# Patient Record
Sex: MALE | Race: WHITE | NOT HISPANIC OR LATINO | Employment: FULL TIME | URBAN - METROPOLITAN AREA
[De-identification: names, ages, dates, MRNs, and addresses within clinical notes are randomized per-mention and may not be internally consistent; named-entity substitution may affect disease eponyms.]

---

## 2024-09-14 ENCOUNTER — HOSPITAL ENCOUNTER (OUTPATIENT)
Dept: LAB | Facility: MEDICAL CENTER | Age: 59
End: 2024-09-14
Attending: FAMILY MEDICINE
Payer: COMMERCIAL

## 2024-09-14 ENCOUNTER — HOSPITAL ENCOUNTER (OUTPATIENT)
Dept: RADIOLOGY | Facility: MEDICAL CENTER | Age: 59
End: 2024-09-14
Attending: FAMILY MEDICINE
Payer: COMMERCIAL

## 2024-09-14 DIAGNOSIS — R10.84 ABDOMINAL PAIN, GENERALIZED: ICD-10-CM

## 2024-09-14 LAB
ALBUMIN SERPL BCP-MCNC: 4.6 G/DL (ref 3.2–4.9)
ALBUMIN/GLOB SERPL: 1.8 G/DL
ALP SERPL-CCNC: 100 U/L (ref 30–99)
ALT SERPL-CCNC: 39 U/L (ref 2–50)
ANION GAP SERPL CALC-SCNC: 12 MMOL/L (ref 7–16)
AST SERPL-CCNC: 32 U/L (ref 12–45)
BASOPHILS # BLD AUTO: 0.5 % (ref 0–1.8)
BASOPHILS # BLD: 0.03 K/UL (ref 0–0.12)
BILIRUB SERPL-MCNC: 0.5 MG/DL (ref 0.1–1.5)
BUN SERPL-MCNC: 14 MG/DL (ref 8–22)
CALCIUM ALBUM COR SERPL-MCNC: 9.6 MG/DL (ref 8.5–10.5)
CALCIUM SERPL-MCNC: 10.1 MG/DL (ref 8.4–10.2)
CHLORIDE SERPL-SCNC: 103 MMOL/L (ref 96–112)
CHOLEST SERPL-MCNC: 154 MG/DL (ref 100–199)
CO2 SERPL-SCNC: 26 MMOL/L (ref 20–33)
CREAT SERPL-MCNC: 0.88 MG/DL (ref 0.5–1.4)
EOSINOPHIL # BLD AUTO: 0.17 K/UL (ref 0–0.51)
EOSINOPHIL NFR BLD: 2.8 % (ref 0–6.9)
ERYTHROCYTE [DISTWIDTH] IN BLOOD BY AUTOMATED COUNT: 39.3 FL (ref 35.9–50)
EST. AVERAGE GLUCOSE BLD GHB EST-MCNC: 117 MG/DL
FASTING STATUS PATIENT QL REPORTED: NORMAL
GFR SERPLBLD CREATININE-BSD FMLA CKD-EPI: 99 ML/MIN/1.73 M 2
GLOBULIN SER CALC-MCNC: 2.6 G/DL (ref 1.9–3.5)
GLUCOSE SERPL-MCNC: 104 MG/DL (ref 65–99)
HBA1C MFR BLD: 5.7 % (ref 4–5.6)
HCT VFR BLD AUTO: 42.3 % (ref 42–52)
HDLC SERPL-MCNC: 48 MG/DL
HGB BLD-MCNC: 14.8 G/DL (ref 14–18)
IMM GRANULOCYTES # BLD AUTO: 0.02 K/UL (ref 0–0.11)
IMM GRANULOCYTES NFR BLD AUTO: 0.3 % (ref 0–0.9)
LDLC SERPL CALC-MCNC: 92 MG/DL
LIPASE SERPL-CCNC: 18 U/L (ref 11–82)
LYMPHOCYTES # BLD AUTO: 1.68 K/UL (ref 1–4.8)
LYMPHOCYTES NFR BLD: 27.2 % (ref 22–41)
MCH RBC QN AUTO: 30.7 PG (ref 27–33)
MCHC RBC AUTO-ENTMCNC: 35 G/DL (ref 32.3–36.5)
MCV RBC AUTO: 87.8 FL (ref 81.4–97.8)
MONOCYTES # BLD AUTO: 0.52 K/UL (ref 0–0.85)
MONOCYTES NFR BLD AUTO: 8.4 % (ref 0–13.4)
NEUTROPHILS # BLD AUTO: 3.76 K/UL (ref 1.82–7.42)
NEUTROPHILS NFR BLD: 60.8 % (ref 44–72)
NRBC # BLD AUTO: 0 K/UL
NRBC BLD-RTO: 0 /100 WBC (ref 0–0.2)
PLATELET # BLD AUTO: 228 K/UL (ref 164–446)
PMV BLD AUTO: 10 FL (ref 9–12.9)
POTASSIUM SERPL-SCNC: 4.7 MMOL/L (ref 3.6–5.5)
PROT SERPL-MCNC: 7.2 G/DL (ref 6–8.2)
PSA SERPL-MCNC: 2.76 NG/ML (ref 0–4)
RBC # BLD AUTO: 4.82 M/UL (ref 4.7–6.1)
SODIUM SERPL-SCNC: 141 MMOL/L (ref 135–145)
TRIGL SERPL-MCNC: 70 MG/DL (ref 0–149)
WBC # BLD AUTO: 6.2 K/UL (ref 4.8–10.8)

## 2024-09-14 PROCEDURE — 85025 COMPLETE CBC W/AUTO DIFF WBC: CPT

## 2024-09-14 PROCEDURE — 80053 COMPREHEN METABOLIC PANEL: CPT

## 2024-09-14 PROCEDURE — 36415 COLL VENOUS BLD VENIPUNCTURE: CPT

## 2024-09-14 PROCEDURE — 83036 HEMOGLOBIN GLYCOSYLATED A1C: CPT

## 2024-09-14 PROCEDURE — 80061 LIPID PANEL: CPT

## 2024-09-14 PROCEDURE — 84153 ASSAY OF PSA TOTAL: CPT

## 2024-09-14 PROCEDURE — 83690 ASSAY OF LIPASE: CPT

## 2024-09-14 PROCEDURE — 74018 RADEX ABDOMEN 1 VIEW: CPT

## 2024-09-19 ENCOUNTER — HOSPITAL ENCOUNTER (EMERGENCY)
Facility: MEDICAL CENTER | Age: 59
End: 2024-09-19
Attending: EMERGENCY MEDICINE
Payer: COMMERCIAL

## 2024-09-19 ENCOUNTER — APPOINTMENT (OUTPATIENT)
Dept: RADIOLOGY | Facility: MEDICAL CENTER | Age: 59
End: 2024-09-19
Attending: EMERGENCY MEDICINE
Payer: COMMERCIAL

## 2024-09-19 VITALS
DIASTOLIC BLOOD PRESSURE: 78 MMHG | BODY MASS INDEX: 28.33 KG/M2 | RESPIRATION RATE: 18 BRPM | TEMPERATURE: 97.7 F | OXYGEN SATURATION: 98 % | HEART RATE: 88 BPM | HEIGHT: 68 IN | SYSTOLIC BLOOD PRESSURE: 147 MMHG | WEIGHT: 186.95 LBS

## 2024-09-19 DIAGNOSIS — R93.3 ABNORMAL CT SCAN, COLON: ICD-10-CM

## 2024-09-19 DIAGNOSIS — N20.1 URETERAL STONE: Primary | ICD-10-CM

## 2024-09-19 LAB
ALBUMIN SERPL BCP-MCNC: 4.6 G/DL (ref 3.2–4.9)
ALBUMIN/GLOB SERPL: 1.9 G/DL
ALP SERPL-CCNC: 104 U/L (ref 30–99)
ALT SERPL-CCNC: 37 U/L (ref 2–50)
ANION GAP SERPL CALC-SCNC: 12 MMOL/L (ref 7–16)
APPEARANCE UR: CLEAR
AST SERPL-CCNC: 32 U/L (ref 12–45)
BACTERIA #/AREA URNS HPF: NEGATIVE /HPF
BASOPHILS # BLD AUTO: 0.5 % (ref 0–1.8)
BASOPHILS # BLD: 0.04 K/UL (ref 0–0.12)
BILIRUB SERPL-MCNC: 0.3 MG/DL (ref 0.1–1.5)
BILIRUB UR QL STRIP.AUTO: NEGATIVE
BUN SERPL-MCNC: 19 MG/DL (ref 8–22)
CALCIUM ALBUM COR SERPL-MCNC: 9.5 MG/DL (ref 8.5–10.5)
CALCIUM SERPL-MCNC: 10 MG/DL (ref 8.4–10.2)
CHLORIDE SERPL-SCNC: 102 MMOL/L (ref 96–112)
CO2 SERPL-SCNC: 21 MMOL/L (ref 20–33)
COLOR UR: YELLOW
CREAT SERPL-MCNC: 0.89 MG/DL (ref 0.5–1.4)
EOSINOPHIL # BLD AUTO: 0.19 K/UL (ref 0–0.51)
EOSINOPHIL NFR BLD: 2.3 % (ref 0–6.9)
EPI CELLS #/AREA URNS HPF: NEGATIVE /HPF
ERYTHROCYTE [DISTWIDTH] IN BLOOD BY AUTOMATED COUNT: 41 FL (ref 35.9–50)
GFR SERPLBLD CREATININE-BSD FMLA CKD-EPI: 99 ML/MIN/1.73 M 2
GLOBULIN SER CALC-MCNC: 2.4 G/DL (ref 1.9–3.5)
GLUCOSE SERPL-MCNC: 134 MG/DL (ref 65–99)
GLUCOSE UR STRIP.AUTO-MCNC: NEGATIVE MG/DL
HCT VFR BLD AUTO: 44.5 % (ref 42–52)
HGB BLD-MCNC: 15.4 G/DL (ref 14–18)
IMM GRANULOCYTES # BLD AUTO: 0.02 K/UL (ref 0–0.11)
IMM GRANULOCYTES NFR BLD AUTO: 0.2 % (ref 0–0.9)
KETONES UR STRIP.AUTO-MCNC: NEGATIVE MG/DL
LEUKOCYTE ESTERASE UR QL STRIP.AUTO: NEGATIVE
LIPASE SERPL-CCNC: 25 U/L (ref 11–82)
LYMPHOCYTES # BLD AUTO: 2.06 K/UL (ref 1–4.8)
LYMPHOCYTES NFR BLD: 25.3 % (ref 22–41)
MCH RBC QN AUTO: 30.9 PG (ref 27–33)
MCHC RBC AUTO-ENTMCNC: 34.6 G/DL (ref 32.3–36.5)
MCV RBC AUTO: 89.2 FL (ref 81.4–97.8)
MICRO URNS: ABNORMAL
MONOCYTES # BLD AUTO: 0.75 K/UL (ref 0–0.85)
MONOCYTES NFR BLD AUTO: 9.2 % (ref 0–13.4)
NEUTROPHILS # BLD AUTO: 5.08 K/UL (ref 1.82–7.42)
NEUTROPHILS NFR BLD: 62.5 % (ref 44–72)
NITRITE UR QL STRIP.AUTO: NEGATIVE
NRBC # BLD AUTO: 0 K/UL
NRBC BLD-RTO: 0 /100 WBC (ref 0–0.2)
PH UR STRIP.AUTO: 6 [PH] (ref 5–8)
PLATELET # BLD AUTO: 227 K/UL (ref 164–446)
PMV BLD AUTO: 10.2 FL (ref 9–12.9)
POTASSIUM SERPL-SCNC: 4.8 MMOL/L (ref 3.6–5.5)
PROT SERPL-MCNC: 7 G/DL (ref 6–8.2)
PROT UR QL STRIP: NEGATIVE MG/DL
RBC # BLD AUTO: 4.99 M/UL (ref 4.7–6.1)
RBC # URNS HPF: ABNORMAL /HPF
RBC UR QL AUTO: ABNORMAL
SODIUM SERPL-SCNC: 135 MMOL/L (ref 135–145)
SP GR UR STRIP.AUTO: <=1.005
WBC # BLD AUTO: 8.1 K/UL (ref 4.8–10.8)
WBC #/AREA URNS HPF: ABNORMAL /HPF

## 2024-09-19 PROCEDURE — 80053 COMPREHEN METABOLIC PANEL: CPT

## 2024-09-19 PROCEDURE — 96375 TX/PRO/DX INJ NEW DRUG ADDON: CPT

## 2024-09-19 PROCEDURE — A9270 NON-COVERED ITEM OR SERVICE: HCPCS | Performed by: EMERGENCY MEDICINE

## 2024-09-19 PROCEDURE — 85025 COMPLETE CBC W/AUTO DIFF WBC: CPT

## 2024-09-19 PROCEDURE — 36415 COLL VENOUS BLD VENIPUNCTURE: CPT

## 2024-09-19 PROCEDURE — 700102 HCHG RX REV CODE 250 W/ 637 OVERRIDE(OP): Performed by: EMERGENCY MEDICINE

## 2024-09-19 PROCEDURE — 700117 HCHG RX CONTRAST REV CODE 255: Performed by: EMERGENCY MEDICINE

## 2024-09-19 PROCEDURE — 700111 HCHG RX REV CODE 636 W/ 250 OVERRIDE (IP): Mod: JZ | Performed by: EMERGENCY MEDICINE

## 2024-09-19 PROCEDURE — 74177 CT ABD & PELVIS W/CONTRAST: CPT

## 2024-09-19 PROCEDURE — 81001 URINALYSIS AUTO W/SCOPE: CPT

## 2024-09-19 PROCEDURE — 96374 THER/PROPH/DIAG INJ IV PUSH: CPT

## 2024-09-19 PROCEDURE — 99285 EMERGENCY DEPT VISIT HI MDM: CPT

## 2024-09-19 PROCEDURE — 83690 ASSAY OF LIPASE: CPT

## 2024-09-19 RX ORDER — TAMSULOSIN HYDROCHLORIDE 0.4 MG/1
0.4 CAPSULE ORAL DAILY
Qty: 30 CAPSULE | Refills: 0 | Status: SHIPPED | OUTPATIENT
Start: 2024-09-19

## 2024-09-19 RX ORDER — ONDANSETRON 2 MG/ML
4 INJECTION INTRAMUSCULAR; INTRAVENOUS ONCE
Status: COMPLETED | OUTPATIENT
Start: 2024-09-19 | End: 2024-09-19

## 2024-09-19 RX ORDER — MORPHINE SULFATE 15 MG/1
15 TABLET ORAL EVERY 6 HOURS PRN
Qty: 15 TABLET | Refills: 0 | Status: SHIPPED | OUTPATIENT
Start: 2024-09-19 | End: 2024-09-19

## 2024-09-19 RX ORDER — TAMSULOSIN HYDROCHLORIDE 0.4 MG/1
0.4 CAPSULE ORAL ONCE
Status: COMPLETED | OUTPATIENT
Start: 2024-09-19 | End: 2024-09-19

## 2024-09-19 RX ORDER — OXYCODONE AND ACETAMINOPHEN 5; 325 MG/1; MG/1
1 TABLET ORAL EVERY 4 HOURS PRN
Qty: 10 TABLET | Refills: 0 | Status: SHIPPED | OUTPATIENT
Start: 2024-09-19 | End: 2024-09-22

## 2024-09-19 RX ORDER — HYDROMORPHONE HYDROCHLORIDE 1 MG/ML
1 INJECTION, SOLUTION INTRAMUSCULAR; INTRAVENOUS; SUBCUTANEOUS ONCE
Status: COMPLETED | OUTPATIENT
Start: 2024-09-19 | End: 2024-09-19

## 2024-09-19 RX ADMIN — HYDROMORPHONE HYDROCHLORIDE 1 MG: 1 INJECTION, SOLUTION INTRAMUSCULAR; INTRAVENOUS; SUBCUTANEOUS at 03:15

## 2024-09-19 RX ADMIN — TAMSULOSIN HYDROCHLORIDE 0.4 MG: 0.4 CAPSULE ORAL at 04:27

## 2024-09-19 RX ADMIN — IOHEXOL 100 ML: 350 INJECTION, SOLUTION INTRAVENOUS at 03:36

## 2024-09-19 RX ADMIN — ONDANSETRON 4 MG: 2 INJECTION INTRAMUSCULAR; INTRAVENOUS at 03:15

## 2024-09-19 ASSESSMENT — PAIN DESCRIPTION - PAIN TYPE: TYPE: ACUTE PAIN

## 2024-09-19 ASSESSMENT — FIBROSIS 4 INDEX: FIB4 SCORE: 1.33

## 2024-09-19 NOTE — ED PROVIDER NOTES
"ER Provider Note    Scribed for Jim Williamson Ii, M.d. by Ruy Peck. 9/19/2024  2:50 AM    Primary Care Provider: Rickie Naranjo M.D.    CHIEF COMPLAINT   Chief Complaint   Patient presents with    Low Back Pain     Right sided back pain worse tonight possible kidney stone hx of this 4months ago    Abdominal Pain     Lower abd pain   Reports it started in July & has been having issues since then with his pcp   No GI referral pending scheduling a colonoscopy    Nausea     -Vomit     EXTERNAL RECORDS REVIEWED  No recent records    HPI/ROS  LIMITATION TO HISTORY   Select: : None  OUTSIDE HISTORIAN(S):  None    Booker Saucedo is a 59 y.o. male who presents to the ED for evaluation of right sided lower back  pain onset 10 PM tonight. He reports a history of kidney stones which he states resemble his current symptoms. He reports his last kidney stone was around 4 months ago which he passed at home. He reports associated nausea, but denies fever. The patient had a prescription for tramadol which ran out. He has been taking antibiotics. The patient has been seen by his primary care provider for diarrhea and constipation with associated abdominal pain stating he has not had a normal bowel movement since July.  He has a history of myocardial infarction.     PAST MEDICAL HISTORY  Reports history of kidney stones, myocardial infarction.     SURGICAL HISTORY  No past surgical history noted.    FAMILY HISTORY  No family history noted.    SOCIAL HISTORY  None noted.    CURRENT MEDICATIONS  Discharge Medication List as of 9/19/2024  4:29 AM        ALLERGIES  Patient has no allergy information on record.    PHYSICAL EXAM  BP (!) 175/90   Pulse 83   Temp 36.5 °C (97.7 °F) (Temporal)   Resp 18   Ht 1.727 m (5' 8\")   Wt 84.8 kg (186 lb 15.2 oz)   SpO2 98%   BMI 28.43 kg/m²   Physical Exam  Vitals and nursing note reviewed.   Constitutional:       Comments: 59 year old man appears to be in pain   HENT:      " Head: Normocephalic and atraumatic.      Nose: Nose normal.      Mouth/Throat:      Mouth: Mucous membranes are moist.   Eyes:      Extraocular Movements: Extraocular movements intact.      Pupils: Pupils are equal, round, and reactive to light.   Cardiovascular:      Rate and Rhythm: Normal rate and regular rhythm.   Pulmonary:      Effort: Pulmonary effort is normal.      Breath sounds: Normal breath sounds.   Abdominal:      Comments: Mild distension, tenderness at right lower quadrant   Musculoskeletal:         General: No swelling or tenderness. Normal range of motion.   Skin:     General: Skin is warm.   Neurological:      General: No focal deficit present.      Mental Status: He is alert.   Psychiatric:         Mood and Affect: Mood normal.     DIAGNOSTIC STUDIES    EKG/LABS  Results for orders placed or performed during the hospital encounter of 09/19/24   CBC WITH DIFFERENTIAL   Result Value Ref Range    WBC 8.1 4.8 - 10.8 K/uL    RBC 4.99 4.70 - 6.10 M/uL    Hemoglobin 15.4 14.0 - 18.0 g/dL    Hematocrit 44.5 42.0 - 52.0 %    MCV 89.2 81.4 - 97.8 fL    MCH 30.9 27.0 - 33.0 pg    MCHC 34.6 32.3 - 36.5 g/dL    RDW 41.0 35.9 - 50.0 fL    Platelet Count 227 164 - 446 K/uL    MPV 10.2 9.0 - 12.9 fL    Neutrophils-Polys 62.50 44.00 - 72.00 %    Lymphocytes 25.30 22.00 - 41.00 %    Monocytes 9.20 0.00 - 13.40 %    Eosinophils 2.30 0.00 - 6.90 %    Basophils 0.50 0.00 - 1.80 %    Immature Granulocytes 0.20 0.00 - 0.90 %    Nucleated RBC 0.00 0.00 - 0.20 /100 WBC    Neutrophils (Absolute) 5.08 1.82 - 7.42 K/uL    Lymphs (Absolute) 2.06 1.00 - 4.80 K/uL    Monos (Absolute) 0.75 0.00 - 0.85 K/uL    Eos (Absolute) 0.19 0.00 - 0.51 K/uL    Baso (Absolute) 0.04 0.00 - 0.12 K/uL    Immature Granulocytes (abs) 0.02 0.00 - 0.11 K/uL    NRBC (Absolute) 0.00 K/uL   COMP METABOLIC PANEL   Result Value Ref Range    Sodium 135 135 - 145 mmol/L    Potassium 4.8 3.6 - 5.5 mmol/L    Chloride 102 96 - 112 mmol/L    Co2 21 20 - 33  mmol/L    Anion Gap 12.0 7.0 - 16.0    Glucose 134 (H) 65 - 99 mg/dL    Bun 19 8 - 22 mg/dL    Creatinine 0.89 0.50 - 1.40 mg/dL    Calcium 10.0 8.4 - 10.2 mg/dL    Correct Calcium 9.5 8.5 - 10.5 mg/dL    AST(SGOT) 32 12 - 45 U/L    ALT(SGPT) 37 2 - 50 U/L    Alkaline Phosphatase 104 (H) 30 - 99 U/L    Total Bilirubin 0.3 0.1 - 1.5 mg/dL    Albumin 4.6 3.2 - 4.9 g/dL    Total Protein 7.0 6.0 - 8.2 g/dL    Globulin 2.4 1.9 - 3.5 g/dL    A-G Ratio 1.9 g/dL   URINALYSIS (UA)    Specimen: Urine   Result Value Ref Range    Color Yellow     Character Clear     Specific Gravity <=1.005 <1.035    Ph 6.0 5.0 - 8.0    Glucose Negative Negative mg/dL    Ketones Negative Negative mg/dL    Protein Negative Negative mg/dL    Bilirubin Negative Negative    Nitrite Negative Negative    Leukocyte Esterase Negative Negative    Occult Blood Trace (A) Negative    Micro Urine Req Microscopic    LIPASE   Result Value Ref Range    Lipase 25 11 - 82 U/L   URINE MICROSCOPIC (W/UA)   Result Value Ref Range    WBC 0-2 (A) /hpf    RBC 0-2 (A) /hpf    Bacteria Negative None /hpf    Epithelial Cells Negative Few /hpf   ESTIMATED GFR   Result Value Ref Range    GFR (CKD-EPI) 99 >60 mL/min/1.73 m 2      I have independently interpreted this EKG    RADIOLOGY/PROCEDURES   The attending emergency physician has independently interpreted the diagnostic imaging associated with this visit and am waiting the final reading from the radiologist.   My preliminary interpretation is a follows: Right hydronephrosis    Radiologist interpretation:  CT-ABDOMEN-PELVIS WITH   Final Result      1.  Mild right hydronephrosis and hydroureter appear to be caused by obstructing 1 mm calculus at the right UVJ.      2.  No evidence of left-sided hydronephrosis.      3.  Rectal wall thickening appears to be present. Differential diagnosis includes proctitis colitis and carcinoma. Mildly enlarged perirectal lymph node is also noted.        COURSE & MEDICAL DECISION MAKING      ASSESSMENT, COURSE AND PLAN  Care Narrative:     2:54 AM - This is an evaluation of a 59 y.o. man who presents to the ED with lower abdominal pain radiating to the lower back onset tonight. He notes a history of kidney stones which his current symptoms resemble. He has also had fluctuating diarrhea, constipation and abdominal pain since July that has been unexplained. Plan for labs cbc, cmp, lipase, UA and CT abdomen pelvis. Pain could be renal stone, constipation, bowel obstruction, muscle strain.  He also has concerns about possible malignancy given his difficulties with stools and rectal pain.  He just finished a course of ciprofloxacin and Flagyl treating for possible colitis.  He has not had any recent imaging and his primary is trying to arrange for him to get a colonoscopy.    4:09 AM - Patient was reevaluated at bedside. Discussed lab and radiology results with the patient and informed them of findings significant for 1 mm right ureteral stone. There is also thickening of the rectum that could be colitis or carcinoma. I informed the patient of my plan for discharge including referrals to GI.  For his problems with fluctuating diarrhea and constipation I ecommended increased fiber intake.  He does need to have a colonoscopy to further investigate the abnormality seen on CT.  I did make an urgent referral to GI and gave him our on-call GI specialist contact information Dr. More.  He will also be prescribed Flomax for ureteral stone and medication for pain.  His pain has improved and I think it is likely that the stone will be passed soon.  I was agreeable to writing a prescription for pain medication that is for the kidney stone in the rectal pain he has been experiencing.  I did caution him that this could cause worsening constipation and to only take the medication as needed.  The patient was given strict return precautions for any new or worsening symptoms. Patient verbalizes understanding and  agreement to this plan of care.       PROBLEM LIST  #Right ureteral stone with hydronephrosis    #CT shows abnormality at rectum   -needs close follow up, colonoscopy with GI    DISPOSITION AND DISCUSSIONS  I have discussed management of the patient with the following physicians and MARTHA's:  None    Discussion of management with other HP or appropriate source(s): None         Barriers to care at this time, including but not limited to:  None known at this time .     Decision tools and prescription drugs considered including, but not limited to: Pain Medications Morphine 15 mg PO .    The patient will return for new or worsening symptoms and is stable at the time of discharge.    I reviewed prescription monitoring program for patient's narcotic use before prescribing a scheduled drug.The patient will not drink alcohol nor drive with prescribed medications. The patient will return for new or worsening symptoms and is stable at the time of discharge.    The patient is referred to a primary physician for blood pressure management, diabetic screening, and for all other preventative health concerns.    In prescribing controlled substances to this patient, I certify that I have obtained and reviewed the medical history of Booker Saucedo. I have also made a good abraham effort to obtain applicable records from other providers who have treated the patient and records did not demonstrate any increased risk of substance abuse that would prevent me from prescribing controlled substances.     I have conducted a physical exam and documented it. I have reviewed Mr. Saucedo’s prescription history as maintained by the [place] Prescription Monitoring Program.     I have assessed the patient’s risk for abuse, dependency, and addiction using the validated Opioid Risk Tool available at https://www.mdcalc.com/qhusas-foqm-mvta-ort-narcotic-abuse.     Given the above, I believe the benefits of controlled substance therapy outweigh the  risks. The reasons for prescribing controlled substances include non-narcotic, oral analgesic alternatives have been inadequate for pain control. Accordingly, I have discussed the risk and benefits, treatment plan, and alternative therapies with the patient.     DISPOSITION:  Patient will be discharged home in stable condition.    FOLLOW UP:  Pedrito More M.D.  [address]  [address]  [place] [id number] [phone]    Schedule an appointment as soon as possible for a visit   Call in 1 day to make an appointment for abnormal findings on CT    [facility] [place] [id number]  [place] [address]  [phone]      for kidney stones    [facility], Emergency Dept  [address]  [place] [address] [phone]    fever, frequent bloody stools    OUTPATIENT MEDICATIONS:  Discharge Medication List as of 9/19/2024  4:29 AM        START taking these medications    Details   morphine (MS IR) 15 MG tablet Take 1 Tablet by mouth every 6 hours as needed for Severe Pain for up to 10 days., Disp-15 Tablet, R-0, Normal      tamsulosin (FLOMAX) 0.4 MG capsule Take 1 Capsule by mouth every day., Disp-30 Capsule, R-0, Normal             FINAL DIAGNOSIS  1. Ureteral stone    2. Abnormal CT scan, colon      Ruy MCDONALD (Scribfarzaneh), am scribing for, and in the presence of, LUANN Eid II.    Electronically signed by: Ruy Peck (Scribe), 9/19/2024    Jim MCDONALD II, M* personally performed the services described in this documentation, as scribed by Ruy Peck in my presence, and it is both accurate and complete.     Dictation #1  MRN:[id number]  CSN:[id number] The note accurately reflects work and decisions made by me.  Jmi Williamson II, M.D.  9/19/2024  5:24 AM

## 2024-09-19 NOTE — PROGRESS NOTES
Md at bedside discussing findings and followup instuctions, pt medicated prior to discharge, pt understands discharge instructions and followup. Ambulated to exit without assist

## 2024-09-19 NOTE — PROGRESS NOTES
Progress Note    Date of Service: 09/19/24    Received a phone call from [facility] pharmacy.  They do not have morphine in stock.  They carry Norco and Percocet.  The maximum number of Percocet that they will give at 1 time is 10 tablets.  This does not seem like an unreasonable amount for pain associated with kidney stone in a patient who is not narcotic naïve.  Database was reviewed.  Patient was already consented.  I have written a new prescription and canceled morphine prescription.    Electronically signed by: Nikolas Lyon M.D., 9/19/2024 9:10 AM

## 2024-09-19 NOTE — ED NOTES
PT states he is experiencing abdominal and lower back pain all lower quadrants. Has been experiencing this since July last night is when it peaked in pain. PT states he has constipation and diarrhea and has a history of kidney stones.

## 2024-09-19 NOTE — DISCHARGE INSTRUCTIONS
CT-ABDOMEN-PELVIS WITH (Final result)  Result time 09/19/24 03:45:32  Final result                Impression:      1.  Mild right hydronephrosis and hydroureter appear to be caused by obstructing 1 mm calculus at the right UVJ.    2.  No evidence of left-sided hydronephrosis.    3.  Rectal wall thickening appears to be present. Differential diagnosis includes proctitis colitis and carcinoma. Mildly enlarged perirectal lymph node is also noted.            Narrative:      9/19/2024 3:07 AM    HISTORY/REASON FOR EXAM:  Right lower quadrant pain and right flank pain.      TECHNIQUE/EXAM DESCRIPTION:   CT scan of the abdomen and pelvis with contrast.    Contrast-enhanced helical scanning was obtained from the diaphragmatic domes through the pubic symphysis following the bolus administration of nonionic contrast without complication.    100 mL of Omnipaque 350 nonionic contrast was administered without complication.    Low dose optimization technique was utilized for this CT exam including automated exposure control and adjustment of the mA and/or kV according to patient size.    COMPARISON: No prior studies available.    FINDINGS:  Lower Chest: Unremarkable.    Liver: Normal.    Spleen: Unremarkable.    Pancreas: Unremarkable.    Gallbladder: No calcified stones.    Biliary: Nondilated.    Adrenal glands: Normal.    Kidneys: There is mild right hydronephrosis and hydroureter. Obstructing 1 mm calculus is noted at the right UVJ. Right-sided perirenal and periureteral stranding is identified..    Bowel: Rectal wall thickening appears to be present. Mild stranding is noted perirectal fat..    Lymph nodes: 7 mm perirectal lymph node.    Vasculature: Unremarkable.    Peritoneum: Unremarkable without ascites.    Musculoskeletal: No acute or destructive process.    Pelvis: No adenopathy or free fluid.

## 2024-09-19 NOTE — ED TRIAGE NOTES
Chief Complaint   Patient presents with    Low Back Pain     Right sided back pain worse tonight possible kidney stone hx of this 4months ago    Abdominal Pain     Lower abd pain   Reports it started in July & has been having issues since then with his pcp   No GI referral pending scheduling a colonoscopy    Nausea     -Vomit

## 2024-09-25 ENCOUNTER — APPOINTMENT (OUTPATIENT)
Dept: RADIOLOGY | Facility: MEDICAL CENTER | Age: 59
End: 2024-09-25
Attending: EMERGENCY MEDICINE
Payer: COMMERCIAL

## 2024-09-25 ENCOUNTER — HOSPITAL ENCOUNTER (EMERGENCY)
Facility: MEDICAL CENTER | Age: 59
End: 2024-09-26
Attending: EMERGENCY MEDICINE
Payer: COMMERCIAL

## 2024-09-25 DIAGNOSIS — N23 RENAL COLIC: ICD-10-CM

## 2024-09-25 DIAGNOSIS — K59.00 CONSTIPATION, UNSPECIFIED CONSTIPATION TYPE: ICD-10-CM

## 2024-09-25 LAB
ALBUMIN SERPL BCP-MCNC: 4.6 G/DL (ref 3.2–4.9)
ALBUMIN/GLOB SERPL: 1.7 G/DL
ALP SERPL-CCNC: 90 U/L (ref 30–99)
ALT SERPL-CCNC: 44 U/L (ref 2–50)
ANION GAP SERPL CALC-SCNC: 16 MMOL/L (ref 7–16)
APPEARANCE UR: CLEAR
APTT PPP: 29.6 SEC (ref 24.7–36)
AST SERPL-CCNC: 34 U/L (ref 12–45)
BACTERIA #/AREA URNS HPF: NEGATIVE /HPF
BASOPHILS # BLD AUTO: 0.3 % (ref 0–1.8)
BASOPHILS # BLD: 0.02 K/UL (ref 0–0.12)
BILIRUB SERPL-MCNC: 0.6 MG/DL (ref 0.1–1.5)
BILIRUB UR QL STRIP.AUTO: NEGATIVE
BUN SERPL-MCNC: 12 MG/DL (ref 8–22)
CALCIUM ALBUM COR SERPL-MCNC: 9.7 MG/DL (ref 8.5–10.5)
CALCIUM SERPL-MCNC: 10.2 MG/DL (ref 8.4–10.2)
CHLORIDE SERPL-SCNC: 96 MMOL/L (ref 96–112)
CO2 SERPL-SCNC: 23 MMOL/L (ref 20–33)
COLOR UR: YELLOW
CREAT SERPL-MCNC: 0.83 MG/DL (ref 0.5–1.4)
EOSINOPHIL # BLD AUTO: 0.09 K/UL (ref 0–0.51)
EOSINOPHIL NFR BLD: 1.3 % (ref 0–6.9)
EPI CELLS #/AREA URNS HPF: NEGATIVE /HPF
ERYTHROCYTE [DISTWIDTH] IN BLOOD BY AUTOMATED COUNT: 38.2 FL (ref 35.9–50)
GFR SERPLBLD CREATININE-BSD FMLA CKD-EPI: 101 ML/MIN/1.73 M 2
GLOBULIN SER CALC-MCNC: 2.7 G/DL (ref 1.9–3.5)
GLUCOSE SERPL-MCNC: 87 MG/DL (ref 65–99)
GLUCOSE UR STRIP.AUTO-MCNC: NEGATIVE MG/DL
HCT VFR BLD AUTO: 43.2 % (ref 42–52)
HGB BLD-MCNC: 15.1 G/DL (ref 14–18)
HYALINE CASTS #/AREA URNS LPF: ABNORMAL /LPF
IMM GRANULOCYTES # BLD AUTO: 0.01 K/UL (ref 0–0.11)
IMM GRANULOCYTES NFR BLD AUTO: 0.1 % (ref 0–0.9)
INR PPP: 1.04 (ref 0.87–1.13)
KETONES UR STRIP.AUTO-MCNC: 40 MG/DL
LEUKOCYTE ESTERASE UR QL STRIP.AUTO: NEGATIVE
LIPASE SERPL-CCNC: 9 U/L (ref 11–82)
LYMPHOCYTES # BLD AUTO: 1.56 K/UL (ref 1–4.8)
LYMPHOCYTES NFR BLD: 21.9 % (ref 22–41)
MCH RBC QN AUTO: 30.8 PG (ref 27–33)
MCHC RBC AUTO-ENTMCNC: 35 G/DL (ref 32.3–36.5)
MCV RBC AUTO: 88.2 FL (ref 81.4–97.8)
MICRO URNS: ABNORMAL
MONOCYTES # BLD AUTO: 0.61 K/UL (ref 0–0.85)
MONOCYTES NFR BLD AUTO: 8.6 % (ref 0–13.4)
NEUTROPHILS # BLD AUTO: 4.82 K/UL (ref 1.82–7.42)
NEUTROPHILS NFR BLD: 67.8 % (ref 44–72)
NITRITE UR QL STRIP.AUTO: NEGATIVE
NRBC # BLD AUTO: 0 K/UL
NRBC BLD-RTO: 0 /100 WBC (ref 0–0.2)
PH UR STRIP.AUTO: 6 [PH] (ref 5–8)
PLATELET # BLD AUTO: 247 K/UL (ref 164–446)
PMV BLD AUTO: 9.3 FL (ref 9–12.9)
POTASSIUM SERPL-SCNC: 4.3 MMOL/L (ref 3.6–5.5)
PROT SERPL-MCNC: 7.3 G/DL (ref 6–8.2)
PROT UR QL STRIP: NEGATIVE MG/DL
PROTHROMBIN TIME: 13.9 SEC (ref 12–14.6)
RBC # BLD AUTO: 4.9 M/UL (ref 4.7–6.1)
RBC # URNS HPF: ABNORMAL /HPF
RBC UR QL AUTO: ABNORMAL
SODIUM SERPL-SCNC: 135 MMOL/L (ref 135–145)
SP GR UR STRIP.AUTO: 1.01
WBC # BLD AUTO: 7.1 K/UL (ref 4.8–10.8)
WBC #/AREA URNS HPF: ABNORMAL /HPF

## 2024-09-25 PROCEDURE — 36415 COLL VENOUS BLD VENIPUNCTURE: CPT

## 2024-09-25 PROCEDURE — 96375 TX/PRO/DX INJ NEW DRUG ADDON: CPT

## 2024-09-25 PROCEDURE — 700111 HCHG RX REV CODE 636 W/ 250 OVERRIDE (IP): Mod: JZ | Performed by: EMERGENCY MEDICINE

## 2024-09-25 PROCEDURE — 81001 URINALYSIS AUTO W/SCOPE: CPT

## 2024-09-25 PROCEDURE — 85730 THROMBOPLASTIN TIME PARTIAL: CPT

## 2024-09-25 PROCEDURE — 99285 EMERGENCY DEPT VISIT HI MDM: CPT

## 2024-09-25 PROCEDURE — 83690 ASSAY OF LIPASE: CPT

## 2024-09-25 PROCEDURE — 96374 THER/PROPH/DIAG INJ IV PUSH: CPT

## 2024-09-25 PROCEDURE — 85025 COMPLETE CBC W/AUTO DIFF WBC: CPT

## 2024-09-25 PROCEDURE — 80053 COMPREHEN METABOLIC PANEL: CPT

## 2024-09-25 PROCEDURE — 85610 PROTHROMBIN TIME: CPT

## 2024-09-25 RX ORDER — ONDANSETRON 2 MG/ML
4 INJECTION INTRAMUSCULAR; INTRAVENOUS ONCE
Status: COMPLETED | OUTPATIENT
Start: 2024-09-26 | End: 2024-09-25

## 2024-09-25 RX ORDER — MORPHINE SULFATE 4 MG/ML
4 INJECTION INTRAVENOUS ONCE
Status: COMPLETED | OUTPATIENT
Start: 2024-09-26 | End: 2024-09-25

## 2024-09-25 RX ORDER — SODIUM PHOSPHATE,MONO-DIBASIC 19G-7G/118
1 ENEMA (ML) RECTAL ONCE
Status: COMPLETED | OUTPATIENT
Start: 2024-09-26 | End: 2024-09-26

## 2024-09-25 RX ADMIN — ONDANSETRON 4 MG: 2 INJECTION INTRAMUSCULAR; INTRAVENOUS at 23:59

## 2024-09-25 RX ADMIN — MORPHINE SULFATE 4 MG: 4 INJECTION, SOLUTION INTRAMUSCULAR; INTRAVENOUS at 23:59

## 2024-09-25 ASSESSMENT — FIBROSIS 4 INDEX: FIB4 SCORE: 1.37

## 2024-09-26 VITALS
TEMPERATURE: 98.6 F | SYSTOLIC BLOOD PRESSURE: 149 MMHG | DIASTOLIC BLOOD PRESSURE: 94 MMHG | OXYGEN SATURATION: 98 % | HEIGHT: 68 IN | BODY MASS INDEX: 27.57 KG/M2 | WEIGHT: 181.88 LBS | RESPIRATION RATE: 22 BRPM | HEART RATE: 73 BPM

## 2024-09-26 PROCEDURE — 700101 HCHG RX REV CODE 250: Performed by: EMERGENCY MEDICINE

## 2024-09-26 PROCEDURE — 74177 CT ABD & PELVIS W/CONTRAST: CPT

## 2024-09-26 PROCEDURE — 700117 HCHG RX CONTRAST REV CODE 255: Performed by: EMERGENCY MEDICINE

## 2024-09-26 RX ORDER — SODIUM PHOSPHATE,MONO-DIBASIC 19G-7G/118
1 ENEMA (ML) RECTAL ONCE
Status: DISCONTINUED | OUTPATIENT
Start: 2024-09-26 | End: 2024-09-26

## 2024-09-26 RX ADMIN — IOHEXOL 100 ML: 350 INJECTION, SOLUTION INTRAVENOUS at 00:19

## 2024-09-26 RX ADMIN — SODIUM PHOSPHATE 1 ENEMA: 7; 19 ENEMA RECTAL at 00:01

## 2024-09-26 NOTE — ED NOTES
Pt. Verbalizes understanding of discharge instructions.  Pt. Alert/awake in NAD.  All questions answered and understood. Advised to call GI about visit in ER.

## 2024-09-26 NOTE — ED PROVIDER NOTES
"ER Provider Note    Scribed for Dr. Gigi Lopes M.D. by Poonam Traore. 9/25/2024  11:27 PM    Primary Care Provider: Rickie Naranjo M.D.    CHIEF COMPLAINT  Chief Complaint   Patient presents with    Abdominal Pain     Pt has having abdominal pain like 8/10 cramping possible bowel obstruction. Pt has a scheduled  for colonoscopy tomorrow and drinking the premedication before procedure but no bowel movement with little amount of stool coming out. Pt hx hypertension,  kidney stones and CAD with stent on aspirin.       EXTERNAL RECORDS REVIEWED  Outpatient Notes Patient was seen on 9/19 and found to have thickening of the rectum and a ureteral stone. He is prepping for a colonoscopy tonight.    HPI/ROS    Booker Saucedo is a 59 y.o. male who presents to the ED for abdominal pain onset one week ago. The patient describes his pain as pressure, cramping, and localized to his lower pelvis. He notes he is scheduled for a colonoscopy tomorrow morning and has been drinking the premedication, however he has only been able to pass very small, \"mushy\" bowel movements. The patient has a history of kidney stones and recent ureteral stone.    PAST MEDICAL HISTORY  History reviewed. No pertinent past medical history.    SURGICAL HISTORY  History reviewed. No pertinent surgical history.    FAMILY HISTORY  History reviewed. No pertinent family history.    SOCIAL HISTORY   reports that he has never smoked. He has never used smokeless tobacco. He reports that he does not drink alcohol and does not use drugs.    CURRENT MEDICATIONS  Previous Medications    TAMSULOSIN (FLOMAX) 0.4 MG CAPSULE    Take 1 Capsule by mouth every day.       ALLERGIES  Patient has no known allergies.    PHYSICAL EXAM  BP (!) 144/83   Pulse 79   Temp 37 °C (98.6 °F) (Tympanic)   Resp (!) 22   Ht 1.727 m (5' 8\")   Wt 82.5 kg (181 lb 14.1 oz)   BMI 27.65 kg/m²   Constitutional: Alert in no apparent distress.  HENT: No signs of trauma, " Bilateral external ears normal, Nose normal.   Eyes: Pupils are equal and reactive, Conjunctiva normal, Non-icteric.   Neck: Normal range of motion, No tenderness, Supple,   Cardiovascular: Regular rate and rhythm, no murmurs.   Thorax & Lungs: Normal breath sounds, No respiratory distress, No wheezing, No chest tenderness.   Abdomen: Bowel sounds diminished but present, Soft, Tenderness throughout.  Skin: Warm, Dry, No erythema, No rash.   Back: No bony tenderness, No CVA tenderness.   Extremities: No edema, No tenderness, No cyanosis.  Neurologic: Awake and alert.  Psychiatric: Affect normal     DIAGNOSTIC STUDIES & PROCEDURES    Labs:   Labs Reviewed   CBC WITH DIFFERENTIAL - Abnormal; Notable for the following components:       Result Value    Lymphocytes 21.90 (*)     All other components within normal limits   LIPASE - Abnormal; Notable for the following components:    Lipase 9 (*)     All other components within normal limits   URINALYSIS - Abnormal; Notable for the following components:    Ketones 40 (*)     Occult Blood Trace (*)     All other components within normal limits   URINE MICROSCOPIC (W/UA) - Abnormal; Notable for the following components:    WBC 0-2 (*)     RBC 0-2 (*)     All other components within normal limits   COMP METABOLIC PANEL   PROTHROMBIN TIME   APTT   ESTIMATED GFR      All labs reviewed by me.    Radiology:   The attending Emergency Physician has independently interpreted the diagnostic imaging associated with this visit and is awaiting the final reading from the radiologist, which will be displayed below.    Preliminary interpretation is a follows: Stool noted in the patient's distal colon  Radiologist interpretation:     CT-ABDOMEN-PELVIS WITH   Final Result         1.  1.0 mm right ureterovesicular junction stone resulting in right urinary outflow tract changes   2.  Mild fluid-filled prominence of small bowel and colon, consider component of enterocolitis.   3.  Atherosclerosis  and atherosclerotic coronary artery disease           COURSE & MEDICAL DECISION MAKING    ED Observation Status? No; Patient does not meet criteria for ED Observation.     INITIAL ASSESSMENT AND PLAN  Care Narrative:       11:27 PM - Patient seen and evaluated at bedside. He presents with lower pelvic pain and difficulty passing stools. Discussed plan of care, including labs and imaging to evaluate. Patient agrees to plan of care. Ordered CT-Abdomen-Pelvis w/, UA w/ microscopic, Lipase, CMP, CBC w/ diff, PT/INR, and PTT to evaluate.        ADDITIONAL PROBLEM LIST AND DISPOSITION  Patient with some symptoms of constipation.  Ultimately if he was given in the patient feeling much improved.  CT scan does show a 1 mm right UVJ stone.  This was known to the patient.  Lipase is low.  No concerning infection in the urine.  Patient feeling improved.  Will follow-up colonoscopy tomorrow.  Will give strict return precautions and follow-up.               DISPOSITION AND DISCUSSIONS  I have discussed management of the patient with the following physicians and MARTHA's: none    Discussion of management with other QHP or appropriate source(s): None     Escalation of care considered, and ultimately not performed: acute inpatient care management, however at this time, the patient is most appropriate for outpatient management.    Barriers to care at this time, including but not limited to:  None known at this time .     Decision tools and prescription drugs considered including, but not limited to: Pain Medications as needed over-the-counter .        FINAL IMPRESSION   1. Renal colic    2. Constipation, unspecified constipation type         I, Poonam Traore (Tyra), am scribing for, and in the presence of, Gigi Lopes M.D..    Electronically signed by: Poonam Graves), 9/25/2024    IGigi M.D. personally performed the services described in this documentation, as scribed by Poonam Traore in my presence,  and it is both accurate and complete.    The note accurately reflects work and decisions made by me.  Gigi Lopes M.D.  9/26/2024  12:51 AM

## 2024-09-27 ENCOUNTER — HOSPITAL ENCOUNTER (OUTPATIENT)
Dept: RADIOLOGY | Facility: MEDICAL CENTER | Age: 59
End: 2024-09-27
Attending: SURGERY
Payer: COMMERCIAL

## 2024-09-27 DIAGNOSIS — R19.00 INTRA-ABDOMINAL AND PELVIC SWELLING, MASS AND LUMP, UNSPECIFIED SITE: ICD-10-CM

## 2024-09-27 PROCEDURE — 700117 HCHG RX CONTRAST REV CODE 255: Performed by: SURGERY

## 2024-09-27 PROCEDURE — 71260 CT THORAX DX C+: CPT

## 2024-09-27 RX ADMIN — IOHEXOL 100 ML: 350 INJECTION, SOLUTION INTRAVENOUS at 18:05

## 2024-09-27 RX ADMIN — IOHEXOL 25 ML: 240 INJECTION, SOLUTION INTRATHECAL; INTRAVASCULAR; INTRAVENOUS; ORAL at 18:17

## 2024-10-02 ENCOUNTER — HOSPITAL ENCOUNTER (OUTPATIENT)
Dept: RADIATION ONCOLOGY | Facility: MEDICAL CENTER | Age: 59
End: 2024-10-02
Attending: RADIOLOGY
Payer: COMMERCIAL

## 2024-10-02 ENCOUNTER — PATIENT MESSAGE (OUTPATIENT)
Dept: ONCOLOGY | Facility: MEDICAL CENTER | Age: 59
End: 2024-10-02

## 2024-10-02 ENCOUNTER — HOSPITAL ENCOUNTER (OUTPATIENT)
Dept: RADIOLOGY | Facility: MEDICAL CENTER | Age: 59
End: 2024-10-02
Attending: SURGERY
Payer: COMMERCIAL

## 2024-10-02 VITALS
DIASTOLIC BLOOD PRESSURE: 83 MMHG | SYSTOLIC BLOOD PRESSURE: 135 MMHG | RESPIRATION RATE: 17 BRPM | WEIGHT: 177.69 LBS | HEART RATE: 76 BPM | BODY MASS INDEX: 26.93 KG/M2 | TEMPERATURE: 97.8 F | OXYGEN SATURATION: 99 % | HEIGHT: 68 IN

## 2024-10-02 DIAGNOSIS — R19.00 INTRA-ABDOMINAL AND PELVIC SWELLING, MASS AND LUMP, UNSPECIFIED SITE: ICD-10-CM

## 2024-10-02 DIAGNOSIS — C20 RECTAL CANCER (HCC): ICD-10-CM

## 2024-10-02 PROCEDURE — 99205 OFFICE O/P NEW HI 60 MIN: CPT | Performed by: RADIOLOGY

## 2024-10-02 PROCEDURE — A9579 GAD-BASE MR CONTRAST NOS,1ML: HCPCS | Mod: JZ | Performed by: SURGERY

## 2024-10-02 PROCEDURE — 700117 HCHG RX CONTRAST REV CODE 255: Mod: JZ | Performed by: SURGERY

## 2024-10-02 PROCEDURE — 700111 HCHG RX REV CODE 636 W/ 250 OVERRIDE (IP): Mod: JZ | Performed by: RADIOLOGY

## 2024-10-02 PROCEDURE — 72197 MRI PELVIS W/O & W/DYE: CPT

## 2024-10-02 PROCEDURE — 99214 OFFICE O/P EST MOD 30 MIN: CPT | Performed by: RADIOLOGY

## 2024-10-02 RX ORDER — TRAMADOL HYDROCHLORIDE 100 MG/1
100 TABLET, COATED ORAL 2 TIMES DAILY PRN
COMMUNITY
Start: 2024-09-18 | End: 2024-10-23

## 2024-10-02 RX ADMIN — GADOTERIDOL 20 ML: 279.3 INJECTION, SOLUTION INTRAVENOUS at 15:33

## 2024-10-02 RX ADMIN — GLUCAGON 1 MG: 1 INJECTION, POWDER, LYOPHILIZED, FOR SOLUTION INTRAMUSCULAR; INTRAVENOUS at 14:50

## 2024-10-02 ASSESSMENT — LIFESTYLE VARIABLES
SMOKING_STATUS: NO
TOBACCO_USE: NO

## 2024-10-02 ASSESSMENT — PAIN SCALES - GENERAL: PAINLEVEL: 4=SLIGHT-MODERATE PAIN

## 2024-10-02 ASSESSMENT — FIBROSIS 4 INDEX: FIB4 SCORE: 1.22

## 2024-10-03 ENCOUNTER — PATIENT OUTREACH (OUTPATIENT)
Dept: ONCOLOGY | Facility: MEDICAL CENTER | Age: 59
End: 2024-10-03
Payer: COMMERCIAL

## 2024-10-04 ENCOUNTER — HOSPITAL ENCOUNTER (OUTPATIENT)
Dept: LAB | Facility: MEDICAL CENTER | Age: 59
End: 2024-10-04
Attending: SURGERY
Payer: COMMERCIAL

## 2024-10-04 LAB — CEA SERPL-MCNC: 2.5 NG/ML (ref 0–3)

## 2024-10-04 PROCEDURE — 82378 CARCINOEMBRYONIC ANTIGEN: CPT

## 2024-10-04 PROCEDURE — 36415 COLL VENOUS BLD VENIPUNCTURE: CPT

## 2024-10-09 ENCOUNTER — HOSPITAL ENCOUNTER (OUTPATIENT)
Dept: HEMATOLOGY ONCOLOGY | Facility: MEDICAL CENTER | Age: 59
End: 2024-10-09
Attending: STUDENT IN AN ORGANIZED HEALTH CARE EDUCATION/TRAINING PROGRAM
Payer: COMMERCIAL

## 2024-10-09 ENCOUNTER — APPOINTMENT (OUTPATIENT)
Dept: RADIOLOGY | Facility: MEDICAL CENTER | Age: 59
End: 2024-10-09
Attending: RADIOLOGY
Payer: COMMERCIAL

## 2024-10-09 DIAGNOSIS — Z79.899 ENCOUNTER FOR LONG-TERM (CURRENT) USE OF HIGH-RISK MEDICATION: ICD-10-CM

## 2024-10-09 DIAGNOSIS — C20 RECTAL CANCER (HCC): ICD-10-CM

## 2024-10-09 LAB — GLUCOSE BLD-MCNC: 84 MG/DL (ref 65–99)

## 2024-10-09 PROCEDURE — A9579 GAD-BASE MR CONTRAST NOS,1ML: HCPCS | Mod: JZ | Performed by: RADIOLOGY

## 2024-10-09 PROCEDURE — A9552 F18 FDG: HCPCS

## 2024-10-09 PROCEDURE — 74183 MRI ABD W/O CNTR FLWD CNTR: CPT

## 2024-10-09 PROCEDURE — 99214 OFFICE O/P EST MOD 30 MIN: CPT

## 2024-10-09 PROCEDURE — 700117 HCHG RX CONTRAST REV CODE 255: Mod: JZ | Performed by: RADIOLOGY

## 2024-10-09 RX ORDER — LOPERAMIDE HYDROCHLORIDE 2 MG/1
2 TABLET ORAL SEE ADMIN INSTRUCTIONS
Qty: 30 TABLET | Refills: 6 | Status: SHIPPED | OUTPATIENT
Start: 2024-10-12

## 2024-10-09 RX ORDER — PROCHLORPERAZINE MALEATE 10 MG
10 TABLET ORAL EVERY 6 HOURS PRN
Qty: 30 TABLET | Refills: 6 | Status: SHIPPED | OUTPATIENT
Start: 2024-10-12

## 2024-10-09 RX ORDER — OLANZAPINE 5 MG/1
5 TABLET ORAL NIGHTLY
Qty: 30 TABLET | Refills: 6 | Status: SHIPPED | OUTPATIENT
Start: 2024-10-12

## 2024-10-09 RX ORDER — ONDANSETRON 4 MG/1
4 TABLET, FILM COATED ORAL EVERY 4 HOURS PRN
Qty: 30 TABLET | Refills: 6 | Status: SHIPPED | OUTPATIENT
Start: 2024-10-12

## 2024-10-09 RX ADMIN — GADOTERIDOL 15 ML: 279.3 INJECTION, SOLUTION INTRAVENOUS at 13:27

## 2024-10-11 ENCOUNTER — APPOINTMENT (OUTPATIENT)
Dept: ADMISSIONS | Facility: MEDICAL CENTER | Age: 59
End: 2024-10-11
Attending: SURGERY
Payer: COMMERCIAL

## 2024-10-14 ENCOUNTER — PRE-ADMISSION TESTING (OUTPATIENT)
Dept: ADMISSIONS | Facility: MEDICAL CENTER | Age: 59
End: 2024-10-14
Attending: SURGERY
Payer: COMMERCIAL

## 2024-10-14 RX ORDER — HYDROXYZINE HYDROCHLORIDE 25 MG/1
25 TABLET, FILM COATED ORAL EVERY 8 HOURS PRN
COMMUNITY
Start: 2024-10-04

## 2024-10-14 RX ORDER — ASPIRIN 81 MG/1
81 TABLET ORAL DAILY
COMMUNITY

## 2024-10-14 RX ORDER — CARVEDILOL 12.5 MG/1
12.5 TABLET ORAL 2 TIMES DAILY
COMMUNITY

## 2024-10-14 RX ORDER — ATORVASTATIN CALCIUM 40 MG/1
40 TABLET, FILM COATED ORAL DAILY
COMMUNITY

## 2024-10-14 RX ORDER — CITALOPRAM HYDROBROMIDE 20 MG/1
20 TABLET ORAL DAILY
COMMUNITY

## 2024-10-15 ENCOUNTER — APPOINTMENT (OUTPATIENT)
Dept: RADIOLOGY | Facility: MEDICAL CENTER | Age: 59
End: 2024-10-15
Attending: SURGERY
Payer: COMMERCIAL

## 2024-10-15 ENCOUNTER — ANESTHESIA (OUTPATIENT)
Dept: SURGERY | Facility: MEDICAL CENTER | Age: 59
End: 2024-10-15
Payer: COMMERCIAL

## 2024-10-15 ENCOUNTER — TELEPHONE (OUTPATIENT)
Dept: RADIATION ONCOLOGY | Facility: MEDICAL CENTER | Age: 59
End: 2024-10-15

## 2024-10-15 ENCOUNTER — ANESTHESIA EVENT (OUTPATIENT)
Dept: SURGERY | Facility: MEDICAL CENTER | Age: 59
End: 2024-10-15
Payer: COMMERCIAL

## 2024-10-15 ENCOUNTER — HOSPITAL ENCOUNTER (OUTPATIENT)
Facility: MEDICAL CENTER | Age: 59
End: 2024-10-15
Attending: SURGERY | Admitting: SURGERY
Payer: COMMERCIAL

## 2024-10-15 VITALS
HEIGHT: 68 IN | SYSTOLIC BLOOD PRESSURE: 145 MMHG | DIASTOLIC BLOOD PRESSURE: 85 MMHG | HEART RATE: 78 BPM | TEMPERATURE: 98 F | RESPIRATION RATE: 15 BRPM | WEIGHT: 182.98 LBS | OXYGEN SATURATION: 92 % | BODY MASS INDEX: 27.73 KG/M2

## 2024-10-15 DIAGNOSIS — C20 RECTAL CANCER (HCC): ICD-10-CM

## 2024-10-15 LAB — EKG IMPRESSION: NORMAL

## 2024-10-15 PROCEDURE — 160046 HCHG PACU - 1ST 60 MINS PHASE II: Performed by: SURGERY

## 2024-10-15 PROCEDURE — 700111 HCHG RX REV CODE 636 W/ 250 OVERRIDE (IP): Performed by: SURGERY

## 2024-10-15 PROCEDURE — 93005 ELECTROCARDIOGRAM TRACING: CPT | Performed by: SURGERY

## 2024-10-15 PROCEDURE — C1788 PORT, INDWELLING, IMP: HCPCS | Performed by: SURGERY

## 2024-10-15 PROCEDURE — 160048 HCHG OR STATISTICAL LEVEL 1-5: Performed by: SURGERY

## 2024-10-15 PROCEDURE — 160002 HCHG RECOVERY MINUTES (STAT): Performed by: SURGERY

## 2024-10-15 PROCEDURE — 700111 HCHG RX REV CODE 636 W/ 250 OVERRIDE (IP): Performed by: STUDENT IN AN ORGANIZED HEALTH CARE EDUCATION/TRAINING PROGRAM

## 2024-10-15 PROCEDURE — 160039 HCHG SURGERY MINUTES - EA ADDL 1 MIN LEVEL 3: Performed by: SURGERY

## 2024-10-15 PROCEDURE — 700111 HCHG RX REV CODE 636 W/ 250 OVERRIDE (IP): Mod: JZ | Performed by: STUDENT IN AN ORGANIZED HEALTH CARE EDUCATION/TRAINING PROGRAM

## 2024-10-15 PROCEDURE — 71045 X-RAY EXAM CHEST 1 VIEW: CPT

## 2024-10-15 PROCEDURE — 160025 RECOVERY II MINUTES (STATS): Performed by: SURGERY

## 2024-10-15 PROCEDURE — 160028 HCHG SURGERY MINUTES - 1ST 30 MINS LEVEL 3: Performed by: SURGERY

## 2024-10-15 PROCEDURE — 160035 HCHG PACU - 1ST 60 MINS PHASE I: Performed by: SURGERY

## 2024-10-15 PROCEDURE — 160009 HCHG ANES TIME/MIN: Performed by: SURGERY

## 2024-10-15 PROCEDURE — 160036 HCHG PACU - EA ADDL 30 MINS PHASE I: Performed by: SURGERY

## 2024-10-15 PROCEDURE — 700101 HCHG RX REV CODE 250: Performed by: STUDENT IN AN ORGANIZED HEALTH CARE EDUCATION/TRAINING PROGRAM

## 2024-10-15 PROCEDURE — A9270 NON-COVERED ITEM OR SERVICE: HCPCS | Performed by: ANESTHESIOLOGY

## 2024-10-15 PROCEDURE — 700105 HCHG RX REV CODE 258: Performed by: SURGERY

## 2024-10-15 PROCEDURE — A9270 NON-COVERED ITEM OR SERVICE: HCPCS | Performed by: STUDENT IN AN ORGANIZED HEALTH CARE EDUCATION/TRAINING PROGRAM

## 2024-10-15 PROCEDURE — 700102 HCHG RX REV CODE 250 W/ 637 OVERRIDE(OP): Performed by: ANESTHESIOLOGY

## 2024-10-15 PROCEDURE — 93010 ELECTROCARDIOGRAM REPORT: CPT | Performed by: INTERNAL MEDICINE

## 2024-10-15 PROCEDURE — 700102 HCHG RX REV CODE 250 W/ 637 OVERRIDE(OP): Performed by: STUDENT IN AN ORGANIZED HEALTH CARE EDUCATION/TRAINING PROGRAM

## 2024-10-15 DEVICE — POWER PORTMRI COMPATABLE: Type: IMPLANTABLE DEVICE | Site: CHEST | Status: FUNCTIONAL

## 2024-10-15 RX ORDER — EPHEDRINE SULFATE 50 MG/ML
INJECTION, SOLUTION INTRAVENOUS PRN
Status: DISCONTINUED | OUTPATIENT
Start: 2024-10-15 | End: 2024-10-15 | Stop reason: SURG

## 2024-10-15 RX ORDER — HALOPERIDOL 5 MG/ML
1 INJECTION INTRAMUSCULAR
Status: DISCONTINUED | OUTPATIENT
Start: 2024-10-15 | End: 2024-10-15 | Stop reason: HOSPADM

## 2024-10-15 RX ORDER — SODIUM CHLORIDE, SODIUM LACTATE, POTASSIUM CHLORIDE, CALCIUM CHLORIDE 600; 310; 30; 20 MG/100ML; MG/100ML; MG/100ML; MG/100ML
INJECTION, SOLUTION INTRAVENOUS CONTINUOUS
Status: ACTIVE | OUTPATIENT
Start: 2024-10-15 | End: 2024-10-15

## 2024-10-15 RX ORDER — DIPHENHYDRAMINE HYDROCHLORIDE 50 MG/ML
12.5 INJECTION INTRAMUSCULAR; INTRAVENOUS
Status: DISCONTINUED | OUTPATIENT
Start: 2024-10-15 | End: 2024-10-15 | Stop reason: HOSPADM

## 2024-10-15 RX ORDER — BUPIVACAINE HYDROCHLORIDE 5 MG/ML
INJECTION, SOLUTION EPIDURAL; INTRACAUDAL
Status: DISCONTINUED | OUTPATIENT
Start: 2024-10-15 | End: 2024-10-15 | Stop reason: HOSPADM

## 2024-10-15 RX ORDER — OXYCODONE HCL 5 MG/5 ML
5 SOLUTION, ORAL ORAL
Status: COMPLETED | OUTPATIENT
Start: 2024-10-15 | End: 2024-10-15

## 2024-10-15 RX ORDER — LANOLIN ALCOHOL/MO/W.PET/CERES
3 CREAM (GRAM) TOPICAL
COMMUNITY

## 2024-10-15 RX ORDER — NAPROXEN SODIUM 220 MG/1
220 TABLET, FILM COATED ORAL 2 TIMES DAILY PRN
COMMUNITY

## 2024-10-15 RX ORDER — CEFAZOLIN SODIUM 1 G/3ML
INJECTION, POWDER, FOR SOLUTION INTRAMUSCULAR; INTRAVENOUS PRN
Status: DISCONTINUED | OUTPATIENT
Start: 2024-10-15 | End: 2024-10-15 | Stop reason: SURG

## 2024-10-15 RX ORDER — HYDROCODONE BITARTRATE AND ACETAMINOPHEN 7.5; 325 MG/1; MG/1
1 TABLET ORAL EVERY 8 HOURS PRN
COMMUNITY

## 2024-10-15 RX ORDER — ALBUTEROL SULFATE 5 MG/ML
2.5 SOLUTION RESPIRATORY (INHALATION)
Status: DISCONTINUED | OUTPATIENT
Start: 2024-10-15 | End: 2024-10-15 | Stop reason: HOSPADM

## 2024-10-15 RX ORDER — POLYETHYLENE GLYCOL-3350 AND ELECTROLYTES 236; 6.74; 5.86; 2.97; 22.74 G/274.31G; G/274.31G; G/274.31G; G/274.31G; G/274.31G
4 POWDER, FOR SOLUTION ORAL ONCE
COMMUNITY
Start: 2024-09-23 | End: 2024-10-23

## 2024-10-15 RX ORDER — MIDAZOLAM HYDROCHLORIDE 1 MG/ML
INJECTION INTRAMUSCULAR; INTRAVENOUS PRN
Status: DISCONTINUED | OUTPATIENT
Start: 2024-10-15 | End: 2024-10-15 | Stop reason: SURG

## 2024-10-15 RX ORDER — HYDROMORPHONE HYDROCHLORIDE 1 MG/ML
0.1 INJECTION, SOLUTION INTRAMUSCULAR; INTRAVENOUS; SUBCUTANEOUS
Status: DISCONTINUED | OUTPATIENT
Start: 2024-10-15 | End: 2024-10-15 | Stop reason: HOSPADM

## 2024-10-15 RX ORDER — ACETAMINOPHEN 500 MG
1000 TABLET ORAL ONCE
Status: COMPLETED | OUTPATIENT
Start: 2024-10-15 | End: 2024-10-15

## 2024-10-15 RX ORDER — ONDANSETRON 2 MG/ML
INJECTION INTRAMUSCULAR; INTRAVENOUS PRN
Status: DISCONTINUED | OUTPATIENT
Start: 2024-10-15 | End: 2024-10-15 | Stop reason: SURG

## 2024-10-15 RX ORDER — OXYCODONE AND ACETAMINOPHEN 5; 325 MG/1; MG/1
1 TABLET ORAL EVERY 4 HOURS PRN
COMMUNITY
End: 2024-10-16

## 2024-10-15 RX ORDER — HYDROMORPHONE HYDROCHLORIDE 1 MG/ML
0.4 INJECTION, SOLUTION INTRAMUSCULAR; INTRAVENOUS; SUBCUTANEOUS
Status: DISCONTINUED | OUTPATIENT
Start: 2024-10-15 | End: 2024-10-15 | Stop reason: HOSPADM

## 2024-10-15 RX ORDER — HYDROMORPHONE HYDROCHLORIDE 1 MG/ML
0.2 INJECTION, SOLUTION INTRAMUSCULAR; INTRAVENOUS; SUBCUTANEOUS
Status: DISCONTINUED | OUTPATIENT
Start: 2024-10-15 | End: 2024-10-15 | Stop reason: HOSPADM

## 2024-10-15 RX ORDER — OXYCODONE HCL 5 MG/5 ML
10 SOLUTION, ORAL ORAL
Status: COMPLETED | OUTPATIENT
Start: 2024-10-15 | End: 2024-10-15

## 2024-10-15 RX ORDER — ONDANSETRON 2 MG/ML
4 INJECTION INTRAMUSCULAR; INTRAVENOUS
Status: DISCONTINUED | OUTPATIENT
Start: 2024-10-15 | End: 2024-10-15 | Stop reason: HOSPADM

## 2024-10-15 RX ADMIN — SODIUM CHLORIDE, POTASSIUM CHLORIDE, SODIUM LACTATE AND CALCIUM CHLORIDE: 600; 310; 30; 20 INJECTION, SOLUTION INTRAVENOUS at 12:58

## 2024-10-15 RX ADMIN — GLYCOPYRROLATE 0.3 MG: 0.2 INJECTION INTRAMUSCULAR; INTRAVENOUS at 18:01

## 2024-10-15 RX ADMIN — EPHEDRINE SULFATE 15 MG: 50 INJECTION, SOLUTION INTRAVENOUS at 18:05

## 2024-10-15 RX ADMIN — FENTANYL CITRATE 25 MCG: 50 INJECTION, SOLUTION INTRAMUSCULAR; INTRAVENOUS at 19:45

## 2024-10-15 RX ADMIN — FENTANYL CITRATE 100 MCG: 50 INJECTION, SOLUTION INTRAMUSCULAR; INTRAVENOUS at 17:40

## 2024-10-15 RX ADMIN — EPHEDRINE SULFATE 15 MG: 50 INJECTION, SOLUTION INTRAVENOUS at 17:47

## 2024-10-15 RX ADMIN — ACETAMINOPHEN 1000 MG: 500 TABLET ORAL at 12:51

## 2024-10-15 RX ADMIN — MIDAZOLAM HYDROCHLORIDE 2 MG: 2 INJECTION, SOLUTION INTRAMUSCULAR; INTRAVENOUS at 17:40

## 2024-10-15 RX ADMIN — CEFAZOLIN 2 G: 1 INJECTION, POWDER, FOR SOLUTION INTRAMUSCULAR; INTRAVENOUS at 17:53

## 2024-10-15 RX ADMIN — OXYCODONE HYDROCHLORIDE 10 MG: 5 SOLUTION ORAL at 19:21

## 2024-10-15 RX ADMIN — ONDANSETRON 4 MG: 2 INJECTION INTRAMUSCULAR; INTRAVENOUS at 18:10

## 2024-10-15 RX ADMIN — PROPOFOL 200 MG: 10 INJECTION, EMULSION INTRAVENOUS at 17:41

## 2024-10-15 ASSESSMENT — PAIN DESCRIPTION - PAIN TYPE
TYPE: SURGICAL PAIN
TYPE: ACUTE PAIN;SURGICAL PAIN
TYPE: SURGICAL PAIN
TYPE: SURGICAL PAIN

## 2024-10-15 ASSESSMENT — FIBROSIS 4 INDEX: FIB4 SCORE: 1.22

## 2024-10-16 ENCOUNTER — PHARMACY VISIT (OUTPATIENT)
Dept: PHARMACY | Facility: MEDICAL CENTER | Age: 59
End: 2024-10-16
Payer: MEDICARE

## 2024-10-16 ENCOUNTER — HOSPITAL ENCOUNTER (OUTPATIENT)
Dept: RADIATION ONCOLOGY | Facility: MEDICAL CENTER | Age: 59
End: 2024-10-16
Attending: RADIOLOGY
Payer: COMMERCIAL

## 2024-10-16 ENCOUNTER — HOSPITAL ENCOUNTER (OUTPATIENT)
Dept: HEMATOLOGY ONCOLOGY | Facility: MEDICAL CENTER | Age: 59
End: 2024-10-16
Attending: STUDENT IN AN ORGANIZED HEALTH CARE EDUCATION/TRAINING PROGRAM
Payer: COMMERCIAL

## 2024-10-16 VITALS
WEIGHT: 186 LBS | BODY MASS INDEX: 28.19 KG/M2 | SYSTOLIC BLOOD PRESSURE: 113 MMHG | DIASTOLIC BLOOD PRESSURE: 59 MMHG | HEIGHT: 68 IN

## 2024-10-16 VITALS
RESPIRATION RATE: 18 BRPM | WEIGHT: 186.95 LBS | HEART RATE: 63 BPM | SYSTOLIC BLOOD PRESSURE: 125 MMHG | BODY MASS INDEX: 28.43 KG/M2 | OXYGEN SATURATION: 96 % | DIASTOLIC BLOOD PRESSURE: 76 MMHG | TEMPERATURE: 97.7 F

## 2024-10-16 DIAGNOSIS — G89.3 CANCER RELATED PAIN: ICD-10-CM

## 2024-10-16 DIAGNOSIS — C20 RECTAL CANCER (HCC): ICD-10-CM

## 2024-10-16 PROCEDURE — 99205 OFFICE O/P NEW HI 60 MIN: CPT | Performed by: STUDENT IN AN ORGANIZED HEALTH CARE EDUCATION/TRAINING PROGRAM

## 2024-10-16 PROCEDURE — 99212 OFFICE O/P EST SF 10 MIN: CPT | Performed by: STUDENT IN AN ORGANIZED HEALTH CARE EDUCATION/TRAINING PROGRAM

## 2024-10-16 PROCEDURE — RXMED WILLOW AMBULATORY MEDICATION CHARGE: Performed by: STUDENT IN AN ORGANIZED HEALTH CARE EDUCATION/TRAINING PROGRAM

## 2024-10-16 RX ORDER — ONDANSETRON 2 MG/ML
4 INJECTION INTRAMUSCULAR; INTRAVENOUS PRN
OUTPATIENT
Start: 2024-10-16

## 2024-10-16 RX ORDER — DEXTROSE MONOHYDRATE 50 MG/ML
INJECTION, SOLUTION INTRAVENOUS CONTINUOUS
OUTPATIENT
Start: 2024-10-16

## 2024-10-16 RX ORDER — ONDANSETRON 8 MG/1
8 TABLET, ORALLY DISINTEGRATING ORAL PRN
OUTPATIENT
Start: 2024-10-16

## 2024-10-16 RX ORDER — 0.9 % SODIUM CHLORIDE 0.9 %
3 VIAL (ML) INJECTION PRN
OUTPATIENT
Start: 2024-10-16

## 2024-10-16 RX ORDER — 0.9 % SODIUM CHLORIDE 0.9 %
10 VIAL (ML) INJECTION PRN
OUTPATIENT
Start: 2024-10-16

## 2024-10-16 RX ORDER — 0.9 % SODIUM CHLORIDE 0.9 %
VIAL (ML) INJECTION PRN
OUTPATIENT
Start: 2024-10-16

## 2024-10-16 RX ORDER — OXYCODONE AND ACETAMINOPHEN 5; 325 MG/1; MG/1
1 TABLET ORAL EVERY 8 HOURS PRN
Qty: 30 TABLET | Refills: 0 | Status: SHIPPED | OUTPATIENT
Start: 2024-10-16 | End: 2024-10-26

## 2024-10-16 RX ORDER — PROCHLORPERAZINE MALEATE 10 MG
10 TABLET ORAL EVERY 6 HOURS PRN
OUTPATIENT
Start: 2024-10-16

## 2024-10-16 RX ORDER — METHYLPREDNISOLONE SODIUM SUCCINATE 125 MG/2ML
125 INJECTION, POWDER, LYOPHILIZED, FOR SOLUTION INTRAMUSCULAR; INTRAVENOUS PRN
OUTPATIENT
Start: 2024-10-16

## 2024-10-16 RX ORDER — EPINEPHRINE 1 MG/ML(1)
0.5 AMPUL (ML) INJECTION PRN
OUTPATIENT
Start: 2024-10-16

## 2024-10-16 RX ORDER — LOPERAMIDE HYDROCHLORIDE 2 MG/1
4 CAPSULE ORAL PRN
OUTPATIENT
Start: 2024-10-16

## 2024-10-16 RX ORDER — 0.9 % SODIUM CHLORIDE 0.9 %
20 VIAL (ML) INJECTION PRN
OUTPATIENT
Start: 2024-10-16

## 2024-10-16 RX ORDER — ATROPINE SULFATE 1 MG/ML
0.5 INJECTION, SOLUTION INTRAVENOUS PRN
OUTPATIENT
Start: 2024-10-16

## 2024-10-16 RX ORDER — DIPHENHYDRAMINE HYDROCHLORIDE 50 MG/ML
50 INJECTION INTRAMUSCULAR; INTRAVENOUS PRN
OUTPATIENT
Start: 2024-10-16

## 2024-10-16 RX ORDER — LOPERAMIDE HYDROCHLORIDE 2 MG/1
2 CAPSULE ORAL
OUTPATIENT
Start: 2024-10-16

## 2024-10-16 ASSESSMENT — FIBROSIS 4 INDEX
FIB4 SCORE: 1.22
FIB4 SCORE: 1.22

## 2024-10-16 ASSESSMENT — PAIN SCALES - GENERAL: PAINLEVEL: 2=MINIMAL-SLIGHT

## 2024-10-17 ENCOUNTER — APPOINTMENT (OUTPATIENT)
Dept: ADMISSIONS | Facility: MEDICAL CENTER | Age: 59
End: 2024-10-17
Attending: RADIOLOGY
Payer: COMMERCIAL

## 2024-10-17 ASSESSMENT — ENCOUNTER SYMPTOMS
DOUBLE VISION: 0
WHEEZING: 0
BLOOD IN STOOL: 0
DIZZINESS: 0
VOMITING: 0
SORE THROAT: 0
SHORTNESS OF BREATH: 0
TINGLING: 0
CONSTIPATION: 0
NERVOUS/ANXIOUS: 0
WEIGHT LOSS: 0
DIAPHORESIS: 0
NAUSEA: 0
COUGH: 0
MYALGIAS: 0
SPUTUM PRODUCTION: 0
SINUS PAIN: 0
PALPITATIONS: 0
HEADACHES: 0
ABDOMINAL PAIN: 1
DIARRHEA: 0
BACK PAIN: 0
ROS GI COMMENTS: RECTAL PAIN
CHILLS: 0
BRUISES/BLEEDS EASILY: 0
ORTHOPNEA: 0
FEVER: 0
HEARTBURN: 0
BLURRED VISION: 0
INSOMNIA: 0
WEAKNESS: 0

## 2024-10-21 ENCOUNTER — TELEPHONE (OUTPATIENT)
Dept: HEMATOLOGY ONCOLOGY | Facility: MEDICAL CENTER | Age: 59
End: 2024-10-21
Payer: COMMERCIAL

## 2024-10-23 ENCOUNTER — PRE-ADMISSION TESTING (OUTPATIENT)
Dept: ADMISSIONS | Facility: MEDICAL CENTER | Age: 59
End: 2024-10-23
Attending: RADIOLOGY
Payer: COMMERCIAL

## 2024-10-23 ENCOUNTER — TELEPHONE (OUTPATIENT)
Dept: SURGICAL ONCOLOGY | Facility: MEDICAL CENTER | Age: 59
End: 2024-10-23
Payer: COMMERCIAL

## 2024-10-23 RX ORDER — POLYETHYLENE GLYCOL 3350 17 G/17G
17 POWDER, FOR SOLUTION ORAL
COMMUNITY

## 2024-10-24 ENCOUNTER — PATIENT OUTREACH (OUTPATIENT)
Dept: ONCOLOGY | Facility: MEDICAL CENTER | Age: 59
End: 2024-10-24
Payer: COMMERCIAL

## 2024-10-29 DIAGNOSIS — G89.3 CANCER RELATED PAIN: ICD-10-CM

## 2024-10-29 PROCEDURE — RXMED WILLOW AMBULATORY MEDICATION CHARGE: Performed by: STUDENT IN AN ORGANIZED HEALTH CARE EDUCATION/TRAINING PROGRAM

## 2024-10-29 RX ORDER — OXYCODONE AND ACETAMINOPHEN 5; 325 MG/1; MG/1
1 TABLET ORAL EVERY 8 HOURS PRN
Qty: 90 TABLET | Refills: 0 | Status: SHIPPED | OUTPATIENT
Start: 2024-10-29 | End: 2024-11-28

## 2024-10-31 NOTE — PROGRESS NOTES
"Pharmacy Chemotherapy Verification Note:    Patient Name: Booker Saucedo      Dx: Rectal Cancer      Protocol: mFOLFIRINOX (OXALIplatin + Leucovorin + Irinotecan + Fluorouracil)       *Dosing Reference*  OXALIplatin 85 mg/m² IV over 2 hours on Day 1   Leucovorin 400 mg/m² IV over 2 hours on Day 1   Irinotecan 150 mg/m² IV over 30 - 90 minutes on Day 1   Fluorouracil 1,200 mg/m² IV continuous infusion daily on Days 1 - 2 (2,400 mg/m2 IV over 46 hours)     14-day cycle for 6 - 8 cycles (Total Neoadjuvant Therapy) or until disease progression or unacceptable toxicity (advanced or metastatic)    1. NCCNGuidelines® for Rectal Cancer V.1.2024.   2. Jim J , et al. Clin Colorectal Cancer. 2019;18(1):e69-e73.c   3. Awa WEST et al. Beena Surg Oncol. 2013;20(13):4289-97.d    Allergies:  Patient has no known allergies.     BP (!) 145/85   Pulse 90   Temp 36.1 °C (97 °F) (Temporal)   Resp 18   Ht 1.727 m (5' 8\")   Wt 82.6 kg (182 lb 1.6 oz)   SpO2 97%   BMI 27.69 kg/m²  Body surface area is 1.99 meters squared.    Labs 11/7/24:  ANC~ 3770 Plt = 238k   Hgb = 13.8     SCr = 0.83 mg/dL CrCl ~ 111.5 mL/min   AST/ALT/AP = 24/16/103 TBili = 0.3          Drug Order   (Drug name, dose, route, IV Fluid & volume, frequency, number of doses) Cycle: 1      Previous treatment: n/a     Medication = OXALIplatin  Base Dose = 85 mg/m²  Calc Dose: Base Dose x 1.99 m² = 169.15 mg  Final Dose = 165 mg  Route = IV  Fluid & Volume = D5W 250 mL  Admin Duration = Over 2 hours          <10% difference, rounded to vial size (with 10%) per dose rounding protocol, OK to treat with final dose     Medication = Leucovorin  Base Dose = 400 mg/m²  Calc Dose: Base Dose x 1.99 m² = 796 mg  Final Dose = 790 mg  Route = IV  Fluid & Volume = D5W 250 mL  Admin Duration = Over 2 hours          <10% difference, rounded to vial size (with 10%) per dose rounding protocol, OK to treat with final dose     Medication = Irinotecan  Base Dose = 150 " mg/m²  Calc Dose:Base Dose x 1.99 m² = 298.5 mg  Final Dose = 295.6 mg  Route = IV  Fluid & Volume = D5W 500 mL  Admin Duration = Over 90 minutes          <10% difference, rounded to vial size (with 10%) per dose rounding protocol, OK to treat with final dose     Medication = Fluorouracil  Base Dose = 2400 mg/m²  Calc Dose: Base Dose x 1.99 m² = 4776 mg  Final Dose = 4730 mg  Route = continuous IV infusion  Conc 50 mg/ml Volume = 94.6mL (+ 3 mL overfill)  Admin Duration = Over 48 hours at a rate of 2 ml/hr          <10% difference, rounded to vial size (with 10%) per dose rounding protocol, OK to treat with final dose       By my signature below, I confirm this process was performed independently with the BSA and all final chemotherapy dosing calculations congruent. I have reviewed the above chemotherapy order and that my calculation of the final dose and BSA (when applicable) corroborate those calculations of the  pharmacist.     Aliyah Xiao, PharmD

## 2024-11-03 ENCOUNTER — PHARMACY VISIT (OUTPATIENT)
Dept: PHARMACY | Facility: MEDICAL CENTER | Age: 59
End: 2024-11-03
Payer: MEDICARE

## 2024-11-06 ENCOUNTER — PATIENT OUTREACH (OUTPATIENT)
Dept: ONCOLOGY | Facility: MEDICAL CENTER | Age: 59
End: 2024-11-06
Payer: COMMERCIAL

## 2024-11-06 NOTE — PROGRESS NOTES
On November 6, 2024, Oncology Social Worker Kathy Tse attempted telephone contact with pt. regarding lodging reservations. OSW Dedrick left voicemail message for pt. requesting pt. call back at earliest convenience.  OSW Dedrick left contact information in voicemail message.

## 2024-11-07 ENCOUNTER — HOSPITAL ENCOUNTER (OUTPATIENT)
Dept: LAB | Facility: MEDICAL CENTER | Age: 59
End: 2024-11-07
Attending: STUDENT IN AN ORGANIZED HEALTH CARE EDUCATION/TRAINING PROGRAM
Payer: COMMERCIAL

## 2024-11-07 DIAGNOSIS — C20 RECTAL CANCER (HCC): ICD-10-CM

## 2024-11-07 DIAGNOSIS — Z79.899 ENCOUNTER FOR LONG-TERM (CURRENT) USE OF HIGH-RISK MEDICATION: ICD-10-CM

## 2024-11-07 LAB
ALBUMIN SERPL BCP-MCNC: 4.5 G/DL (ref 3.2–4.9)
ALBUMIN/GLOB SERPL: 1.6 G/DL
ALP SERPL-CCNC: 103 U/L (ref 30–99)
ALT SERPL-CCNC: 16 U/L (ref 2–50)
ANION GAP SERPL CALC-SCNC: 13 MMOL/L (ref 7–16)
AST SERPL-CCNC: 24 U/L (ref 12–45)
BASOPHILS # BLD AUTO: 0.5 % (ref 0–1.8)
BASOPHILS # BLD: 0.03 K/UL (ref 0–0.12)
BILIRUB SERPL-MCNC: 0.3 MG/DL (ref 0.1–1.5)
BUN SERPL-MCNC: 16 MG/DL (ref 8–22)
CALCIUM ALBUM COR SERPL-MCNC: 10.1 MG/DL (ref 8.5–10.5)
CALCIUM SERPL-MCNC: 10.5 MG/DL (ref 8.5–10.5)
CHLORIDE SERPL-SCNC: 103 MMOL/L (ref 96–112)
CO2 SERPL-SCNC: 25 MMOL/L (ref 20–33)
CREAT SERPL-MCNC: 0.83 MG/DL (ref 0.5–1.4)
EOSINOPHIL # BLD AUTO: 0.17 K/UL (ref 0–0.51)
EOSINOPHIL NFR BLD: 2.6 % (ref 0–6.9)
ERYTHROCYTE [DISTWIDTH] IN BLOOD BY AUTOMATED COUNT: 41.2 FL (ref 35.9–50)
GFR SERPLBLD CREATININE-BSD FMLA CKD-EPI: 101 ML/MIN/1.73 M 2
GLOBULIN SER CALC-MCNC: 2.9 G/DL (ref 1.9–3.5)
GLUCOSE SERPL-MCNC: 86 MG/DL (ref 65–99)
HCT VFR BLD AUTO: 40.2 % (ref 42–52)
HGB BLD-MCNC: 13.8 G/DL (ref 14–18)
IMM GRANULOCYTES # BLD AUTO: 0.02 K/UL (ref 0–0.11)
IMM GRANULOCYTES NFR BLD AUTO: 0.3 % (ref 0–0.9)
LYMPHOCYTES # BLD AUTO: 1.84 K/UL (ref 1–4.8)
LYMPHOCYTES NFR BLD: 28.5 % (ref 22–41)
MCH RBC QN AUTO: 30.7 PG (ref 27–33)
MCHC RBC AUTO-ENTMCNC: 34.3 G/DL (ref 32.3–36.5)
MCV RBC AUTO: 89.5 FL (ref 81.4–97.8)
MONOCYTES # BLD AUTO: 0.63 K/UL (ref 0–0.85)
MONOCYTES NFR BLD AUTO: 9.8 % (ref 0–13.4)
NEUTROPHILS # BLD AUTO: 3.77 K/UL (ref 1.82–7.42)
NEUTROPHILS NFR BLD: 58.3 % (ref 44–72)
NRBC # BLD AUTO: 0 K/UL
NRBC BLD-RTO: 0 /100 WBC (ref 0–0.2)
PLATELET # BLD AUTO: 238 K/UL (ref 164–446)
PMV BLD AUTO: 10 FL (ref 9–12.9)
POTASSIUM SERPL-SCNC: 4.2 MMOL/L (ref 3.6–5.5)
PROT SERPL-MCNC: 7.4 G/DL (ref 6–8.2)
RBC # BLD AUTO: 4.49 M/UL (ref 4.7–6.1)
SODIUM SERPL-SCNC: 141 MMOL/L (ref 135–145)
WBC # BLD AUTO: 6.5 K/UL (ref 4.8–10.8)

## 2024-11-07 PROCEDURE — 85025 COMPLETE CBC W/AUTO DIFF WBC: CPT

## 2024-11-07 PROCEDURE — 80053 COMPREHEN METABOLIC PANEL: CPT

## 2024-11-07 PROCEDURE — 36415 COLL VENOUS BLD VENIPUNCTURE: CPT

## 2024-11-07 NOTE — PROGRESS NOTES
On November 5th, 2024, Oncology Social Worker Kathy Tse received notification from JUANA Alvarado with the following request:  Booker Saucedo will need his lodging secured today for check in tomorrow 11/7, check out 11/8. C1 chemo @ 0700 11/8.      Chart note from JUANA Alvarado stated the following on 10/24/2024:  Booker has previously completed and returned his application for [employer] lodging assistance.     SHELBI Tse had not received [facility] Patient Lodging Assistance Hitesh application.  SHELBI Tse asked JUANA Alvarado regarding application.  JUANA Alvarado provided SHELBI Tse with printed pt.'s application.  SHELBI Tse was informed application had been placed in patient's media within [facility].  SHELBI Tse was not made aware of this.      SHELBI Tse attempted telephone contact with the [facility] multiple times and was unsuccessful at reaching staff to make pt.'s lodging accommodations.

## 2024-11-07 NOTE — PROGRESS NOTES
On November 6, 2024, Oncology Social Worker Kathy Tse received telephone call back from pt.  OSW Dedrick informed pt. room had been reserved for pt. to check in on November 7th, 2024 and check out on November 8th, 2024.  OSW Dedrick provided pt. with instructions on where the [facility]  is located, reservation details and must have debit/credit card or cash in the amount of $200 security deposit to check in.

## 2024-11-08 ENCOUNTER — PATIENT OUTREACH (OUTPATIENT)
Dept: ONCOLOGY | Facility: MEDICAL CENTER | Age: 59
End: 2024-11-08

## 2024-11-08 ENCOUNTER — OUTPATIENT INFUSION SERVICES (OUTPATIENT)
Dept: ONCOLOGY | Facility: MEDICAL CENTER | Age: 59
End: 2024-11-08
Attending: STUDENT IN AN ORGANIZED HEALTH CARE EDUCATION/TRAINING PROGRAM
Payer: COMMERCIAL

## 2024-11-08 VITALS
DIASTOLIC BLOOD PRESSURE: 85 MMHG | RESPIRATION RATE: 18 BRPM | WEIGHT: 182.1 LBS | OXYGEN SATURATION: 97 % | HEART RATE: 90 BPM | BODY MASS INDEX: 27.6 KG/M2 | SYSTOLIC BLOOD PRESSURE: 145 MMHG | HEIGHT: 68 IN | TEMPERATURE: 97 F

## 2024-11-08 DIAGNOSIS — C20 RECTAL CANCER (HCC): ICD-10-CM

## 2024-11-08 LAB — CEA SERPL-MCNC: 2.6 NG/ML (ref 0–3)

## 2024-11-08 PROCEDURE — 700105 HCHG RX REV CODE 258: Performed by: STUDENT IN AN ORGANIZED HEALTH CARE EDUCATION/TRAINING PROGRAM

## 2024-11-08 PROCEDURE — 96375 TX/PRO/DX INJ NEW DRUG ADDON: CPT

## 2024-11-08 PROCEDURE — 96417 CHEMO IV INFUS EACH ADDL SEQ: CPT

## 2024-11-08 PROCEDURE — 700101 HCHG RX REV CODE 250: Performed by: STUDENT IN AN ORGANIZED HEALTH CARE EDUCATION/TRAINING PROGRAM

## 2024-11-08 PROCEDURE — 82378 CARCINOEMBRYONIC ANTIGEN: CPT

## 2024-11-08 PROCEDURE — 96367 TX/PROPH/DG ADDL SEQ IV INF: CPT

## 2024-11-08 PROCEDURE — 700111 HCHG RX REV CODE 636 W/ 250 OVERRIDE (IP): Mod: JZ | Performed by: STUDENT IN AN ORGANIZED HEALTH CARE EDUCATION/TRAINING PROGRAM

## 2024-11-08 PROCEDURE — 96413 CHEMO IV INFUSION 1 HR: CPT

## 2024-11-08 PROCEDURE — A4212 NON CORING NEEDLE OR STYLET: HCPCS

## 2024-11-08 PROCEDURE — 96415 CHEMO IV INFUSION ADDL HR: CPT

## 2024-11-08 PROCEDURE — G0498 CHEMO EXTEND IV INFUS W/PUMP: HCPCS

## 2024-11-08 RX ORDER — ATROPINE SULFATE 1 MG/ML
0.5 INJECTION, SOLUTION INTRAVENOUS PRN
Status: DISCONTINUED | OUTPATIENT
Start: 2024-11-08 | End: 2024-11-08 | Stop reason: HOSPADM

## 2024-11-08 RX ORDER — DEXAMETHASONE SODIUM PHOSPHATE 4 MG/ML
12 INJECTION, SOLUTION INTRA-ARTICULAR; INTRALESIONAL; INTRAMUSCULAR; INTRAVENOUS; SOFT TISSUE ONCE
Status: COMPLETED | OUTPATIENT
Start: 2024-11-08 | End: 2024-11-08

## 2024-11-08 RX ORDER — ONDANSETRON 2 MG/ML
16 INJECTION INTRAMUSCULAR; INTRAVENOUS ONCE
Status: COMPLETED | OUTPATIENT
Start: 2024-11-08 | End: 2024-11-08

## 2024-11-08 RX ADMIN — LIDOCAINE HYDROCHLORIDE 0.5 ML: 10 INJECTION, SOLUTION EPIDURAL; INFILTRATION; INTRACAUDAL at 07:20

## 2024-11-08 RX ADMIN — ATROPINE SULFATE 0.5 MG: 1 INJECTION, SOLUTION INTRAVENOUS at 08:18

## 2024-11-08 RX ADMIN — FLUOROURACIL 4730 MG: 50 INJECTION, SOLUTION INTRAVENOUS at 12:48

## 2024-11-08 RX ADMIN — OXALIPLATIN 165 MG: 5 INJECTION, SOLUTION INTRAVENOUS at 10:19

## 2024-11-08 RX ADMIN — FOSAPREPITANT 150 MG: 150 INJECTION, POWDER, LYOPHILIZED, FOR SOLUTION INTRAVENOUS at 07:42

## 2024-11-08 RX ADMIN — DEXTROSE MONOHYDRATE 295.6 MG: 50 INJECTION, SOLUTION INTRAVENOUS at 08:19

## 2024-11-08 RX ADMIN — DEXAMETHASONE SODIUM PHOSPHATE 12 MG: 4 INJECTION INTRA-ARTICULAR; INTRALESIONAL; INTRAMUSCULAR; INTRAVENOUS; SOFT TISSUE at 07:35

## 2024-11-08 RX ADMIN — ONDANSETRON 16 MG: 2 INJECTION INTRAMUSCULAR; INTRAVENOUS at 07:31

## 2024-11-08 RX ADMIN — LEUCOVORIN CALCIUM 790 MG: 500 INJECTION, POWDER, LYOPHILIZED, FOR SOLUTION INTRAMUSCULAR; INTRAVENOUS at 10:16

## 2024-11-08 ASSESSMENT — FIBROSIS 4 INDEX: FIB4 SCORE: 1.49

## 2024-11-08 NOTE — PROGRESS NOTES
"Pharmacy Chemotherapy calculation:    DX: Rectal CA    Cycle 1  Previous treatment = n/a    Regimen: mFOLFIRINOX    *Dosing Reference*  Oxaliplatin 85 mg/m2 IV over 2 hours on Day 1  Leucovorin 400 mg/m2 IV over 2 hours on Day 1  Irinotecan 150 mg/m2 IV over 30-90 minutes on Day 1  Fluorouracil 1200 mg/m2 IV continuous infusion daily on Days 1-2 (2400 mg/m2 IV over 46 hours)  14-day cycle for 6-8 cycles (total neoadjuvant therapy) or until disease progression or unacceptable toxicity (advanced or metastatic)  NCCN Guidelines for Rectal Cancer V.1.2024.  Jim REGAN, et al. Clin Colorectal Cancer. 2019;18(1):e69-e73.  Awa M, et al. Beena Surg Oncol. 2013;20(13):4289-97.    Allergies: Patient has no known allergies.   BP (!) 145/85   Pulse 90   Temp 36.1 °C (97 °F) (Temporal)   Resp 18   Ht 1.727 m (5' 8\")   Wt 82.6 kg (182 lb 1.6 oz)   SpO2 97%   BMI 27.69 kg/m²   Body surface area is 1.99 meters squared.    Labs 11/7/24  ANC~ 3770 Plt = 238k   Hgb = 13.8     SCr = 0.83 mg/dL CrCl ~ 111.5 mL/min   LFT's = 24/16/103 TBili = 0.3     Irinotecan 150 mg/m2 x 1.99 m2 = 298.5 mg   <10% difference, okay to treat with final dose = 295.6 mg IV    Oxaliplatin 85 mg/m2 x 1.99 m2 = 169.15 mg   <10% difference, okay to treat with final dose = 165 mg IV    Leucovorin 400 mg/m2 x 1.99 m2 = 796 mg   <10% difference, okay to treat with final dose = 790 mg IV    Fluorouracil 2400 mg/m2 x 1.99 m2 = 4776 mg   <10% difference, okay to treat with final dose = 4730 mg IV over 48 hours at 2 mL/hour    Tadeo Lake, PharmD    "

## 2024-11-08 NOTE — PROGRESS NOTES
ONN able to meet Booker in [facility] on D1C1 mFOLFIRINOX for rectal ca. Booker was sitting comfortably in his infusion bay getting pre-medications. Booker expresses gratitude to formerly [facility] and all his care team members. He was able to stay at the [facility] last night and states that went very well for him. JUANA will continue to follow and provide ongoing support to Booker.

## 2024-11-08 NOTE — PROGRESS NOTES
Pt presented to Infusion for D1C1 OP Colon mFOLFIRINIOX. POC discussed and pt verbalized understanding. Pt was oriented to OPIC and all questions were answered. Pt has never had port accessed and he did not have EMLA cream on; lidocaine bleb used prior to accessing. Port accessed per [facility] policy, brisk blood return noted, line flushes with ease, and labs drawn. Pt tolerated well. Labs reviewed. Pre-medications and chemotherapy administered per MAR. Pt tolerated well.   Consent signed for infusion pump and patient viewed instructional video. He was given a chemo spill kit. All questions were answered and pt verbalized understanding.  Port flushed per [facility] policy, brisk blood return noted and 5FU CADD pump connected with 2 RN verification of settings. Pump in RUN mode, connections secured, clamps open and pump placed in carrying bag. Educated pt on when to contact provider including fever, s/s of infection, worrisome symptoms, pump troubleshooting and/or defer judgment to ED for provider evaluation. Pt verbalized understanding. Pt confirmed pump deaccess appt and discharged to self care. Pt in NAD at time of discharge.

## 2024-11-08 NOTE — PROGRESS NOTES
Chemotherapy Verification - PRIMARY RN      Height = 1.727 m  Weight = 82.6 kg  BSA = 1.99 m^2       Medication: irinotecan (Camptosar)  Dose: 150 mg/m^2  Calculated Dose: 298.5 mg                             (In mg/m2, AUC, mg/kg)     Medication: oxaliplatin (Eloxatin)  Dose: 85 mg/m^2  Calculated Dose: 169.15 mg                             (In mg/m2, AUC, mg/kg)    Medication: leucovorin  Dose: 400 mg/m^2  Calculated Dose: 796 mg                             (In mg/m2, AUC, mg/kg)    Medication: fluorouracil (Adrucil)  Dose: 2400 mg/m^2  Calculated Dose: 4776 mg                             (In mg/m2, AUC, mg/kg)      I confirm this process was performed independently with the BSA and all final chemotherapy dosing calculations congruent.  Any discrepancies of 10% or greater have been addressed with the chemotherapy pharmacist. The resolution of the discrepancy has been documented in the EPIC progress notes.

## 2024-11-08 NOTE — PROGRESS NOTES
Chemotherapy Verification - SECONDARY RN       Height = 1.727m  Weight = 82.6kg  BSA = 1.99m2       Medication: irinotecan (Camptosar)  Dose: 150mg/m2  Calculated Dose: 298.5mg                             (In mg/m2, AUC, mg/kg)     Medication: oxaliplatin (Eloxatin)  Dose: 85mg/m2  Calculated Dose: 169.15mg                             (In mg/m2, AUC, mg/kg)    Medication: fluorouracil (Adrucil)  Dose: 2400mg/m2  Calculated Dose: 4776mg                             (In mg/m2, AUC, mg/kg)      I confirm that this process was performed independently.

## 2024-11-10 ENCOUNTER — OUTPATIENT INFUSION SERVICES (OUTPATIENT)
Dept: ONCOLOGY | Facility: MEDICAL CENTER | Age: 59
End: 2024-11-10
Attending: STUDENT IN AN ORGANIZED HEALTH CARE EDUCATION/TRAINING PROGRAM
Payer: COMMERCIAL

## 2024-11-10 VITALS
RESPIRATION RATE: 18 BRPM | OXYGEN SATURATION: 99 % | TEMPERATURE: 97.2 F | DIASTOLIC BLOOD PRESSURE: 83 MMHG | SYSTOLIC BLOOD PRESSURE: 147 MMHG | HEART RATE: 62 BPM

## 2024-11-10 DIAGNOSIS — C20 RECTAL CANCER (HCC): ICD-10-CM

## 2024-11-10 PROCEDURE — 96523 IRRIG DRUG DELIVERY DEVICE: CPT

## 2024-11-10 PROCEDURE — 700111 HCHG RX REV CODE 636 W/ 250 OVERRIDE (IP): Performed by: STUDENT IN AN ORGANIZED HEALTH CARE EDUCATION/TRAINING PROGRAM

## 2024-11-10 RX ADMIN — HEPARIN 500 UNITS: 100 SYRINGE at 14:12

## 2024-11-10 NOTE — PROGRESS NOTES
Here for 5 FU pump disconnect after 48 hrs of continuous infusion. See chemotherapy flow sheet for volume / dose administration. Port flushed per protocol, site deaccessed, needle intact compression dressing applied. Patient discharge home with care giver via wheelchair in [facility]. Next appointment confirmed.

## 2024-11-12 ENCOUNTER — TELEPHONE (OUTPATIENT)
Dept: ONCOLOGY | Facility: MEDICAL CENTER | Age: 59
End: 2024-11-12
Payer: COMMERCIAL

## 2024-11-12 NOTE — TELEPHONE ENCOUNTER
"Nutrition Services: RD Consultation/ Telephone Encounter    Booker Saucedo is a 59 y.o. male with diagnosis of rectal cancer        Nutrition Assessment:      RD able to call to introduce self and nutrition services.  Pt answers, does express interest in speaking with RD as is having challenges with colorectal pain. Pt describes the sensation of \"tearing\" when food passes through parts of his colorectum due to the tumor narrowing the passage. Pt interested in exploring which foods may be helpful in reducing or managing this pain. Mentions does take miralax daily which results in stool output of creamy consistency. Pt states would rather have this output than hard stool and constipation. Mentions does experience some nausea, has taken zofran, compazine, and percocet, which seem to help but does still generally feel poor overall. Pt did not express any further nutrition-related questions or concerns at this time.     Food/ Nutrition-related History:  No specific food allergies on file, does mention experiencing some constipation with dairy products. Does include plant-based protein powder into diet, Liquid IV    Past Medical History:   Diagnosis Date    Bowel habit changes     Cancer (HCC) 09/2024    colorectal cancer    High cholesterol     Hypertension     Kidney stone     4-5x    MI (myocardial infarction) (HCC) 2015       Past Surgical History:   Procedure Laterality Date    CATH PLACEMENT Right 10/15/2024    Procedure: CENTRAL VENOUS CATHETER PLACEMENT WITH SUBCUTANEOUS PORT;  Surgeon: Hector Tse MD, PhD;  Location: SURGERY [facility];  Service: Gastroenterology    STENT PLACEMENT  2015    BARE METAL STENT- CARDIAC    APPENDECTOMY      KNEE ARTHROSCOPY      MENISCECTOMY    TONSILLECTOMY         Biochemical/ Anthropometrics  Treatment Regimen: OP Colon mFOLFIRINOX (OXALIplatin + Leucovorin + Irinotecan + Fluorouracil)   Pertinent Labs: reviewed   Pertinent Meds: percocet, zofran, compazine, " "olanzapine, imodium, hydroxyzine  Wt Readings from Last 1 Encounters:   24 82.6 kg (182 lb 1.6 oz)      Ht Readings from Last 1 Encounters:   24 1.727 m (5' 8\")      BMI Readings from Last 1 Encounters:   24 27.69 kg/m²   BMI Classification: Overweight   Nutrition-Focused Physical Exam: Unable to assess given telephone encounter     Estimated Nutrition Needs:   Calories 27kcal/k kcal per day  Total grams of protein: 115 grams/day (1.4g/kg)  Total fluids per Jackelyn Segar: 2400mL/day     Weight History:  Wt Readings from Last 6 Encounters:   24 82.6 kg (182 lb 1.6 oz)   10/16/24 84.8 kg (186 lb 15.2 oz)   10/16/24 84.4 kg (186 lb)   10/02/24 80.6 kg (177 lb 11.1 oz)   10/15/24 83 kg (182 lb 15.7 oz)   24 82.5 kg (181 lb 14.1 oz)     Weight Change/Malnutrition Risk: wt fluctuates, though reflective of weight from 24     Nutrition Diagnosis:      Altered GI function related to rectal cancer as evidenced by narrowing of rectum, pain associated with passage of bolus of food in rectum, constipation.          Nutrition Interventions:      Introduced self and discussed role of dietitian throughout treatment process   RD discussed potential benefit of low fiber diet to prevent blockages and pain associated with fecal weight passing through narrowed rectum. RD to send MNT handout for consideration. Encouraged pt to track foods that are easier to tolerate and ones that contribute to discomfort. Will send via [employer] message.   Encouraged continuation of supplements to augment nutrients in the setting of reduced appetite 2/2 treatment side effects. RD to recommend different low-volume supplements or additions to consider and will provide samples accordingly pending pt interest.   RD provided contact information. Encouraged pt to reach out as questions/concerns arise.     Nutrition Monitoring and Evaluation:     Dietary pattern as prescribed by RD  Weight stabilization throughout " treatment  Appropriate management of side effects through medication management and nutrition modification as warranted    Pt reports understanding and was receptive to information provided.   RD available PRN   [phone]

## 2024-11-13 ENCOUNTER — APPOINTMENT (OUTPATIENT)
Dept: RADIOLOGY | Facility: MEDICAL CENTER | Age: 59
End: 2024-11-13
Attending: RADIOLOGY
Payer: COMMERCIAL

## 2024-11-13 ENCOUNTER — HOSPITAL ENCOUNTER (OUTPATIENT)
Facility: MEDICAL CENTER | Age: 59
End: 2024-11-13
Attending: RADIOLOGY | Admitting: RADIOLOGY
Payer: COMMERCIAL

## 2024-11-13 VITALS
HEART RATE: 71 BPM | HEIGHT: 68 IN | DIASTOLIC BLOOD PRESSURE: 81 MMHG | WEIGHT: 176.59 LBS | RESPIRATION RATE: 16 BRPM | SYSTOLIC BLOOD PRESSURE: 120 MMHG | TEMPERATURE: 98.7 F | BODY MASS INDEX: 26.76 KG/M2 | OXYGEN SATURATION: 99 %

## 2024-11-13 DIAGNOSIS — C20 RECTAL CANCER (HCC): ICD-10-CM

## 2024-11-13 LAB
INR PPP: 0.96 (ref 0.87–1.13)
PATHOLOGY CONSULT NOTE: NORMAL
PROTHROMBIN TIME: 12.8 SEC (ref 12–14.6)

## 2024-11-13 PROCEDURE — 700111 HCHG RX REV CODE 636 W/ 250 OVERRIDE (IP): Mod: JZ

## 2024-11-13 PROCEDURE — 71045 X-RAY EXAM CHEST 1 VIEW: CPT

## 2024-11-13 PROCEDURE — 700111 HCHG RX REV CODE 636 W/ 250 OVERRIDE (IP): Mod: JZ | Performed by: RADIOLOGY

## 2024-11-13 PROCEDURE — 700105 HCHG RX REV CODE 258: Performed by: RADIOLOGY

## 2024-11-13 PROCEDURE — 85610 PROTHROMBIN TIME: CPT

## 2024-11-13 PROCEDURE — 47000 NEEDLE BIOPSY OF LIVER PERQ: CPT

## 2024-11-13 PROCEDURE — 160002 HCHG RECOVERY MINUTES (STAT)

## 2024-11-13 PROCEDURE — 160035 HCHG PACU - 1ST 60 MINS PHASE I

## 2024-11-13 PROCEDURE — 160036 HCHG PACU - EA ADDL 30 MINS PHASE I

## 2024-11-13 PROCEDURE — 160046 HCHG PACU - 1ST 60 MINS PHASE II

## 2024-11-13 RX ORDER — OXYCODONE HYDROCHLORIDE 10 MG/1
10 TABLET ORAL
Status: DISCONTINUED | OUTPATIENT
Start: 2024-11-13 | End: 2024-11-13 | Stop reason: HOSPADM

## 2024-11-13 RX ORDER — MIDAZOLAM HYDROCHLORIDE 1 MG/ML
.5-2 INJECTION INTRAMUSCULAR; INTRAVENOUS PRN
Status: DISCONTINUED | OUTPATIENT
Start: 2024-11-13 | End: 2024-11-13 | Stop reason: HOSPADM

## 2024-11-13 RX ORDER — ONDANSETRON 2 MG/ML
4 INJECTION INTRAMUSCULAR; INTRAVENOUS PRN
Status: DISCONTINUED | OUTPATIENT
Start: 2024-11-13 | End: 2024-11-13 | Stop reason: HOSPADM

## 2024-11-13 RX ORDER — MIDAZOLAM HYDROCHLORIDE 1 MG/ML
INJECTION INTRAMUSCULAR; INTRAVENOUS
Status: COMPLETED
Start: 2024-11-13 | End: 2024-11-13

## 2024-11-13 RX ORDER — ONDANSETRON 2 MG/ML
4 INJECTION INTRAMUSCULAR; INTRAVENOUS ONCE
Status: DISCONTINUED | OUTPATIENT
Start: 2024-11-13 | End: 2024-11-13 | Stop reason: HOSPADM

## 2024-11-13 RX ORDER — ONDANSETRON 2 MG/ML
INJECTION INTRAMUSCULAR; INTRAVENOUS
Status: DISCONTINUED
Start: 2024-11-13 | End: 2024-11-13 | Stop reason: HOSPADM

## 2024-11-13 RX ORDER — SODIUM CHLORIDE 9 MG/ML
500 INJECTION, SOLUTION INTRAVENOUS
Status: DISCONTINUED | OUTPATIENT
Start: 2024-11-13 | End: 2024-11-13 | Stop reason: HOSPADM

## 2024-11-13 RX ORDER — LIDOCAINE HYDROCHLORIDE 10 MG/ML
INJECTION, SOLUTION EPIDURAL; INFILTRATION; INTRACAUDAL; PERINEURAL
Status: COMPLETED
Start: 2024-11-13 | End: 2024-11-13

## 2024-11-13 RX ORDER — HYDROMORPHONE HYDROCHLORIDE 1 MG/ML
0.5 INJECTION, SOLUTION INTRAMUSCULAR; INTRAVENOUS; SUBCUTANEOUS
Status: DISCONTINUED | OUTPATIENT
Start: 2024-11-13 | End: 2024-11-13 | Stop reason: HOSPADM

## 2024-11-13 RX ORDER — OXYCODONE HYDROCHLORIDE 5 MG/1
5 TABLET ORAL
Status: DISCONTINUED | OUTPATIENT
Start: 2024-11-13 | End: 2024-11-13 | Stop reason: HOSPADM

## 2024-11-13 RX ADMIN — FENTANYL CITRATE 25 MCG: 50 INJECTION, SOLUTION INTRAMUSCULAR; INTRAVENOUS at 13:38

## 2024-11-13 RX ADMIN — MIDAZOLAM HYDROCHLORIDE 1 MG: 1 INJECTION, SOLUTION INTRAMUSCULAR; INTRAVENOUS at 13:30

## 2024-11-13 RX ADMIN — FENTANYL CITRATE 50 MCG: 50 INJECTION, SOLUTION INTRAMUSCULAR; INTRAVENOUS at 13:30

## 2024-11-13 RX ADMIN — MIDAZOLAM HYDROCHLORIDE 1 MG: 1 INJECTION, SOLUTION INTRAMUSCULAR; INTRAVENOUS at 13:38

## 2024-11-13 RX ADMIN — FENTANYL CITRATE 25 MCG: 50 INJECTION, SOLUTION INTRAMUSCULAR; INTRAVENOUS at 13:35

## 2024-11-13 RX ADMIN — LIDOCAINE HYDROCHLORIDE: 10 INJECTION, SOLUTION EPIDURAL; INFILTRATION; INTRACAUDAL; PERINEURAL at 13:45

## 2024-11-13 ASSESSMENT — FIBROSIS 4 INDEX: FIB4 SCORE: 1.49

## 2024-11-13 ASSESSMENT — PAIN DESCRIPTION - PAIN TYPE: TYPE: ACUTE PAIN

## 2024-11-13 NOTE — OR SURGEON
Immediate Post- Operative Note        Findings: Lesion in Left Lobe of Liver      Procedure(s): CT guided Core Biopsy      Estimated Blood Loss: Less than 5 ml        Complications: None            11/13/2024     1:43 PM     Jim Sheth M.D.

## 2024-11-13 NOTE — DISCHARGE INSTRUCTIONS
Care After Liver Biopsy    After a liver biopsy, it is common to have these things in the area where the biopsy was done. You may:    Have pain.    Feel sore.    Have bruising.     You may also feel tired for a few days.     Follow these instructions at home:   Medicines    Take over-the-counter and prescription medicines only as told by your doctor.    If you were prescribed an antibiotic medicine, take it as told by your doctor. Do not stop taking the antibiotic, even if you start to feel better.    Do not take medicines that may thin your blood. These medicines include aspirin and ibuprofen. Take them only if your doctor tells you to.     Take steps to prevent problems with pooping (constipation). You may need to:    Drink enough fluid to keep your pee (urine) pale yellow.    Take medicines. Use what you normally would or call your provider for suggestions.     Eat foods that are high in fiber. These include beans, whole grains, and fresh fruits and vegetables.    Limit foods that are high in fat and sugar. These include fried or sweet foods.       Caring for your incision    Keep the dressing and incision dry and intact for 24 hours, do not shower while bandage is in place. Do not submerge (bath, hot tubs, lakes)  the site in water for 5 days.     Wash your hands with soap and water for at least 20 seconds before and after you change your bandage. If you cannot use soap and water, use hand .    If stitches or skin glue were used leave them in place for at least two weeks.     If steri strips were used leave them alone unless you are told to take them off. You may trim the edges of the tape strips if they curl up.      Check your incision every day for signs of infection. Check for:    Redness, swelling, or more pain.    Fluid or blood.    Warmth.    Pus or a bad smell.    Do not take baths, swim, or use a hot tub.      Activity    Rest at home for 1-2 days, or as told by your doctor.    Get up to take  short walks every 1 to 2 hours. Ask for help if you feel weak or unsteady.    Do not lift anything that is heavier than 10 lb (4.5 kg) for one week.    Do not play contact sports for 2 weeks after the procedure.    Return to your normal activities as told by your doctor. Ask what activities are safe for you.     General instructions    Do not drink alcohol in the first week after the procedure.    Plan to have a responsible adult care for you for the time you are told after you leave the hospital or clinic. This is important.    Your results will be posted to [employer] and reported to your Provider that ordered the procedure.    Keep all follow-up visits.     Contact a doctor if:      You have more bleeding in your incision.    Your incision swells, or is red and more painful.    You have fluid that comes from your incision.    You develop a rash.    You have fever or chills.     Get help right away if:    You have swelling, bloating, or pain in your belly (abdomen).    You get dizzy or faint.    You vomit or you feel like vomiting.    You have trouble breathing or feel short of breath.    You have chest pain.    You have problems talking or seeing.    You have trouble with your balance or moving your arms or legs.     These symptoms may be an emergency. Get help right away. Call your local emergency services (911 in the [place]    Do not wait to see if the symptoms will go away.    Do not drive yourself to the hospital.     Summary      After the procedure, it is common to have pain, soreness, bruising, and tiredness.    Your doctor will tell you how to take care of yourself at home. Change your bandage, take your medicines, and limit your activities as told by your doctor.    Call your doctor if you have symptoms of infection. Get help right away if your belly swells, your cut bleeds a lot, or you have trouble talking or breathing.     This information is not intended to replace advice given to you by your health  care provider. Make sure you discuss any questions you have with your health care providerActivity    Rest at home for 1-2 days, or as told by your doctor.    Get up to take short walks every 1 to 2 hours. Ask for help if you feel weak or unsteady.    Do not lift anything that is heavier than 10 lb (4.5 kg) for one week.    Do not play contact sports for 2 weeks after the procedure.    Return to your normal activities as told by your doctor. Ask what activities are safe for you.     General instructions    Do not drink alcohol in the first week after the procedure.    Plan to have a responsible adult care for you for the time you are told after you leave the hospital or clinic. This is important.    Your results will be posted to [employer] and reported to your Provider that ordered the procedure.    Keep all follow-up visits.     Contact a doctor if:      You have more bleeding in your incision.    Your incision swells, or is red and more painful.    You have fluid that comes from your incision.    You develop a rash.    You have fever or chills.     Get help right away if:    You have swelling, bloating, or pain in your belly (abdomen).    You get dizzy or faint.    You vomit or you feel like vomiting.    You have trouble breathing or feel short of breath.    You have chest pain.    You have problems talking or seeing.    You have trouble with your balance or moving your arms or legs.     These symptoms may be an emergency. Get help right away. Call your local emergency services (911 in the [place]    Do not wait to see if the symptoms will go away.    Do not drive yourself to the hospital.     Summary      After the procedure, it is common to have pain, soreness, bruising, and tiredness.    Your doctor will tell you how to take care of yourself at home. Change your bandage, take your medicines, and limit your activities as told by your doctor.    Call your doctor if you have symptoms of infection. Get help right away  if your belly swells, your cut bleeds a lot, or you have trouble talking or breathing.     This information is not intended to replace advice given to you by your health care provider. Make sure you discuss any questions you have with your health care provider

## 2024-11-13 NOTE — PROGRESS NOTES
Pt presents to ct4. pt was consented by MD at bedside, confirmed by this RN and consent at bedside. Pt transferred to ct table in supine position. Patient underwent a liver mass biopsy by Dr. hampton. Procedure site was marked by MD and verified using imaging guidance. Pt placed on monitor, prepped and draped in a sterile fashion. Vitals were taken every 5 minutes and remained stable during procedure (see doc flow sheet for results). CO2 waveform capnography was monitored and remained WNL throughout procedure. Report called to safia PEREZ. Pt transported by stretcher with RN to ppu.      Specimen: Liver mass biopsy x 4 cores  in formalin hand delivered to lab.

## 2024-11-13 NOTE — PROGRESS NOTES
"1349 - Patient arrived to pacu.  2 identifiers verified, attached to monitors, alarms/parameters verified, and received report.  Liver biopsy site/midline chest assessed with IR RN.  Site is clean, dry and intact with gauze dressing in place.  Oriented to unit and plan of care discussed including laying flat until 1550, pt to remain NPO for now. X-ray ordered for 1 hour post procedure.  1410 - Pts friend Sunni \has been contacted, states she'll \"be right up\"  1422 - pts belongings returned from locker  1451 - xray at pt bedside  1515 - xray read, pt ok to have food and drink  1534 - pt ambulates hallway and tolerates well, instructed to get dressed and await discharge instructions   1545- Discussed discharge instructions with patient and his friend Sunni, all questions and concerns addressed. Pt has all belongings at bedside. IV removed with tip intact, no s/sx of bleeding. Pt insists on walking out on own.    "

## 2024-11-15 LAB
NTRA SIGNATERA MTM READOUT: NORMAL
NTRA SIGNATERA TEST RESULT: NORMAL

## 2024-11-19 ENCOUNTER — PATIENT OUTREACH (OUTPATIENT)
Dept: ONCOLOGY | Facility: MEDICAL CENTER | Age: 59
End: 2024-11-19
Payer: COMMERCIAL

## 2024-11-19 NOTE — PROGRESS NOTES
Incoming email from Booker to JUANA received on 11/19. Request for lodging for 11/21 and future Thursdays at the [facility] with alana funding. JUANA placed referral for [facility] as urgent and sent her an urgent message regarding this. JUANA notified Booker that OSW has his request and will be reaching out for booking.

## 2024-11-20 ENCOUNTER — PATIENT OUTREACH (OUTPATIENT)
Dept: ONCOLOGY | Facility: MEDICAL CENTER | Age: 59
End: 2024-11-20
Payer: COMMERCIAL

## 2024-11-20 NOTE — PROGRESS NOTES
"ONN received this email from Booker today  \"Adriel Medina,    Greetings.  Hoping you are having a great day.  I just left a message with Kathy again.  Haven’t heard back so I’m not sure if rooms are still available.    Can you help?    Thanks,    Booker\"    ONN will reach out to OSW again today with this request, when she is available today. ONN reached out to OSW to remind her of patient request from yesterday. OSW read ONN message.    "

## 2024-11-20 NOTE — PROGRESS NOTES
On November 20, 2024, Oncology Social Worker Kathy Tse contacted pt. via telephone to confirm his lodging request for November 21st, 2024.  Pt. shared he was currently on a work call.  SHELBI Tse informed pt. room has been booked.  Pt. asked if he would have to do this every appointment.  SHELBI Tse explained unfortunately she cannot book rooms at the [facility] without confirming the treatment appointments in [facility] and the only one showing is for tomorrow.  Pt. shared he will have treatment every other week.  SHELBI Tse explained as soon as they show up in [facility] he can reach out to her and if email is better for him he can do so.  SHELBI Tse confirmed pt.'s email address and informed him she would email him the reservation for tomorrow.  Pt. agreed to email dates once they are in [facility].  SHELBI Tse reminded pt. of security deposit.      SHELBI Tse emailed pt. the room reservation confirmation number.      SHELBI Tse contacted [facility] and explained if there was any way to obtain additional treatment dates in [facility] in order for SHELBI Tse to secure room at [facility].  MARCUS Payne informed SHELBI Tse she would reach out to Infusion Services to see what they can do.

## 2024-11-21 ENCOUNTER — HOSPITAL ENCOUNTER (OUTPATIENT)
Dept: LAB | Facility: MEDICAL CENTER | Age: 59
End: 2024-11-21
Attending: STUDENT IN AN ORGANIZED HEALTH CARE EDUCATION/TRAINING PROGRAM
Payer: COMMERCIAL

## 2024-11-21 DIAGNOSIS — C20 RECTAL CANCER (HCC): ICD-10-CM

## 2024-11-21 DIAGNOSIS — Z79.899 ENCOUNTER FOR LONG-TERM (CURRENT) USE OF HIGH-RISK MEDICATION: ICD-10-CM

## 2024-11-21 LAB
ALBUMIN SERPL BCP-MCNC: 4.2 G/DL (ref 3.2–4.9)
ALBUMIN/GLOB SERPL: 1.7 G/DL
ALP SERPL-CCNC: 95 U/L (ref 30–99)
ALT SERPL-CCNC: 16 U/L (ref 2–50)
ANION GAP SERPL CALC-SCNC: 8 MMOL/L (ref 7–16)
AST SERPL-CCNC: 18 U/L (ref 12–45)
BASOPHILS # BLD AUTO: 0.6 % (ref 0–1.8)
BASOPHILS # BLD: 0.03 K/UL (ref 0–0.12)
BILIRUB SERPL-MCNC: 0.2 MG/DL (ref 0.1–1.5)
BUN SERPL-MCNC: 7 MG/DL (ref 8–22)
CALCIUM ALBUM COR SERPL-MCNC: 9.2 MG/DL (ref 8.5–10.5)
CALCIUM SERPL-MCNC: 9.4 MG/DL (ref 8.5–10.5)
CHLORIDE SERPL-SCNC: 106 MMOL/L (ref 96–112)
CO2 SERPL-SCNC: 26 MMOL/L (ref 20–33)
CREAT SERPL-MCNC: 0.64 MG/DL (ref 0.5–1.4)
EOSINOPHIL # BLD AUTO: 0.24 K/UL (ref 0–0.51)
EOSINOPHIL NFR BLD: 4.8 % (ref 0–6.9)
ERYTHROCYTE [DISTWIDTH] IN BLOOD BY AUTOMATED COUNT: 38.8 FL (ref 35.9–50)
GFR SERPLBLD CREATININE-BSD FMLA CKD-EPI: 109 ML/MIN/1.73 M 2
GLOBULIN SER CALC-MCNC: 2.5 G/DL (ref 1.9–3.5)
GLUCOSE SERPL-MCNC: 105 MG/DL (ref 65–99)
HCT VFR BLD AUTO: 38.4 % (ref 42–52)
HGB BLD-MCNC: 12.9 G/DL (ref 14–18)
IMM GRANULOCYTES # BLD AUTO: 0.01 K/UL (ref 0–0.11)
IMM GRANULOCYTES NFR BLD AUTO: 0.2 % (ref 0–0.9)
LYMPHOCYTES # BLD AUTO: 1.87 K/UL (ref 1–4.8)
LYMPHOCYTES NFR BLD: 37.1 % (ref 22–41)
MCH RBC QN AUTO: 29.9 PG (ref 27–33)
MCHC RBC AUTO-ENTMCNC: 33.6 G/DL (ref 32.3–36.5)
MCV RBC AUTO: 88.9 FL (ref 81.4–97.8)
MONOCYTES # BLD AUTO: 0.55 K/UL (ref 0–0.85)
MONOCYTES NFR BLD AUTO: 10.9 % (ref 0–13.4)
NEUTROPHILS # BLD AUTO: 2.34 K/UL (ref 1.82–7.42)
NEUTROPHILS NFR BLD: 46.4 % (ref 44–72)
NRBC # BLD AUTO: 0 K/UL
NRBC BLD-RTO: 0 /100 WBC (ref 0–0.2)
PLATELET # BLD AUTO: 263 K/UL (ref 164–446)
PMV BLD AUTO: 10.1 FL (ref 9–12.9)
POTASSIUM SERPL-SCNC: 3.8 MMOL/L (ref 3.6–5.5)
PROT SERPL-MCNC: 6.7 G/DL (ref 6–8.2)
RBC # BLD AUTO: 4.32 M/UL (ref 4.7–6.1)
SODIUM SERPL-SCNC: 140 MMOL/L (ref 135–145)
WBC # BLD AUTO: 5 K/UL (ref 4.8–10.8)

## 2024-11-21 PROCEDURE — 36415 COLL VENOUS BLD VENIPUNCTURE: CPT

## 2024-11-21 PROCEDURE — 80053 COMPREHEN METABOLIC PANEL: CPT

## 2024-11-21 PROCEDURE — 85025 COMPLETE CBC W/AUTO DIFF WBC: CPT

## 2024-11-21 RX ORDER — DIPHENHYDRAMINE HYDROCHLORIDE 50 MG/ML
50 INJECTION INTRAMUSCULAR; INTRAVENOUS PRN
Status: CANCELLED | OUTPATIENT
Start: 2024-11-22

## 2024-11-21 RX ORDER — EPINEPHRINE 1 MG/ML(1)
0.5 AMPUL (ML) INJECTION PRN
Status: CANCELLED | OUTPATIENT
Start: 2024-11-22

## 2024-11-21 RX ORDER — LOPERAMIDE HYDROCHLORIDE 2 MG/1
4 CAPSULE ORAL PRN
Status: CANCELLED | OUTPATIENT
Start: 2024-11-22

## 2024-11-21 RX ORDER — 0.9 % SODIUM CHLORIDE 0.9 %
3 VIAL (ML) INJECTION PRN
Status: CANCELLED | OUTPATIENT
Start: 2024-11-22

## 2024-11-21 RX ORDER — METHYLPREDNISOLONE SODIUM SUCCINATE 125 MG/2ML
125 INJECTION, POWDER, LYOPHILIZED, FOR SOLUTION INTRAMUSCULAR; INTRAVENOUS PRN
Status: CANCELLED | OUTPATIENT
Start: 2024-11-22

## 2024-11-21 RX ORDER — 0.9 % SODIUM CHLORIDE 0.9 %
10 VIAL (ML) INJECTION PRN
Status: CANCELLED | OUTPATIENT
Start: 2024-11-21

## 2024-11-21 RX ORDER — DEXTROSE MONOHYDRATE 50 MG/ML
INJECTION, SOLUTION INTRAVENOUS CONTINUOUS
Status: CANCELLED | OUTPATIENT
Start: 2024-11-22

## 2024-11-21 RX ORDER — ATROPINE SULFATE 1 MG/ML
0.5 INJECTION, SOLUTION INTRAVENOUS PRN
Status: CANCELLED | OUTPATIENT
Start: 2024-11-22

## 2024-11-21 RX ORDER — 0.9 % SODIUM CHLORIDE 0.9 %
10 VIAL (ML) INJECTION PRN
Status: CANCELLED | OUTPATIENT
Start: 2024-11-22

## 2024-11-21 RX ORDER — PROCHLORPERAZINE MALEATE 10 MG
10 TABLET ORAL EVERY 6 HOURS PRN
Status: CANCELLED | OUTPATIENT
Start: 2024-11-22

## 2024-11-21 RX ORDER — 0.9 % SODIUM CHLORIDE 0.9 %
VIAL (ML) INJECTION PRN
Status: CANCELLED | OUTPATIENT
Start: 2024-11-21

## 2024-11-21 RX ORDER — ONDANSETRON 8 MG/1
8 TABLET, ORALLY DISINTEGRATING ORAL PRN
Status: CANCELLED | OUTPATIENT
Start: 2024-11-22

## 2024-11-21 RX ORDER — 0.9 % SODIUM CHLORIDE 0.9 %
3 VIAL (ML) INJECTION PRN
Status: CANCELLED | OUTPATIENT
Start: 2024-11-21

## 2024-11-21 RX ORDER — LOPERAMIDE HYDROCHLORIDE 2 MG/1
2 CAPSULE ORAL
Status: CANCELLED | OUTPATIENT
Start: 2024-11-22

## 2024-11-21 RX ORDER — ONDANSETRON 2 MG/ML
4 INJECTION INTRAMUSCULAR; INTRAVENOUS PRN
Status: CANCELLED | OUTPATIENT
Start: 2024-11-22

## 2024-11-21 RX ORDER — 0.9 % SODIUM CHLORIDE 0.9 %
VIAL (ML) INJECTION PRN
Status: CANCELLED | OUTPATIENT
Start: 2024-11-22

## 2024-11-21 RX ORDER — 0.9 % SODIUM CHLORIDE 0.9 %
20 VIAL (ML) INJECTION PRN
Status: CANCELLED | OUTPATIENT
Start: 2024-11-24

## 2024-11-21 ASSESSMENT — ENCOUNTER SYMPTOMS
WEAKNESS: 0
CHILLS: 0
DIAPHORESIS: 0
BRUISES/BLEEDS EASILY: 0
DOUBLE VISION: 0
NAUSEA: 0
COUGH: 0
SORE THROAT: 0
INSOMNIA: 0
TINGLING: 0
VOMITING: 0
HEADACHES: 0
SPUTUM PRODUCTION: 0
FEVER: 0
HEARTBURN: 0
ABDOMINAL PAIN: 1
ORTHOPNEA: 0
SHORTNESS OF BREATH: 0
SINUS PAIN: 0
PALPITATIONS: 0
BLURRED VISION: 0
BACK PAIN: 0
NERVOUS/ANXIOUS: 0
WEIGHT LOSS: 0
WHEEZING: 0
MYALGIAS: 0
DIARRHEA: 0
DIZZINESS: 0

## 2024-11-21 NOTE — PROGRESS NOTES
"Pharmacy Chemotherapy Verification Note:    Patient Name: Booker Saucedo      Dx: Rectal Cancer      Protocol: mFOLFIRINOX (OXALIplatin + Leucovorin + Irinotecan + Fluorouracil)       *Dosing Reference*  OXALIplatin 85 mg/m² IV over 2 hours on Day 1   Leucovorin 400 mg/m² IV over 2 hours on Day 1   Irinotecan 150 mg/m² IV over 30 - 90 minutes on Day 1   Fluorouracil 1,200 mg/m² IV continuous infusion daily on Days 1 - 2 (2,400 mg/m2 IV over 46 hours)     14-day cycle for 6 - 8 cycles (Total Neoadjuvant Therapy) or until disease progression or unacceptable toxicity (advanced or metastatic)    1. NCCNGuidelines® for Rectal Cancer V.1.2024.   2. Jim J , et al. Clin Colorectal Cancer. 2019;18(1):e69-e73.c   3. Awa WEST et al. Beena Surg Oncol. 2013;20(13):4289-97.d    Allergies:  Patient has no known allergies.     BP (!) 143/78   Pulse 68   Temp 36.3 °C (97.4 °F) (Temporal)   Resp 18   Ht 1.727 m (5' 7.99\")   Wt 82.2 kg (181 lb 3.5 oz)   SpO2 97%   BMI 27.56 kg/m²  Body surface area is 1.99 meters squared.    Labs 11/22/24:  ANC~ 2220 Plt = 240k   Hgb = 12.6     SCr = 0.74 mg/dL CrCl ~ 125 mL/min   AST/ALT/AP = WNL TBili = 0.2              Drug Order   (Drug name, dose, route, IV Fluid & volume, frequency, number of doses) Cycle: 2      Previous treatment: C1 11/8/24     Medication = OXALIplatin  Base Dose = 85 mg/m²  Calc Dose: Base Dose x 1.99 m² = 169.15 mg  Final Dose = 165 mg  Route = IV  Fluid & Volume = D5W 250 mL  Admin Duration = Over 2 hours          <10% difference, rounded to vial size (with 10%) per dose rounding protocol, OK to treat with final dose     Medication = Leucovorin  Base Dose = 400 mg/m²  Calc Dose: Base Dose x 1.99 m² = 796 mg  Final Dose = 790 mg  Route = IV  Fluid & Volume = D5W 250 mL  Admin Duration = Over 2 hours          <10% difference, rounded to vial size (with 10%) per dose rounding protocol, OK to treat with final dose     Medication = Irinotecan  Base Dose = 150 " mg/m²  Calc Dose:Base Dose x 1.99 m² = 298.5 mg  Final Dose = 295.6 mg  Route = IV  Fluid & Volume = D5W 500 mL  Admin Duration = Over 90 minutes          <10% difference, rounded to vial size (with 10%) per dose rounding protocol, OK to treat with final dose     Medication = Fluorouracil  Base Dose = 2400 mg/m²  Calc Dose: Base Dose x 1.99 m² = 4776 mg  Final Dose = 4730 mg  Route = continuous IV infusion  Conc 50 mg/ml Volume = 94.6mL (+ 3 mL overfill)  Admin Duration = Over 48 hours at a rate of 2 ml/hr          <10% difference, rounded to vial size (with 10%) per dose rounding protocol, OK to treat with final dose       By my signature below, I confirm this process was performed independently with the BSA and all final chemotherapy dosing calculations congruent. I have reviewed the above chemotherapy order and that my calculation of the final dose and BSA (when applicable) corroborate those calculations of the  pharmacist.     Aliyah Xiao, PharmD

## 2024-11-21 NOTE — PROGRESS NOTES
Consult:  Hematology/Oncology      Referring Physician: Jason Huerta MD  Primary Care:  Rickie Naranjo M.D.    Diagnosis: Rectal adenocarcinoma    Chief Complaint:  Evaluation for systemic therapy    History of Presenting Illness:  Booker Saucedo is a 59 y.o.  man who presents to the clinic for evaluation for systemic therapy for a diagnosis of rectal carcinoma.  He started having back pain and changes in his bowel habits in July and went to the hospital where CT scan revealed right hydronephrosis due to a 1 mm calculus.  However, he was also noted to have rectal wall thickening with perirectal lymph nodes.  He subsequently underwent a colonoscopy which revealed a fungating circumferential mass, 5 cm from the anal verge, with biopsy revealing invasive adenocarcinoma, moderately differentiated, MMR proficient.  Staging scans did not reveal any definitive metastatic disease, though there was a 1.5 cm left hepatic lobe lesion which was not PET avid.  He was seen by radiation oncology and colorectal surgery and ultimately was staged on rectal MRI as a nD2xO2d stage IIIC disease. He has been referred for treatment accordingly.     Interval History:  Patient returns to clinic today for follow-up appointment.  Patient had his first cycle of FOLFIRINOX on 11/8/2024.  Patient underwent a liver mass biopsy on 11/13/2024.  Preliminary results show no features of metastatic carcinoma.  Sections demonstrate a vascular neoplasm which could represent a hemangioma.  The specimen was sent to [facility] for further characterization.  Signatera testing was requested on 11/15/2024, however the test was canceled due to lack of sufficient tissue available.  Patient reports he did do fairly well with his first cycle of chemotherapy.  He reports he had some mild nausea, which he did control with the olanzapine and the ondansetron.  He also experienced some cold sensitivity, which resolved within 3 to 4  days.  His biggest issue was with constipation, as he states he became more constipated with the taking of the ondansetron, the pain medication, as well as decreased intake of fluids.  His pain became very significant, and he was taken to the emergency room near his house.  Once his constipation was addressed and he had a bowel movement, the pain has improved.  He is now taking Metamucil twice a day, which he states is helping.  I advised him to also add Senokot tablets in each evening, to assist with constipation.  He is taking Percocet 5-325 mg 1 to 2 tablets every 8 hours as needed for pain.  He is also using Aleve intermittently.  He states he does not need to take the medication all the time, however he is concerned about taking narcotics.  He has noted a small amount of intermittent bleeding in his stool.  He does feel like the tumor has perhaps shrunk even with just this first chemo.  No other complaints or concerns provided.      Treatment history:  11/8/2024 cycle 1 day 1 FOLFIRINOX   11/22/2024 cycle 2 day 1 FOLFIRINOX (Planned)    Past Medical History:   Diagnosis Date    Bowel habit changes     Cancer (HCC) 09/2024    colorectal cancer    High cholesterol     Hypertension     Kidney stone     4-5x    MI (myocardial infarction) (HCC) 2015     Past Surgical History:   Procedure Laterality Date    CATH PLACEMENT Right 10/15/2024    Procedure: CENTRAL VENOUS CATHETER PLACEMENT WITH SUBCUTANEOUS PORT;  Surgeon: Hector Tse MD, PhD;  Location: SURGERY [facility];  Service: Gastroenterology    STENT PLACEMENT  2015    BARE METAL STENT- CARDIAC    APPENDECTOMY      KNEE ARTHROSCOPY      MENISCECTOMY    TONSILLECTOMY       Family History   Problem Relation Age of Onset    Bladder cancer Father     Breast Cancer Sister      Allergies as of 11/22/2024    (No Known Allergies)     Current Outpatient Medications:     oxyCODONE-acetaminophen (PERCOCET) 5-325 MG Tab, Take 1 Tablet by mouth every 8 hours as  needed for Severe Pain for up to 30 days., Disp: 90 Tablet, Rfl: 0    polyethylene glycol/lytes (MIRALAX) Pack, Take 17 g by mouth 1 time a day as needed., Disp: , Rfl:     melatonin 3 MG Tab, Take 3 mg by mouth at bedtime., Disp: , Rfl:     HYDROcodone-acetaminophen (NORCO) 7.5-325 MG tab, Take 1 Tablet by mouth every 8 hours as needed for Moderate Pain. 7 day course started 09/24/2024, Disp: , Rfl:     naproxen (ALEVE) 220 MG tablet, Take 220 mg by mouth 2 times a day as needed (pain)., Disp: , Rfl:     aspirin 81 MG EC tablet, Take 81 mg by mouth every day. (Patient not taking: Reported on 10/23/2024), Disp: , Rfl:     atorvastatin (LIPITOR) 40 MG Tab, Take 40 mg by mouth every day., Disp: , Rfl:     carvedilol (COREG) 12.5 MG Tab, Take 12.5 mg by mouth 2 times a day., Disp: , Rfl:     citalopram (CELEXA) 20 MG Tab, Take 20 mg by mouth every day., Disp: , Rfl:     hydrOXYzine HCl (ATARAX) 25 MG Tab, Take 25 mg by mouth every 8 hours as needed for Anxiety., Disp: , Rfl:     ondansetron (ZOFRAN) 4 MG Tab tablet, Take 1 Tablet by mouth every four hours as needed for Nausea/Vomiting (for nausea, vomiting)., Disp: 30 Tablet, Rfl: 6    prochlorperazine (COMPAZINE) 10 MG Tab, Take 1 Tablet by mouth every 6 hours as needed (for nausea, vomiting)., Disp: 30 Tablet, Rfl: 6    OLANZapine (ZYPREXA) 5 MG Tab, Take 1 Tablet by mouth every evening., Disp: 30 Tablet, Rfl: 6    loperamide (IMODIUM A-D) 2 MG tablet, Take 1 Tablet by mouth see administration instructions., Disp: 30 Tablet, Rfl: 6    tamsulosin (FLOMAX) 0.4 MG capsule, Take 1 Capsule by mouth every day., Disp: 30 Capsule, Rfl: 0    Review of Systems:  Review of Systems   Constitutional:  Negative for chills, diaphoresis, fever, malaise/fatigue and weight loss.   HENT:  Negative for hearing loss, nosebleeds, sinus pain and sore throat.    Eyes:  Negative for blurred vision and double vision.   Respiratory:  Negative for cough, sputum production, shortness of breath  "and wheezing.    Cardiovascular:  Negative for chest pain, palpitations, orthopnea and leg swelling.   Gastrointestinal:  Positive for abdominal pain, blood in stool and constipation. Negative for diarrhea, heartburn, melena, nausea and vomiting.        Rectal pain.  Got significantly worse with constipation following treatment.  Now better.   Genitourinary:  Negative for dysuria, frequency, hematuria and urgency.   Musculoskeletal:  Negative for back pain, joint pain and myalgias.   Skin:  Negative for rash.        Dry skin on palms   Neurological:  Negative for dizziness, tingling, weakness and headaches.   Endo/Heme/Allergies:  Does not bruise/bleed easily.   Psychiatric/Behavioral:  The patient is not nervous/anxious and does not have insomnia.           Physical Exam:  /86 (BP Location: Right arm, Patient Position: Sitting, BP Cuff Size: Adult)   Pulse 84   Temp 36.3 °C (97.4 °F) (Temporal)   Ht 1.727 m (5' 7.99\")   Wt 80.1 kg (176 lb 9.4 oz) Comment: pt last weight due to being in office already for RN to draw blood  SpO2 98%   BMI 26.86 kg/m²         DESC; KARNOFSKY SCALE WITH ECOG EQUIVALENT: 100, Fully active, able to carry on all pre-disease performed without restriction (ECOG equivalent 0)    DISTRESS LEVEL: no apparent distress    Physical Exam  Vitals and nursing note reviewed.   Constitutional:       General: He is awake. He is not in acute distress.     Appearance: Normal appearance. He is normal weight. He is not ill-appearing, toxic-appearing or diaphoretic.   HENT:      Head: Normocephalic and atraumatic.      Nose: Nose normal. No congestion.      Mouth/Throat:      Pharynx: Oropharynx is clear. No oropharyngeal exudate or posterior oropharyngeal erythema.   Eyes:      General: No scleral icterus.     Extraocular Movements: Extraocular movements intact.      Conjunctiva/sclera: Conjunctivae normal.   Cardiovascular:      Rate and Rhythm: Normal rate and regular rhythm.      Pulses: " Normal pulses.      Heart sounds: Normal heart sounds. No murmur heard.     No friction rub. No gallop.   Pulmonary:      Effort: Pulmonary effort is normal.      Breath sounds: Normal breath sounds. No decreased air movement. No wheezing, rhonchi or rales.   Abdominal:      General: Abdomen is flat. Bowel sounds are normal. There is no distension.      Palpations: Abdomen is soft. There is no mass.      Tenderness: There is abdominal tenderness in the right lower quadrant and suprapubic area.   Musculoskeletal:         General: No deformity. Normal range of motion.      Cervical back: Normal range of motion and neck supple. No tenderness.      Right lower leg: No edema.      Left lower leg: No edema.   Lymphadenopathy:      Cervical: No cervical adenopathy.      Upper Body:      Right upper body: No supraclavicular or axillary adenopathy.      Left upper body: No supraclavicular or axillary adenopathy.      Lower Body: No right inguinal adenopathy. No left inguinal adenopathy.   Skin:     General: Skin is warm and dry.      Coloration: Skin is not jaundiced.      Findings: No erythema or rash.   Neurological:      General: No focal deficit present.      Mental Status: He is alert and oriented to person, place, and time.      Sensory: Sensation is intact.      Motor: Motor function is intact. No weakness.      Gait: Gait is intact.   Psychiatric:         Attention and Perception: Attention normal.         Mood and Affect: Mood normal.         Behavior: Behavior normal. Behavior is cooperative.         Thought Content: Thought content normal.         Judgment: Judgment normal.        Labs:     Latest Reference Range & Units 11/22/24 08:19   WBC 4.8 - 10.8 K/uL 4.1 (L)   RBC 4.70 - 6.10 M/uL 4.21 (L)   Hemoglobin 14.0 - 18.0 g/dL 12.6 (L)   Hematocrit 42.0 - 52.0 % 36.4 (L)   MCV 81.4 - 97.8 fL 86.5   MCH 27.0 - 33.0 pg 29.9   MCHC 32.3 - 36.5 g/dL 34.6   RDW 35.9 - 50.0 fL 37.9   Platelet Count 164 - 446 K/uL 240    MPV 9.0 - 12.9 fL 9.3   Neutrophils-Polys 44.00 - 72.00 % 54.40   Neutrophils (Absolute) 1.82 - 7.42 K/uL 2.22   Lymphocytes 22.00 - 41.00 % 28.60   Lymphs (Absolute) 1.00 - 4.80 K/uL 1.17   Monocytes 0.00 - 13.40 % 13.40   Monos (Absolute) 0.00 - 0.85 K/uL 0.55   Eosinophils 0.00 - 6.90 % 2.90   Eos (Absolute) 0.00 - 0.51 K/uL 0.12   Basophils 0.00 - 1.80 % 0.50   Baso (Absolute) 0.00 - 0.12 K/uL 0.02   Immature Granulocytes 0.00 - 0.90 % 0.20   Immature Granulocytes (abs) 0.00 - 0.11 K/uL 0.01   Nucleated RBC 0.00 - 0.20 /100 WBC 0.00   NRBC (Absolute) K/uL 0.00   Sodium 135 - 145 mmol/L 139   Potassium 3.6 - 5.5 mmol/L 4.3   Chloride 96 - 112 mmol/L 105   Co2 20 - 33 mmol/L 23   Anion Gap 7.0 - 16.0  11.0   Glucose 65 - 99 mg/dL 125 (H)   Bun 8 - 22 mg/dL 13   Creatinine 0.50 - 1.40 mg/dL 0.74   GFR (CKD-EPI) >60 mL/min/1.73 m 2 104   Calcium 8.5 - 10.5 mg/dL 9.6   Correct Calcium 8.5 - 10.5 mg/dL 9.4   AST(SGOT) 12 - 45 U/L 17   ALT(SGPT) 2 - 50 U/L 18   Alkaline Phosphatase 30 - 99 U/L 94   Total Bilirubin 0.1 - 1.5 mg/dL 0.2   Albumin 3.2 - 4.9 g/dL 4.3   Total Protein 6.0 - 8.2 g/dL 6.6   Globulin 1.9 - 3.5 g/dL 2.3   A-G Ratio g/dL 1.9   Carcinoembryonic Antigen 0.0 - 3.0 ng/mL 2.9   (L): Data is abnormally low  (H): Data is abnormally high    10/4/2024 CEA 2.5  11/8/2024 CEA 2.6  11/22/2024 CEA 2.9    Imaging:     TH-SDYNI-CAIOT BASE TO MID-THIGH  10/9/2024  1.  Large hypermetabolic rectal mass consistent with known malignancy.   2.  Local involvement of 2 perirectal/presacral lymph nodes.   3.  No evidence of distant metastatic disease.   4.  No PET correlate for peripherally enhancing lesion seen in the LEFT lobe liver superiorly favoring benign etiology.   5.  Mild uptake in distal esophagus likely inflammatory.      MR-ABDOMEN-WITH & W/O  10/9/2024  1.  Peripherally enhancing lesion in the medial segment LEFT lobe liver superiorly measuring 15 mm, concerning for metastasis.   2.  No adenopathy  demonstrated.       MR-RECTAL/PROSTATE PROTOCOL  10/2/2024  Circumferential mid/upper rectal mass straddling the peritoneal reflection, with involvement of the mesorectal fascia anteriorly at the peritoneal reflection.  Multiple perirectal lymph nodes/tumor deposits present. T4a N2     CT-CHEST,ABDOMEN,PELVIS WITH  9/28/2024  1.  Segmental area of distal rectal wall thickening, likely corresponding with reported colon cancer.   2.  Mildly prominent perirectal lymph nodes, could represent local metastatic disease   3.   Indeterminate right hepatic lobe lesion, could be followed up with MRI of the liver for more definitive characterization.   4.  Atherosclerosis and atherosclerotic coronary artery disease    CT-ABDOMEN-PELVIS WITH 9/26/2024  1.  1.0 mm right ureterovesicular junction stone resulting in right urinary outflow tract changes   2.  Mild fluid-filled prominence of small bowel and colon, consider component of enterocolitis.   3.  Atherosclerosis and atherosclerotic coronary artery disease    CT-ABDOMEN-PELVIS WITH 9/19/2024  1.  Mild right hydronephrosis and hydroureter appear to be caused by obstructing 1 mm calculus at the right UVJ.   2.  No evidence of left-sided hydronephrosis.   3.  Rectal wall thickening appears to be present. Differential diagnosis includes proctitis colitis and carcinoma. Mildly enlarged perirectal lymph node is also noted.       Pathology:      11/13/2024  A. Liver mass, biopsy:          Vascular neoplasm involving liver, pending consultation at           [facility] for further evaluation.     Comment: Features of metastatic carcinoma are not seen. Sections    demonstrate a vascular neoplasm, which could represent a hemangioma    (such as an anastomosing hemangioma), among other etiologies. Pending    consultation at [facility] for further characterization.           Assessment & Plan:  No diagnosis found.      This is a 59 year old  man with rectal  adenocarcinoma, [id number] stage IIIC disease. He presents for evaluation.     Current Diagnosis and Staging: Rectal adenocarcinoma, [id number] stage IIIC disease    Treatment Plan: mFOLFIRINOX followed by capecitabine with concurrent XRT and then surgical evaluation    Treatment Citation: NCCN    Plan of Care:    Primary Therapy: mFOLFIRINOX, C2 today pending labs  Supportive Therapy: Antiemetics per protocol  Toxicity: Patient is getting antineoplastic therapy and needs monitoring of blood counts, hepatic function, and renal function due to potential for organ dysfunction.   Labs: CBC with diff, CMP, CEA, ctDNA monitoring  Imaging: Repeat MRI rectal protocol, CT scans after completion of JOSEMANUEL  Treatment Planning: Patient will start with chemotherapy due to concern of aggressive spread of disease, and then will do capecitabine with concurrent XRT and surgical evaluation.  Consultations: Colorectal surgery (Dr. Tse); Radiation oncology (Dr. Huerta)  Code Status: Full  Miscellaneous: Referred to medical genetics, liver biopsy done 11/13/2024 favors vascular neoplasm, sent to [facility] for further characterization.  ctDNA requested 11/15/2024 canceled due to lack of sufficient tissue for testing.  Persistent rectal pain, requiring additional pain medication and visit to emergency room for pain management.  Continue Percocet 5/3/2025 1 to 2 tablets every 8 hours as needed for pain.  Will refer to Dr. Styles palliative care for additional assistance with pain management.  Return to clinic in 2 weeks for next follow-up appointment, and cycle 3 FOLFIRINOX.      Plan of care reviewed with patient and all questions answered.  Patient verbalizes understanding, denies questions, and agrees with plan of care.  Patient instructed to call or message clinic with any questions or concerns, contact information provided.    Thank you for allowing me the privilege of caring for this patient.  If if you have any questions,  please do not hesitate to reach out.  I may be contacted at [phone].    Britney Pendleton, MSN, AOCNP, FNP-C    Please note that this dictation was created using voice recognition software. I have made every reasonable attempt to correct obvious errors, but I expect that there are errors of grammar, and possibly content, that I did not discover before finalizing the note.

## 2024-11-22 ENCOUNTER — OUTPATIENT INFUSION SERVICES (OUTPATIENT)
Dept: ONCOLOGY | Facility: MEDICAL CENTER | Age: 59
End: 2024-11-22
Attending: STUDENT IN AN ORGANIZED HEALTH CARE EDUCATION/TRAINING PROGRAM
Payer: COMMERCIAL

## 2024-11-22 ENCOUNTER — HOSPITAL ENCOUNTER (OUTPATIENT)
Dept: HEMATOLOGY ONCOLOGY | Facility: MEDICAL CENTER | Age: 59
End: 2024-11-22
Attending: STUDENT IN AN ORGANIZED HEALTH CARE EDUCATION/TRAINING PROGRAM
Payer: COMMERCIAL

## 2024-11-22 ENCOUNTER — HOSPITAL ENCOUNTER (OUTPATIENT)
Facility: MEDICAL CENTER | Age: 59
End: 2024-11-22
Attending: STUDENT IN AN ORGANIZED HEALTH CARE EDUCATION/TRAINING PROGRAM
Payer: COMMERCIAL

## 2024-11-22 ENCOUNTER — PATIENT OUTREACH (OUTPATIENT)
Dept: ONCOLOGY | Facility: MEDICAL CENTER | Age: 59
End: 2024-11-22

## 2024-11-22 VITALS
OXYGEN SATURATION: 98 % | HEIGHT: 68 IN | WEIGHT: 176.59 LBS | BODY MASS INDEX: 26.76 KG/M2 | SYSTOLIC BLOOD PRESSURE: 134 MMHG | TEMPERATURE: 97.4 F | HEART RATE: 84 BPM | DIASTOLIC BLOOD PRESSURE: 86 MMHG

## 2024-11-22 VITALS
RESPIRATION RATE: 18 BRPM | BODY MASS INDEX: 27.47 KG/M2 | SYSTOLIC BLOOD PRESSURE: 143 MMHG | TEMPERATURE: 97.4 F | WEIGHT: 181.22 LBS | DIASTOLIC BLOOD PRESSURE: 78 MMHG | HEIGHT: 68 IN | OXYGEN SATURATION: 97 % | HEART RATE: 68 BPM

## 2024-11-22 DIAGNOSIS — Z79.899 ENCOUNTER FOR LONG-TERM CURRENT USE OF HIGH RISK MEDICATION: ICD-10-CM

## 2024-11-22 DIAGNOSIS — C20 RECTAL CANCER (HCC): ICD-10-CM

## 2024-11-22 LAB
ALBUMIN SERPL BCP-MCNC: 4.3 G/DL (ref 3.2–4.9)
ALBUMIN/GLOB SERPL: 1.9 G/DL
ALP SERPL-CCNC: 94 U/L (ref 30–99)
ALT SERPL-CCNC: 18 U/L (ref 2–50)
ANION GAP SERPL CALC-SCNC: 11 MMOL/L (ref 7–16)
AST SERPL-CCNC: 17 U/L (ref 12–45)
BASOPHILS # BLD AUTO: 0.5 % (ref 0–1.8)
BASOPHILS # BLD: 0.02 K/UL (ref 0–0.12)
BILIRUB SERPL-MCNC: 0.2 MG/DL (ref 0.1–1.5)
BUN SERPL-MCNC: 13 MG/DL (ref 8–22)
CALCIUM ALBUM COR SERPL-MCNC: 9.4 MG/DL (ref 8.5–10.5)
CALCIUM SERPL-MCNC: 9.6 MG/DL (ref 8.5–10.5)
CEA SERPL-MCNC: 2.9 NG/ML (ref 0–3)
CHLORIDE SERPL-SCNC: 105 MMOL/L (ref 96–112)
CO2 SERPL-SCNC: 23 MMOL/L (ref 20–33)
CREAT SERPL-MCNC: 0.74 MG/DL (ref 0.5–1.4)
EOSINOPHIL # BLD AUTO: 0.12 K/UL (ref 0–0.51)
EOSINOPHIL NFR BLD: 2.9 % (ref 0–6.9)
ERYTHROCYTE [DISTWIDTH] IN BLOOD BY AUTOMATED COUNT: 37.9 FL (ref 35.9–50)
GFR SERPLBLD CREATININE-BSD FMLA CKD-EPI: 104 ML/MIN/1.73 M 2
GLOBULIN SER CALC-MCNC: 2.3 G/DL (ref 1.9–3.5)
GLUCOSE SERPL-MCNC: 125 MG/DL (ref 65–99)
HCT VFR BLD AUTO: 36.4 % (ref 42–52)
HGB BLD-MCNC: 12.6 G/DL (ref 14–18)
IMM GRANULOCYTES # BLD AUTO: 0.01 K/UL (ref 0–0.11)
IMM GRANULOCYTES NFR BLD AUTO: 0.2 % (ref 0–0.9)
LYMPHOCYTES # BLD AUTO: 1.17 K/UL (ref 1–4.8)
LYMPHOCYTES NFR BLD: 28.6 % (ref 22–41)
MCH RBC QN AUTO: 29.9 PG (ref 27–33)
MCHC RBC AUTO-ENTMCNC: 34.6 G/DL (ref 32.3–36.5)
MCV RBC AUTO: 86.5 FL (ref 81.4–97.8)
MONOCYTES # BLD AUTO: 0.55 K/UL (ref 0–0.85)
MONOCYTES NFR BLD AUTO: 13.4 % (ref 0–13.4)
NEUTROPHILS # BLD AUTO: 2.22 K/UL (ref 1.82–7.42)
NEUTROPHILS NFR BLD: 54.4 % (ref 44–72)
NRBC # BLD AUTO: 0 K/UL
NRBC BLD-RTO: 0 /100 WBC (ref 0–0.2)
PLATELET # BLD AUTO: 240 K/UL (ref 164–446)
PMV BLD AUTO: 9.3 FL (ref 9–12.9)
POTASSIUM SERPL-SCNC: 4.3 MMOL/L (ref 3.6–5.5)
PROT SERPL-MCNC: 6.6 G/DL (ref 6–8.2)
RBC # BLD AUTO: 4.21 M/UL (ref 4.7–6.1)
SODIUM SERPL-SCNC: 139 MMOL/L (ref 135–145)
WBC # BLD AUTO: 4.1 K/UL (ref 4.8–10.8)

## 2024-11-22 PROCEDURE — G0498 CHEMO EXTEND IV INFUS W/PUMP: HCPCS

## 2024-11-22 PROCEDURE — 96367 TX/PROPH/DG ADDL SEQ IV INF: CPT

## 2024-11-22 PROCEDURE — 99212 OFFICE O/P EST SF 10 MIN: CPT

## 2024-11-22 PROCEDURE — A4212 NON CORING NEEDLE OR STYLET: HCPCS

## 2024-11-22 PROCEDURE — 96417 CHEMO IV INFUS EACH ADDL SEQ: CPT

## 2024-11-22 PROCEDURE — 96375 TX/PRO/DX INJ NEW DRUG ADDON: CPT

## 2024-11-22 PROCEDURE — 700105 HCHG RX REV CODE 258: Performed by: STUDENT IN AN ORGANIZED HEALTH CARE EDUCATION/TRAINING PROGRAM

## 2024-11-22 PROCEDURE — 96415 CHEMO IV INFUSION ADDL HR: CPT

## 2024-11-22 PROCEDURE — 96413 CHEMO IV INFUSION 1 HR: CPT

## 2024-11-22 PROCEDURE — 82378 CARCINOEMBRYONIC ANTIGEN: CPT

## 2024-11-22 PROCEDURE — 99214 OFFICE O/P EST MOD 30 MIN: CPT

## 2024-11-22 PROCEDURE — 85025 COMPLETE CBC W/AUTO DIFF WBC: CPT

## 2024-11-22 PROCEDURE — 80053 COMPREHEN METABOLIC PANEL: CPT

## 2024-11-22 PROCEDURE — 700111 HCHG RX REV CODE 636 W/ 250 OVERRIDE (IP): Mod: JZ | Performed by: STUDENT IN AN ORGANIZED HEALTH CARE EDUCATION/TRAINING PROGRAM

## 2024-11-22 RX ORDER — METHYLPREDNISOLONE SODIUM SUCCINATE 125 MG/2ML
125 INJECTION, POWDER, LYOPHILIZED, FOR SOLUTION INTRAMUSCULAR; INTRAVENOUS PRN
Status: COMPLETED | OUTPATIENT
Start: 2024-11-22 | End: 2024-11-22

## 2024-11-22 RX ORDER — DEXAMETHASONE SODIUM PHOSPHATE 4 MG/ML
12 INJECTION, SOLUTION INTRA-ARTICULAR; INTRALESIONAL; INTRAMUSCULAR; INTRAVENOUS; SOFT TISSUE ONCE
Status: COMPLETED | OUTPATIENT
Start: 2024-11-22 | End: 2024-11-22

## 2024-11-22 RX ORDER — ATROPINE SULFATE 1 MG/ML
0.5 INJECTION, SOLUTION INTRAVENOUS PRN
Status: DISCONTINUED | OUTPATIENT
Start: 2024-11-22 | End: 2024-11-22 | Stop reason: HOSPADM

## 2024-11-22 RX ORDER — ONDANSETRON 2 MG/ML
16 INJECTION INTRAMUSCULAR; INTRAVENOUS ONCE
Status: COMPLETED | OUTPATIENT
Start: 2024-11-22 | End: 2024-11-22

## 2024-11-22 RX ORDER — DIPHENHYDRAMINE HYDROCHLORIDE 50 MG/ML
50 INJECTION INTRAMUSCULAR; INTRAVENOUS PRN
Status: COMPLETED | OUTPATIENT
Start: 2024-11-22 | End: 2024-11-22

## 2024-11-22 RX ADMIN — DEXTROSE MONOHYDRATE 295.6 MG: 50 INJECTION, SOLUTION INTRAVENOUS at 10:49

## 2024-11-22 RX ADMIN — FLUOROURACIL 4730 MG: 50 INJECTION, SOLUTION INTRAVENOUS at 15:10

## 2024-11-22 RX ADMIN — OXALIPLATIN 165 MG: 5 INJECTION, SOLUTION INTRAVENOUS at 12:46

## 2024-11-22 RX ADMIN — DEXAMETHASONE SODIUM PHOSPHATE 12 MG: 4 INJECTION INTRA-ARTICULAR; INTRALESIONAL; INTRAMUSCULAR; INTRAVENOUS; SOFT TISSUE at 10:05

## 2024-11-22 RX ADMIN — METHYLPREDNISOLONE SODIUM SUCCINATE 125 MG: 125 INJECTION, POWDER, FOR SOLUTION INTRAMUSCULAR; INTRAVENOUS at 12:27

## 2024-11-22 RX ADMIN — ONDANSETRON 16 MG: 2 INJECTION INTRAMUSCULAR; INTRAVENOUS at 09:55

## 2024-11-22 RX ADMIN — DIPHENHYDRAMINE HYDROCHLORIDE 25 MG: 50 INJECTION, SOLUTION INTRAMUSCULAR; INTRAVENOUS at 12:29

## 2024-11-22 RX ADMIN — LEUCOVORIN CALCIUM 790 MG: 500 INJECTION, POWDER, LYOPHILIZED, FOR SOLUTION INTRAMUSCULAR; INTRAVENOUS at 12:46

## 2024-11-22 RX ADMIN — FOSAPREPITANT 150 MG: 150 INJECTION, POWDER, LYOPHILIZED, FOR SOLUTION INTRAVENOUS at 10:05

## 2024-11-22 ASSESSMENT — FIBROSIS 4 INDEX
FIB4 SCORE: 0.99
FIB4 SCORE: 1.01

## 2024-11-22 ASSESSMENT — ENCOUNTER SYMPTOMS
BLOOD IN STOOL: 1
CONSTIPATION: 1

## 2024-11-22 NOTE — PROGRESS NOTES
Pt arrives to medical oncology for port access with lab draw.  Right port site cleaned and accessed in sterile fashion with 20 gauge, 1 inch garcia needle. Port with brisk blood return.  Labs were obtained. Pt tolerated well.  Port flushed with brisk blood return. Port remained accessed for Pt Infusion today.  Pt okay with not having low profile needle.

## 2024-11-22 NOTE — PROGRESS NOTES
"On November 22, 2024, Oncology Social Worker Kathy Tse met with pt. while he was receiving treatment at [facility].  SHELBI Tse provided pt. with written reservation confirmations for his next 3 treatment dates.  Pt. shared he is more worried about lodging when he begins his radiation treatment.  SHELBI Tse informed him not to worry and once those dates are secured to let her know so she can book his lodging.  SHELBI Tse provided pt. with an [facility] [employer] alana application.  Pt. became tearful stating \"everyone here has been mikal great to me.\"  Pt. shared financially he's okay but does have a lot of expenses including alimony and child support as he is .  Pt. shared he is working through treatment in order not to lose his insurance.  SHELBI Tse asked for pt. to complete [facility] Transportation application and she will provide him with gas cards to help with the costs of driving in from [place], [place] for treatment.  Pt. thanked SHELBI Tse for her assistance.    "

## 2024-11-22 NOTE — PROGRESS NOTES
On November 22, 2024, Oncology Social Worker Kathy Tse contacted pt. via telephone to inform him of reservations secured with the [facility].  Pt. shared [facility].  OSW Dedrick informed pt. she would come over to visit him.

## 2024-11-22 NOTE — PROGRESS NOTES
On November 22, 2024, Oncology Social Worker Kathy Tse processed pt[facility] [employer] Hitesh application.  Pt. will be covered for a total of 3 nights.      12/5-12/6/2024-[facility] #[id number] -1 night  12/19-12/20/2024 [facility] #[id number] night  1/2/2025-1/3/2025-[facility] #[id number] night

## 2024-11-22 NOTE — PROGRESS NOTES
"Pt arrived ambulatory accompanied by sister for chemo, denies c/o, states he is feeling well today, biggest issue after last cycle was constipation which is resolving.  Plan of care reviewed, verbalized understanding and wishes to proceed.  Port was accessed previously by 801 RN and labs were drawn, results reviewed and WNL, okay for chemo.  Port flushed with good blood return.  Premeds given followed by Irinotecan, pt refused Atropine prior to Irinotecan due to constipation. Right at the end of Irinotecan, pt c/o nausea, cold sweats, and \"feeling terrible\", denies any ABD cramping.  Pt given Solumedrol and Benadryl and observed for 10 minutes after with resolution of symptoms. Oxaliplatin infused over 2 hours with Leucovorin infused concurrently. Pt tolerated well, no further issues noted.  5FU pump administered and in RUN setting, good blood return noted prior to administration, Dc'd home without incident and will return Sunday as scheduled.   "

## 2024-11-22 NOTE — PROGRESS NOTES
Chemotherapy Verification - PRIMARY RN      Height = 172cm  Weight = 82.2kg  BSA = 1.99       Medication: Irinotecan  Dose: 150mg/m2  Calculated Dose: 298.5mg                             (In mg/m2, AUC, mg/kg)     Medication: Oxaliplatin  Dose: 85mg/m2  Calculated Dose: 169.1mg                             (In mg/m2, AUC, mg/kg)    Medication: 5FU CADD pump  Dose: 2400mg/m2  Calculated Dose: 4776mg                             (In mg/m2, AUC, mg/kg)        I confirm this process was performed independently with the BSA and all final chemotherapy dosing calculations congruent.  Any discrepancies of 10% or greater have been addressed with the chemotherapy pharmacist. The resolution of the discrepancy has been documented in the EPIC progress notes.

## 2024-11-22 NOTE — PROGRESS NOTES
On November 22, 2024, Oncology Social Worker Kathy Tse processed [facility]s [facility] [place] [employer] Hites[employer] application.  Pt. will receive $200 in gas card to assist with transportation.  SHELBI Tse provided pt. gas card in person [facility].  Pt. introduced SHELBI Tse to his sister.  Pt. thanked SHELBI Tse for all of her assistance.

## 2024-11-22 NOTE — PROGRESS NOTES
"Pharmacy Chemotherapy Calculation:    DX: Rectal adenocarcinoma   Cycle 2  Previous treatment = C1 11/8/24    Regimen: mFOLFIRINOX  *Dosing Reference*  Oxaliplatin 85 mg/m2 IV over 2 hours on Day 1  Leucovorin 400 mg/m2 IV over 2 hours on Day 1  Irinotecan 150 mg/m2 IV over 30-90 minutes on Day 1  Fluorouracil 1200 mg/m2 IV continuous infusion daily on Days 1-2 (2400 mg/m2 IV over 48 hours)  14-day cycle for 6-8 cycles (total neoadjuvant therapy) or until disease progression or unacceptable toxicity (advanced or metastatic)  NCCN Guidelines for Rectal Cancer V.1.2024.  Jim REGAN et al. Clin Colorectal Cancer. 2019;18(1):e69-e73.  Awa M, et al. [facility]ena Surg Oncol. 2013;20(13):4289-97.    Allergies: Patient has no known allergies.   BP (!) 143/78   Pulse 68   Temp 36.3 °C (97.4 °F) (Temporal)   Resp 18   Ht 1.727 m (5' 7.99\")   Wt 82.2 kg (181 lb 3.5 oz)   SpO2 97%   BMI 27.56 kg/m²   Body surface area is 1.99 meters squared.    Labs 11/22/24:  ANC~ 2220 Plt = 240k   Hgb = 12.6     SCr = 0.74 mg/dL CrCl ~ 125 mL/min   AST/ALT/AP = 17/18/94 TBili = 0.2      Irinotecan 150 mg/m2 x 1.99 m2 = 298.5 mg   <10% difference, okay to treat with final dose = 295.6 mg IV    Oxaliplatin 85 mg/m2 x 1.99 m2 = 169.15 mg   <10% difference, okay to treat with final dose = 165 mg IV    Leucovorin 400 mg/m2 x 1.99 m2 = 796 mg   <10% difference, okay to treat with final dose = 790 mg IV    Fluorouracil 2400 mg/m2 x 1.99 m2 = 4776 mg   <10% difference, okay to treat with final dose = 4730 mg IV over 48 hours @ 2 mL/hour    Queenie Jalloh, PharmD    "

## 2024-11-22 NOTE — PROGRESS NOTES
Chemotherapy Verification - SECONDARY RN       Height = 172.7 cm  Weight = 82.2 kg  BSA = 1.99 m2       Medication: Irinotecan  Dose: 150 mg/m2  Calculated Dose: 298.5 mg                             (In mg/m2, AUC, mg/kg)     Medication: Oxaliplatin  Dose: 85 mg/m2  Calculated Dose: 169.15 mg                             (In mg/m2, AUC, mg/kg)    Medication: Fluorouracil  Dose: 2,400 mg/m2  Calculated Dose: 4,776 mg                             (In mg/m2, AUC, mg/kg)      I confirm that this process was performed independently.

## 2024-11-24 ENCOUNTER — OUTPATIENT INFUSION SERVICES (OUTPATIENT)
Dept: ONCOLOGY | Facility: MEDICAL CENTER | Age: 59
End: 2024-11-24
Attending: STUDENT IN AN ORGANIZED HEALTH CARE EDUCATION/TRAINING PROGRAM
Payer: COMMERCIAL

## 2024-11-24 VITALS
HEART RATE: 79 BPM | OXYGEN SATURATION: 97 % | DIASTOLIC BLOOD PRESSURE: 65 MMHG | RESPIRATION RATE: 18 BRPM | TEMPERATURE: 97.1 F | SYSTOLIC BLOOD PRESSURE: 126 MMHG

## 2024-11-24 DIAGNOSIS — C20 RECTAL CANCER (HCC): ICD-10-CM

## 2024-11-24 PROCEDURE — 96523 IRRIG DRUG DELIVERY DEVICE: CPT

## 2024-11-24 PROCEDURE — 700111 HCHG RX REV CODE 636 W/ 250 OVERRIDE (IP): Performed by: STUDENT IN AN ORGANIZED HEALTH CARE EDUCATION/TRAINING PROGRAM

## 2024-11-24 RX ADMIN — HEPARIN 500 UNITS: 100 SYRINGE at 16:04

## 2024-11-25 NOTE — PROGRESS NOTES
Booker presents to infusion for 5FU CADD pump disconnect. Reports feeling okay today aside from cold sensitive neuropathy in mouth and hands and hiccups.    Ensured that prescribed volume had been infused and that pump reservoir was empty prior to disconnecting. Port flushed with NS with positive blood return, heparinized and d/aldair with tip intact, site covered with band aid.     Patient left in stable condition, knows when to return for next appt.

## 2024-12-04 ENCOUNTER — HOSPITAL ENCOUNTER (OUTPATIENT)
Dept: LAB | Facility: MEDICAL CENTER | Age: 59
End: 2024-12-04
Attending: STUDENT IN AN ORGANIZED HEALTH CARE EDUCATION/TRAINING PROGRAM
Payer: COMMERCIAL

## 2024-12-04 DIAGNOSIS — C20 RECTAL CANCER (HCC): ICD-10-CM

## 2024-12-04 DIAGNOSIS — Z79.899 ENCOUNTER FOR LONG-TERM (CURRENT) USE OF HIGH-RISK MEDICATION: ICD-10-CM

## 2024-12-04 LAB
ALBUMIN SERPL BCP-MCNC: 4.4 G/DL (ref 3.2–4.9)
ALBUMIN/GLOB SERPL: 1.6 G/DL
ALP SERPL-CCNC: 97 U/L (ref 30–99)
ALT SERPL-CCNC: 25 U/L (ref 2–50)
ANION GAP SERPL CALC-SCNC: 8 MMOL/L (ref 7–16)
AST SERPL-CCNC: 22 U/L (ref 12–45)
BASOPHILS # BLD AUTO: 0.4 % (ref 0–1.8)
BASOPHILS # BLD: 0.02 K/UL (ref 0–0.12)
BILIRUB SERPL-MCNC: <0.2 MG/DL (ref 0.1–1.5)
BUN SERPL-MCNC: 12 MG/DL (ref 8–22)
CALCIUM ALBUM COR SERPL-MCNC: 9.9 MG/DL (ref 8.5–10.5)
CALCIUM SERPL-MCNC: 10.2 MG/DL (ref 8.5–10.5)
CHLORIDE SERPL-SCNC: 105 MMOL/L (ref 96–112)
CO2 SERPL-SCNC: 27 MMOL/L (ref 20–33)
CREAT SERPL-MCNC: 0.78 MG/DL (ref 0.5–1.4)
EOSINOPHIL # BLD AUTO: 0.2 K/UL (ref 0–0.51)
EOSINOPHIL NFR BLD: 3.6 % (ref 0–6.9)
ERYTHROCYTE [DISTWIDTH] IN BLOOD BY AUTOMATED COUNT: 39 FL (ref 35.9–50)
GFR SERPLBLD CREATININE-BSD FMLA CKD-EPI: 102 ML/MIN/1.73 M 2
GLOBULIN SER CALC-MCNC: 2.8 G/DL (ref 1.9–3.5)
GLUCOSE SERPL-MCNC: 121 MG/DL (ref 65–99)
HCT VFR BLD AUTO: 38.6 % (ref 42–52)
HGB BLD-MCNC: 13.5 G/DL (ref 14–18)
IMM GRANULOCYTES # BLD AUTO: 0.02 K/UL (ref 0–0.11)
IMM GRANULOCYTES NFR BLD AUTO: 0.4 % (ref 0–0.9)
LYMPHOCYTES # BLD AUTO: 1.82 K/UL (ref 1–4.8)
LYMPHOCYTES NFR BLD: 32.6 % (ref 22–41)
MCH RBC QN AUTO: 30.6 PG (ref 27–33)
MCHC RBC AUTO-ENTMCNC: 35 G/DL (ref 32.3–36.5)
MCV RBC AUTO: 87.5 FL (ref 81.4–97.8)
MONOCYTES # BLD AUTO: 0.75 K/UL (ref 0–0.85)
MONOCYTES NFR BLD AUTO: 13.4 % (ref 0–13.4)
NEUTROPHILS # BLD AUTO: 2.78 K/UL (ref 1.82–7.42)
NEUTROPHILS NFR BLD: 49.6 % (ref 44–72)
NRBC # BLD AUTO: 0 K/UL
NRBC BLD-RTO: 0 /100 WBC (ref 0–0.2)
PLATELET # BLD AUTO: 197 K/UL (ref 164–446)
PMV BLD AUTO: 10.1 FL (ref 9–12.9)
POTASSIUM SERPL-SCNC: 3.8 MMOL/L (ref 3.6–5.5)
PROT SERPL-MCNC: 7.2 G/DL (ref 6–8.2)
RBC # BLD AUTO: 4.41 M/UL (ref 4.7–6.1)
SODIUM SERPL-SCNC: 140 MMOL/L (ref 135–145)
WBC # BLD AUTO: 5.6 K/UL (ref 4.8–10.8)

## 2024-12-04 PROCEDURE — 85025 COMPLETE CBC W/AUTO DIFF WBC: CPT

## 2024-12-04 PROCEDURE — 80053 COMPREHEN METABOLIC PANEL: CPT

## 2024-12-04 PROCEDURE — 36415 COLL VENOUS BLD VENIPUNCTURE: CPT

## 2024-12-04 RX ORDER — 0.9 % SODIUM CHLORIDE 0.9 %
10 VIAL (ML) INJECTION PRN
Status: CANCELLED | OUTPATIENT
Start: 2024-12-21

## 2024-12-04 RX ORDER — METHYLPREDNISOLONE SODIUM SUCCINATE 125 MG/2ML
125 INJECTION, POWDER, LYOPHILIZED, FOR SOLUTION INTRAMUSCULAR; INTRAVENOUS PRN
Status: CANCELLED | OUTPATIENT
Start: 2024-12-07

## 2024-12-04 RX ORDER — 0.9 % SODIUM CHLORIDE 0.9 %
10 VIAL (ML) INJECTION PRN
OUTPATIENT
Start: 2025-01-03

## 2024-12-04 RX ORDER — 0.9 % SODIUM CHLORIDE 0.9 %
VIAL (ML) INJECTION PRN
Status: CANCELLED | OUTPATIENT
Start: 2024-12-21

## 2024-12-04 RX ORDER — LOPERAMIDE HYDROCHLORIDE 2 MG/1
2 CAPSULE ORAL
Status: CANCELLED | OUTPATIENT
Start: 2024-12-07

## 2024-12-04 RX ORDER — ONDANSETRON 8 MG/1
8 TABLET, ORALLY DISINTEGRATING ORAL PRN
OUTPATIENT
Start: 2025-01-04

## 2024-12-04 RX ORDER — 0.9 % SODIUM CHLORIDE 0.9 %
10 VIAL (ML) INJECTION PRN
Status: CANCELLED | OUTPATIENT
Start: 2024-12-20

## 2024-12-04 RX ORDER — EPINEPHRINE 1 MG/ML(1)
0.5 AMPUL (ML) INJECTION PRN
Status: CANCELLED | OUTPATIENT
Start: 2024-12-07

## 2024-12-04 RX ORDER — PROCHLORPERAZINE MALEATE 10 MG
10 TABLET ORAL EVERY 6 HOURS PRN
Status: CANCELLED | OUTPATIENT
Start: 2024-12-21

## 2024-12-04 RX ORDER — DIPHENHYDRAMINE HYDROCHLORIDE 50 MG/ML
50 INJECTION INTRAMUSCULAR; INTRAVENOUS PRN
OUTPATIENT
Start: 2025-01-04

## 2024-12-04 RX ORDER — LOPERAMIDE HYDROCHLORIDE 2 MG/1
4 CAPSULE ORAL PRN
Status: CANCELLED | OUTPATIENT
Start: 2024-12-21

## 2024-12-04 RX ORDER — 0.9 % SODIUM CHLORIDE 0.9 %
3 VIAL (ML) INJECTION PRN
OUTPATIENT
Start: 2025-01-03

## 2024-12-04 RX ORDER — 0.9 % SODIUM CHLORIDE 0.9 %
3 VIAL (ML) INJECTION PRN
Status: CANCELLED | OUTPATIENT
Start: 2024-12-20

## 2024-12-04 RX ORDER — DIPHENHYDRAMINE HYDROCHLORIDE 50 MG/ML
50 INJECTION INTRAMUSCULAR; INTRAVENOUS PRN
Status: CANCELLED | OUTPATIENT
Start: 2024-12-07

## 2024-12-04 RX ORDER — PROCHLORPERAZINE MALEATE 10 MG
10 TABLET ORAL EVERY 6 HOURS PRN
OUTPATIENT
Start: 2025-01-04

## 2024-12-04 RX ORDER — ATROPINE SULFATE 1 MG/ML
0.5 INJECTION, SOLUTION INTRAVENOUS PRN
Status: CANCELLED | OUTPATIENT
Start: 2024-12-21

## 2024-12-04 RX ORDER — 0.9 % SODIUM CHLORIDE 0.9 %
3 VIAL (ML) INJECTION PRN
Status: CANCELLED | OUTPATIENT
Start: 2024-12-05

## 2024-12-04 RX ORDER — 0.9 % SODIUM CHLORIDE 0.9 %
VIAL (ML) INJECTION PRN
OUTPATIENT
Start: 2025-01-04

## 2024-12-04 RX ORDER — 0.9 % SODIUM CHLORIDE 0.9 %
10 VIAL (ML) INJECTION PRN
Status: CANCELLED | OUTPATIENT
Start: 2024-12-05

## 2024-12-04 RX ORDER — ONDANSETRON 8 MG/1
8 TABLET, ORALLY DISINTEGRATING ORAL PRN
Status: CANCELLED | OUTPATIENT
Start: 2024-12-21

## 2024-12-04 RX ORDER — ONDANSETRON 2 MG/ML
4 INJECTION INTRAMUSCULAR; INTRAVENOUS PRN
OUTPATIENT
Start: 2025-01-04

## 2024-12-04 RX ORDER — LOPERAMIDE HYDROCHLORIDE 2 MG/1
2 CAPSULE ORAL
Status: CANCELLED | OUTPATIENT
Start: 2024-12-21

## 2024-12-04 RX ORDER — 0.9 % SODIUM CHLORIDE 0.9 %
20 VIAL (ML) INJECTION PRN
Status: CANCELLED | OUTPATIENT
Start: 2024-12-09

## 2024-12-04 RX ORDER — METHYLPREDNISOLONE SODIUM SUCCINATE 125 MG/2ML
125 INJECTION, POWDER, LYOPHILIZED, FOR SOLUTION INTRAMUSCULAR; INTRAVENOUS PRN
OUTPATIENT
Start: 2025-01-04

## 2024-12-04 RX ORDER — 0.9 % SODIUM CHLORIDE 0.9 %
3 VIAL (ML) INJECTION PRN
OUTPATIENT
Start: 2025-01-04

## 2024-12-04 RX ORDER — 0.9 % SODIUM CHLORIDE 0.9 %
VIAL (ML) INJECTION PRN
Status: CANCELLED | OUTPATIENT
Start: 2024-12-05

## 2024-12-04 RX ORDER — 0.9 % SODIUM CHLORIDE 0.9 %
VIAL (ML) INJECTION PRN
Status: CANCELLED | OUTPATIENT
Start: 2024-12-07

## 2024-12-04 RX ORDER — DEXTROSE MONOHYDRATE 50 MG/ML
INJECTION, SOLUTION INTRAVENOUS CONTINUOUS
OUTPATIENT
Start: 2025-01-04

## 2024-12-04 RX ORDER — ATROPINE SULFATE 1 MG/ML
0.5 INJECTION, SOLUTION INTRAVENOUS PRN
Status: CANCELLED | OUTPATIENT
Start: 2024-12-07

## 2024-12-04 RX ORDER — 0.9 % SODIUM CHLORIDE 0.9 %
10 VIAL (ML) INJECTION PRN
OUTPATIENT
Start: 2025-01-04

## 2024-12-04 RX ORDER — LOPERAMIDE HYDROCHLORIDE 2 MG/1
2 CAPSULE ORAL
OUTPATIENT
Start: 2025-01-04

## 2024-12-04 RX ORDER — ONDANSETRON 8 MG/1
8 TABLET, ORALLY DISINTEGRATING ORAL PRN
Status: CANCELLED | OUTPATIENT
Start: 2024-12-07

## 2024-12-04 RX ORDER — 0.9 % SODIUM CHLORIDE 0.9 %
VIAL (ML) INJECTION PRN
Status: CANCELLED | OUTPATIENT
Start: 2024-12-20

## 2024-12-04 RX ORDER — 0.9 % SODIUM CHLORIDE 0.9 %
3 VIAL (ML) INJECTION PRN
Status: CANCELLED | OUTPATIENT
Start: 2024-12-21

## 2024-12-04 RX ORDER — LOPERAMIDE HYDROCHLORIDE 2 MG/1
4 CAPSULE ORAL PRN
Status: CANCELLED | OUTPATIENT
Start: 2024-12-07

## 2024-12-04 RX ORDER — PROCHLORPERAZINE MALEATE 10 MG
10 TABLET ORAL EVERY 6 HOURS PRN
Status: CANCELLED | OUTPATIENT
Start: 2024-12-07

## 2024-12-04 RX ORDER — LOPERAMIDE HYDROCHLORIDE 2 MG/1
4 CAPSULE ORAL PRN
OUTPATIENT
Start: 2025-01-04

## 2024-12-04 RX ORDER — 0.9 % SODIUM CHLORIDE 0.9 %
VIAL (ML) INJECTION PRN
OUTPATIENT
Start: 2025-01-03

## 2024-12-04 RX ORDER — EPINEPHRINE 1 MG/ML(1)
0.5 AMPUL (ML) INJECTION PRN
OUTPATIENT
Start: 2025-01-04

## 2024-12-04 RX ORDER — 0.9 % SODIUM CHLORIDE 0.9 %
3 VIAL (ML) INJECTION PRN
Status: CANCELLED | OUTPATIENT
Start: 2024-12-07

## 2024-12-04 RX ORDER — ATROPINE SULFATE 1 MG/ML
0.5 INJECTION, SOLUTION INTRAVENOUS PRN
OUTPATIENT
Start: 2025-01-04

## 2024-12-04 RX ORDER — ONDANSETRON 2 MG/ML
4 INJECTION INTRAMUSCULAR; INTRAVENOUS PRN
Status: CANCELLED | OUTPATIENT
Start: 2024-12-21

## 2024-12-04 RX ORDER — DEXTROSE MONOHYDRATE 50 MG/ML
INJECTION, SOLUTION INTRAVENOUS CONTINUOUS
Status: CANCELLED | OUTPATIENT
Start: 2024-12-07

## 2024-12-04 RX ORDER — 0.9 % SODIUM CHLORIDE 0.9 %
10 VIAL (ML) INJECTION PRN
Status: CANCELLED | OUTPATIENT
Start: 2024-12-07

## 2024-12-04 RX ORDER — EPINEPHRINE 1 MG/ML(1)
0.5 AMPUL (ML) INJECTION PRN
Status: CANCELLED | OUTPATIENT
Start: 2024-12-21

## 2024-12-04 RX ORDER — ONDANSETRON 2 MG/ML
4 INJECTION INTRAMUSCULAR; INTRAVENOUS PRN
Status: CANCELLED | OUTPATIENT
Start: 2024-12-07

## 2024-12-04 RX ORDER — DIPHENHYDRAMINE HYDROCHLORIDE 50 MG/ML
50 INJECTION INTRAMUSCULAR; INTRAVENOUS PRN
Status: CANCELLED | OUTPATIENT
Start: 2024-12-21

## 2024-12-04 RX ORDER — DEXTROSE MONOHYDRATE 50 MG/ML
INJECTION, SOLUTION INTRAVENOUS CONTINUOUS
Status: CANCELLED | OUTPATIENT
Start: 2024-12-21

## 2024-12-04 RX ORDER — 0.9 % SODIUM CHLORIDE 0.9 %
20 VIAL (ML) INJECTION PRN
OUTPATIENT
Start: 2025-01-06

## 2024-12-04 RX ORDER — 0.9 % SODIUM CHLORIDE 0.9 %
20 VIAL (ML) INJECTION PRN
Status: CANCELLED | OUTPATIENT
Start: 2024-12-23

## 2024-12-04 RX ORDER — METHYLPREDNISOLONE SODIUM SUCCINATE 125 MG/2ML
125 INJECTION, POWDER, LYOPHILIZED, FOR SOLUTION INTRAMUSCULAR; INTRAVENOUS PRN
Status: CANCELLED | OUTPATIENT
Start: 2024-12-21

## 2024-12-05 ASSESSMENT — ENCOUNTER SYMPTOMS
SPUTUM PRODUCTION: 0
CONSTIPATION: 1
SINUS PAIN: 0
DOUBLE VISION: 0
FEVER: 0
DIARRHEA: 0
WHEEZING: 0
WEAKNESS: 0
INSOMNIA: 0
DIAPHORESIS: 0
CHILLS: 0
MYALGIAS: 0
ORTHOPNEA: 0
SORE THROAT: 0
BLURRED VISION: 0
ABDOMINAL PAIN: 1
BACK PAIN: 0
COUGH: 0
NERVOUS/ANXIOUS: 0
HEARTBURN: 0
NAUSEA: 0
BRUISES/BLEEDS EASILY: 0
PALPITATIONS: 0
TINGLING: 0
SHORTNESS OF BREATH: 0
VOMITING: 0
DIZZINESS: 0
HEADACHES: 0
BLOOD IN STOOL: 1
WEIGHT LOSS: 0

## 2024-12-05 NOTE — PROGRESS NOTES
Consult:  Hematology/Oncology      Referring Physician: Jason Huerta MD  Primary Care:  Rickie Naranjo M.D.    Diagnosis: Rectal adenocarcinoma    Chief Complaint:  Evaluation for systemic therapy    History of Presenting Illness:  Booker Saucedo is a 59 y.o.  man who presents to the clinic for evaluation for systemic therapy for a diagnosis of rectal carcinoma.  He started having back pain and changes in his bowel habits in July and went to the hospital where CT scan revealed right hydronephrosis due to a 1 mm calculus.  However, he was also noted to have rectal wall thickening with perirectal lymph nodes.  He subsequently underwent a colonoscopy which revealed a fungating circumferential mass, 5 cm from the anal verge, with biopsy revealing invasive adenocarcinoma, moderately differentiated, MMR proficient.  Staging scans did not reveal any definitive metastatic disease, though there was a 1.5 cm left hepatic lobe lesion which was not PET avid.  He was seen by radiation oncology and colorectal surgery and ultimately was staged on rectal MRI as a [id number] stage IIIC disease. He has been referred for treatment accordingly.     Interval History:  Patient returns to clinic today for follow-up appointment.  He is accompanied today by his wife.  He states he did well with his second cycle of FOLFIRINOX.  He did noticed that the cold sensitivity was starting to get a little bit worse, but did completely resolve after couple days.  He also reports that he is having an easier time having a bowel movement, and that the pain in his rectum is greatly improving.  He states he is not taking nearly as many pain medications, however he would like a refill in case he does need more.  He denies any problems with any nausea, vomiting, or diarrhea.  He is feeling well overall, continues to work full-time.  He states that he feels he is doing very well with his treatment and is pleased with how things are  going.        Treatment history:  11/8/2024 cycle 1 day 1 FOLFIRINOX   11/22/2024 cycle 2 day 1 FOLFIRINOX   12/6/2024 cycle 3 day 1 FOLFIRINOX (Planned)    Past Medical History:   Diagnosis Date    Bowel habit changes     Cancer (HCC) 09/2024    colorectal cancer    High cholesterol     Hypertension     Kidney stone     4-5x    MI (myocardial infarction) (HCC) 2015     Past Surgical History:   Procedure Laterality Date    CATH PLACEMENT Right 10/15/2024    Procedure: CENTRAL VENOUS CATHETER PLACEMENT WITH SUBCUTANEOUS PORT;  Surgeon: Hector Tse MD, PhD;  Location: SURGERY [facility];  Service: Gastroenterology    STENT PLACEMENT  2015    BARE METAL STENT- CARDIAC    APPENDECTOMY      KNEE ARTHROSCOPY      MENISCECTOMY    TONSILLECTOMY       Family History   Problem Relation Age of Onset    Bladder cancer Father     Breast Cancer Sister      Allergies as of 12/06/2024    (No Known Allergies)     Current Outpatient Medications:     polyethylene glycol/lytes (MIRALAX) Pack, Take 17 g by mouth 1 time a day as needed., Disp: , Rfl:     melatonin 3 MG Tab, Take 3 mg by mouth at bedtime., Disp: , Rfl:     naproxen (ALEVE) 220 MG tablet, Take 220 mg by mouth 2 times a day as needed (pain)., Disp: , Rfl:     atorvastatin (LIPITOR) 40 MG Tab, Take 40 mg by mouth every day., Disp: , Rfl:     carvedilol (COREG) 12.5 MG Tab, Take 12.5 mg by mouth 2 times a day., Disp: , Rfl:     citalopram (CELEXA) 20 MG Tab, Take 20 mg by mouth every day., Disp: , Rfl:     hydrOXYzine HCl (ATARAX) 25 MG Tab, Take 25 mg by mouth every 8 hours as needed for Anxiety., Disp: , Rfl:     ondansetron (ZOFRAN) 4 MG Tab tablet, Take 1 Tablet by mouth every four hours as needed for Nausea/Vomiting (for nausea, vomiting)., Disp: 30 Tablet, Rfl: 6    prochlorperazine (COMPAZINE) 10 MG Tab, Take 1 Tablet by mouth every 6 hours as needed (for nausea, vomiting)., Disp: 30 Tablet, Rfl: 6    OLANZapine (ZYPREXA) 5 MG Tab, Take 1 Tablet by mouth every  "evening., Disp: 30 Tablet, Rfl: 6    loperamide (IMODIUM A-D) 2 MG tablet, Take 1 Tablet by mouth see administration instructions., Disp: 30 Tablet, Rfl: 6    tamsulosin (FLOMAX) 0.4 MG capsule, Take 1 Capsule by mouth every day., Disp: 30 Capsule, Rfl: 0    Review of Systems:  Review of Systems   Constitutional:  Negative for chills, diaphoresis, fever, malaise/fatigue and weight loss.   HENT:  Negative for hearing loss, nosebleeds, sinus pain and sore throat.    Eyes:  Negative for blurred vision and double vision.   Respiratory:  Negative for cough, sputum production, shortness of breath and wheezing.    Cardiovascular:  Negative for chest pain, palpitations, orthopnea and leg swelling.   Gastrointestinal:  Positive for abdominal pain, blood in stool and constipation. Negative for diarrhea, heartburn, melena, nausea and vomiting.        Rectal pain.  Got significantly worse with constipation following treatment.  Now better.   Genitourinary:  Negative for dysuria, frequency, hematuria and urgency.   Musculoskeletal:  Negative for back pain, joint pain and myalgias.   Skin:  Negative for rash.        Dry skin on palms   Neurological:  Positive for sensory change (Cold sensitivity for 3 to 4 days following oxaliplatin infusion). Negative for dizziness, tingling, weakness and headaches.   Endo/Heme/Allergies:  Does not bruise/bleed easily.   Psychiatric/Behavioral:  The patient is not nervous/anxious and does not have insomnia.           Physical Exam:  BP (!) 142/86 (BP Location: Right arm, Patient Position: Sitting, BP Cuff Size: Large adult)   Pulse 80   Temp 36.1 °C (97 °F) (Temporal)   Ht 1.727 m (5' 7.99\")   Wt 83.4 kg (183 lb 15.2 oz)   SpO2 96%   BMI 27.98 kg/m²       DESC; KARNOFSKY SCALE WITH ECOG EQUIVALENT: 100, Fully active, able to carry on all pre-disease performed without restriction (ECOG equivalent 0)    DISTRESS LEVEL: no apparent distress    Physical Exam  Vitals and nursing note reviewed. "   Constitutional:       General: He is awake. He is not in acute distress.     Appearance: Normal appearance. He is normal weight. He is not ill-appearing, toxic-appearing or diaphoretic.   HENT:      Head: Normocephalic and atraumatic.      Nose: Nose normal. No congestion.      Mouth/Throat:      Mouth: Mucous membranes are moist.      Pharynx: Oropharynx is clear. No oropharyngeal exudate or posterior oropharyngeal erythema.   Eyes:      General: No scleral icterus.     Extraocular Movements: Extraocular movements intact.      Conjunctiva/sclera: Conjunctivae normal.   Cardiovascular:      Rate and Rhythm: Normal rate and regular rhythm.      Pulses: Normal pulses.      Heart sounds: Normal heart sounds. No murmur heard.     No friction rub. No gallop.   Pulmonary:      Effort: Pulmonary effort is normal.      Breath sounds: Normal breath sounds. No decreased air movement. No wheezing, rhonchi or rales.   Abdominal:      General: Abdomen is flat. Bowel sounds are normal. There is no distension.      Palpations: Abdomen is soft. There is no mass.      Tenderness: There is abdominal tenderness in the right lower quadrant and suprapubic area.   Musculoskeletal:         General: No deformity. Normal range of motion.      Cervical back: Normal range of motion and neck supple. No tenderness.      Right lower leg: No edema.      Left lower leg: No edema.   Lymphadenopathy:      Cervical: No cervical adenopathy.      Upper Body:      Right upper body: No supraclavicular or axillary adenopathy.      Left upper body: No supraclavicular or axillary adenopathy.      Lower Body: No right inguinal adenopathy. No left inguinal adenopathy.   Skin:     General: Skin is warm and dry.      Coloration: Skin is not jaundiced.      Findings: No erythema or rash.      Comments: Dry skin noted on palms of hands   Neurological:      General: No focal deficit present.      Mental Status: He is alert and oriented to person, place, and  time.      Sensory: Sensation is intact.      Motor: Motor function is intact. No weakness.      Gait: Gait is intact.   Psychiatric:         Attention and Perception: Attention normal.         Mood and Affect: Mood normal.         Behavior: Behavior normal. Behavior is cooperative.         Thought Content: Thought content normal.         Judgment: Judgment normal.        Labs:     Latest Reference Range & Units 12/04/24 06:11   WBC 4.8 - 10.8 K/uL 5.6   RBC 4.70 - 6.10 M/uL 4.41 (L)   Hemoglobin 14.0 - 18.0 g/dL 13.5 (L)   Hematocrit 42.0 - 52.0 % 38.6 (L)   MCV 81.4 - 97.8 fL 87.5   MCH 27.0 - 33.0 pg 30.6   MCHC 32.3 - 36.5 g/dL 35.0   RDW 35.9 - 50.0 fL 39.0   Platelet Count 164 - 446 K/uL 197   MPV 9.0 - 12.9 fL 10.1   Neutrophils-Polys 44.00 - 72.00 % 49.60   Neutrophils (Absolute) 1.82 - 7.42 K/uL 2.78   Lymphocytes 22.00 - 41.00 % 32.60   Lymphs (Absolute) 1.00 - 4.80 K/uL 1.82   Monocytes 0.00 - 13.40 % 13.40   Monos (Absolute) 0.00 - 0.85 K/uL 0.75   Eosinophils 0.00 - 6.90 % 3.60   Eos (Absolute) 0.00 - 0.51 K/uL 0.20   Basophils 0.00 - 1.80 % 0.40   Baso (Absolute) 0.00 - 0.12 K/uL 0.02   Immature Granulocytes 0.00 - 0.90 % 0.40   Immature Granulocytes (abs) 0.00 - 0.11 K/uL 0.02   Nucleated RBC 0.00 - 0.20 /100 WBC 0.00   NRBC (Absolute) K/uL 0.00   Sodium 135 - 145 mmol/L 140   Potassium 3.6 - 5.5 mmol/L 3.8   Chloride 96 - 112 mmol/L 105   Co2 20 - 33 mmol/L 27   Anion Gap 7.0 - 16.0  8.0   Glucose 65 - 99 mg/dL 121 (H)   Bun 8 - 22 mg/dL 12   Creatinine 0.50 - 1.40 mg/dL 0.78   GFR (CKD-EPI) >60 mL/min/1.73 m 2 102   Calcium 8.5 - 10.5 mg/dL 10.2   Correct Calcium 8.5 - 10.5 mg/dL 9.9   AST(SGOT) 12 - 45 U/L 22   ALT(SGPT) 2 - 50 U/L 25   Alkaline Phosphatase 30 - 99 U/L 97   Total Bilirubin 0.1 - 1.5 mg/dL <0.2   Albumin 3.2 - 4.9 g/dL 4.4   Total Protein 6.0 - 8.2 g/dL 7.2   Globulin 1.9 - 3.5 g/dL 2.8   A-G Ratio g/dL 1.6   (L): Data is abnormally low  (H): Data is abnormally high    10/4/2024 CEA  2.5  11/8/2024 CEA 2.6  11/22/2024 CEA 2.9    Imaging:     IL-PRADD-[id number] BASE TO MID-THIGH  10/9/2024  1.  Large hypermetabolic rectal mass consistent with known malignancy.   2.  Local involvement of 2 perirectal/presacral lymph nodes.   3.  No evidence of distant metastatic disease.   4.  No PET correlate for peripherally enhancing lesion seen in the LEFT lobe liver superiorly favoring benign etiology.   5.  Mild uptake in distal esophagus likely inflammatory.      MR-ABDOMEN-WITH & W/O  10/9/2024  1.  Peripherally enhancing lesion in the medial segment LEFT lobe liver superiorly measuring 15 mm, concerning for metastasis.   2.  No adenopathy demonstrated.       MR-RECTAL/PROSTATE PROTOCOL  10/2/2024  Circumferential mid/upper rectal mass straddling the peritoneal reflection, with involvement of the mesorectal fascia anteriorly at the peritoneal reflection.  Multiple perirectal lymph nodes/tumor deposits present. T4a N2     CT-CHEST,ABDOMEN,PELVIS WITH  9/28/2024  1.  Segmental area of distal rectal wall thickening, likely corresponding with reported colon cancer.   2.  Mildly prominent perirectal lymph nodes, could represent local metastatic disease   3.   Indeterminate right hepatic lobe lesion, could be followed up with MRI of the liver for more definitive characterization.   4.  Atherosclerosis and atherosclerotic coronary artery disease    CT-ABDOMEN-PELVIS WITH 9/26/2024  1.  1.0 mm right ureterovesicular junction stone resulting in right urinary outflow tract changes   2.  Mild fluid-filled prominence of small bowel and colon, consider component of enterocolitis.   3.  Atherosclerosis and atherosclerotic coronary artery disease    CT-ABDOMEN-PELVIS WITH 9/19/2024  1.  Mild right hydronephrosis and hydroureter appear to be caused by obstructing 1 mm calculus at the right UVJ.   2.  No evidence of left-sided hydronephrosis.   3.  Rectal wall thickening appears to be present. Differential diagnosis includes  proctitis colitis and carcinoma. Mildly enlarged perirectal lymph node is also noted.       Pathology:      11/13/2024  A. Liver mass, biopsy:          Vascular neoplasm involving liver, pending consultation at           [facility] for further evaluation.     Comment: Features of metastatic carcinoma are not seen. Sections    demonstrate a vascular neoplasm, which could represent a hemangioma    (such as an anastomosing hemangioma), among other etiologies. Pending    consultation at [facility] for further characterization.           Assessment & Plan:    This is a 59 year old  man with rectal adenocarcinoma, [id number] stage IIIC disease. He presents for evaluation.     Current Diagnosis and Staging: Rectal adenocarcinoma, [id number] stage IIIC disease    Treatment Plan: mFOLFIRINOX followed by capecitabine with concurrent XRT and then surgical evaluation    Treatment Citation: NCCN    Plan of Care:    Primary Therapy: mFOLFIRINOX, C3 planned for 12/6/2024  Supportive Therapy: Antiemetics per protocol  Toxicity: Patient is getting antineoplastic therapy and needs monitoring of blood counts, hepatic function, and renal function due to potential for organ dysfunction.   Labs: CBC with diff, CMP, CEA, ctDNA monitoring  Imaging: Repeat MRI rectal protocol, CT scans after completion of JOSEMANUEL  Treatment Planning: Patient will start with chemotherapy due to concern of aggressive spread of disease, and then will do capecitabine with concurrent XRT and surgical evaluation.  Consultations: Colorectal surgery (Dr. Tse); Radiation oncology (Dr. Huerta)  Code Status: Full  Miscellaneous: Referred to medical genetics, liver biopsy done 11/13/2024 favors vascular neoplasm, sent to [facility] for further characterization.  ctDNA requested 11/15/2024 canceled due to lack of sufficient tissue for testing.  Persistent rectal pain, requiring additional pain medication and visit to emergency room for pain  management.  Continue Percocet 5/325 1 to 2 tablets every 8 hours as needed for pain.    Return to clinic in 2 weeks for next follow-up appointment, and cycle 4 FOLFIRINOX.      Plan of care reviewed with patient and all questions answered.  Patient verbalizes understanding, denies questions, and agrees with plan of care.  Patient instructed to call or message clinic with any questions or concerns, contact information provided.    Thank you for allowing me the privilege of caring for this patient.  If if you have any questions, please do not hesitate to reach out.  I may be contacted at [phone].    Britney Pendleton, MSN, AOCNP, FNP-C    Please note that this dictation was created using voice recognition software. I have made every reasonable attempt to correct obvious errors, but I expect that there are errors of grammar, and possibly content, that I did not discover before finalizing the note.

## 2024-12-06 ENCOUNTER — HOSPITAL ENCOUNTER (OUTPATIENT)
Dept: HEMATOLOGY ONCOLOGY | Facility: MEDICAL CENTER | Age: 59
End: 2024-12-06
Payer: COMMERCIAL

## 2024-12-06 ENCOUNTER — OUTPATIENT INFUSION SERVICES (OUTPATIENT)
Dept: ONCOLOGY | Facility: MEDICAL CENTER | Age: 59
End: 2024-12-06
Attending: STUDENT IN AN ORGANIZED HEALTH CARE EDUCATION/TRAINING PROGRAM
Payer: COMMERCIAL

## 2024-12-06 ENCOUNTER — PHARMACY VISIT (OUTPATIENT)
Dept: PHARMACY | Facility: MEDICAL CENTER | Age: 59
End: 2024-12-06
Payer: MEDICARE

## 2024-12-06 VITALS
RESPIRATION RATE: 18 BRPM | OXYGEN SATURATION: 95 % | HEIGHT: 68 IN | SYSTOLIC BLOOD PRESSURE: 140 MMHG | WEIGHT: 183.2 LBS | DIASTOLIC BLOOD PRESSURE: 79 MMHG | TEMPERATURE: 97.6 F | HEART RATE: 75 BPM | BODY MASS INDEX: 27.77 KG/M2

## 2024-12-06 VITALS
HEART RATE: 80 BPM | BODY MASS INDEX: 27.88 KG/M2 | DIASTOLIC BLOOD PRESSURE: 86 MMHG | TEMPERATURE: 97 F | SYSTOLIC BLOOD PRESSURE: 142 MMHG | HEIGHT: 68 IN | WEIGHT: 183.95 LBS | OXYGEN SATURATION: 96 %

## 2024-12-06 DIAGNOSIS — G89.3 CANCER RELATED PAIN: ICD-10-CM

## 2024-12-06 DIAGNOSIS — Z79.899 ENCOUNTER FOR LONG-TERM CURRENT USE OF HIGH RISK MEDICATION: ICD-10-CM

## 2024-12-06 DIAGNOSIS — C20 RECTAL CANCER (HCC): ICD-10-CM

## 2024-12-06 LAB — CEA SERPL-MCNC: 2.2 NG/ML (ref 0–3)

## 2024-12-06 PROCEDURE — 96367 TX/PROPH/DG ADDL SEQ IV INF: CPT

## 2024-12-06 PROCEDURE — A4212 NON CORING NEEDLE OR STYLET: HCPCS

## 2024-12-06 PROCEDURE — 700105 HCHG RX REV CODE 258: Performed by: NURSE PRACTITIONER

## 2024-12-06 PROCEDURE — 99212 OFFICE O/P EST SF 10 MIN: CPT

## 2024-12-06 PROCEDURE — 82378 CARCINOEMBRYONIC ANTIGEN: CPT

## 2024-12-06 PROCEDURE — 700111 HCHG RX REV CODE 636 W/ 250 OVERRIDE (IP): Performed by: NURSE PRACTITIONER

## 2024-12-06 PROCEDURE — 96376 TX/PRO/DX INJ SAME DRUG ADON: CPT

## 2024-12-06 PROCEDURE — 96413 CHEMO IV INFUSION 1 HR: CPT

## 2024-12-06 PROCEDURE — 700101 HCHG RX REV CODE 250: Performed by: NURSE PRACTITIONER

## 2024-12-06 PROCEDURE — 96417 CHEMO IV INFUS EACH ADDL SEQ: CPT

## 2024-12-06 PROCEDURE — RXMED WILLOW AMBULATORY MEDICATION CHARGE

## 2024-12-06 PROCEDURE — G0498 CHEMO EXTEND IV INFUS W/PUMP: HCPCS

## 2024-12-06 PROCEDURE — 96375 TX/PRO/DX INJ NEW DRUG ADDON: CPT

## 2024-12-06 PROCEDURE — 96415 CHEMO IV INFUSION ADDL HR: CPT

## 2024-12-06 PROCEDURE — 99214 OFFICE O/P EST MOD 30 MIN: CPT

## 2024-12-06 RX ORDER — ONDANSETRON 8 MG/1
8 TABLET, ORALLY DISINTEGRATING ORAL PRN
Status: COMPLETED | OUTPATIENT
Start: 2024-12-06 | End: 2024-12-06

## 2024-12-06 RX ORDER — ATROPINE SULFATE 1 MG/ML
0.5 INJECTION, SOLUTION INTRAVENOUS PRN
Status: DISCONTINUED | OUTPATIENT
Start: 2024-12-06 | End: 2024-12-06 | Stop reason: HOSPADM

## 2024-12-06 RX ORDER — ONDANSETRON 2 MG/ML
16 INJECTION INTRAMUSCULAR; INTRAVENOUS ONCE
Status: COMPLETED | OUTPATIENT
Start: 2024-12-06 | End: 2024-12-06

## 2024-12-06 RX ORDER — DEXAMETHASONE SODIUM PHOSPHATE 4 MG/ML
12 INJECTION, SOLUTION INTRA-ARTICULAR; INTRALESIONAL; INTRAMUSCULAR; INTRAVENOUS; SOFT TISSUE ONCE
Status: CANCELLED
Start: 2024-12-21 | End: 2024-12-21

## 2024-12-06 RX ORDER — ONDANSETRON 2 MG/ML
4 INJECTION INTRAMUSCULAR; INTRAVENOUS PRN
Status: COMPLETED | OUTPATIENT
Start: 2024-12-06 | End: 2024-12-06

## 2024-12-06 RX ORDER — DEXAMETHASONE SODIUM PHOSPHATE 4 MG/ML
12 INJECTION, SOLUTION INTRA-ARTICULAR; INTRALESIONAL; INTRAMUSCULAR; INTRAVENOUS; SOFT TISSUE ONCE
Status: COMPLETED | OUTPATIENT
Start: 2024-12-06 | End: 2024-12-06

## 2024-12-06 RX ORDER — OXYCODONE AND ACETAMINOPHEN 5; 325 MG/1; MG/1
1 TABLET ORAL EVERY 8 HOURS PRN
Qty: 90 TABLET | Refills: 0 | Status: SHIPPED | OUTPATIENT
Start: 2024-12-06 | End: 2025-01-05

## 2024-12-06 RX ORDER — ONDANSETRON 2 MG/ML
16 INJECTION INTRAMUSCULAR; INTRAVENOUS ONCE
Status: CANCELLED
Start: 2024-12-21 | End: 2024-12-21

## 2024-12-06 RX ADMIN — FLUOROURACIL 4730 MG: 50 INJECTION, SOLUTION INTRAVENOUS at 15:06

## 2024-12-06 RX ADMIN — ONDANSETRON 8 MG: 8 TABLET, ORALLY DISINTEGRATING ORAL at 11:56

## 2024-12-06 RX ADMIN — FOSAPREPITANT 150 MG: 150 INJECTION, POWDER, LYOPHILIZED, FOR SOLUTION INTRAVENOUS at 09:52

## 2024-12-06 RX ADMIN — LEUCOVORIN CALCIUM 790 MG: 500 INJECTION, POWDER, LYOPHILIZED, FOR SOLUTION INTRAMUSCULAR; INTRAVENOUS at 12:19

## 2024-12-06 RX ADMIN — LIDOCAINE HYDROCHLORIDE 0.5 ML: 10 INJECTION, SOLUTION EPIDURAL; INFILTRATION; INTRACAUDAL; PERINEURAL at 09:17

## 2024-12-06 RX ADMIN — DEXTROSE MONOHYDRATE 295.6 MG: 50 INJECTION, SOLUTION INTRAVENOUS at 10:34

## 2024-12-06 RX ADMIN — OXALIPLATIN 165 MG: 5 INJECTION, SOLUTION INTRAVENOUS at 12:21

## 2024-12-06 RX ADMIN — ONDANSETRON 4 MG: 2 INJECTION INTRAMUSCULAR; INTRAVENOUS at 14:40

## 2024-12-06 RX ADMIN — DEXAMETHASONE SODIUM PHOSPHATE 12 MG: 4 INJECTION INTRA-ARTICULAR; INTRALESIONAL; INTRAMUSCULAR; INTRAVENOUS; SOFT TISSUE at 09:36

## 2024-12-06 RX ADMIN — ONDANSETRON 16 MG: 2 INJECTION INTRAMUSCULAR; INTRAVENOUS at 09:44

## 2024-12-06 ASSESSMENT — PAIN SCALES - GENERAL: PAINLEVEL_OUTOF10: 2=MINIMAL-SLIGHT

## 2024-12-06 ASSESSMENT — FIBROSIS 4 INDEX
FIB4 SCORE: 1.32
FIB4 SCORE: 1.32

## 2024-12-06 ASSESSMENT — ENCOUNTER SYMPTOMS: SENSORY CHANGE: 1

## 2024-12-06 NOTE — PROGRESS NOTES
"Pharmacy Chemotherapy Verification:    DX: Rectal adenocarcinoma [id number] stage IIIC     Regimen: mFOLFIRINOX  *Dosing Reference*  Oxaliplatin 85 mg/m2 IV over 2 hours on Day 1  Leucovorin 400 mg/m2 IV over 2 hours on Day 1  Irinotecan 150 mg/m2 IV over 30-90 minutes on Day 1  Fluorouracil 1200 mg/m2 IV continuous infusion daily on Days 1-2 (2400 mg/m2 IV over 48 hours)  14-day cycle for 6-8 cycles (total neoadjuvant therapy) or until disease progression or unacceptable toxicity (advanced or metastatic)    NCCN Guidelines for Rectal Cancer V.1.2024.  Jim REGAN et al. Clin Colorectal Cancer. 2019;18(1):e69-e73.  Awa M, et al. Beena Surg Oncol. 2013;20(13):4281-97.    Allergies:  Patient has no known allergies.     BP (!) 140/79   Pulse 75   Temp 36.4 °C (97.6 °F) (Temporal)   Resp 18   Ht 1.727 m (5' 7.99\")   Wt 83.1 kg (183 lb 3.2 oz)   SpO2 95%   BMI 27.86 kg/m²  Body surface area is 2 meters squared.    Labs 12/4/24:  ANC~ 2780 Plt = 197k   Hgb = 13.5     SCr = 0.78 mg/dL CrCl ~ 119.3 mL/min   AST/ALT/AP = 22/25/97 TBili < 0.2       Drug Order   (Drug name, dose, route, IV Fluid & volume, frequency, number of doses) Cycle 3    Previous treatment: C2 11/22/24     Medication = OXALIplatin  Base Dose = 85 mg/m²  Calc Dose: Base Dose x 2 m² = 170 mg  Final Dose = 165 mg  Route = IV  Fluid & Volume = D5W 250 mL  Admin Duration = Over 2 hours   Infuse concurrently with LCV       <10% difference, OK to treat with final dose     Medication = Leucovorin  Base Dose = 400 mg/m²  Calc Dose: Base Dose x  2 m² = 800 mg  Final Dose = 790 mg  Route = IV  Fluid & Volume = D5W 250 mL  Admin Duration = Over 2 hours   Infuse concurrently with OXALIplatin        <10% difference, OK to treat with final dose     Medication = Irinotecan  Base Dose = 150 mg/m²  Calc Dose:Base Dose x 2 m² = 300 mg  Final Dose = 295.6 mg  Route = IV  Fluid & Volume = D5W 500 mL  Admin Duration = Over 90 minutes          <10% difference, OK to " treat with final dose     Medication = Fluorouracil  Base Dose = 2400 mg/m²  Calc Dose: Base Dose x 2 m² = 4800 mg  Final Dose = 4730 mg  Route = CIVI via CADD  Concentration = 50 mg/mL  Fluid and Volume = 94.6 mL (+ 3 mL overfill)  Admin Duration = Over 48 hours @ 2 mL/hr            <10% difference,  OK to treat with final dose       By my signature below, I confirm this process was performed independently with the BSA and all final chemotherapy dosing calculations congruent. I have reviewed the above chemotherapy order and that my calculation of the final dose and BSA (when applicable) corroborate those calculations of the  pharmacist.     Queenie Jalloh, DonnyD

## 2024-12-06 NOTE — PROGRESS NOTES
[facility]er [facility] Day [facility]/ Cycle 3 of mFOLFIRINOX  for rectal cancer. Pt denied having any new or acute complaints today, reports tolerating past treatments well. 1% lidocaine was injected over port site. Port accessed in a sterile manner, had positive blood return and flushed briskly. Labs were drawn 12/04/2024 and reviewed. CEA was drawn today. Pt meets parameters for treatment today. Premeds given as ordered. Pt declined atropine prior to irinotecan. Pt given irinotecan as ordered. Booker began having increased nausea during this infusion. PRN Zofran given with good results. Pt given oxaliplatin, and leucovorin as prescribed. Booker developed more waves of nausea towards the end of the oxaliplatin infusion. Additional PRN Zofran given IV. Booker stated improvement resolving his nausea with the IV Zofran. He stated he felt okay to leave. Port had positive blood return after and prior to 5 FU CADD pump connection. 5 FU CADD pump placed in the RUN position and verified with the patient. Pt has future appointments. Pt discharged to home in good condition.

## 2024-12-06 NOTE — PROGRESS NOTES
Chemotherapy Verification - SECONDARY RN       Height = 172.7 cm  Weight = 83.1 kg  BSA = 2.06 m2       Medication: Irinotecan  Dose: 150 mg/m2  Calculated Dose: 309 mg                             (In mg/m2, AUC, mg/kg)     Medication: Oxaliplatin  Dose: 85 mg/m2  Calculated Dose: 175.1 mg                             (In mg/m2, AUC, mg/kg)    Medication: Fluorouracil  Dose: 2,400 mg/m2  Calculated Dose: 4,944 mg                             (In mg/m2, AUC, mg/kg)      I confirm that this process was performed independently.

## 2024-12-06 NOTE — PROGRESS NOTES
"Pharmacy Chemotherapy Calculation:    DX: Rectal adenocarcinoma   Cycle 3  Previous treatment = C2 11/22/24    Regimen: mFOLFIRINOX  *Dosing Reference*  Oxaliplatin 85 mg/m2 IV over 2 hours on Day 1  Leucovorin 400 mg/m2 IV over 2 hours on Day 1  Irinotecan 150 mg/m2 IV over 30-90 minutes on Day 1  Fluorouracil 1200 mg/m2 IV continuous infusion daily on Days 1-2 (2400 mg/m2 IV over 48 hours)  14-day cycle for 6-8 cycles (total neoadjuvant therapy) or until disease progression or unacceptable toxicity (advanced or metastatic)  NCCN Guidelines for Rectal Cancer V.1.2024.  Jim J, et al. Clin Colorectal Cancer. 2019;18(1):e69-e73.  Awa M, et al. Beena Surg Oncol. 2013;20(13):4289-97.    Allergies: Patient has no known allergies.   BP (!) 140/79   Pulse 75   Temp 36.4 °C (97.6 °F) (Temporal)   Resp 18   Ht 1.727 m (5' 7.99\")   Wt 83.1 kg (183 lb 3.2 oz)   SpO2 95%   BMI 27.86 kg/m²   Body surface area is 2 meters squared.    Labs 12/4/24  ANC~ 2780 Plt = 197k   Hgb = 13.5     SCr = 0.78 mg/dL CrCl ~ 119.3 mL/min   LFT's = WNLs  TBili = <0.2     Irinotecan 150 mg/m2 x 2 m2 = 300 mg   <10% difference, okay to treat with final dose = 295.6 mg IV    Oxaliplatin 85 mg/m2 x 2 m2 = 170 mg   <10% difference, okay to treat with final dose = 165 mg IV    Leucovorin 400 mg/m2 x 2 m2 = 800 mg   <10% difference, okay to treat with final dose = 790 mg IV    Fluorouracil 2400 mg/m2 x 2 m2 = 4800 mg   <10% difference, okay to treat with final dose = 4730 mg IV over 48 hours @ 2 mL/hour    T[place]o [place], PharmD    "

## 2024-12-06 NOTE — PROGRESS NOTES
Chemotherapy Verification - PRIMARY RN      Height = 172.7 cm  Weight = 83.1 kg  BSA = 2.00 m^2       Medication: irinotecan (Camptosar)  Dose: 150 mg/m^2  Calculated Dose: 300 mg                             (In mg/m2, AUC, mg/kg)     Medication: oxaliplatin (Eloxatin)  Dose: 85 mg/m^2  Calculated Dose: 170 mg                             (In mg/m2, AUC, mg/kg)    Medication: fluorouracil (Adrucil)  Dose: 2400 mg/m^2 via CADD pump Calculated Dose: 4800 mg via CADD pump over 48 hours                             (In mg/m2, AUC, mg/kg)    I confirm this process was performed independently with the BSA and all final chemotherapy dosing calculations congruent.  Any discrepancies of 10% or greater have been addressed with the chemotherapy pharmacist. The resolution of the discrepancy has been documented in the EPIC progress notes.

## 2024-12-08 ENCOUNTER — OUTPATIENT INFUSION SERVICES (OUTPATIENT)
Dept: ONCOLOGY | Facility: MEDICAL CENTER | Age: 59
End: 2024-12-08
Attending: STUDENT IN AN ORGANIZED HEALTH CARE EDUCATION/TRAINING PROGRAM
Payer: COMMERCIAL

## 2024-12-08 VITALS
OXYGEN SATURATION: 98 % | DIASTOLIC BLOOD PRESSURE: 72 MMHG | TEMPERATURE: 97.9 F | RESPIRATION RATE: 18 BRPM | HEART RATE: 72 BPM | SYSTOLIC BLOOD PRESSURE: 127 MMHG

## 2024-12-08 DIAGNOSIS — C20 RECTAL CANCER (HCC): ICD-10-CM

## 2024-12-08 PROCEDURE — 700111 HCHG RX REV CODE 636 W/ 250 OVERRIDE (IP): Performed by: NURSE PRACTITIONER

## 2024-12-08 PROCEDURE — 96523 IRRIG DRUG DELIVERY DEVICE: CPT

## 2024-12-08 RX ADMIN — HEPARIN 500 UNITS: 100 SYRINGE at 16:56

## 2024-12-09 ENCOUNTER — APPOINTMENT (OUTPATIENT)
Dept: HEMATOLOGY ONCOLOGY | Facility: MEDICAL CENTER | Age: 59
End: 2024-12-09
Attending: FAMILY MEDICINE
Payer: COMMERCIAL

## 2024-12-09 NOTE — PROGRESS NOTES
Patient to [facility] for CADD pump disconnect.CADD pump stopped, turned off then disconnected from patient. [facility] performed and port de-accessed. Gauze and paper tape applied as a dressing. Patient to home.

## 2024-12-17 ASSESSMENT — ENCOUNTER SYMPTOMS
INSOMNIA: 0
WEIGHT LOSS: 0
BLURRED VISION: 0
NAUSEA: 0
PALPITATIONS: 0
COUGH: 0
BLOOD IN STOOL: 1
WHEEZING: 0
SHORTNESS OF BREATH: 0
HEARTBURN: 0
SPUTUM PRODUCTION: 0
WEAKNESS: 0
ABDOMINAL PAIN: 1
BRUISES/BLEEDS EASILY: 0
DIARRHEA: 0
DIZZINESS: 0
MYALGIAS: 0
SENSORY CHANGE: 1
VOMITING: 0
DIAPHORESIS: 0
NERVOUS/ANXIOUS: 0
SINUS PAIN: 0
TINGLING: 0
ORTHOPNEA: 0
CONSTIPATION: 1
HEADACHES: 0
FEVER: 0
BACK PAIN: 0
CHILLS: 0
SORE THROAT: 0
DOUBLE VISION: 0

## 2024-12-17 NOTE — PROGRESS NOTES
"Consult:  Hematology/Oncology      Referring Physician: Jason Huerta MD  Primary Care:  Rickie Naranjo M.D.    Diagnosis: Rectal adenocarcinoma    Chief Complaint:  Evaluation for systemic therapy    History of Presenting Illness:  Booker Saucedo is a 59 y.o.  man who presents to the clinic for evaluation for systemic therapy for a diagnosis of rectal carcinoma.  He started having back pain and changes in his bowel habits in July and went to the hospital where CT scan revealed right hydronephrosis due to a 1 mm calculus.  However, he was also noted to have rectal wall thickening with perirectal lymph nodes.  He subsequently underwent a colonoscopy which revealed a fungating circumferential mass, 5 cm from the anal verge, with biopsy revealing invasive adenocarcinoma, moderately differentiated, MMR proficient.  Staging scans did not reveal any definitive metastatic disease, though there was a 1.5 cm left hepatic lobe lesion which was not PET avid.  He was seen by radiation oncology and colorectal surgery and ultimately was staged on rectal MRI as a [id number] stage IIIC disease. He has been referred for treatment accordingly.     Interval History:  Patient returns to clinic today for follow-up appointment.  He states he did well with his third cycle of FOLFIRINOX.  He did noticed that the cold sensitivity lasted a little longer, however it resolved by day 7.  He also did notice a little numbness in his fingertips for approximately 24 hours.  He continues to struggle with pain medications and constipation.  He admits he was feeling better, so he is backed off on both.  However on Saturday, 12/14/2024, he was forced to go to the emergency room due to constipation with severe pain.  He states the oral contrast they gave him caused a significant amount diarrhea, after which he \"felt fabulous\".  He did see Dr. Styles earlier this morning and has been given a pain plan which she feels will be " helpful, he is also being more consistent with his MiraLAX and Senokot.  Overall he feels he is doing pretty good, no other complaints or concerns provided.      Treatment history:  11/8/2024 cycle 1 day 1 FOLFIRINOX   11/22/2024 cycle 2 day 1 FOLFIRINOX   12/6/2024 cycle 3 day 1 FOLFIRINOX  12/20/2024 cycle 4 day 1 FOLFIRINOX (Planned)      Past Medical History:   Diagnosis Date    Bowel habit changes     Cancer (HCC) 09/2024    colorectal cancer    High cholesterol     Hypertension     Kidney stone     4-5x    MI (myocardial infarction) (HCC) 2015     Past Surgical History:   Procedure Laterality Date    CATH PLACEMENT Right 10/15/2024    Procedure: CENTRAL VENOUS CATHETER PLACEMENT WITH SUBCUTANEOUS PORT;  Surgeon: Hector Tse MD, PhD;  Location: SURGERY [facility];  Service: Gastroenterology    STENT PLACEMENT  2015    BARE METAL STENT- CARDIAC    APPENDECTOMY      KNEE ARTHROSCOPY      MENISCECTOMY    TONSILLECTOMY       Family History   Problem Relation Age of Onset    Bladder cancer Father     Breast Cancer Sister      Allergies as of 12/19/2024    (No Known Allergies)     Current Outpatient Medications:     oxyCODONE-acetaminophen (PERCOCET) 5-325 MG Tab, Take 1 Tablet by mouth every 8 hours as needed for Moderate Pain for up to 30 days., Disp: 90 Tablet, Rfl: 0    polyethylene glycol/lytes (MIRALAX) Pack, Take 17 g by mouth 1 time a day as needed., Disp: , Rfl:     melatonin 3 MG Tab, Take 3 mg by mouth at bedtime., Disp: , Rfl:     naproxen (ALEVE) 220 MG tablet, Take 220 mg by mouth 2 times a day as needed (pain)., Disp: , Rfl:     atorvastatin (LIPITOR) 40 MG Tab, Take 40 mg by mouth every day., Disp: , Rfl:     carvedilol (COREG) 12.5 MG Tab, Take 12.5 mg by mouth 2 times a day., Disp: , Rfl:     citalopram (CELEXA) 20 MG Tab, Take 20 mg by mouth every day., Disp: , Rfl:     hydrOXYzine HCl (ATARAX) 25 MG Tab, Take 25 mg by mouth every 8 hours as needed for Anxiety., Disp: , Rfl:     ondansetron  (ZOFRAN) 4 MG Tab tablet, Take 1 Tablet by mouth every four hours as needed for Nausea/Vomiting (for nausea, vomiting)., Disp: 30 Tablet, Rfl: 6    prochlorperazine (COMPAZINE) 10 MG Tab, Take 1 Tablet by mouth every 6 hours as needed (for nausea, vomiting)., Disp: 30 Tablet, Rfl: 6    OLANZapine (ZYPREXA) 5 MG Tab, Take 1 Tablet by mouth every evening., Disp: 30 Tablet, Rfl: 6    loperamide (IMODIUM A-D) 2 MG tablet, Take 1 Tablet by mouth see administration instructions., Disp: 30 Tablet, Rfl: 6    tamsulosin (FLOMAX) 0.4 MG capsule, Take 1 Capsule by mouth every day., Disp: 30 Capsule, Rfl: 0    Review of Systems:  Review of Systems   Constitutional:  Negative for chills, diaphoresis, fever, malaise/fatigue and weight loss.   HENT:  Negative for hearing loss, nosebleeds, sinus pain and sore throat.    Eyes:  Negative for blurred vision and double vision.   Respiratory:  Negative for cough, sputum production, shortness of breath and wheezing.    Cardiovascular:  Negative for chest pain, palpitations, orthopnea and leg swelling.   Gastrointestinal:  Positive for abdominal pain, blood in stool and constipation. Negative for diarrhea, heartburn, melena, nausea and vomiting.        Rectal pain.  Got significantly worse with constipation following treatment.  Now better.   Genitourinary:  Negative for dysuria, frequency, hematuria and urgency.   Musculoskeletal:  Negative for back pain, joint pain and myalgias.   Skin:  Negative for rash.        Dry skin on palms   Neurological:  Positive for sensory change (Cold sensitivity for 3 to 4 days following oxaliplatin infusion). Negative for dizziness, tingling, weakness and headaches.   Endo/Heme/Allergies:  Does not bruise/bleed easily.   Psychiatric/Behavioral:  The patient is not nervous/anxious and does not have insomnia.           Physical Exam:  BP (!) 146/84 (BP Location: Right arm, Patient Position: Sitting, BP Cuff Size: Large adult)   Pulse 83   Temp 36.6 °C  "(97.8 °F) (Temporal)   Ht 1.727 m (5' 7.99\")   Wt 81.9 kg (180 lb 8.9 oz)   SpO2 99%   BMI 27.46 kg/m²       DESC; KARNOFSKY SCALE WITH ECOG EQUIVALENT: 100, Fully active, able to carry on all pre-disease performed without restriction (ECOG equivalent 0)    DISTRESS LEVEL: no apparent distress    Physical Exam  Vitals and nursing note reviewed.   Constitutional:       General: He is awake. He is not in acute distress.     Appearance: Normal appearance. He is normal weight. He is not ill-appearing, toxic-appearing or diaphoretic.   HENT:      Head: Normocephalic and atraumatic.      Nose: Nose normal. No congestion.      Mouth/Throat:      Mouth: Mucous membranes are moist.      Pharynx: Oropharynx is clear. No oropharyngeal exudate or posterior oropharyngeal erythema.   Eyes:      General: No scleral icterus.     Extraocular Movements: Extraocular movements intact.      Conjunctiva/sclera: Conjunctivae normal.   Cardiovascular:      Rate and Rhythm: Normal rate and regular rhythm.      Pulses: Normal pulses.      Heart sounds: Normal heart sounds. No murmur heard.     No friction rub. No gallop.   Pulmonary:      Effort: Pulmonary effort is normal.      Breath sounds: Normal breath sounds. No decreased air movement. No wheezing, rhonchi or rales.   Abdominal:      General: Abdomen is flat. Bowel sounds are normal. There is no distension.      Palpations: Abdomen is soft. There is no mass.      Tenderness: There is abdominal tenderness in the right lower quadrant and suprapubic area.   Musculoskeletal:         General: No deformity. Normal range of motion.      Cervical back: Normal range of motion and neck supple. No tenderness.      Right lower leg: No edema.      Left lower leg: No edema.   Lymphadenopathy:      Cervical: No cervical adenopathy.      Upper Body:      Right upper body: No supraclavicular or axillary adenopathy.      Left upper body: No supraclavicular or axillary adenopathy.      Lower Body: No " right inguinal adenopathy. No left inguinal adenopathy.   Skin:     General: Skin is warm and dry.      Coloration: Skin is not jaundiced.      Findings: No erythema or rash.      Comments: Dry skin noted on palms of hands   Neurological:      General: No focal deficit present.      Mental Status: He is alert and oriented to person, place, and time.      Sensory: Sensation is intact.      Motor: Motor function is intact. No weakness.      Gait: Gait is intact.   Psychiatric:         Attention and Perception: Attention normal.         Mood and Affect: Mood normal.         Behavior: Behavior normal. Behavior is cooperative.         Thought Content: Thought content normal.         Judgment: Judgment normal.        Labs:     Latest Reference Range & Units 12/04/24 06:11   WBC 4.8 - 10.8 K/uL 5.6   RBC 4.70 - 6.10 M/uL 4.41 (L)   Hemoglobin 14.0 - 18.0 g/dL 13.5 (L)   Hematocrit 42.0 - 52.0 % 38.6 (L)   MCV 81.4 - 97.8 fL 87.5   MCH 27.0 - 33.0 pg 30.6   MCHC 32.3 - 36.5 g/dL 35.0   RDW 35.9 - 50.0 fL 39.0   Platelet Count 164 - 446 K/uL 197   MPV 9.0 - 12.9 fL 10.1   Neutrophils-Polys 44.00 - 72.00 % 49.60   Neutrophils (Absolute) 1.82 - 7.42 K/uL 2.78   Lymphocytes 22.00 - 41.00 % 32.60   Lymphs (Absolute) 1.00 - 4.80 K/uL 1.82   Monocytes 0.00 - 13.40 % 13.40   Monos (Absolute) 0.00 - 0.85 K/uL 0.75   Eosinophils 0.00 - 6.90 % 3.60   Eos (Absolute) 0.00 - 0.51 K/uL 0.20   Basophils 0.00 - 1.80 % 0.40   Baso (Absolute) 0.00 - 0.12 K/uL 0.02   Immature Granulocytes 0.00 - 0.90 % 0.40   Immature Granulocytes (abs) 0.00 - 0.11 K/uL 0.02   Nucleated RBC 0.00 - 0.20 /100 WBC 0.00   NRBC (Absolute) K/uL 0.00   Sodium 135 - 145 mmol/L 140   Potassium 3.6 - 5.5 mmol/L 3.8   Chloride 96 - 112 mmol/L 105   Co2 20 - 33 mmol/L 27   Anion Gap 7.0 - 16.0  8.0   Glucose 65 - 99 mg/dL 121 (H)   Bun 8 - 22 mg/dL 12   Creatinine 0.50 - 1.40 mg/dL 0.78   GFR (CKD-EPI) >60 mL/min/1.73 m 2 102   Calcium 8.5 - 10.5 mg/dL 10.2   Correct  Calcium 8.5 - 10.5 mg/dL 9.9   AST(SGOT) 12 - 45 U/L 22   ALT(SGPT) 2 - 50 U/L 25   Alkaline Phosphatase 30 - 99 U/L 97   Total Bilirubin 0.1 - 1.5 mg/dL <0.2   Albumin 3.2 - 4.9 g/dL 4.4   Total Protein 6.0 - 8.2 g/dL 7.2   Globulin 1.9 - 3.5 g/dL 2.8   A-G Ratio g/dL 1.6   (L): Data is abnormally low  (H): Data is abnormally high    10/4/2024 CEA 2.5  11/8/2024 CEA 2.6  11/22/2024 CEA 2.9  12/6/2024 CEA 2.2      12/14/2024 WBC 6.1, H&H 13.4/36.6L, MCV 82.6, platelets 183, creatinine 0.77, EGFR 103.4    Imaging:     CT abdomen and pelvis with contrast 12/14/2024  Mild wall thickening in the sigmoid colon and rectum most likely on the basis of colitis perhaps diverticula.  No abscess or free air or mechanical obstruction.  Mild abdominal aortic plaque.      [id number] BASE TO MID-THIGH  10/9/2024  1.  Large hypermetabolic rectal mass consistent with known malignancy.   2.  Local involvement of 2 perirectal/presacral lymph nodes.   3.  No evidence of distant metastatic disease.   4.  No PET correlate for peripherally enhancing lesion seen in the LEFT lobe liver superiorly favoring benign etiology.   5.  Mild uptake in distal esophagus likely inflammatory.      MR-ABDOMEN-WITH & W/O  10/9/2024  1.  Peripherally enhancing lesion in the medial segment LEFT lobe liver superiorly measuring 15 mm, concerning for metastasis.   2.  No adenopathy demonstrated.       MR-RECTAL/PROSTATE PROTOCOL  10/2/2024  Circumferential mid/upper rectal mass straddling the peritoneal reflection, with involvement of the mesorectal fascia anteriorly at the peritoneal reflection.  Multiple perirectal lymph nodes/tumor deposits present. T4a N2     CT-CHEST,ABDOMEN,PELVIS WITH  9/28/2024  1.  Segmental area of distal rectal wall thickening, likely corresponding with reported colon cancer.   2.  Mildly prominent perirectal lymph nodes, could represent local metastatic disease   3.   Indeterminate right hepatic lobe lesion, could be followed up  with MRI of the liver for more definitive characterization.   4.  Atherosclerosis and atherosclerotic coronary artery disease    CT-ABDOMEN-PELVIS WITH 9/26/2024  1.  1.0 mm right ureterovesicular junction stone resulting in right urinary outflow tract changes   2.  Mild fluid-filled prominence of small bowel and colon, consider component of enterocolitis.   3.  Atherosclerosis and atherosclerotic coronary artery disease    CT-ABDOMEN-PELVIS WITH 9/19/2024  1.  Mild right hydronephrosis and hydroureter appear to be caused by obstructing 1 mm calculus at the right UVJ.   2.  No evidence of left-sided hydronephrosis.   3.  Rectal wall thickening appears to be present. Differential diagnosis includes proctitis colitis and carcinoma. Mildly enlarged perirectal lymph node is also noted.       Pathology:      11/13/2024  A. Liver mass, biopsy:          Vascular neoplasm involving liver, pending consultation at           [facility] for further evaluation.     Comment: Features of metastatic carcinoma are not seen. Sections    demonstrate a vascular neoplasm, which could represent a hemangioma    (such as an anastomosing hemangioma), among other etiologies. Pending    consultation at [facility] for further characterization.           Assessment & Plan:    This is a 59 year old  man with rectal adenocarcinoma, [id number] stage IIIC disease. He presents for evaluation.     Current Diagnosis and Staging: Rectal adenocarcinoma, [id number] stage IIIC disease    Treatment Plan: mFOLFIRINOX followed by capecitabine with concurrent XRT and then surgical evaluation    Treatment Citation: NCCN    Plan of Care:    Primary Therapy: mFOLFIRINOX, C4 planned for 12/20/2024  Supportive Therapy: Antiemetics per protocol  Toxicity: Patient is getting antineoplastic therapy and needs monitoring of blood counts, hepatic function, and renal function due to potential for organ dysfunction.   Labs: CBC with diff, CMP, CEA,  ctDNA monitoring  Imaging: Repeat MRI rectal protocol, CT scans after completion of JOSEMANUEL  Treatment Planning: Patient will start with chemotherapy due to concern of aggressive spread of disease, and then will do capecitabine with concurrent XRT and surgical evaluation.  Consultations: Colorectal surgery (Dr. Tse); Radiation oncology (Dr. Huerta), palliative care Dr. Styles  Code Status: Full  Miscellaneous: Referred to medical genetics, liver biopsy done 11/13/2024 favors vascular neoplasm, sent to [facility] for further characterization.  ctDNA requested 11/15/2024 canceled due to lack of sufficient tissue for testing.  Persistent rectal pain, requiring additional pain medication and visit to emergency room for pain management.  Consultation with Dr. Styles earlier this morning for pain management.  Return to clinic in 2 weeks for next follow-up appointment, and cycle 5 FOLFIRINOX.      Plan of care reviewed with patient and all questions answered.  Patient verbalizes understanding, denies questions, and agrees with plan of care.  Patient instructed to call or message clinic with any questions or concerns, contact information provided.    Thank you for allowing me the privilege of caring for this patient.  If if you have any questions, please do not hesitate to reach out.  I may be contacted at [phone].    Britney Pendleton, MSN, AOCNP, FNP-C    Please note that this dictation was created using voice recognition software. I have made every reasonable attempt to correct obvious errors, but I expect that there are errors of grammar, and possibly content, that I did not discover before finalizing the note.

## 2024-12-19 ENCOUNTER — HOSPITAL ENCOUNTER (OUTPATIENT)
Dept: HEMATOLOGY ONCOLOGY | Facility: MEDICAL CENTER | Age: 59
End: 2024-12-19
Payer: COMMERCIAL

## 2024-12-19 ENCOUNTER — HOSPITAL ENCOUNTER (OUTPATIENT)
Dept: HEMATOLOGY ONCOLOGY | Facility: MEDICAL CENTER | Age: 59
End: 2024-12-19
Attending: FAMILY MEDICINE
Payer: COMMERCIAL

## 2024-12-19 ENCOUNTER — HOSPITAL ENCOUNTER (OUTPATIENT)
Dept: LAB | Facility: MEDICAL CENTER | Age: 59
End: 2024-12-19
Attending: STUDENT IN AN ORGANIZED HEALTH CARE EDUCATION/TRAINING PROGRAM
Payer: COMMERCIAL

## 2024-12-19 VITALS
HEART RATE: 83 BPM | WEIGHT: 169.8 LBS | TEMPERATURE: 97.8 F | HEIGHT: 68 IN | OXYGEN SATURATION: 99 % | BODY MASS INDEX: 25.73 KG/M2

## 2024-12-19 VITALS
DIASTOLIC BLOOD PRESSURE: 84 MMHG | SYSTOLIC BLOOD PRESSURE: 146 MMHG | TEMPERATURE: 97.8 F | HEIGHT: 68 IN | WEIGHT: 180.56 LBS | BODY MASS INDEX: 27.36 KG/M2 | OXYGEN SATURATION: 99 % | HEART RATE: 83 BPM

## 2024-12-19 DIAGNOSIS — C20 RECTAL CANCER (HCC): ICD-10-CM

## 2024-12-19 DIAGNOSIS — Z79.899 ENCOUNTER FOR LONG-TERM (CURRENT) USE OF HIGH-RISK MEDICATION: ICD-10-CM

## 2024-12-19 DIAGNOSIS — G89.3 CANCER RELATED PAIN: ICD-10-CM

## 2024-12-19 LAB
ALBUMIN SERPL BCP-MCNC: 4.3 G/DL (ref 3.2–4.9)
ALBUMIN/GLOB SERPL: 1.7 G/DL
ALP SERPL-CCNC: 93 U/L (ref 30–99)
ALT SERPL-CCNC: 40 U/L (ref 2–50)
ANION GAP SERPL CALC-SCNC: 11 MMOL/L (ref 7–16)
AST SERPL-CCNC: 25 U/L (ref 12–45)
BASOPHILS # BLD AUTO: 0.5 % (ref 0–1.8)
BASOPHILS # BLD: 0.03 K/UL (ref 0–0.12)
BILIRUB SERPL-MCNC: 0.3 MG/DL (ref 0.1–1.5)
BUN SERPL-MCNC: 17 MG/DL (ref 8–22)
CALCIUM ALBUM COR SERPL-MCNC: 9.6 MG/DL (ref 8.5–10.5)
CALCIUM SERPL-MCNC: 9.8 MG/DL (ref 8.5–10.5)
CHLORIDE SERPL-SCNC: 109 MMOL/L (ref 96–112)
CO2 SERPL-SCNC: 27 MMOL/L (ref 20–33)
CREAT SERPL-MCNC: 0.74 MG/DL (ref 0.5–1.4)
EOSINOPHIL # BLD AUTO: 0.17 K/UL (ref 0–0.51)
EOSINOPHIL NFR BLD: 2.6 % (ref 0–6.9)
ERYTHROCYTE [DISTWIDTH] IN BLOOD BY AUTOMATED COUNT: 43.3 FL (ref 35.9–50)
GFR SERPLBLD CREATININE-BSD FMLA CKD-EPI: 104 ML/MIN/1.73 M 2
GLOBULIN SER CALC-MCNC: 2.5 G/DL (ref 1.9–3.5)
GLUCOSE SERPL-MCNC: 83 MG/DL (ref 65–99)
HCT VFR BLD AUTO: 37.2 % (ref 42–52)
HGB BLD-MCNC: 12.8 G/DL (ref 14–18)
IMM GRANULOCYTES # BLD AUTO: 0.02 K/UL (ref 0–0.11)
IMM GRANULOCYTES NFR BLD AUTO: 0.3 % (ref 0–0.9)
LYMPHOCYTES # BLD AUTO: 2 K/UL (ref 1–4.8)
LYMPHOCYTES NFR BLD: 30.8 % (ref 22–41)
MCH RBC QN AUTO: 30.1 PG (ref 27–33)
MCHC RBC AUTO-ENTMCNC: 34.4 G/DL (ref 32.3–36.5)
MCV RBC AUTO: 87.5 FL (ref 81.4–97.8)
MONOCYTES # BLD AUTO: 0.8 K/UL (ref 0–0.85)
MONOCYTES NFR BLD AUTO: 12.3 % (ref 0–13.4)
NEUTROPHILS # BLD AUTO: 3.47 K/UL (ref 1.82–7.42)
NEUTROPHILS NFR BLD: 53.5 % (ref 44–72)
NRBC # BLD AUTO: 0 K/UL
NRBC BLD-RTO: 0 /100 WBC (ref 0–0.2)
PLATELET # BLD AUTO: 195 K/UL (ref 164–446)
PMV BLD AUTO: 10.2 FL (ref 9–12.9)
POTASSIUM SERPL-SCNC: 4.1 MMOL/L (ref 3.6–5.5)
PROT SERPL-MCNC: 6.8 G/DL (ref 6–8.2)
RBC # BLD AUTO: 4.25 M/UL (ref 4.7–6.1)
SODIUM SERPL-SCNC: 147 MMOL/L (ref 135–145)
WBC # BLD AUTO: 6.5 K/UL (ref 4.8–10.8)

## 2024-12-19 PROCEDURE — 85025 COMPLETE CBC W/AUTO DIFF WBC: CPT

## 2024-12-19 PROCEDURE — 99215 OFFICE O/P EST HI 40 MIN: CPT | Mod: 25 | Performed by: FAMILY MEDICINE

## 2024-12-19 PROCEDURE — 99212 OFFICE O/P EST SF 10 MIN: CPT | Mod: 25 | Performed by: FAMILY MEDICINE

## 2024-12-19 PROCEDURE — 99212 OFFICE O/P EST SF 10 MIN: CPT

## 2024-12-19 PROCEDURE — 80053 COMPREHEN METABOLIC PANEL: CPT

## 2024-12-19 PROCEDURE — 36415 COLL VENOUS BLD VENIPUNCTURE: CPT

## 2024-12-19 PROCEDURE — 99214 OFFICE O/P EST MOD 30 MIN: CPT

## 2024-12-19 ASSESSMENT — ENCOUNTER SYMPTOMS
BACK PAIN: 0
NERVOUS/ANXIOUS: 0
CHILLS: 0
NAUSEA: 0
HEADACHES: 0
COUGH: 0
VOMITING: 0
WEIGHT LOSS: 1
FEVER: 0
ABDOMINAL PAIN: 1
SHORTNESS OF BREATH: 0
DEPRESSION: 0
PALPITATIONS: 0
DIARRHEA: 0
BLURRED VISION: 0
BRUISES/BLEEDS EASILY: 0
CONSTIPATION: 1

## 2024-12-19 ASSESSMENT — PAIN SCALES - GENERAL: PAINLEVEL_OUTOF10: 6=MODERATE PAIN

## 2024-12-19 ASSESSMENT — FIBROSIS 4 INDEX
FIB4 SCORE: 1.32
FIB4 SCORE: 1.32

## 2024-12-19 NOTE — ASSESSMENT & PLAN NOTE
Patient with known rectal adenocarcinoma.  Does have oncological pain secondary to his primary tumor.  Does have Percocet 5/325 available.  Did discuss the use of 1 to 2 tablets as needed for breakthrough pain.  Did also discuss using MiraLAX to keep stools soft to prevent aggravating his rectal pain.  PDMP reviewed, no signs of diversion or abuse, risk and benefits of medication discussed, no refills needed today.

## 2024-12-19 NOTE — PROGRESS NOTES
"Pharmacy Chemotherapy Calculation:    DX: Rectal adenocarcinoma   Cycle 4  Previous treatment = C3 12/6/24    Regimen: mFOLFIRINOX  *Dosing Reference*  Oxaliplatin 85 mg/m2 IV over 2 hours on Day 1  Leucovorin 400 mg/m2 IV over 2 hours on Day 1  Irinotecan 150 mg/m2 IV over 30-90 minutes on Day 1  Fluorouracil 1200 mg/m2 IV continuous infusion daily on Days 1-2 (2400 mg/m2 IV over 48 hours)  14-day cycle for 6-8 cycles (total neoadjuvant therapy) or until disease progression or unacceptable toxicity (advanced or metastatic)  NCCN Guidelines for Rectal Cancer V.1.2024.  Jim REGAN et al. Clin Colorectal Cancer. 2019;18(1):e69-e73.  Awa M, et al. Beena Surg Oncol. 2013;20(13):4289-97.    Allergies: Patient has no known allergies.   BP (!) 138/91   Pulse 70   Temp 36.1 °C (97 °F) (Temporal)   Resp 18   Ht 1.727 m (5' 7.99\")   Wt 82 kg (180 lb 12.4 oz)   SpO2 98%   BMI 27.49 kg/m²   Body surface area is 1.98 meters squared.    Labs 12/19/24:  ANC~ 3470 Plt = 195k   Hgb = 12.8     SCr = 0.74 mg/dL CrCl ~ 124 mL/min   AST/ALT/AP = WNL TBili = 0.3         Irinotecan 150 mg/m2 x 1.98 m2 = 297 mg   <10% difference, okay to treat with final dose = 295.6 mg IV    Oxaliplatin 85 mg/m2 x 1.98 m2 = 168.3 mg   <10% difference, okay to treat with final dose = 165 mg IV    Leucovorin 400 mg/m2 x1.98 m2 = 792 mg   <10% difference, okay to treat with final dose = 790 mg IV    Fluorouracil 2400 mg/m2 x 1.98 m2 =4752 mg   <10% difference, okay to treat with final dose = 4730 mg IV over 48 hours @ 2 mL/hour    Aliyah Xiao, PharmD    "

## 2024-12-19 NOTE — PROGRESS NOTES
Subjective:     Chief Complaint   Patient presents with    New Patient      [employer] / rectal cancer / palliative care     Booker Saucedo is a 59 y.o. male referred by Dr. Kim for symptom management and goals of care.  The role of palliative care was discussed and he was open to a conversation.    Oncological history: Rectal adenocarcinoma stage IIIc currently undergoing chemo with discussion of radiation and surgical resection in the future    Symptoms: Patient reports his most significant oncological symptom is lower abdominal and rectal pain.  This pain can be severe and even resulted in an ER visit recently.  Pain can also be worsened by defecation.  Currently using oxycodone for relief and through discussion did advise he may take 2 of his pain medication tablets if needed.  We did also discuss the use of MiraLAX to keep his stools soft.  He is currently using 1-2 capfuls daily.  Does not endorse any significant nausea.    Goals of care: Patient is knowledgeable about his cancer.  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  Current goals are to pursue treatment with hopes of providing his significant additional quality of time in the future.  Does live in [place] and enjoys the outdoors.    Problem   Cancer Related Pain   Rectal Cancer (Hcc)        Current medicines (including changes today)  Current Outpatient Medications   Medication Sig Dispense Refill    oxyCODONE-acetaminophen (PERCOCET) 5-325 MG Tab Take 1 Tablet by mouth every 8 hours as needed for Moderate Pain for up to 30 days. 90 Tablet 0    polyethylene glycol/lytes (MIRALAX) Pack Take 17 g by mouth 1 time a day as needed.      melatonin 3 MG Tab Take 3 mg by mouth at bedtime.      naproxen (ALEVE) 220 MG tablet Take 220 mg by mouth 2 times a day as needed (pain).      atorvastatin (LIPITOR) 40 MG Tab Take 40 mg by mouth every day.      carvedilol (COREG)  "12.5 MG Tab Take 12.5 mg by mouth 2 times a day.      citalopram (CELEXA) 20 MG Tab Take 20 mg by mouth every day.      hydrOXYzine HCl (ATARAX) 25 MG Tab Take 25 mg by mouth every 8 hours as needed for Anxiety.      ondansetron (ZOFRAN) 4 MG Tab tablet Take 1 Tablet by mouth every four hours as needed for Nausea/Vomiting (for nausea, vomiting). 30 Tablet 6    prochlorperazine (COMPAZINE) 10 MG Tab Take 1 Tablet by mouth every 6 hours as needed (for nausea, vomiting). 30 Tablet 6    OLANZapine (ZYPREXA) 5 MG Tab Take 1 Tablet by mouth every evening. 30 Tablet 6    loperamide (IMODIUM A-D) 2 MG tablet Take 1 Tablet by mouth see administration instructions. 30 Tablet 6    tamsulosin (FLOMAX) 0.4 MG capsule Take 1 Capsule by mouth every day. 30 Capsule 0     No current facility-administered medications for this encounter.       Social History     Tobacco Use    Smoking status: Former     Types: Cigars     Quit date: 2024     Years since quittin.9    Smokeless tobacco: Never    Tobacco comments:     Former cigars    Vaping Use    Vaping status: Never Used   Substance Use Topics    Alcohol use: Not Currently     Comment: Remote history of alcohol abuse.  None for 6 1/2 years.    Drug use: Never     Past Medical History:   Diagnosis Date    Bowel habit changes     Cancer (HCC) 2024    colorectal cancer    High cholesterol     Hypertension     Kidney stone     4-5x    MI (myocardial infarction) (HCC)       Family History   Problem Relation Age of Onset    Bladder cancer Father     Breast Cancer Sister          Objective:     Vitals:    24 0810   Pulse: 83   Temp: 36.6 °C (97.8 °F)   TempSrc: Temporal   SpO2: 99%   Weight: 77 kg (169 lb 12.8 oz)   Height: 1.727 m (5' 7.99\")     Body mass index is 25.82 kg/m².     Review of Systems   Constitutional:  Positive for malaise/fatigue and weight loss. Negative for chills and fever.   HENT:  Negative for congestion and hearing loss.    Eyes:  Negative for " blurred vision.   Respiratory:  Negative for cough and shortness of breath.    Cardiovascular:  Negative for chest pain and palpitations.   Gastrointestinal:  Positive for abdominal pain and constipation. Negative for diarrhea, nausea and vomiting.   Musculoskeletal:  Negative for back pain and joint pain.   Skin:  Negative for rash.   Neurological:  Negative for headaches.   Endo/Heme/Allergies:  Does not bruise/bleed easily.   Psychiatric/Behavioral:  Negative for depression. The patient is not nervous/anxious.      Physical Exam:   Gen: No acute distress.   Skin: Pink, warm, and dry  HEENT: conjunctiva non-injected, sclera non-icteric. EOMs intact.   Nasal mucosa without edema nor erythema.   Pinna normal.    Neck: Supple, trachea midline. No adenopathy or masses in the neck or supraclavicular regions.  Lungs: Effort is normal.   CV: regular rate and rhythm  Abdomen: Flat  Ext: no edema, color normal, vascularity normal, temperature normal  Alert and oriented Eye contact is good, speech goal directed, affect calm    Assessment and Plan:   Cancer related pain  Patient with known rectal adenocarcinoma.  Does have oncological pain secondary to his primary tumor.  Does have Percocet 5/325 available.  Did discuss the use of 1 to 2 tablets as needed for breakthrough pain.  Did also discuss using MiraLAX to keep stools soft to prevent aggravating his rectal pain.  PDMP reviewed, no signs of diversion or abuse, risk and benefits of medication discussed, no refills needed today.    Rectal cancer (HCC)  Patient's current goals and wishes are to pursue treatment and hopefully look at radiation and surgical resection in the future.  Will continue goals of care conversations as needed.      43 minutes in total including chart review and consultation with patients oncological providers.     Followup: Return in about 8 weeks (around 2/13/2025), or if symptoms worsen or fail to improve.    Quoc Goel M.D.

## 2024-12-19 NOTE — ASSESSMENT & PLAN NOTE
Patient's current goals and wishes are to pursue treatment and hopefully look at radiation and surgical resection in the future.  Will continue goals of care conversations as needed.

## 2024-12-20 ENCOUNTER — OUTPATIENT INFUSION SERVICES (OUTPATIENT)
Dept: ONCOLOGY | Facility: MEDICAL CENTER | Age: 59
End: 2024-12-20
Attending: STUDENT IN AN ORGANIZED HEALTH CARE EDUCATION/TRAINING PROGRAM
Payer: COMMERCIAL

## 2024-12-20 ENCOUNTER — PATIENT OUTREACH (OUTPATIENT)
Dept: ONCOLOGY | Facility: MEDICAL CENTER | Age: 59
End: 2024-12-20
Payer: COMMERCIAL

## 2024-12-20 VITALS
HEART RATE: 70 BPM | BODY MASS INDEX: 27.4 KG/M2 | TEMPERATURE: 97 F | OXYGEN SATURATION: 98 % | DIASTOLIC BLOOD PRESSURE: 91 MMHG | HEIGHT: 68 IN | WEIGHT: 180.78 LBS | RESPIRATION RATE: 18 BRPM | SYSTOLIC BLOOD PRESSURE: 138 MMHG

## 2024-12-20 DIAGNOSIS — C20 RECTAL CANCER (HCC): ICD-10-CM

## 2024-12-20 LAB — CEA SERPL-MCNC: 3.1 NG/ML (ref 0–3)

## 2024-12-20 PROCEDURE — 96367 TX/PROPH/DG ADDL SEQ IV INF: CPT

## 2024-12-20 PROCEDURE — 82378 CARCINOEMBRYONIC ANTIGEN: CPT

## 2024-12-20 PROCEDURE — 96415 CHEMO IV INFUSION ADDL HR: CPT

## 2024-12-20 PROCEDURE — 96413 CHEMO IV INFUSION 1 HR: CPT

## 2024-12-20 PROCEDURE — 700111 HCHG RX REV CODE 636 W/ 250 OVERRIDE (IP): Mod: JZ | Performed by: NURSE PRACTITIONER

## 2024-12-20 PROCEDURE — G0498 CHEMO EXTEND IV INFUS W/PUMP: HCPCS

## 2024-12-20 PROCEDURE — 96376 TX/PRO/DX INJ SAME DRUG ADON: CPT

## 2024-12-20 PROCEDURE — 700101 HCHG RX REV CODE 250: Performed by: NURSE PRACTITIONER

## 2024-12-20 PROCEDURE — 96417 CHEMO IV INFUS EACH ADDL SEQ: CPT

## 2024-12-20 PROCEDURE — 700105 HCHG RX REV CODE 258: Performed by: NURSE PRACTITIONER

## 2024-12-20 PROCEDURE — 96375 TX/PRO/DX INJ NEW DRUG ADDON: CPT

## 2024-12-20 PROCEDURE — A4212 NON CORING NEEDLE OR STYLET: HCPCS

## 2024-12-20 RX ORDER — ONDANSETRON 2 MG/ML
4 INJECTION INTRAMUSCULAR; INTRAVENOUS PRN
Status: COMPLETED | OUTPATIENT
Start: 2024-12-20 | End: 2024-12-20

## 2024-12-20 RX ORDER — DEXAMETHASONE SODIUM PHOSPHATE 4 MG/ML
12 INJECTION, SOLUTION INTRA-ARTICULAR; INTRALESIONAL; INTRAMUSCULAR; INTRAVENOUS; SOFT TISSUE ONCE
Status: COMPLETED | OUTPATIENT
Start: 2024-12-20 | End: 2024-12-20

## 2024-12-20 RX ORDER — ONDANSETRON 8 MG/1
8 TABLET, ORALLY DISINTEGRATING ORAL PRN
Status: DISCONTINUED | OUTPATIENT
Start: 2024-12-20 | End: 2024-12-20 | Stop reason: HOSPADM

## 2024-12-20 RX ORDER — ONDANSETRON 2 MG/ML
16 INJECTION INTRAMUSCULAR; INTRAVENOUS ONCE
Status: COMPLETED | OUTPATIENT
Start: 2024-12-20 | End: 2024-12-20

## 2024-12-20 RX ADMIN — DEXAMETHASONE SODIUM PHOSPHATE 12 MG: 4 INJECTION INTRA-ARTICULAR; INTRALESIONAL; INTRAMUSCULAR; INTRAVENOUS; SOFT TISSUE at 12:34

## 2024-12-20 RX ADMIN — FLUOROURACIL 4730 MG: 50 INJECTION, SOLUTION INTRAVENOUS at 17:30

## 2024-12-20 RX ADMIN — LIDOCAINE HYDROCHLORIDE 0.5 ML: 10 INJECTION, SOLUTION EPIDURAL; INFILTRATION; INTRACAUDAL; PERINEURAL at 12:02

## 2024-12-20 RX ADMIN — ONDANSETRON 4 MG: 2 INJECTION INTRAMUSCULAR; INTRAVENOUS at 15:02

## 2024-12-20 RX ADMIN — LEUCOVORIN CALCIUM 790 MG: 500 INJECTION, POWDER, LYOPHILIZED, FOR SOLUTION INTRAMUSCULAR; INTRAVENOUS at 15:05

## 2024-12-20 RX ADMIN — FOSAPREPITANT 150 MG: 150 INJECTION, POWDER, LYOPHILIZED, FOR SOLUTION INTRAVENOUS at 12:40

## 2024-12-20 RX ADMIN — ONDANSETRON 16 MG: 2 INJECTION INTRAMUSCULAR; INTRAVENOUS at 12:26

## 2024-12-20 RX ADMIN — OXALIPLATIN 165 MG: 5 INJECTION, SOLUTION INTRAVENOUS at 15:06

## 2024-12-20 RX ADMIN — DEXTROSE MONOHYDRATE 295.6 MG: 50 INJECTION, SOLUTION INTRAVENOUS at 13:20

## 2024-12-20 ASSESSMENT — FIBROSIS 4 INDEX: FIB4 SCORE: 1.2

## 2024-12-20 NOTE — PROGRESS NOTES
Booker presented into [facility] for Day 1/ Cycle 4 of mFOLFIRINOX for rectal cancer. Pt denied having any new or acute complaints today, reports tolerating past treatments well. 1% lidocaine was injected over port site. Port accessed in a sterile manner, had positive blood return and flushed briskly. Labs were drawn 12/19/2024 and reviewed. CEA was drawn today. Pt meets parameters for treatment today. Premeds given as ordered. Pt declined atropine prior to irinotecan. Pt given irinotecan as ordered. Pt given oxaliplatin and leucovorin as prescribed. Booker began having increased nausea at this infusion. PRN Zofran given with good results. Remainder of infusions tolerated well. Port had positive blood return after and prior to 5 FU CADD pump connection. 5 FU CADD pump placed in the RUN position and verified with the patient. Pt has future appointments. Pt discharged to home in good condition.

## 2024-12-20 NOTE — PROGRESS NOTES
ONN able to see Booker as he was entering [facility] for treatment. ONN spoke to Booker for a few minutes to check in. Booker reports doing well, continuing treatment. He has had some additional pain but is making it through. He feels very safe and supported at [facility] for his rectal cancer tx. Appreciative ONN time to stop and talk. Booker entered [facility] in nad on foot after ONN interaction.

## 2024-12-20 NOTE — PROGRESS NOTES
Chemotherapy Verification - PRIMARY RN      Height = 172.7   Weight = 82 kg  BSA = 1.98 m^2       Medication: irinotecan (Camptosar)  Dose: 150 mg/m^2  Calculated Dose: 297 mg                             (In mg/m2, AUC, mg/kg)     Medication: oxaliplatin (Eloxatin)  Dose: 85 mg/m^2  Calculated Dose: 168.3 mg                             (In mg/m2, AUC, mg/kg)    Medication: fluorouracil (Adrucil)  Dose: 2400 mg/m^2 via CADD pump  Calculated Dose: 4752 mg via CADD pump over 48 hours                             (In mg/m2, AUC, mg/kg)    I confirm this process was performed independently with the BSA and all final chemotherapy dosing calculations congruent.  Any discrepancies of 10% or greater have been addressed with the chemotherapy pharmacist. The resolution of the discrepancy has been documented in the EPIC progress notes.

## 2024-12-20 NOTE — ADDENDUM NOTE
Encounter addended by: Aida Wild on: 12/20/2024 10:21 AM   Actions taken: Charge Capture section accepted

## 2024-12-20 NOTE — PROGRESS NOTES
Chemotherapy Verification - SECONDARY RN       Height = 1.727m  Weight = 82kg  BSA = 1.98m2       Medication: irinotecan (Camptosar)  Dose: 150mg/m2  Calculated Dose: 297mg                             (In mg/m2, AUC, mg/kg)     Medication: oxaliplatin (Eloxatin)  Dose: 85mg/m2  Calculated Dose: 168.3mg                             (In mg/m2, AUC, mg/kg)    Medication: fluorouracil (Adrucil)  Dose: 2400mg/m2  Calculated Dose: 4752mg                             (In mg/m2, AUC, mg/kg)      I confirm that this process was performed independently.

## 2024-12-22 ENCOUNTER — OUTPATIENT INFUSION SERVICES (OUTPATIENT)
Dept: ONCOLOGY | Facility: MEDICAL CENTER | Age: 59
End: 2024-12-22
Attending: STUDENT IN AN ORGANIZED HEALTH CARE EDUCATION/TRAINING PROGRAM
Payer: COMMERCIAL

## 2024-12-22 VITALS
OXYGEN SATURATION: 94 % | TEMPERATURE: 97.7 F | HEART RATE: 66 BPM | DIASTOLIC BLOOD PRESSURE: 72 MMHG | RESPIRATION RATE: 18 BRPM | SYSTOLIC BLOOD PRESSURE: 137 MMHG

## 2024-12-22 DIAGNOSIS — C20 RECTAL CANCER (HCC): ICD-10-CM

## 2024-12-22 PROCEDURE — 700111 HCHG RX REV CODE 636 W/ 250 OVERRIDE (IP): Performed by: NURSE PRACTITIONER

## 2024-12-22 PROCEDURE — 700105 HCHG RX REV CODE 258: Performed by: NURSE PRACTITIONER

## 2024-12-22 PROCEDURE — 96523 IRRIG DRUG DELIVERY DEVICE: CPT

## 2024-12-22 RX ADMIN — HEPARIN SODIUM 500 UNITS: 1000 INJECTION, SOLUTION INTRAVENOUS; SUBCUTANEOUS at 17:45

## 2024-12-23 NOTE — PROGRESS NOTES
"Pt arrived ambulatory for CADD pump disconnect, denies c/o, states this \"has been my best cycle of chemo so far, I feel good\".  Port flushed with good blood return, flushed and deaccessed per protocol.  Pt Dc'd home without incident and will f/u as scheduled.   "

## 2025-01-01 ENCOUNTER — APPOINTMENT (OUTPATIENT)
Dept: RADIOLOGY | Facility: MEDICAL CENTER | Age: 60
DRG: 374 | End: 2025-01-01
Attending: EMERGENCY MEDICINE
Payer: COMMERCIAL

## 2025-01-01 ENCOUNTER — APPOINTMENT (OUTPATIENT)
Dept: RADIOLOGY | Facility: MEDICAL CENTER | Age: 60
DRG: 374 | End: 2025-01-01
Attending: INTERNAL MEDICINE
Payer: COMMERCIAL

## 2025-01-01 ENCOUNTER — HOSPITAL ENCOUNTER (INPATIENT)
Facility: MEDICAL CENTER | Age: 60
LOS: 6 days | DRG: 374 | End: 2025-05-13
Attending: EMERGENCY MEDICINE | Admitting: INTERNAL MEDICINE
Payer: COMMERCIAL

## 2025-01-01 ENCOUNTER — HOSPITAL ENCOUNTER (OUTPATIENT)
Dept: HEMATOLOGY ONCOLOGY | Facility: MEDICAL CENTER | Age: 60
End: 2025-05-07
Attending: STUDENT IN AN ORGANIZED HEALTH CARE EDUCATION/TRAINING PROGRAM
Payer: COMMERCIAL

## 2025-01-01 ENCOUNTER — APPOINTMENT (OUTPATIENT)
Dept: CARDIOLOGY | Facility: MEDICAL CENTER | Age: 60
DRG: 374 | End: 2025-01-01
Attending: STUDENT IN AN ORGANIZED HEALTH CARE EDUCATION/TRAINING PROGRAM
Payer: COMMERCIAL

## 2025-01-01 ENCOUNTER — APPOINTMENT (OUTPATIENT)
Dept: RADIOLOGY | Facility: MEDICAL CENTER | Age: 60
DRG: 374 | End: 2025-01-01
Attending: STUDENT IN AN ORGANIZED HEALTH CARE EDUCATION/TRAINING PROGRAM
Payer: COMMERCIAL

## 2025-01-01 ENCOUNTER — TELEPHONE (OUTPATIENT)
Dept: HEMATOLOGY ONCOLOGY | Facility: MEDICAL CENTER | Age: 60
End: 2025-01-01
Payer: COMMERCIAL

## 2025-01-01 ENCOUNTER — APPOINTMENT (OUTPATIENT)
Dept: HEMATOLOGY ONCOLOGY | Facility: MEDICAL CENTER | Age: 60
End: 2025-01-01
Attending: FAMILY MEDICINE
Payer: COMMERCIAL

## 2025-01-01 VITALS
DIASTOLIC BLOOD PRESSURE: 56 MMHG | WEIGHT: 161 LBS | SYSTOLIC BLOOD PRESSURE: 92 MMHG | BODY MASS INDEX: 23.85 KG/M2 | OXYGEN SATURATION: 92 % | HEART RATE: 128 BPM | TEMPERATURE: 97.6 F | HEIGHT: 69 IN | RESPIRATION RATE: 18 BRPM

## 2025-01-01 VITALS
BODY MASS INDEX: 23.85 KG/M2 | TEMPERATURE: 97.4 F | WEIGHT: 161 LBS | HEART RATE: 80 BPM | SYSTOLIC BLOOD PRESSURE: 108 MMHG | DIASTOLIC BLOOD PRESSURE: 56 MMHG | HEIGHT: 69 IN | OXYGEN SATURATION: 98 %

## 2025-01-01 DIAGNOSIS — E87.1 HYPONATREMIA: ICD-10-CM

## 2025-01-01 DIAGNOSIS — N17.9 AKI (ACUTE KIDNEY INJURY) (HCC): ICD-10-CM

## 2025-01-01 DIAGNOSIS — G89.3 CANCER ASSOCIATED PAIN: ICD-10-CM

## 2025-01-01 DIAGNOSIS — K62.89 RECTAL PAIN: ICD-10-CM

## 2025-01-01 DIAGNOSIS — R53.83 OTHER FATIGUE: ICD-10-CM

## 2025-01-01 DIAGNOSIS — C78.00 RECTAL ADENOCARCINOMA METASTATIC TO LUNG (HCC): ICD-10-CM

## 2025-01-01 DIAGNOSIS — C20 RECTAL ADENOCARCINOMA METASTATIC TO LUNG (HCC): ICD-10-CM

## 2025-01-01 DIAGNOSIS — E86.0 DEHYDRATION: ICD-10-CM

## 2025-01-01 DIAGNOSIS — C20 RECTAL CANCER (HCC): ICD-10-CM

## 2025-01-01 DIAGNOSIS — J90 PLEURAL EFFUSION: ICD-10-CM

## 2025-01-01 DIAGNOSIS — C79.9 METASTATIC MALIGNANT NEOPLASM, UNSPECIFIED SITE (HCC): ICD-10-CM

## 2025-01-01 LAB
ABO + RH BLD: NORMAL
ABO GROUP BLD: NORMAL
ALBUMIN SERPL BCP-MCNC: 2.1 G/DL (ref 3.2–4.9)
ALBUMIN SERPL BCP-MCNC: 2.3 G/DL (ref 3.2–4.9)
ALBUMIN SERPL BCP-MCNC: 2.3 G/DL (ref 3.2–4.9)
ALBUMIN SERPL BCP-MCNC: 2.4 G/DL (ref 3.2–4.9)
ALBUMIN/GLOB SERPL: 0.7 G/DL
ALBUMIN/GLOB SERPL: 0.7 G/DL
ALBUMIN/GLOB SERPL: 0.8 G/DL
ALBUMIN/GLOB SERPL: 0.8 G/DL
ALP SERPL-CCNC: 133 U/L (ref 30–99)
ALP SERPL-CCNC: 210 U/L (ref 30–99)
ALP SERPL-CCNC: 213 U/L (ref 30–99)
ALP SERPL-CCNC: 239 U/L (ref 30–99)
ALT SERPL-CCNC: 22 U/L (ref 2–50)
ALT SERPL-CCNC: 27 U/L (ref 2–50)
ALT SERPL-CCNC: 27 U/L (ref 2–50)
ALT SERPL-CCNC: 31 U/L (ref 2–50)
AMYLASE FLD-CCNC: <3 U/L
ANION GAP SERPL CALC-SCNC: 10 MMOL/L (ref 7–16)
ANION GAP SERPL CALC-SCNC: 11 MMOL/L (ref 7–16)
ANION GAP SERPL CALC-SCNC: 16 MMOL/L (ref 7–16)
ANION GAP SERPL CALC-SCNC: 7 MMOL/L (ref 7–16)
ANISOCYTOSIS BLD QL SMEAR: ABNORMAL
APPEARANCE FLD: NORMAL
APPEARANCE UR: CLEAR
APTT PPP: 27.3 SEC (ref 24.7–36)
AST SERPL-CCNC: 39 U/L (ref 12–45)
AST SERPL-CCNC: 43 U/L (ref 12–45)
AST SERPL-CCNC: 47 U/L (ref 12–45)
AST SERPL-CCNC: 53 U/L (ref 12–45)
BACTERIA #/AREA URNS HPF: ABNORMAL /HPF
BACTERIA BLD CULT: NORMAL
BACTERIA BLD CULT: NORMAL
BACTERIA FLD AEROBE CULT: NORMAL
BACTERIA UR CULT: NORMAL
BASOPHILS # BLD AUTO: 0 % (ref 0–1.8)
BASOPHILS # BLD: 0 K/UL (ref 0–0.12)
BASOPHILS NFR FLD: 2 %
BILIRUB SERPL-MCNC: 0.6 MG/DL (ref 0.1–1.5)
BILIRUB SERPL-MCNC: 0.7 MG/DL (ref 0.1–1.5)
BILIRUB UR QL STRIP.AUTO: NEGATIVE
BLD GP AB SCN SERPL QL: NORMAL
BODY FLD TYPE: NORMAL
BUN SERPL-MCNC: 46 MG/DL (ref 8–22)
BUN SERPL-MCNC: 52 MG/DL (ref 8–22)
BUN SERPL-MCNC: 57 MG/DL (ref 8–22)
BUN SERPL-MCNC: 57 MG/DL (ref 8–22)
BURR CELLS BLD QL SMEAR: NORMAL
CALCIUM ALBUM COR SERPL-MCNC: 10.1 MG/DL (ref 8.5–10.5)
CALCIUM SERPL-MCNC: 8.6 MG/DL (ref 8.5–10.5)
CALCIUM SERPL-MCNC: 8.7 MG/DL (ref 8.5–10.5)
CALCIUM SERPL-MCNC: 8.7 MG/DL (ref 8.5–10.5)
CALCIUM SERPL-MCNC: 8.8 MG/DL (ref 8.5–10.5)
CASTS URNS QL MICRO: ABNORMAL /LPF (ref 0–2)
CHLORIDE SERPL-SCNC: 92 MMOL/L (ref 96–112)
CHLORIDE SERPL-SCNC: 93 MMOL/L (ref 96–112)
CHLORIDE SERPL-SCNC: 96 MMOL/L (ref 96–112)
CHLORIDE SERPL-SCNC: 98 MMOL/L (ref 96–112)
CO2 SERPL-SCNC: 18 MMOL/L (ref 20–33)
CO2 SERPL-SCNC: 23 MMOL/L (ref 20–33)
CO2 SERPL-SCNC: 24 MMOL/L (ref 20–33)
CO2 SERPL-SCNC: 24 MMOL/L (ref 20–33)
COLOR FLD: NORMAL
COLOR UR: ABNORMAL
COMPONENT CELLULAR 8504CLL: NORMAL
CREAT SERPL-MCNC: 0.92 MG/DL (ref 0.5–1.4)
CREAT SERPL-MCNC: 0.99 MG/DL (ref 0.5–1.4)
CREAT SERPL-MCNC: 1.34 MG/DL (ref 0.5–1.4)
CREAT SERPL-MCNC: 1.53 MG/DL (ref 0.5–1.4)
CYTOLOGY REG CYTOL: NORMAL
EOSINOPHIL # BLD AUTO: 0.16 K/UL (ref 0–0.51)
EOSINOPHIL NFR BLD: 0.8 % (ref 0–6.9)
EOSINOPHIL NFR FLD: 2 %
EPITHELIAL CELLS 1715: ABNORMAL /HPF (ref 0–5)
ERYTHROCYTE [DISTWIDTH] IN BLOOD BY AUTOMATED COUNT: 56.4 FL (ref 35.9–50)
ERYTHROCYTE [DISTWIDTH] IN BLOOD BY AUTOMATED COUNT: 56.5 FL (ref 35.9–50)
ERYTHROCYTE [DISTWIDTH] IN BLOOD BY AUTOMATED COUNT: 57 FL (ref 35.9–50)
ERYTHROCYTE [DISTWIDTH] IN BLOOD BY AUTOMATED COUNT: 57.1 FL (ref 35.9–50)
FUNGUS SPEC FUNGUS STN: NORMAL
GAMMA INTERFERON BACKGROUND BLD IA-ACNC: 0.02 IU/ML
GFR SERPLBLD CREATININE-BSD FMLA CKD-EPI: 52 ML/MIN/1.73 M 2
GFR SERPLBLD CREATININE-BSD FMLA CKD-EPI: 61 ML/MIN/1.73 M 2
GFR SERPLBLD CREATININE-BSD FMLA CKD-EPI: 87 ML/MIN/1.73 M 2
GFR SERPLBLD CREATININE-BSD FMLA CKD-EPI: 95 ML/MIN/1.73 M 2
GLOBULIN SER CALC-MCNC: 2.8 G/DL (ref 1.9–3.5)
GLOBULIN SER CALC-MCNC: 3.1 G/DL (ref 1.9–3.5)
GLOBULIN SER CALC-MCNC: 3.1 G/DL (ref 1.9–3.5)
GLOBULIN SER CALC-MCNC: 3.2 G/DL (ref 1.9–3.5)
GLUCOSE FLD-MCNC: 80 MG/DL
GLUCOSE SERPL-MCNC: 114 MG/DL (ref 65–99)
GLUCOSE SERPL-MCNC: 130 MG/DL (ref 65–99)
GLUCOSE SERPL-MCNC: 91 MG/DL (ref 65–99)
GLUCOSE SERPL-MCNC: 99 MG/DL (ref 65–99)
GLUCOSE UR STRIP.AUTO-MCNC: NEGATIVE MG/DL
GRAM STN SPEC: NORMAL
GRAM STN SPEC: NORMAL
HCT VFR BLD AUTO: 34.1 % (ref 42–52)
HCT VFR BLD AUTO: 35.6 % (ref 42–52)
HCT VFR BLD AUTO: 36.2 % (ref 42–52)
HCT VFR BLD AUTO: 37.7 % (ref 42–52)
HGB BLD-MCNC: 11.8 G/DL (ref 14–18)
HGB BLD-MCNC: 11.8 G/DL (ref 14–18)
HGB BLD-MCNC: 12 G/DL (ref 14–18)
HGB BLD-MCNC: 12.3 G/DL (ref 14–18)
HYALINE CAST   1831: PRESENT /LPF
INR PPP: 1.6 (ref 0.87–1.13)
KETONES UR STRIP.AUTO-MCNC: 15 MG/DL
LACTATE SERPL-SCNC: 2 MMOL/L (ref 0.5–2)
LDH FLD L TO P-CCNC: 112 U/L
LDH SERPL L TO P-CCNC: 292 U/L (ref 107–266)
LEUKOCYTE ESTERASE UR QL STRIP.AUTO: NEGATIVE
LIPASE SERPL-CCNC: 16 U/L (ref 11–82)
LV EJECT FRACT  99904: 55
LV EJECT FRACT MOD 2C 99903: 47.27
LV EJECT FRACT MOD 4C 99902: 47.07
LV EJECT FRACT MOD BP 99901: 47.97
LYMPHOCYTES # BLD AUTO: 0.33 K/UL (ref 1–4.8)
LYMPHOCYTES NFR BLD: 1.7 % (ref 22–41)
LYMPHOCYTES NFR FLD: 48 %
M TB IFN-G BLD-IMP: ABNORMAL
M TB IFN-G CD4+ BCKGRND COR BLD-ACNC: 0 IU/ML
MAGNESIUM SERPL-MCNC: 2.5 MG/DL (ref 1.5–2.5)
MANUAL DIFF BLD: NORMAL
MCH RBC QN AUTO: 29.5 PG (ref 27–33)
MCH RBC QN AUTO: 29.9 PG (ref 27–33)
MCH RBC QN AUTO: 29.9 PG (ref 27–33)
MCH RBC QN AUTO: 30.7 PG (ref 27–33)
MCHC RBC AUTO-ENTMCNC: 32.6 G/DL (ref 32.3–36.5)
MCHC RBC AUTO-ENTMCNC: 33.1 G/DL (ref 32.3–36.5)
MCHC RBC AUTO-ENTMCNC: 33.1 G/DL (ref 32.3–36.5)
MCHC RBC AUTO-ENTMCNC: 34.6 G/DL (ref 32.3–36.5)
MCV RBC AUTO: 88.8 FL (ref 81.4–97.8)
MCV RBC AUTO: 90.1 FL (ref 81.4–97.8)
MCV RBC AUTO: 90.3 FL (ref 81.4–97.8)
MCV RBC AUTO: 90.4 FL (ref 81.4–97.8)
METAMYELOCYTES NFR BLD MANUAL: 0.8 %
MICRO URNS: ABNORMAL
MICROCYTES BLD QL SMEAR: ABNORMAL
MITOGEN IGNF BCKGRD COR BLD-ACNC: 0.05 IU/ML
MONOCYTES # BLD AUTO: 0.8 K/UL (ref 0–0.85)
MONOCYTES NFR BLD AUTO: 4.1 % (ref 0–13.4)
MONOS+MACROS NFR FLD MANUAL: 33 %
MORPHOLOGY BLD-IMP: NORMAL
MYELOCYTES NFR BLD MANUAL: 1.6 %
NEUTROPHILS # BLD AUTO: 17.93 K/UL (ref 1.82–7.42)
NEUTROPHILS NFR BLD: 90.2 % (ref 44–72)
NEUTROPHILS NFR FLD: 15 %
NEUTS BAND NFR BLD MANUAL: 0.8 % (ref 0–10)
NITRITE UR QL STRIP.AUTO: NEGATIVE
NRBC # BLD AUTO: 0 K/UL
NRBC BLD-RTO: 0 /100 WBC (ref 0–0.2)
NT-PROBNP SERPL IA-MCNC: 1279 PG/ML (ref 0–125)
NUC CELL # FLD: 671 CELLS/UL
OVALOCYTES BLD QL SMEAR: NORMAL
PH FLD: 8 [PH]
PH UR STRIP.AUTO: 5.5 [PH] (ref 5–8)
PHOSPHATE SERPL-MCNC: 3.7 MG/DL (ref 2.5–4.5)
PLATELET # BLD AUTO: 119 K/UL (ref 164–446)
PLATELET # BLD AUTO: 122 K/UL (ref 164–446)
PLATELET # BLD AUTO: 144 K/UL (ref 164–446)
PLATELET # BLD AUTO: 169 K/UL (ref 164–446)
PLATELET BLD QL SMEAR: NORMAL
PMV BLD AUTO: 10.8 FL (ref 9–12.9)
PMV BLD AUTO: 11.1 FL (ref 9–12.9)
PMV BLD AUTO: 11.1 FL (ref 9–12.9)
PMV BLD AUTO: 11.8 FL (ref 9–12.9)
POIKILOCYTOSIS BLD QL SMEAR: NORMAL
POTASSIUM SERPL-SCNC: 4.1 MMOL/L (ref 3.6–5.5)
POTASSIUM SERPL-SCNC: 4.2 MMOL/L (ref 3.6–5.5)
POTASSIUM SERPL-SCNC: 4.2 MMOL/L (ref 3.6–5.5)
POTASSIUM SERPL-SCNC: 4.6 MMOL/L (ref 3.6–5.5)
PRELIMINARY RPT Q0601: NORMAL
PROCALCITONIN SERPL-MCNC: 9.38 NG/ML
PROT FLD-MCNC: 2.9 G/DL
PROT SERPL-MCNC: 5.1 G/DL (ref 6–8.2)
PROT SERPL-MCNC: 5.2 G/DL (ref 6–8.2)
PROT SERPL-MCNC: 5.4 G/DL (ref 6–8.2)
PROT SERPL-MCNC: 5.6 G/DL (ref 6–8.2)
PROT UR QL STRIP: 30 MG/DL
PROTHROMBIN TIME: 19.1 SEC (ref 12–14.6)
QFT TB2 - NIL TBQ2: 0 IU/ML
RBC # BLD AUTO: 3.84 M/UL (ref 4.7–6.1)
RBC # BLD AUTO: 3.95 M/UL (ref 4.7–6.1)
RBC # BLD AUTO: 4.01 M/UL (ref 4.7–6.1)
RBC # BLD AUTO: 4.17 M/UL (ref 4.7–6.1)
RBC # FLD: <2000 CELLS/UL
RBC # URNS HPF: ABNORMAL /HPF (ref 0–2)
RBC BLD AUTO: PRESENT
RBC UR QL AUTO: ABNORMAL
RH BLD: NORMAL
RHODAMINE-AURAMINE STN SPEC: NORMAL
SIGNIFICANT IND 70042: NORMAL
SITE SITE: NORMAL
SODIUM SERPL-SCNC: 126 MMOL/L (ref 135–145)
SODIUM SERPL-SCNC: 128 MMOL/L (ref 135–145)
SODIUM SERPL-SCNC: 128 MMOL/L (ref 135–145)
SODIUM SERPL-SCNC: 130 MMOL/L (ref 135–145)
SOURCE SOURCE: NORMAL
SP GR UR STRIP.AUTO: 1.04
TROPONIN T SERPL-MCNC: 35 NG/L (ref 6–19)
UROBILINOGEN UR STRIP.AUTO-MCNC: 1 EU/DL
WBC # BLD AUTO: 18.1 K/UL (ref 4.8–10.8)
WBC # BLD AUTO: 19.7 K/UL (ref 4.8–10.8)
WBC # BLD AUTO: 20.3 K/UL (ref 4.8–10.8)
WBC # BLD AUTO: 21.7 K/UL (ref 4.8–10.8)
WBC #/AREA URNS HPF: ABNORMAL /HPF

## 2025-01-01 PROCEDURE — 96374 THER/PROPH/DIAG INJ IV PUSH: CPT

## 2025-01-01 PROCEDURE — 700117 HCHG RX CONTRAST REV CODE 255: Performed by: EMERGENCY MEDICINE

## 2025-01-01 PROCEDURE — 770004 HCHG ROOM/CARE - ONCOLOGY PRIVATE *

## 2025-01-01 PROCEDURE — 700102 HCHG RX REV CODE 250 W/ 637 OVERRIDE(OP): Performed by: NURSE PRACTITIONER

## 2025-01-01 PROCEDURE — 99215 OFFICE O/P EST HI 40 MIN: CPT | Performed by: STUDENT IN AN ORGANIZED HEALTH CARE EDUCATION/TRAINING PROGRAM

## 2025-01-01 PROCEDURE — 97166 OT EVAL MOD COMPLEX 45 MIN: CPT

## 2025-01-01 PROCEDURE — 84484 ASSAY OF TROPONIN QUANT: CPT

## 2025-01-01 PROCEDURE — 700111 HCHG RX REV CODE 636 W/ 250 OVERRIDE (IP): Performed by: INTERNAL MEDICINE

## 2025-01-01 PROCEDURE — 700105 HCHG RX REV CODE 258: Performed by: EMERGENCY MEDICINE

## 2025-01-01 PROCEDURE — 87040 BLOOD CULTURE FOR BACTERIA: CPT

## 2025-01-01 PROCEDURE — 700102 HCHG RX REV CODE 250 W/ 637 OVERRIDE(OP): Performed by: NAPRAPATH

## 2025-01-01 PROCEDURE — 83986 ASSAY PH BODY FLUID NOS: CPT

## 2025-01-01 PROCEDURE — A9270 NON-COVERED ITEM OR SERVICE: HCPCS | Performed by: INTERNAL MEDICINE

## 2025-01-01 PROCEDURE — 700105 HCHG RX REV CODE 258: Performed by: INTERNAL MEDICINE

## 2025-01-01 PROCEDURE — 85730 THROMBOPLASTIN TIME PARTIAL: CPT

## 2025-01-01 PROCEDURE — 700105 HCHG RX REV CODE 258: Performed by: STUDENT IN AN ORGANIZED HEALTH CARE EDUCATION/TRAINING PROGRAM

## 2025-01-01 PROCEDURE — 700111 HCHG RX REV CODE 636 W/ 250 OVERRIDE (IP): Mod: JZ | Performed by: INTERNAL MEDICINE

## 2025-01-01 PROCEDURE — 99233 SBSQ HOSP IP/OBS HIGH 50: CPT | Performed by: NURSE PRACTITIONER

## 2025-01-01 PROCEDURE — 86901 BLOOD TYPING SEROLOGIC RH(D): CPT

## 2025-01-01 PROCEDURE — 700102 HCHG RX REV CODE 250 W/ 637 OVERRIDE(OP): Performed by: INTERNAL MEDICINE

## 2025-01-01 PROCEDURE — 97163 PT EVAL HIGH COMPLEX 45 MIN: CPT

## 2025-01-01 PROCEDURE — A9579 GAD-BASE MR CONTRAST NOS,1ML: HCPCS | Mod: JZ | Performed by: STUDENT IN AN ORGANIZED HEALTH CARE EDUCATION/TRAINING PROGRAM

## 2025-01-01 PROCEDURE — 99497 ADVNCD CARE PLAN 30 MIN: CPT | Performed by: STUDENT IN AN ORGANIZED HEALTH CARE EDUCATION/TRAINING PROGRAM

## 2025-01-01 PROCEDURE — 93306 TTE W/DOPPLER COMPLETE: CPT | Mod: 26 | Performed by: INTERNAL MEDICINE

## 2025-01-01 PROCEDURE — 99212 OFFICE O/P EST SF 10 MIN: CPT | Performed by: STUDENT IN AN ORGANIZED HEALTH CARE EDUCATION/TRAINING PROGRAM

## 2025-01-01 PROCEDURE — 99233 SBSQ HOSP IP/OBS HIGH 50: CPT | Mod: 25 | Performed by: INTERNAL MEDICINE

## 2025-01-01 PROCEDURE — 700111 HCHG RX REV CODE 636 W/ 250 OVERRIDE (IP): Performed by: NURSE PRACTITIONER

## 2025-01-01 PROCEDURE — 36415 COLL VENOUS BLD VENIPUNCTURE: CPT

## 2025-01-01 PROCEDURE — 92523 SPEECH SOUND LANG COMPREHEN: CPT

## 2025-01-01 PROCEDURE — 83690 ASSAY OF LIPASE: CPT

## 2025-01-01 PROCEDURE — A9270 NON-COVERED ITEM OR SERVICE: HCPCS | Performed by: NAPRAPATH

## 2025-01-01 PROCEDURE — 87205 SMEAR GRAM STAIN: CPT | Mod: 91

## 2025-01-01 PROCEDURE — 71045 X-RAY EXAM CHEST 1 VIEW: CPT

## 2025-01-01 PROCEDURE — 83880 ASSAY OF NATRIURETIC PEPTIDE: CPT

## 2025-01-01 PROCEDURE — 85610 PROTHROMBIN TIME: CPT

## 2025-01-01 PROCEDURE — 87116 MYCOBACTERIA CULTURE: CPT

## 2025-01-01 PROCEDURE — 74177 CT ABD & PELVIS W/CONTRAST: CPT

## 2025-01-01 PROCEDURE — 99233 SBSQ HOSP IP/OBS HIGH 50: CPT | Mod: 25 | Performed by: STUDENT IN AN ORGANIZED HEALTH CARE EDUCATION/TRAINING PROGRAM

## 2025-01-01 PROCEDURE — 99233 SBSQ HOSP IP/OBS HIGH 50: CPT | Performed by: NAPRAPATH

## 2025-01-01 PROCEDURE — 84100 ASSAY OF PHOSPHORUS: CPT

## 2025-01-01 PROCEDURE — 99497 ADVNCD CARE PLAN 30 MIN: CPT | Performed by: INTERNAL MEDICINE

## 2025-01-01 PROCEDURE — 88305 TISSUE EXAM BY PATHOLOGIST: CPT | Performed by: PATHOLOGY

## 2025-01-01 PROCEDURE — 86900 BLOOD TYPING SEROLOGIC ABO: CPT

## 2025-01-01 PROCEDURE — 85027 COMPLETE CBC AUTOMATED: CPT

## 2025-01-01 PROCEDURE — 99285 EMERGENCY DEPT VISIT HI MDM: CPT

## 2025-01-01 PROCEDURE — 82150 ASSAY OF AMYLASE: CPT

## 2025-01-01 PROCEDURE — 84145 PROCALCITONIN (PCT): CPT

## 2025-01-01 PROCEDURE — 80053 COMPREHEN METABOLIC PANEL: CPT

## 2025-01-01 PROCEDURE — 87015 SPECIMEN INFECT AGNT CONCNTJ: CPT

## 2025-01-01 PROCEDURE — 87102 FUNGUS ISOLATION CULTURE: CPT

## 2025-01-01 PROCEDURE — 700105 HCHG RX REV CODE 258: Performed by: NURSE PRACTITIONER

## 2025-01-01 PROCEDURE — 88305 TISSUE EXAM BY PATHOLOGIST: CPT | Mod: 26 | Performed by: PATHOLOGY

## 2025-01-01 PROCEDURE — 700111 HCHG RX REV CODE 636 W/ 250 OVERRIDE (IP): Performed by: STUDENT IN AN ORGANIZED HEALTH CARE EDUCATION/TRAINING PROGRAM

## 2025-01-01 PROCEDURE — 99223 1ST HOSP IP/OBS HIGH 75: CPT | Performed by: INTERNAL MEDICINE

## 2025-01-01 PROCEDURE — 99233 SBSQ HOSP IP/OBS HIGH 50: CPT | Performed by: STUDENT IN AN ORGANIZED HEALTH CARE EDUCATION/TRAINING PROGRAM

## 2025-01-01 PROCEDURE — 83615 LACTATE (LD) (LDH) ENZYME: CPT

## 2025-01-01 PROCEDURE — A9270 NON-COVERED ITEM OR SERVICE: HCPCS | Performed by: NURSE PRACTITIONER

## 2025-01-01 PROCEDURE — 83735 ASSAY OF MAGNESIUM: CPT

## 2025-01-01 PROCEDURE — 86480 TB TEST CELL IMMUN MEASURE: CPT

## 2025-01-01 PROCEDURE — 88112 CYTOPATH CELL ENHANCE TECH: CPT | Performed by: PATHOLOGY

## 2025-01-01 PROCEDURE — C1729 CATH, DRAINAGE: HCPCS

## 2025-01-01 PROCEDURE — 89051 BODY FLUID CELL COUNT: CPT

## 2025-01-01 PROCEDURE — 87086 URINE CULTURE/COLONY COUNT: CPT

## 2025-01-01 PROCEDURE — 70450 CT HEAD/BRAIN W/O DYE: CPT

## 2025-01-01 PROCEDURE — 84157 ASSAY OF PROTEIN OTHER: CPT

## 2025-01-01 PROCEDURE — 83605 ASSAY OF LACTIC ACID: CPT

## 2025-01-01 PROCEDURE — 70553 MRI BRAIN STEM W/O & W/DYE: CPT

## 2025-01-01 PROCEDURE — 700117 HCHG RX CONTRAST REV CODE 255: Mod: JZ | Performed by: STUDENT IN AN ORGANIZED HEALTH CARE EDUCATION/TRAINING PROGRAM

## 2025-01-01 PROCEDURE — 700101 HCHG RX REV CODE 250: Performed by: INTERNAL MEDICINE

## 2025-01-01 PROCEDURE — 85007 BL SMEAR W/DIFF WBC COUNT: CPT

## 2025-01-01 PROCEDURE — 99232 SBSQ HOSP IP/OBS MODERATE 35: CPT | Performed by: INTERNAL MEDICINE

## 2025-01-01 PROCEDURE — 71275 CT ANGIOGRAPHY CHEST: CPT

## 2025-01-01 PROCEDURE — 88112 CYTOPATH CELL ENHANCE TECH: CPT | Mod: 26 | Performed by: PATHOLOGY

## 2025-01-01 PROCEDURE — 86850 RBC ANTIBODY SCREEN: CPT

## 2025-01-01 PROCEDURE — 302106 OSTOMY POWDER: Performed by: INTERNAL MEDICINE

## 2025-01-01 PROCEDURE — 99239 HOSP IP/OBS DSCHRG MGMT >30: CPT | Mod: MISDOCU | Performed by: INTERNAL MEDICINE

## 2025-01-01 PROCEDURE — 93306 TTE W/DOPPLER COMPLETE: CPT

## 2025-01-01 PROCEDURE — 82945 GLUCOSE OTHER FLUID: CPT

## 2025-01-01 PROCEDURE — 0W993ZZ DRAINAGE OF RIGHT PLEURAL CAVITY, PERCUTANEOUS APPROACH: ICD-10-PCS | Performed by: NURSE PRACTITIONER

## 2025-01-01 PROCEDURE — 81001 URINALYSIS AUTO W/SCOPE: CPT

## 2025-01-01 PROCEDURE — 700111 HCHG RX REV CODE 636 W/ 250 OVERRIDE (IP): Mod: JZ | Performed by: EMERGENCY MEDICINE

## 2025-01-01 PROCEDURE — 99223 1ST HOSP IP/OBS HIGH 75: CPT | Performed by: NAPRAPATH

## 2025-01-01 PROCEDURE — 87070 CULTURE OTHR SPECIMN AEROBIC: CPT

## 2025-01-01 RX ORDER — METHADONE HYDROCHLORIDE 10 MG/1
10 TABLET ORAL 3 TIMES DAILY
Refills: 0 | Status: DISCONTINUED | OUTPATIENT
Start: 2025-01-01 | End: 2025-01-01

## 2025-01-01 RX ORDER — HYDROMORPHONE HYDROCHLORIDE 1 MG/ML
.5-1 INJECTION, SOLUTION INTRAMUSCULAR; INTRAVENOUS; SUBCUTANEOUS
Status: DISCONTINUED | OUTPATIENT
Start: 2025-01-01 | End: 2025-01-01

## 2025-01-01 RX ORDER — PROCHLORPERAZINE EDISYLATE 5 MG/ML
5-10 INJECTION INTRAMUSCULAR; INTRAVENOUS EVERY 4 HOURS PRN
Status: DISCONTINUED | OUTPATIENT
Start: 2025-01-01 | End: 2025-01-01 | Stop reason: HOSPADM

## 2025-01-01 RX ORDER — ONDANSETRON 2 MG/ML
4 INJECTION INTRAMUSCULAR; INTRAVENOUS EVERY 4 HOURS PRN
Status: DISCONTINUED | OUTPATIENT
Start: 2025-01-01 | End: 2025-01-01 | Stop reason: HOSPADM

## 2025-01-01 RX ORDER — LORAZEPAM 2 MG/ML
2 INJECTION INTRAMUSCULAR
Status: DISCONTINUED | OUTPATIENT
Start: 2025-01-01 | End: 2025-01-01 | Stop reason: HOSPADM

## 2025-01-01 RX ORDER — OXYCODONE HYDROCHLORIDE 5 MG/1
5 TABLET ORAL
Refills: 0 | Status: DISCONTINUED | OUTPATIENT
Start: 2025-01-01 | End: 2025-01-01 | Stop reason: ALTCHOICE

## 2025-01-01 RX ORDER — DOCUSATE SODIUM 100 MG/1
100 CAPSULE, LIQUID FILLED ORAL EVERY 12 HOURS
Status: DISCONTINUED | OUTPATIENT
Start: 2025-01-01 | End: 2025-01-01

## 2025-01-01 RX ORDER — FUROSEMIDE 20 MG/1
20 TABLET ORAL DAILY
COMMUNITY
Start: 2025-01-01

## 2025-01-01 RX ORDER — DIAZEPAM 10 MG/2ML
5 INJECTION, SOLUTION INTRAMUSCULAR; INTRAVENOUS EVERY 4 HOURS
Status: DISCONTINUED | OUTPATIENT
Start: 2025-01-01 | End: 2025-01-01 | Stop reason: HOSPADM

## 2025-01-01 RX ORDER — ONDANSETRON 4 MG/1
4 TABLET, ORALLY DISINTEGRATING ORAL EVERY 4 HOURS PRN
Status: DISCONTINUED | OUTPATIENT
Start: 2025-01-01 | End: 2025-01-01 | Stop reason: HOSPADM

## 2025-01-01 RX ORDER — OXYCODONE HYDROCHLORIDE 10 MG/1
10 TABLET ORAL
Refills: 0 | Status: DISCONTINUED | OUTPATIENT
Start: 2025-01-01 | End: 2025-01-01 | Stop reason: ALTCHOICE

## 2025-01-01 RX ORDER — SODIUM CHLORIDE 9 MG/ML
INJECTION, SOLUTION INTRAVENOUS CONTINUOUS
Status: DISCONTINUED | OUTPATIENT
Start: 2025-01-01 | End: 2025-01-01

## 2025-01-01 RX ORDER — OMEPRAZOLE 20 MG/1
20 CAPSULE, DELAYED RELEASE ORAL
Status: DISCONTINUED | OUTPATIENT
Start: 2025-01-01 | End: 2025-01-01

## 2025-01-01 RX ORDER — OLANZAPINE 5 MG/1
2.5 TABLET, FILM COATED ORAL EVERY EVENING
Status: DISCONTINUED | OUTPATIENT
Start: 2025-01-01 | End: 2025-01-01

## 2025-01-01 RX ORDER — HYDROMORPHONE HYDROCHLORIDE 2 MG/ML
2 INJECTION, SOLUTION INTRAMUSCULAR; INTRAVENOUS; SUBCUTANEOUS
Status: DISCONTINUED | OUTPATIENT
Start: 2025-01-01 | End: 2025-01-01 | Stop reason: HOSPADM

## 2025-01-01 RX ORDER — LORAZEPAM 2 MG/ML
1 CONCENTRATE ORAL
Status: DISCONTINUED | OUTPATIENT
Start: 2025-01-01 | End: 2025-01-01

## 2025-01-01 RX ORDER — PROMETHAZINE HYDROCHLORIDE 25 MG/1
12.5-25 SUPPOSITORY RECTAL EVERY 4 HOURS PRN
Status: DISCONTINUED | OUTPATIENT
Start: 2025-01-01 | End: 2025-01-01 | Stop reason: HOSPADM

## 2025-01-01 RX ORDER — ATORVASTATIN CALCIUM 40 MG/1
40 TABLET, FILM COATED ORAL EVERY EVENING
Status: DISCONTINUED | OUTPATIENT
Start: 2025-01-01 | End: 2025-01-01

## 2025-01-01 RX ORDER — MORPHINE SULFATE 100 MG/5ML
10 SOLUTION ORAL
Refills: 0 | Status: DISCONTINUED | OUTPATIENT
Start: 2025-01-01 | End: 2025-01-01

## 2025-01-01 RX ORDER — SODIUM CHLORIDE 9 MG/ML
1000 INJECTION, SOLUTION INTRAVENOUS ONCE
Status: COMPLETED | OUTPATIENT
Start: 2025-01-01 | End: 2025-01-01

## 2025-01-01 RX ORDER — HYDROMORPHONE HYDROCHLORIDE 1 MG/ML
1 INJECTION, SOLUTION INTRAMUSCULAR; INTRAVENOUS; SUBCUTANEOUS
Status: DISCONTINUED | OUTPATIENT
Start: 2025-01-01 | End: 2025-01-01 | Stop reason: HOSPADM

## 2025-01-01 RX ORDER — GADOTERIDOL 279.3 MG/ML
16 INJECTION INTRAVENOUS ONCE
Status: COMPLETED | OUTPATIENT
Start: 2025-01-01 | End: 2025-01-01

## 2025-01-01 RX ORDER — ENOXAPARIN SODIUM 100 MG/ML
40 INJECTION SUBCUTANEOUS DAILY
Status: DISCONTINUED | OUTPATIENT
Start: 2025-01-01 | End: 2025-01-01

## 2025-01-01 RX ORDER — PROMETHAZINE HYDROCHLORIDE 25 MG/1
12.5-25 TABLET ORAL EVERY 4 HOURS PRN
Status: DISCONTINUED | OUTPATIENT
Start: 2025-01-01 | End: 2025-01-01

## 2025-01-01 RX ORDER — POLYETHYLENE GLYCOL 3350 17 G/17G
1 POWDER, FOR SOLUTION ORAL
Status: DISCONTINUED | OUTPATIENT
Start: 2025-01-01 | End: 2025-01-01

## 2025-01-01 RX ORDER — TAMSULOSIN HYDROCHLORIDE 0.4 MG/1
0.4 CAPSULE ORAL DAILY
Status: DISCONTINUED | OUTPATIENT
Start: 2025-01-01 | End: 2025-01-01

## 2025-01-01 RX ORDER — HYDROXYZINE HYDROCHLORIDE 25 MG/1
25 TABLET, FILM COATED ORAL EVERY 8 HOURS PRN
Status: DISCONTINUED | OUTPATIENT
Start: 2025-01-01 | End: 2025-01-01

## 2025-01-01 RX ORDER — ATROPINE SULFATE 10 MG/ML
2 SOLUTION/ DROPS OPHTHALMIC EVERY 4 HOURS PRN
Status: DISCONTINUED | OUTPATIENT
Start: 2025-01-01 | End: 2025-01-01 | Stop reason: HOSPADM

## 2025-01-01 RX ORDER — LORAZEPAM 2 MG/ML
1 INJECTION INTRAMUSCULAR
Status: DISCONTINUED | OUTPATIENT
Start: 2025-01-01 | End: 2025-01-01

## 2025-01-01 RX ORDER — HYDROMORPHONE HYDROCHLORIDE 1 MG/ML
0.5 INJECTION, SOLUTION INTRAMUSCULAR; INTRAVENOUS; SUBCUTANEOUS
Status: DISCONTINUED | OUTPATIENT
Start: 2025-01-01 | End: 2025-01-01

## 2025-01-01 RX ORDER — GLYCOPYRROLATE 0.2 MG/ML
0.2 INJECTION INTRAMUSCULAR; INTRAVENOUS EVERY 4 HOURS
Status: DISCONTINUED | OUTPATIENT
Start: 2025-01-01 | End: 2025-01-01 | Stop reason: HOSPADM

## 2025-01-01 RX ORDER — LORAZEPAM 2 MG/ML
1 CONCENTRATE ORAL
Status: DISCONTINUED | OUTPATIENT
Start: 2025-01-01 | End: 2025-01-01 | Stop reason: HOSPADM

## 2025-01-01 RX ORDER — HYDROMORPHONE HYDROCHLORIDE 2 MG/1
2 TABLET ORAL EVERY 4 HOURS PRN
Status: DISCONTINUED | OUTPATIENT
Start: 2025-01-01 | End: 2025-01-01

## 2025-01-01 RX ORDER — ACETAMINOPHEN 325 MG/1
650 TABLET ORAL EVERY 6 HOURS PRN
Status: DISCONTINUED | OUTPATIENT
Start: 2025-01-01 | End: 2025-01-01 | Stop reason: HOSPADM

## 2025-01-01 RX ORDER — LABETALOL HYDROCHLORIDE 5 MG/ML
10 INJECTION, SOLUTION INTRAVENOUS EVERY 4 HOURS PRN
Status: DISCONTINUED | OUTPATIENT
Start: 2025-01-01 | End: 2025-01-01

## 2025-01-01 RX ORDER — MORPHINE SULFATE 100 MG/5ML
20 SOLUTION ORAL
Refills: 0 | Status: DISCONTINUED | OUTPATIENT
Start: 2025-01-01 | End: 2025-01-01

## 2025-01-01 RX ORDER — DULOXETIN HYDROCHLORIDE 20 MG/1
60 CAPSULE, DELAYED RELEASE ORAL DAILY
Status: DISCONTINUED | OUTPATIENT
Start: 2025-01-01 | End: 2025-01-01

## 2025-01-01 RX ORDER — DULOXETIN HYDROCHLORIDE 30 MG/1
30 CAPSULE, DELAYED RELEASE ORAL DAILY
Status: DISCONTINUED | OUTPATIENT
Start: 2025-01-01 | End: 2025-01-01

## 2025-01-01 RX ORDER — OXYCODONE HYDROCHLORIDE 15 MG/1
15-30 TABLET ORAL EVERY 4 HOURS PRN
COMMUNITY

## 2025-01-01 RX ORDER — AMOXICILLIN 250 MG
2 CAPSULE ORAL EVERY EVENING
Status: DISCONTINUED | OUTPATIENT
Start: 2025-01-01 | End: 2025-01-01

## 2025-01-01 RX ORDER — HYDROMORPHONE HYDROCHLORIDE 4 MG/1
4 TABLET ORAL EVERY 4 HOURS PRN
Status: DISCONTINUED | OUTPATIENT
Start: 2025-01-01 | End: 2025-01-01

## 2025-01-01 RX ADMIN — MORPHINE SULFATE 20 MG: 100 SOLUTION ORAL at 19:49

## 2025-01-01 RX ADMIN — GLYCOPYRROLATE 0.2 MG: 0.2 INJECTION INTRAMUSCULAR; INTRAVENOUS at 00:22

## 2025-01-01 RX ADMIN — HYDROMORPHONE HYDROCHLORIDE 0.5 MG: 1 INJECTION, SOLUTION INTRAMUSCULAR; INTRAVENOUS; SUBCUTANEOUS at 12:07

## 2025-01-01 RX ADMIN — ENOXAPARIN SODIUM 40 MG: 100 INJECTION SUBCUTANEOUS at 17:55

## 2025-01-01 RX ADMIN — DULOXETINE 60 MG: 20 CAPSULE, DELAYED RELEASE ORAL at 05:57

## 2025-01-01 RX ADMIN — HYDROMORPHONE HYDROCHLORIDE 2 MG: 2 TABLET ORAL at 21:11

## 2025-01-01 RX ADMIN — ATORVASTATIN CALCIUM 40 MG: 40 TABLET, FILM COATED ORAL at 20:31

## 2025-01-01 RX ADMIN — HYDROMORPHONE HYDROCHLORIDE 4 MG: 4 TABLET ORAL at 05:16

## 2025-01-01 RX ADMIN — ENOXAPARIN SODIUM 40 MG: 100 INJECTION SUBCUTANEOUS at 17:45

## 2025-01-01 RX ADMIN — SENNOSIDES AND DOCUSATE SODIUM 2 TABLET: 50; 8.6 TABLET ORAL at 17:54

## 2025-01-01 RX ADMIN — OLANZAPINE 2.5 MG: 5 TABLET, FILM COATED ORAL at 17:45

## 2025-01-01 RX ADMIN — DIAZEPAM 5 MG: 10 INJECTION, SOLUTION INTRAMUSCULAR; INTRAVENOUS at 22:09

## 2025-01-01 RX ADMIN — MORPHINE SULFATE 20 MG: 100 SOLUTION ORAL at 01:38

## 2025-01-01 RX ADMIN — GLYCOPYRROLATE 0.2 MG: 0.2 INJECTION INTRAMUSCULAR; INTRAVENOUS at 12:33

## 2025-01-01 RX ADMIN — MINERAL OIL, AND WHITE PETROLATUM 1 APPLICATION: 425; 573 OINTMENT OPHTHALMIC at 10:01

## 2025-01-01 RX ADMIN — METHADONE HYDROCHLORIDE 10 MG: 10 TABLET ORAL at 17:55

## 2025-01-01 RX ADMIN — HYDROMORPHONE HYDROCHLORIDE 0.5 MG: 1 INJECTION, SOLUTION INTRAMUSCULAR; INTRAVENOUS; SUBCUTANEOUS at 16:48

## 2025-01-01 RX ADMIN — LORAZEPAM 1 MG: 2 LIQUID ORAL at 13:10

## 2025-01-01 RX ADMIN — CEFTRIAXONE SODIUM 2000 MG: 10 INJECTION, POWDER, FOR SOLUTION INTRAVENOUS at 05:57

## 2025-01-01 RX ADMIN — DULOXETINE 60 MG: 20 CAPSULE, DELAYED RELEASE ORAL at 04:22

## 2025-01-01 RX ADMIN — LORAZEPAM 1 MG: 2 INJECTION INTRAMUSCULAR; INTRAVENOUS at 07:02

## 2025-01-01 RX ADMIN — TAMSULOSIN HYDROCHLORIDE 0.4 MG: 0.4 CAPSULE ORAL at 05:37

## 2025-01-01 RX ADMIN — DIAZEPAM 5 MG: 10 INJECTION, SOLUTION INTRAMUSCULAR; INTRAVENOUS at 13:48

## 2025-01-01 RX ADMIN — MORPHINE SULFATE 20 MG: 100 SOLUTION ORAL at 06:03

## 2025-01-01 RX ADMIN — IOHEXOL 98 ML: 350 INJECTION, SOLUTION INTRAVENOUS at 17:00

## 2025-01-01 RX ADMIN — HYDROMORPHONE HYDROCHLORIDE 4 MG: 4 TABLET ORAL at 15:30

## 2025-01-01 RX ADMIN — MORPHINE SULFATE 20 MG: 100 SOLUTION ORAL at 22:55

## 2025-01-01 RX ADMIN — DULOXETINE 30 MG: 30 CAPSULE, DELAYED RELEASE ORAL at 05:16

## 2025-01-01 RX ADMIN — LORAZEPAM 1 MG: 2 INJECTION INTRAMUSCULAR; INTRAVENOUS at 20:42

## 2025-01-01 RX ADMIN — MORPHINE SULFATE 20 MG: 100 SOLUTION ORAL at 01:33

## 2025-01-01 RX ADMIN — CEFTRIAXONE SODIUM 2000 MG: 10 INJECTION, POWDER, FOR SOLUTION INTRAVENOUS at 05:16

## 2025-01-01 RX ADMIN — MINERAL OIL, AND WHITE PETROLATUM 1 APPLICATION: 425; 573 OINTMENT OPHTHALMIC at 03:26

## 2025-01-01 RX ADMIN — METHADONE HYDROCHLORIDE 10 MG: 10 TABLET ORAL at 08:25

## 2025-01-01 RX ADMIN — DULOXETINE 30 MG: 30 CAPSULE, DELAYED RELEASE ORAL at 05:36

## 2025-01-01 RX ADMIN — MORPHINE SULFATE 20 MG: 100 SOLUTION ORAL at 08:26

## 2025-01-01 RX ADMIN — DOCUSATE SODIUM 100 MG: 100 CAPSULE, LIQUID FILLED ORAL at 17:56

## 2025-01-01 RX ADMIN — DIAZEPAM 5 MG: 10 INJECTION, SOLUTION INTRAMUSCULAR; INTRAVENOUS at 11:17

## 2025-01-01 RX ADMIN — LORAZEPAM 1 MG: 2 INJECTION INTRAMUSCULAR; INTRAVENOUS at 03:24

## 2025-01-01 RX ADMIN — HYDROMORPHONE HYDROCHLORIDE 4 MG: 4 TABLET ORAL at 04:32

## 2025-01-01 RX ADMIN — TAMSULOSIN HYDROCHLORIDE 0.4 MG: 0.4 CAPSULE ORAL at 05:16

## 2025-01-01 RX ADMIN — MORPHINE SULFATE 20 MG: 100 SOLUTION ORAL at 21:01

## 2025-01-01 RX ADMIN — METHADONE HYDROCHLORIDE 10 MG: 10 TABLET ORAL at 09:49

## 2025-01-01 RX ADMIN — OLANZAPINE 2.5 MG: 5 TABLET, FILM COATED ORAL at 20:24

## 2025-01-01 RX ADMIN — GLYCOPYRROLATE 0.2 MG: 0.2 INJECTION INTRAMUSCULAR; INTRAVENOUS at 16:56

## 2025-01-01 RX ADMIN — MORPHINE SULFATE 20 MG: 100 SOLUTION ORAL at 08:40

## 2025-01-01 RX ADMIN — TAMSULOSIN HYDROCHLORIDE 0.4 MG: 0.4 CAPSULE ORAL at 04:21

## 2025-01-01 RX ADMIN — LORAZEPAM 1 MG: 2 LIQUID ORAL at 10:01

## 2025-01-01 RX ADMIN — GADOTERIDOL 16 ML: 279.3 INJECTION, SOLUTION INTRAVENOUS at 10:33

## 2025-01-01 RX ADMIN — MORPHINE SULFATE 20 MG: 100 SOLUTION ORAL at 03:25

## 2025-01-01 RX ADMIN — SODIUM CHLORIDE 1000 ML: 9 INJECTION, SOLUTION INTRAVENOUS at 14:45

## 2025-01-01 RX ADMIN — MORPHINE SULFATE 20 MG: 100 SOLUTION ORAL at 16:18

## 2025-01-01 RX ADMIN — MINERAL OIL, AND WHITE PETROLATUM 1 APPLICATION: 425; 573 OINTMENT OPHTHALMIC at 22:10

## 2025-01-01 RX ADMIN — MINERAL OIL, AND WHITE PETROLATUM 1 APPLICATION: 425; 573 OINTMENT OPHTHALMIC at 15:44

## 2025-01-01 RX ADMIN — DOCUSATE SODIUM 100 MG: 100 CAPSULE, LIQUID FILLED ORAL at 04:21

## 2025-01-01 RX ADMIN — MINERAL OIL, AND WHITE PETROLATUM 1 APPLICATION: 425; 573 OINTMENT OPHTHALMIC at 04:15

## 2025-01-01 RX ADMIN — ATORVASTATIN CALCIUM 40 MG: 40 TABLET, FILM COATED ORAL at 17:45

## 2025-01-01 RX ADMIN — SENNOSIDES AND DOCUSATE SODIUM 2 TABLET: 50; 8.6 TABLET ORAL at 17:45

## 2025-01-01 RX ADMIN — DIAZEPAM 5 MG: 10 INJECTION, SOLUTION INTRAMUSCULAR; INTRAVENOUS at 18:26

## 2025-01-01 RX ADMIN — ATORVASTATIN CALCIUM 40 MG: 40 TABLET, FILM COATED ORAL at 17:54

## 2025-01-01 RX ADMIN — LORAZEPAM 1 MG: 2 LIQUID ORAL at 18:07

## 2025-01-01 RX ADMIN — LORAZEPAM 1 MG: 2 INJECTION INTRAMUSCULAR; INTRAVENOUS at 18:15

## 2025-01-01 RX ADMIN — CEFTRIAXONE SODIUM 2000 MG: 10 INJECTION, POWDER, FOR SOLUTION INTRAVENOUS at 17:45

## 2025-01-01 RX ADMIN — METHADONE HYDROCHLORIDE 10 MG: 10 TABLET ORAL at 15:30

## 2025-01-01 RX ADMIN — SODIUM CHLORIDE 1000 ML: 9 INJECTION, SOLUTION INTRAVENOUS at 13:15

## 2025-01-01 RX ADMIN — METHADONE HYDROCHLORIDE 10 MG: 10 TABLET ORAL at 08:31

## 2025-01-01 RX ADMIN — SENNOSIDES AND DOCUSATE SODIUM 2 TABLET: 50; 8.6 TABLET ORAL at 17:55

## 2025-01-01 RX ADMIN — MINERAL OIL, AND WHITE PETROLATUM 1 APPLICATION: 425; 573 OINTMENT OPHTHALMIC at 10:32

## 2025-01-01 RX ADMIN — MORPHINE SULFATE 20 MG: 100 SOLUTION ORAL at 18:15

## 2025-01-01 RX ADMIN — LORAZEPAM 1 MG: 2 LIQUID ORAL at 12:42

## 2025-01-01 RX ADMIN — ATROPINE SULFATE 2 DROP: 10 SOLUTION/ DROPS OPHTHALMIC at 15:44

## 2025-01-01 RX ADMIN — LORAZEPAM 1 MG: 2 INJECTION INTRAMUSCULAR; INTRAVENOUS at 01:34

## 2025-01-01 RX ADMIN — GLYCOPYRROLATE 0.2 MG: 0.2 INJECTION INTRAMUSCULAR; INTRAVENOUS at 20:34

## 2025-01-01 RX ADMIN — HYDROMORPHONE HYDROCHLORIDE 2 MG: 2 TABLET ORAL at 09:49

## 2025-01-01 RX ADMIN — METHADONE HYDROCHLORIDE 10 MG: 10 TABLET ORAL at 20:31

## 2025-01-01 RX ADMIN — MORPHINE SULFATE 20 MG: 100 SOLUTION ORAL at 13:10

## 2025-01-01 RX ADMIN — HYDROMORPHONE HYDROCHLORIDE 4 MG: 4 TABLET ORAL at 08:31

## 2025-01-01 RX ADMIN — ATROPINE SULFATE 2 DROP: 10 SOLUTION/ DROPS OPHTHALMIC at 11:17

## 2025-01-01 RX ADMIN — MORPHINE SULFATE 20 MG: 100 SOLUTION ORAL at 17:55

## 2025-01-01 RX ADMIN — TAMSULOSIN HYDROCHLORIDE 0.4 MG: 0.4 CAPSULE ORAL at 05:57

## 2025-01-01 RX ADMIN — OXYCODONE 5 MG: 5 TABLET ORAL at 09:47

## 2025-01-01 RX ADMIN — HYDROMORPHONE HYDROCHLORIDE 1 MG/HR: 10 INJECTION, SOLUTION INTRAMUSCULAR; INTRAVENOUS; SUBCUTANEOUS at 11:46

## 2025-01-01 RX ADMIN — HYDROMORPHONE HYDROCHLORIDE 0.5 MG: 1 INJECTION, SOLUTION INTRAMUSCULAR; INTRAVENOUS; SUBCUTANEOUS at 09:38

## 2025-01-01 SDOH — ECONOMIC STABILITY: TRANSPORTATION INSECURITY
IN THE PAST 12 MONTHS, HAS THE LACK OF TRANSPORTATION KEPT YOU FROM MEDICAL APPOINTMENTS OR FROM GETTING MEDICATIONS?: NO

## 2025-01-01 SDOH — ECONOMIC STABILITY: TRANSPORTATION INSECURITY
IN THE PAST 12 MONTHS, HAS LACK OF RELIABLE TRANSPORTATION KEPT YOU FROM MEDICAL APPOINTMENTS, MEETINGS, WORK OR FROM GETTING THINGS NEEDED FOR DAILY LIVING?: NO

## 2025-01-01 ASSESSMENT — LIFESTYLE VARIABLES
HAVE YOU EVER FELT YOU SHOULD CUT DOWN ON YOUR DRINKING: NO
HAVE PEOPLE ANNOYED YOU BY CRITICIZING YOUR DRINKING: NO
TOTAL SCORE: 0
TOTAL SCORE: 0
HOW MANY TIMES IN THE PAST YEAR HAVE YOU HAD 5 OR MORE DRINKS IN A DAY: 0
ON A TYPICAL DAY WHEN YOU DRINK ALCOHOL HOW MANY DRINKS DO YOU HAVE: 0
ALCOHOL_USE: NO
DOES PATIENT WANT TO STOP DRINKING: NO
CONSUMPTION TOTAL: NEGATIVE
EVER FELT BAD OR GUILTY ABOUT YOUR DRINKING: NO
TOTAL SCORE: 0
EVER HAD A DRINK FIRST THING IN THE MORNING TO STEADY YOUR NERVES TO GET RID OF A HANGOVER: NO
AVERAGE NUMBER OF DAYS PER WEEK YOU HAVE A DRINK CONTAINING ALCOHOL: 0

## 2025-01-01 ASSESSMENT — COGNITIVE AND FUNCTIONAL STATUS - GENERAL
CLIMB 3 TO 5 STEPS WITH RAILING: A LITTLE
SUGGESTED CMS G CODE MODIFIER DAILY ACTIVITY: CH
SUGGESTED CMS G CODE MODIFIER DAILY ACTIVITY: CH
SUGGESTED CMS G CODE MODIFIER MOBILITY: CI
DAILY ACTIVITIY SCORE: 24
MOBILITY SCORE: 23
CLIMB 3 TO 5 STEPS WITH RAILING: A LITTLE
DAILY ACTIVITIY SCORE: 24
SUGGESTED CMS G CODE MODIFIER MOBILITY: CI
MOBILITY SCORE: 23

## 2025-01-01 ASSESSMENT — ENCOUNTER SYMPTOMS
NERVOUS/ANXIOUS: 0
WEAKNESS: 1
COUGH: 0
WEIGHT LOSS: 1
BACK PAIN: 1
ORTHOPNEA: 0
SHORTNESS OF BREATH: 0
DIARRHEA: 0
DOUBLE VISION: 0
PALPITATIONS: 0
SINUS PAIN: 0
DIAPHORESIS: 0
SHORTNESS OF BREATH: 1
CHILLS: 0
WEAKNESS: 0
DIAPHORESIS: 1
MYALGIAS: 0
NAUSEA: 0
HEARTBURN: 0
BACK PAIN: 0
HEADACHES: 0
BRUISES/BLEEDS EASILY: 0
NAUSEA: 0
DOUBLE VISION: 0
WEIGHT LOSS: 0
HEADACHES: 0
VOMITING: 0
SORE THROAT: 0
NERVOUS/ANXIOUS: 1
INSOMNIA: 0
FEVER: 0
BACK PAIN: 0
MEMORY LOSS: 1
WEAKNESS: 1
BLOOD IN STOOL: 0
DIAPHORESIS: 0
TINGLING: 0
WEIGHT LOSS: 1
TINGLING: 0
NERVOUS/ANXIOUS: 1
ROS GI COMMENTS: POOR APPETITE
CHILLS: 0
FEVER: 0
EYES NEGATIVE: 1
SHORTNESS OF BREATH: 1
BLURRED VISION: 0
MEMORY LOSS: 1
BLOOD IN STOOL: 0
PALPITATIONS: 0
NAUSEA: 1
DIZZINESS: 0
CONSTIPATION: 0
MYALGIAS: 0
DIARRHEA: 0
CONSTIPATION: 0
WHEEZING: 0
WHEEZING: 0
VOMITING: 0
EYES NEGATIVE: 1
ROS GI COMMENTS: COLOSTOMY LLQ
SPUTUM PRODUCTION: 0
WEIGHT LOSS: 0
SHORTNESS OF BREATH: 1
WEAKNESS: 1
BRUISES/BLEEDS EASILY: 0
CARDIOVASCULAR NEGATIVE: 1
BLURRED VISION: 0
SORE THROAT: 0
ABDOMINAL PAIN: 1
SINUS PAIN: 0
ABDOMINAL PAIN: 1
DIZZINESS: 0
ORTHOPNEA: 0
SHORTNESS OF BREATH: 0
NERVOUS/ANXIOUS: 0
BACK PAIN: 1
HEARTBURN: 0
INSOMNIA: 0
SPUTUM PRODUCTION: 0
ABDOMINAL PAIN: 1
COUGH: 0

## 2025-01-01 ASSESSMENT — PAIN DESCRIPTION - PAIN TYPE
TYPE: ACUTE PAIN
TYPE: ACUTE PAIN
TYPE: ACUTE PAIN;CHRONIC PAIN
TYPE: ACUTE PAIN
TYPE: ACUTE PAIN
TYPE: ACUTE PAIN;CHRONIC PAIN
TYPE: ACUTE PAIN;CHRONIC PAIN
TYPE: ACUTE PAIN
TYPE: CHRONIC PAIN
TYPE: ACUTE PAIN
TYPE: ACUTE PAIN
TYPE: CHRONIC PAIN
TYPE: ACUTE PAIN
TYPE: ACUTE PAIN
TYPE: CHRONIC PAIN
TYPE: ACUTE PAIN
TYPE: ACUTE PAIN;CHRONIC PAIN
TYPE: ACUTE PAIN

## 2025-01-01 ASSESSMENT — SOCIAL DETERMINANTS OF HEALTH (SDOH)
WITHIN THE PAST 12 MONTHS, YOU WORRIED THAT YOUR FOOD WOULD RUN OUT BEFORE YOU GOT THE MONEY TO BUY MORE: NEVER TRUE
WITHIN THE LAST YEAR, HAVE YOU BEEN KICKED, HIT, SLAPPED, OR OTHERWISE PHYSICALLY HURT BY YOUR PARTNER OR EX-PARTNER?: NO
WITHIN THE LAST YEAR, HAVE YOU BEEN HUMILIATED OR EMOTIONALLY ABUSED IN OTHER WAYS BY YOUR PARTNER OR EX-PARTNER?: NO
WITHIN THE LAST YEAR, HAVE TO BEEN RAPED OR FORCED TO HAVE ANY KIND OF SEXUAL ACTIVITY BY YOUR PARTNER OR EX-PARTNER?: NO
WITHIN THE LAST YEAR, HAVE YOU BEEN AFRAID OF YOUR PARTNER OR EX-PARTNER?: NO
IN THE PAST 12 MONTHS, HAS THE ELECTRIC, GAS, OIL, OR WATER COMPANY THREATENED TO SHUT OFF SERVICE IN YOUR HOME?: NO
WITHIN THE PAST 12 MONTHS, THE FOOD YOU BOUGHT JUST DIDN'T LAST AND YOU DIDN'T HAVE MONEY TO GET MORE: NEVER TRUE

## 2025-01-01 ASSESSMENT — PAIN SCALES - WONG BAKER
WONGBAKER_NUMERICALRESPONSE: DOESN'T HURT AT ALL

## 2025-01-01 ASSESSMENT — ACTIVITIES OF DAILY LIVING (ADL): TOILETING: INDEPENDENT

## 2025-01-01 ASSESSMENT — PATIENT HEALTH QUESTIONNAIRE - PHQ9
SUM OF ALL RESPONSES TO PHQ9 QUESTIONS 1 AND 2: 0
1. LITTLE INTEREST OR PLEASURE IN DOING THINGS: NOT AT ALL
2. FEELING DOWN, DEPRESSED, IRRITABLE, OR HOPELESS: NOT AT ALL

## 2025-01-01 ASSESSMENT — PAIN SCALES - GENERAL: PAINLEVEL_OUTOF10: 5=MODERATE PAIN

## 2025-01-01 ASSESSMENT — FIBROSIS 4 INDEX
FIB4 SCORE: 1.9
FIB4 SCORE: 1.9

## 2025-01-01 ASSESSMENT — GAIT ASSESSMENTS
DISTANCE (FEET): 85
DEVIATION: BRADYKINETIC
ASSISTIVE DEVICE: FRONT WHEEL WALKER
GAIT LEVEL OF ASSIST: STANDBY ASSIST

## 2025-01-01 ASSESSMENT — PAIN SCALES - PAIN ASSESSMENT IN ADVANCED DEMENTIA (PAINAD)
CONSOLABILITY: NO NEED TO CONSOLE
BREATHING: NORMAL
BODYLANGUAGE: RIGID, FISTS CLENCHED, KNEES UP, PUSHING/PULLING AWAY, STRIKES OUT
NEGVOCALIZATION: OCCASIONAL MOAN/GROAN, LOW SPEECH, NEGATIVE/DISAPPROVING QUALITY
TOTALSCORE: 5
FACIALEXPRESSION: FACIAL GRIMACING

## 2025-01-01 NOTE — PROGRESS NOTES
"Pharmacy Chemotherapy Verification:    DX: Rectal adenocarcinoma [id number] stage IIIC     Regimen: mFOLFIRINOX  *Dosing Reference*  Oxaliplatin  IV over 2 hours on Day 1   - Dose reduced to 65 mg/m2 prior to C5 for tolerance  Leucovorin 400 mg/m2 IV over 2 hours on Day 1  Irinotecan 150 mg/m2 IV over 30-90 minutes on Day 1  Fluorouracil 1200 mg/m2 IV continuous infusion daily on Days 1-2 (2400 mg/m2 IV over 48 hours)  14-day cycle for 6-8 cycles (total neoadjuvant therapy) or until disease progression or unacceptable toxicity (advanced or metastatic)    NCCN Guidelines for Rectal Cancer V.1.2024.  Jim J, et al. Clin Colorectal Cancer. 2019;18(1):e69-e73.  Awa WEST, et al. Beena Surg Oncol. 2013;20(13):4289-97.    Allergies:  Patient has no known allergies.     /84   Pulse 85   Temp 36.5 °C (97.7 °F) (Temporal)   Resp 17   Ht 1.71 m (5' 7.32\")   Wt 82.5 kg (181 lb 14.1 oz)   SpO2 96%   BMI 28.21 kg/m²  Body surface area is 1.98 meters squared.    Labs 1/2/25:  ANC~ 3240 Plt = 182k   Hgb = 13.5     SCr = 0.79 mg/dL CrCl ~ 117 mL/min   AST/ALT/AP = 30/32/105 TBili = 0.3       Drug Order   (Drug name, dose, route, IV Fluid & volume, frequency, number of doses) Cycle 5  Previous treatment: C4 12/20/24     Medication = OXALIplatin  Base Dose = 65 mg/m²  Calc Dose: Base Dose x 1.98 m² = 128.7 mg  Final Dose = 130 mg  Route = IV  Fluid & Volume = D5W 250 mL  Admin Duration = Over 2 hours   Infuse concurrently with LCV       <10% difference, OK to treat with final dose     Medication = Leucovorin  Base Dose = 400 mg/m²  Calc Dose: Base Dose x  1.98 m² = 792 mg  Final Dose = 790 mg  Route = IV  Fluid & Volume = D5W 250 mL  Admin Duration = Over 2 hours   Infuse concurrently with OXALIplatin        <10% difference, OK to treat with final dose     Medication = Irinotecan  Base Dose = 150 mg/m²  Calc Dose:Base Dose x 1.98 m² = 297 mg  Final Dose = 295.6 mg  Route = IV  Fluid & Volume = D5W 500 mL  Admin Duration = " Over 90 minutes          <10% difference, OK to treat with final dose     Medication = Fluorouracil  Base Dose = 2400 mg/m²  Calc Dose: Base Dose x 1.98 m² = 4752 mg  Final Dose = 4730 mg  Route = CIVI via CADD  Concentration = 50 mg/mL  Fluid and Volume = 94.6 mL (+ 3 mL overfill)  Admin Duration = Over 48 hours @ 2 mL/hr            <10% difference,  OK to treat with final dose     By my signature below, I confirm this process was performed independently with the BSA and all final chemotherapy dosing calculations congruent. I have reviewed the above chemotherapy order and that my calculation of the final dose and BSA (when applicable) corroborate those calculations of the  pharmacist.     Samuel Cabello, PharmD

## 2025-01-02 ENCOUNTER — HOSPITAL ENCOUNTER (OUTPATIENT)
Dept: LAB | Facility: MEDICAL CENTER | Age: 60
End: 2025-01-02
Attending: STUDENT IN AN ORGANIZED HEALTH CARE EDUCATION/TRAINING PROGRAM
Payer: COMMERCIAL

## 2025-01-02 DIAGNOSIS — C20 RECTAL CANCER (HCC): ICD-10-CM

## 2025-01-02 DIAGNOSIS — Z79.899 ENCOUNTER FOR LONG-TERM (CURRENT) USE OF HIGH-RISK MEDICATION: ICD-10-CM

## 2025-01-02 LAB
BASOPHILS # BLD AUTO: 0.3 % (ref 0–1.8)
BASOPHILS # BLD: 0.02 K/UL (ref 0–0.12)
EOSINOPHIL # BLD AUTO: 0.22 K/UL (ref 0–0.51)
EOSINOPHIL NFR BLD: 3.7 % (ref 0–6.9)
ERYTHROCYTE [DISTWIDTH] IN BLOOD BY AUTOMATED COUNT: 48 FL (ref 35.9–50)
HCT VFR BLD AUTO: 38.3 % (ref 42–52)
HGB BLD-MCNC: 13.5 G/DL (ref 14–18)
IMM GRANULOCYTES # BLD AUTO: 0.01 K/UL (ref 0–0.11)
IMM GRANULOCYTES NFR BLD AUTO: 0.2 % (ref 0–0.9)
LYMPHOCYTES # BLD AUTO: 1.86 K/UL (ref 1–4.8)
LYMPHOCYTES NFR BLD: 30.9 % (ref 22–41)
MCH RBC QN AUTO: 30.8 PG (ref 27–33)
MCHC RBC AUTO-ENTMCNC: 35.2 G/DL (ref 32.3–36.5)
MCV RBC AUTO: 87.2 FL (ref 81.4–97.8)
MONOCYTES # BLD AUTO: 0.66 K/UL (ref 0–0.85)
MONOCYTES NFR BLD AUTO: 11 % (ref 0–13.4)
NEUTROPHILS # BLD AUTO: 3.24 K/UL (ref 1.82–7.42)
NEUTROPHILS NFR BLD: 53.9 % (ref 44–72)
NRBC # BLD AUTO: 0 K/UL
NRBC BLD-RTO: 0 /100 WBC (ref 0–0.2)
PLATELET # BLD AUTO: 182 K/UL (ref 164–446)
PMV BLD AUTO: 10.4 FL (ref 9–12.9)
RBC # BLD AUTO: 4.39 M/UL (ref 4.7–6.1)
WBC # BLD AUTO: 6 K/UL (ref 4.8–10.8)

## 2025-01-02 PROCEDURE — 80053 COMPREHEN METABOLIC PANEL: CPT

## 2025-01-02 PROCEDURE — 36415 COLL VENOUS BLD VENIPUNCTURE: CPT

## 2025-01-02 PROCEDURE — 85025 COMPLETE CBC W/AUTO DIFF WBC: CPT

## 2025-01-03 ENCOUNTER — HOSPITAL ENCOUNTER (OUTPATIENT)
Dept: HEMATOLOGY ONCOLOGY | Facility: MEDICAL CENTER | Age: 60
End: 2025-01-03
Attending: STUDENT IN AN ORGANIZED HEALTH CARE EDUCATION/TRAINING PROGRAM
Payer: COMMERCIAL

## 2025-01-03 ENCOUNTER — PATIENT OUTREACH (OUTPATIENT)
Dept: ONCOLOGY | Facility: MEDICAL CENTER | Age: 60
End: 2025-01-03

## 2025-01-03 ENCOUNTER — OUTPATIENT INFUSION SERVICES (OUTPATIENT)
Dept: ONCOLOGY | Facility: MEDICAL CENTER | Age: 60
End: 2025-01-03
Attending: STUDENT IN AN ORGANIZED HEALTH CARE EDUCATION/TRAINING PROGRAM
Payer: COMMERCIAL

## 2025-01-03 ENCOUNTER — PHARMACY VISIT (OUTPATIENT)
Dept: PHARMACY | Facility: MEDICAL CENTER | Age: 60
End: 2025-01-03
Payer: MEDICARE

## 2025-01-03 VITALS
HEIGHT: 67 IN | TEMPERATURE: 97.7 F | RESPIRATION RATE: 17 BRPM | SYSTOLIC BLOOD PRESSURE: 132 MMHG | DIASTOLIC BLOOD PRESSURE: 84 MMHG | HEART RATE: 85 BPM | WEIGHT: 181.88 LBS | BODY MASS INDEX: 28.55 KG/M2 | OXYGEN SATURATION: 96 %

## 2025-01-03 VITALS
WEIGHT: 181.3 LBS | SYSTOLIC BLOOD PRESSURE: 142 MMHG | BODY MASS INDEX: 27.48 KG/M2 | HEIGHT: 68 IN | OXYGEN SATURATION: 97 % | TEMPERATURE: 96.6 F | HEART RATE: 72 BPM | DIASTOLIC BLOOD PRESSURE: 84 MMHG

## 2025-01-03 DIAGNOSIS — N20.1 URETERAL STONE: ICD-10-CM

## 2025-01-03 DIAGNOSIS — C20 RECTAL CANCER (HCC): ICD-10-CM

## 2025-01-03 DIAGNOSIS — G89.3 CANCER RELATED PAIN: ICD-10-CM

## 2025-01-03 LAB
ALBUMIN SERPL BCP-MCNC: 4.5 G/DL (ref 3.2–4.9)
ALBUMIN/GLOB SERPL: 1.8 G/DL
ALP SERPL-CCNC: 105 U/L (ref 30–99)
ALT SERPL-CCNC: 32 U/L (ref 2–50)
ANION GAP SERPL CALC-SCNC: 10 MMOL/L (ref 7–16)
AST SERPL-CCNC: 30 U/L (ref 12–45)
BILIRUB SERPL-MCNC: 0.3 MG/DL (ref 0.1–1.5)
BUN SERPL-MCNC: 12 MG/DL (ref 8–22)
CALCIUM ALBUM COR SERPL-MCNC: 9.8 MG/DL (ref 8.5–10.5)
CALCIUM SERPL-MCNC: 10.2 MG/DL (ref 8.5–10.5)
CEA SERPL-MCNC: 2.3 NG/ML (ref 0–3)
CHLORIDE SERPL-SCNC: 104 MMOL/L (ref 96–112)
CO2 SERPL-SCNC: 29 MMOL/L (ref 20–33)
CREAT SERPL-MCNC: 0.79 MG/DL (ref 0.5–1.4)
GFR SERPLBLD CREATININE-BSD FMLA CKD-EPI: 102 ML/MIN/1.73 M 2
GLOBULIN SER CALC-MCNC: 2.5 G/DL (ref 1.9–3.5)
GLUCOSE SERPL-MCNC: 105 MG/DL (ref 65–99)
POTASSIUM SERPL-SCNC: 3.9 MMOL/L (ref 3.6–5.5)
PROT SERPL-MCNC: 7 G/DL (ref 6–8.2)
SODIUM SERPL-SCNC: 143 MMOL/L (ref 135–145)

## 2025-01-03 PROCEDURE — G0498 CHEMO EXTEND IV INFUS W/PUMP: HCPCS

## 2025-01-03 PROCEDURE — 96411 CHEMO IV PUSH ADDL DRUG: CPT

## 2025-01-03 PROCEDURE — 96375 TX/PRO/DX INJ NEW DRUG ADDON: CPT

## 2025-01-03 PROCEDURE — 96367 TX/PROPH/DG ADDL SEQ IV INF: CPT

## 2025-01-03 PROCEDURE — 700111 HCHG RX REV CODE 636 W/ 250 OVERRIDE (IP): Mod: JZ | Performed by: STUDENT IN AN ORGANIZED HEALTH CARE EDUCATION/TRAINING PROGRAM

## 2025-01-03 PROCEDURE — 700105 HCHG RX REV CODE 258: Performed by: STUDENT IN AN ORGANIZED HEALTH CARE EDUCATION/TRAINING PROGRAM

## 2025-01-03 PROCEDURE — RXMED WILLOW AMBULATORY MEDICATION CHARGE: Performed by: FAMILY MEDICINE

## 2025-01-03 PROCEDURE — 99214 OFFICE O/P EST MOD 30 MIN: CPT | Performed by: STUDENT IN AN ORGANIZED HEALTH CARE EDUCATION/TRAINING PROGRAM

## 2025-01-03 PROCEDURE — RXMED WILLOW AMBULATORY MEDICATION CHARGE: Performed by: STUDENT IN AN ORGANIZED HEALTH CARE EDUCATION/TRAINING PROGRAM

## 2025-01-03 PROCEDURE — 96415 CHEMO IV INFUSION ADDL HR: CPT

## 2025-01-03 PROCEDURE — A4212 NON CORING NEEDLE OR STYLET: HCPCS

## 2025-01-03 PROCEDURE — 96417 CHEMO IV INFUS EACH ADDL SEQ: CPT

## 2025-01-03 PROCEDURE — 700101 HCHG RX REV CODE 250: Performed by: STUDENT IN AN ORGANIZED HEALTH CARE EDUCATION/TRAINING PROGRAM

## 2025-01-03 PROCEDURE — 96413 CHEMO IV INFUSION 1 HR: CPT

## 2025-01-03 PROCEDURE — 99212 OFFICE O/P EST SF 10 MIN: CPT | Performed by: STUDENT IN AN ORGANIZED HEALTH CARE EDUCATION/TRAINING PROGRAM

## 2025-01-03 PROCEDURE — 82378 CARCINOEMBRYONIC ANTIGEN: CPT

## 2025-01-03 RX ORDER — TAMSULOSIN HYDROCHLORIDE 0.4 MG/1
0.4 CAPSULE ORAL DAILY
Qty: 30 CAPSULE | Refills: 0 | Status: SHIPPED | OUTPATIENT
Start: 2025-01-03

## 2025-01-03 RX ORDER — ONDANSETRON 2 MG/ML
16 INJECTION INTRAMUSCULAR; INTRAVENOUS ONCE
Status: CANCELLED
Start: 2025-01-04 | End: 2025-01-04

## 2025-01-03 RX ORDER — ONDANSETRON 4 MG/1
4 TABLET, ORALLY DISINTEGRATING ORAL EVERY 4 HOURS PRN
Qty: 30 TABLET | Refills: 3 | Status: SHIPPED | OUTPATIENT
Start: 2025-01-03

## 2025-01-03 RX ORDER — DEXAMETHASONE SODIUM PHOSPHATE 4 MG/ML
12 INJECTION, SOLUTION INTRA-ARTICULAR; INTRALESIONAL; INTRAMUSCULAR; INTRAVENOUS; SOFT TISSUE ONCE
Status: COMPLETED | OUTPATIENT
Start: 2025-01-03 | End: 2025-01-03

## 2025-01-03 RX ORDER — DEXAMETHASONE SODIUM PHOSPHATE 4 MG/ML
12 INJECTION, SOLUTION INTRA-ARTICULAR; INTRALESIONAL; INTRAMUSCULAR; INTRAVENOUS; SOFT TISSUE ONCE
Status: CANCELLED
Start: 2025-01-04 | End: 2025-01-04

## 2025-01-03 RX ORDER — ONDANSETRON 2 MG/ML
16 INJECTION INTRAMUSCULAR; INTRAVENOUS ONCE
Status: COMPLETED | OUTPATIENT
Start: 2025-01-03 | End: 2025-01-03

## 2025-01-03 RX ORDER — ATROPINE SULFATE 1 MG/ML
0.5 INJECTION, SOLUTION INTRAVENOUS PRN
Status: DISCONTINUED | OUTPATIENT
Start: 2025-01-03 | End: 2025-01-03 | Stop reason: HOSPADM

## 2025-01-03 RX ORDER — OXYCODONE HYDROCHLORIDE 10 MG/1
10 TABLET ORAL EVERY 6 HOURS PRN
Qty: 90 TABLET | Refills: 0 | Status: SHIPPED | OUTPATIENT
Start: 2025-01-03 | End: 2025-01-28 | Stop reason: SDUPTHER

## 2025-01-03 RX ORDER — ONDANSETRON 2 MG/ML
16 INJECTION INTRAMUSCULAR; INTRAVENOUS ONCE
Status: CANCELLED
Start: 2025-01-06 | End: 2025-01-06

## 2025-01-03 RX ADMIN — LEUCOVORIN CALCIUM 790 MG: 500 INJECTION, POWDER, LYOPHILIZED, FOR SOLUTION INTRAMUSCULAR; INTRAVENOUS at 13:18

## 2025-01-03 RX ADMIN — FOSAPREPITANT 150 MG: 150 INJECTION, POWDER, LYOPHILIZED, FOR SOLUTION INTRAVENOUS at 10:52

## 2025-01-03 RX ADMIN — DEXAMETHASONE SODIUM PHOSPHATE 12 MG: 4 INJECTION INTRA-ARTICULAR; INTRALESIONAL; INTRAMUSCULAR; INTRAVENOUS; SOFT TISSUE at 10:41

## 2025-01-03 RX ADMIN — LIDOCAINE HYDROCHLORIDE 0.5 ML: 10 INJECTION, SOLUTION EPIDURAL; INFILTRATION; INTRACAUDAL; PERINEURAL at 10:12

## 2025-01-03 RX ADMIN — OXALIPLATIN 130 MG: 5 INJECTION, SOLUTION INTRAVENOUS at 13:19

## 2025-01-03 RX ADMIN — FLUOROURACIL 4730 MG: 50 INJECTION, SOLUTION INTRAVENOUS at 15:33

## 2025-01-03 RX ADMIN — DEXTROSE MONOHYDRATE 295.6 MG: 50 INJECTION, SOLUTION INTRAVENOUS at 11:33

## 2025-01-03 RX ADMIN — ONDANSETRON 16 MG: 2 INJECTION INTRAMUSCULAR; INTRAVENOUS at 10:47

## 2025-01-03 ASSESSMENT — ENCOUNTER SYMPTOMS
SPUTUM PRODUCTION: 0
NAUSEA: 0
SHORTNESS OF BREATH: 0
ORTHOPNEA: 0
CONSTIPATION: 1
BACK PAIN: 0
FEVER: 0
WEIGHT LOSS: 0
WHEEZING: 0
SORE THROAT: 0
DOUBLE VISION: 0
DIARRHEA: 0
BLURRED VISION: 0
INSOMNIA: 0
WEAKNESS: 0
ABDOMINAL PAIN: 0
CHILLS: 0
BRUISES/BLEEDS EASILY: 0
MYALGIAS: 0
DIAPHORESIS: 0
HEADACHES: 0
PALPITATIONS: 0
SINUS PAIN: 0
HEARTBURN: 0
VOMITING: 0
NERVOUS/ANXIOUS: 0
BLOOD IN STOOL: 0
DIZZINESS: 0
COUGH: 0
TINGLING: 1

## 2025-01-03 ASSESSMENT — FIBROSIS 4 INDEX
FIB4 SCORE: 1.72
FIB4 SCORE: 1.72

## 2025-01-03 NOTE — PROGRESS NOTES
"Pharmacy Chemotherapy Calculation:    DX: Rectal adenocarcinoma   Cycle 5  Previous treatment = C4 12/20/24    Regimen: mFOLFIRINOX  *Dosing Reference*  Oxaliplatin  IV over 2 hours on Day 1   1/3/25 - Dose reduced to 65 mg/m2 for tolerance starting with C5  Leucovorin 400 mg/m2 IV over 2 hours on Day 1  Irinotecan 150 mg/m2 IV over 30-90 minutes on Day 1  Fluorouracil 1200 mg/m2 IV continuous infusion daily on Days 1-2 (2400 mg/m2 IV over 48 hours)  14-day cycle for 6-8 cycles (total neoadjuvant therapy) or until disease progression or unacceptable toxicity (advanced or metastatic)  NCCN Guidelines for Rectal Cancer V.1.2024.  Jim J, et al. Clin Colorectal Cancer. 2019;18(1):e69-e73.  Awa WEST, et al. Beena Surg Oncol. 2013;20(13):4289-97.    Allergies: Patient has no known allergies.   /84   Pulse 85   Temp 36.5 °C (97.7 °F) (Temporal)   Resp 17   Ht 1.71 m (5' 7.32\")   Wt 82.5 kg (181 lb 14.1 oz)   SpO2 96%   BMI 28.21 kg/m²   Body surface area is 1.98 meters squared.    Labs 1/2/25  ANC~ 3240 Plt = 182k   Hgb = 13.5     SCr = 0.79 mg/dL CrCl ~ 116.8 mL/min   LFT's = 30/32/105 TBili = 0.3     Irinotecan 150 mg/m2 x 1.98 m2 = 297 mg   <10% difference, okay to treat with final dose = 295.6 mg IV    Oxaliplatin 65 mg/m2 x 1.98 m2 = 128.7 mg   <10% difference, okay to treat with final dose = 130 mg IV    Leucovorin 400 mg/m2 x 1.98 m2 = 792 mg   <10% difference, okay to treat with final dose = 790 mg IV    Fluorouracil 2400 mg/m2 x 1.98 m2 = 4752 mg   <10% difference, okay to treat with final dose = 4730 mg IV over 48 hours @ 2 mL/hour    Tadeo Lake, PharmD    "

## 2025-01-03 NOTE — PROGRESS NOTES
Chemotherapy Verification - PRIMARY RN      Height = 171 cm  Weight = 82.5 kg  BSA = 1.98 m2     Medication: Irinotecan  Dose: 150 mg/m2  Calculated Dose: 297 mg                             (In mg/m2, AUC, mg/kg)     Medication: Oxaliplatin  Dose: 65 mg/m2  Calculated Dose: 128.7 mg                             (In mg/m2, AUC, mg/kg)    Medication: Fluorouracil CADD   Dose: 2400 mg/m2  Calculated Dose: 4752 mg over 48 hrs                             (In mg/m2, AUC, mg/kg)    I confirm this process was performed independently with the BSA and all final chemotherapy dosing calculations congruent.  Any discrepancies of 10% or greater have been addressed with the chemotherapy pharmacist. The resolution of the discrepancy has been documented in the EPIC progress notes.

## 2025-01-03 NOTE — PROGRESS NOTES
Pt presented to Infusion for D1 C5 OP Colon mFOLFIRINOX. POC discussed and pt verbalized understanding, reports doing well and only slight fatigue. Pt arrived with EMLA to port site, cream removed. Port accessed per [facility] policy, brisk blood return noted, line flushes with ease. Pt tolerated well. Labs reviewed. Pt declined atropine today. Pre-medications and chemotherapy administered per MAR. Pt tolerated well. No s/s of adverse reactions or complications noted. Port flushed per [facility] policy, brisk blood return noted and 5FU CADD pump connected with 2 RN verification of settings. Pump in RUN mode, connections secured, clamps open and pump placed in carrying bag. Pt confirmed when to contact his provider, understands pump. Pt confirmed pump deaccess appt and discharged to self care. Pt in NAD at time of discharge.

## 2025-01-03 NOTE — PROGRESS NOTES
Follow Up Note:  Hematology/Oncology      Primary Care:  Rickie Naranjo M.D.    Diagnosis: Rectal adenocarcinoma    Chief Complaint: On-treatment visit    Current Treatment: mFOLFIRINOX, followed by capecitabine with concurrent XRT, and then surgical evaluation    Prior Treatment: NA    Oncology History of Presenting Illness:  Booker Saucedo is a 59 y.o.  man who presents to the clinic for evaluation for systemic therapy for a diagnosis of rectal carcinoma. He started having back pain and changes in his bowel habits in July and went to the hospital where CT scan revealed right hydronephrosis due to a 1 mm calculus. However, he was also noted to have rectal wall thickening with perirectal lymph nodes. He subsequently underwent a colonoscopy which revealed a fungating circumferential mass, 5 cm from the anal verge, with biopsy revealing invasive adenocarcinoma, moderately differentiated, MMR proficient. Staging scans did not reveal any definitive metastatic disease, though there was a 1.5 cm left hepatic lobe lesion which was not PET avid. He was seen by radiation oncology and colorectal surgery and ultimately was staged on rectal MRI as a [id number] stage IIIC disease. He has been referred for treatment accordingly.     Treatment History:   11/08/2024: C1 FOLFIRINOX   11/22/2024: C2 FOLFIRINOX   12/06/2024: C3 FOLFIRINOX  12/20/2024: C4 FOLFIRINOX  01/03/2025: C5 FOLFIRINOX (decrease oxaliplatin to 65 mg/m2 for neuropathy)    Interval History:  Patient is here for follow up visit. He is doing well for the most part with therapy, but is having some mild neuropathy and cold sensitivity. He also has been having issues with constipation, but is managing with medications. He is tolerating chemotherapy well.     Allergies as of 01/03/2025    (No Known Allergies)         Current Outpatient Medications:     tamsulosin (FLOMAX) 0.4 MG capsule, Take 1 Capsule by mouth every day., Disp: 30 Capsule, Rfl: 0     ondansetron (ZOFRAN ODT) 4 MG TABLET DISPERSIBLE, Take 1 Tablet by mouth every four hours as needed for Nausea/Vomiting., Disp: 30 Tablet, Rfl: 3    oxyCODONE-acetaminophen (PERCOCET) 5-325 MG Tab, Take 1 Tablet by mouth every 8 hours as needed for Moderate Pain for up to 30 days., Disp: 90 Tablet, Rfl: 0    polyethylene glycol/lytes (MIRALAX) Pack, Take 17 g by mouth 1 time a day as needed., Disp: , Rfl:     melatonin 3 MG Tab, Take 3 mg by mouth at bedtime., Disp: , Rfl:     naproxen (ALEVE) 220 MG tablet, Take 220 mg by mouth 2 times a day as needed (pain)., Disp: , Rfl:     atorvastatin (LIPITOR) 40 MG Tab, Take 40 mg by mouth every day., Disp: , Rfl:     carvedilol (COREG) 12.5 MG Tab, Take 12.5 mg by mouth 2 times a day., Disp: , Rfl:     citalopram (CELEXA) 20 MG Tab, Take 20 mg by mouth every day., Disp: , Rfl:     hydrOXYzine HCl (ATARAX) 25 MG Tab, Take 25 mg by mouth every 8 hours as needed for Anxiety., Disp: , Rfl:     ondansetron (ZOFRAN) 4 MG Tab tablet, Take 1 Tablet by mouth every four hours as needed for Nausea/Vomiting (for nausea, vomiting)., Disp: 30 Tablet, Rfl: 6    prochlorperazine (COMPAZINE) 10 MG Tab, Take 1 Tablet by mouth every 6 hours as needed (for nausea, vomiting)., Disp: 30 Tablet, Rfl: 6    OLANZapine (ZYPREXA) 5 MG Tab, Take 1 Tablet by mouth every evening., Disp: 30 Tablet, Rfl: 6    loperamide (IMODIUM A-D) 2 MG tablet, Take 1 Tablet by mouth see administration instructions., Disp: 30 Tablet, Rfl: 6    oxyCODONE immediate release (ROXICODONE) 10 MG immediate release tablet, Take 1 Tablet by mouth every 6 hours as needed for Moderate Pain for up to 30 days., Disp: 90 Tablet, Rfl: 0      Review of Systems:  Review of Systems   Constitutional:  Negative for chills, diaphoresis, fever, malaise/fatigue and weight loss.   HENT:  Negative for hearing loss, nosebleeds, sinus pain and sore throat.    Eyes:  Negative for blurred vision and double vision.   Respiratory:  Negative for  "cough, sputum production, shortness of breath and wheezing.    Cardiovascular:  Negative for chest pain, palpitations, orthopnea and leg swelling.   Gastrointestinal:  Positive for constipation. Negative for abdominal pain, blood in stool, diarrhea, heartburn, melena, nausea and vomiting.   Genitourinary:  Negative for dysuria, frequency, hematuria and urgency.   Musculoskeletal:  Negative for back pain, joint pain and myalgias.   Skin:  Negative for rash.   Neurological:  Positive for tingling (Mild, for a few days after therapy, but worse with cold exposure). Negative for dizziness, weakness and headaches.   Endo/Heme/Allergies:  Does not bruise/bleed easily.   Psychiatric/Behavioral:  The patient is not nervous/anxious and does not have insomnia.          Physical Exam:  Vitals:    01/03/25 0838   BP: (!) 142/84   Pulse: 72   Temp: 35.9 °C (96.6 °F)   TempSrc: Temporal   SpO2: 97%   Weight: 82.2 kg (181 lb 4.8 oz)   Height: 1.727 m (5' 7.99\")       DESC; KARNOFSKY SCALE WITH ECOG EQUIVALENT: 100, Fully active, able to carry on all pre-disease performed without restriction (ECOG equivalent 0)    DISTRESS LEVEL: no apparent distress    Physical Exam  Vitals and nursing note reviewed.   Constitutional:       General: He is awake. He is not in acute distress.     Appearance: Normal appearance. He is normal weight. He is not ill-appearing, toxic-appearing or diaphoretic.   HENT:      Head: Normocephalic and atraumatic.      Nose: Nose normal. No congestion.      Mouth/Throat:      Pharynx: Oropharynx is clear. No oropharyngeal exudate or posterior oropharyngeal erythema.   Eyes:      General: No scleral icterus.     Extraocular Movements: Extraocular movements intact.      Conjunctiva/sclera: Conjunctivae normal.      Pupils: Pupils are equal, round, and reactive to light.   Cardiovascular:      Rate and Rhythm: Normal rate and regular rhythm.      Pulses: Normal pulses.      Heart sounds: Normal heart sounds. No " murmur heard.     No friction rub. No gallop.   Pulmonary:      Effort: Pulmonary effort is normal.      Breath sounds: Normal breath sounds. No decreased air movement. No wheezing, rhonchi or rales.   Abdominal:      General: Bowel sounds are normal. There is no distension.      Tenderness: There is no abdominal tenderness.   Musculoskeletal:         General: No deformity. Normal range of motion.      Cervical back: Normal range of motion and neck supple. No tenderness.      Right lower leg: No edema.      Left lower leg: No edema.   Lymphadenopathy:      Cervical: No cervical adenopathy.      Upper Body:      Right upper body: No axillary adenopathy.      Left upper body: No axillary adenopathy.      Lower Body: No right inguinal adenopathy. No left inguinal adenopathy.   Skin:     General: Skin is warm and dry.      Coloration: Skin is not jaundiced.      Findings: No erythema or rash.   Neurological:      General: No focal deficit present.      Mental Status: He is alert and oriented to person, place, and time.      Sensory: Sensation is intact.      Motor: Motor function is intact. No weakness.      Gait: Gait is intact.   Psychiatric:         Attention and Perception: Attention normal.         Mood and Affect: Mood normal.         Behavior: Behavior normal. Behavior is cooperative.         Thought Content: Thought content normal.         Judgment: Judgment normal.           Labs:  Hospital Outpatient Visit on 01/02/2025   Component Date Value Ref Range Status    Sodium 01/02/2025 143  135 - 145 mmol/L Final    Potassium 01/02/2025 3.9  3.6 - 5.5 mmol/L Final    Chloride 01/02/2025 104  96 - 112 mmol/L Final    Co2 01/02/2025 29  20 - 33 mmol/L Final    Anion Gap 01/02/2025 10.0  7.0 - 16.0 Final    Glucose 01/02/2025 105 (H)  65 - 99 mg/dL Final    Bun 01/02/2025 12  8 - 22 mg/dL Final    Creatinine 01/02/2025 0.79  0.50 - 1.40 mg/dL Final    Calcium 01/02/2025 10.2  8.5 - 10.5 mg/dL Final    Correct Calcium  01/02/2025 9.8  8.5 - 10.5 mg/dL Final    AST(SGOT) 01/02/2025 30  12 - 45 U/L Final    ALT(SGPT) 01/02/2025 32  2 - 50 U/L Final    Alkaline Phosphatase 01/02/2025 105 (H)  30 - 99 U/L Final    Total Bilirubin 01/02/2025 0.3  0.1 - 1.5 mg/dL Final    Albumin 01/02/2025 4.5  3.2 - 4.9 g/dL Final    Total Protein 01/02/2025 7.0  6.0 - 8.2 g/dL Final    Globulin 01/02/2025 2.5  1.9 - 3.5 g/dL Final    A-G Ratio 01/02/2025 1.8  g/dL Final    WBC 01/02/2025 6.0  4.8 - 10.8 K/uL Final    RBC 01/02/2025 4.39 (L)  4.70 - 6.10 M/uL Final    Hemoglobin 01/02/2025 13.5 (L)  14.0 - 18.0 g/dL Final    Hematocrit 01/02/2025 38.3 (L)  42.0 - 52.0 % Final    MCV 01/02/2025 87.2  81.4 - 97.8 fL Final    MCH 01/02/2025 30.8  27.0 - 33.0 pg Final    MCHC 01/02/2025 35.2  32.3 - 36.5 g/dL Final    RDW 01/02/2025 48.0  35.9 - 50.0 fL Final    Platelet Count 01/02/2025 182  164 - 446 K/uL Final    MPV 01/02/2025 10.4  9.0 - 12.9 fL Final    Neutrophils-Polys 01/02/2025 53.90  44.00 - 72.00 % Final    Lymphocytes 01/02/2025 30.90  22.00 - 41.00 % Final    Monocytes 01/02/2025 11.00  0.00 - 13.40 % Final    Eosinophils 01/02/2025 3.70  0.00 - 6.90 % Final    Basophils 01/02/2025 0.30  0.00 - 1.80 % Final    Immature Granulocytes 01/02/2025 0.20  0.00 - 0.90 % Final    Nucleated RBC 01/02/2025 0.00  0.00 - 0.20 /100 WBC Final    Neutrophils (Absolute) 01/02/2025 3.24  1.82 - 7.42 K/uL Final    Includes immature neutrophils, if present.    Lymphs (Absolute) 01/02/2025 1.86  1.00 - 4.80 K/uL Final    Monos (Absolute) 01/02/2025 0.66  0.00 - 0.85 K/uL Final    Eos (Absolute) 01/02/2025 0.22  0.00 - 0.51 K/uL Final    Baso (Absolute) 01/02/2025 0.02  0.00 - 0.12 K/uL Final    Immature Granulocytes (abs) 01/02/2025 0.01  0.00 - 0.11 K/uL Final    NRBC (Absolute) 01/02/2025 0.00  K/uL Final    GFR (CKD-EPI) 01/02/2025 102  >60 mL/min/1.73 m 2 Final    Comment: Estimated Glomerular Filtration Rate is calculated using  race neutral CKD-EPI 2021  "equation per NKF-ASN recommendations.         Imaging:     All listed images below have been independently reviewed by me. I agree with the findings as summarized below:    No results found.    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 Dr. Prasad reviewed the slides of this case prior to requesting   consultative opinion by [facility].                                         Diagnosis performed by:                                       KHOA PRASAD MD            Assessment & Plan:  1. Rectal cancer (HCC)  ondansetron (ZOFRAN ODT) 4 MG TABLET DISPERSIBLE      2. Ureteral stone  tamsulosin (FLOMAX) 0.4 MG capsule        This is a 59 year old  man with rectal adenocarcinoma, [id number] stage IIIC disease. He presents for evaluation.      Current Diagnosis and Staging: Rectal adenocarcinoma, [id number] stage IIIC disease    Update: The patient is doing well with treatment but has some mild neuropathy so we will dose-reduce oxaliplatin accordingly. Continue with C5 of mFOLFIRINOX today.      Treatment Plan: mFOLFIRINOX followed by capecitabine with concurrent XRT and then surgical evaluation     Treatment Citation: NCCN     Plan of Care:     Primary Therapy: mFOLFIRINOX C5 today  Supportive Therapy: Antiemetics per protocol  Toxicity: Patient is getting antineoplastic therapy and needs monitoring of blood counts, hepatic function, and renal function due to potential for organ dysfunction.   Labs: CBC with diff, CMP, CEA, ctDNA monitoring  Imaging: Repeat MRI rectal protocol, CT scans after completion of JOSEMANUEL  Treatment Planning: Patient will start with chemotherapy due to concern of aggressive " spread of disease, and then will do capecitabine with concurrent XRT and surgical evaluation.  Consultations: Colorectal surgery (Dr. Tse); Radiation oncology (Dr. Huerta), Palliative Care (Dr. Styles)  Code Status: Full  Miscellaneous: Referred to medical genetics, patient will follow up.   Return for Follow Up: 2 weeks    Any questions and concerns raised by the patient were answered to the best of my ability. Thank you for allowing me to participate in the care for this patient. Please feel free to contact me for any questions or concerns.       Total time spent on chart review, clinic encounter, and documentation: 26 minutes.

## 2025-01-03 NOTE — PROGRESS NOTES
Met with patient during and after his OV with Dr Kim. Pt doing well, continues to work throughout treatment. Pt will continue to Cycle 8, have a short break prior to beginning RAD. Pt discussed a beach vacation. Mild side effects of constipation/diarrhea. Pt manages well with laxatives. Discussed FLMA, pt states when side effects are rash.  Pt has supportive work leadership and feels it is not needed. Saving his time off for surgery and vacation. Contact information provided. Barriers assessed.   Oncology Nurse Navigation Assessment    Met with patient in person     DX: Colon cancer     POC: Continue to C8, see Surgeon.     FAMHX: Cancer-related family history is not on file.      Patient's goals of care:  Complete treatment.            BARRIERS ASSESSMENT:    TRANSPORTATION: NO barriers  EMPLOYMENT:  Employed through treatment.    FINANCIAL:  No barriers   INSURANCE:  NO barriers   SUPPORT SYSTEM:  NO barriers identified   PSYCHOLOGICAL: Good spirits, no barriers   COMMUNICATION: NO barriers     FAMILY CARE:  No barriers identified   SELF CARE:  No barriers          INTERVENTION:  Met patient in person to up date on change in Navigation services from Carol PEREZ to Yin PEREZ.  Contact information provided

## 2025-01-03 NOTE — LETTER
[facility] [place]    [address]  [place] [address]  Phone: [phone] - Fax: [phone]                    01/03/25    Dear Booker ,     It was a pleasure meeting you today during your office visit with Dr. Kim. As your Cancer Nurse Navigator, certified oncology nurse, marly sal is to assess any needs you may have with education, guidance and support. I am available to you and your family through your treatment at [facility].  I understand you do not currently have any needs or concerns.      I am available to address your needs during your journey with the following services:     Care Coordination  I can assist you in facilitating communication between your cancer care treatment team to ensure timely treatment and follow-up.  I can also assist with transition of care back to your primary care provider, or other specialist, as needed.  My goal is to bridge gaps for you throughout the course of your active treatment.       Education Services  Understanding the recommended treatment course by your physician is key. I can provide educational resources personalized to your cancer diagnosis to help you understand your diagnosis and treatment. Please let me know if you would like to receive information about your diagnosis and treatment plan.  I am here to help.     Support Services/Resource Information  [facility] we offer a full scope of support services.  I can assist you with referral information to:  Cancer Clinical Trials & Research  Nutrition counseling  Support groups  Complementary Therapies such as Mind-Body Techniques Meditation  Patient Financial Advocates    [facility], an [employer] affiliate office, our volunteers can assist you with accessing our [employer] library, support services information, head coverings and comfort items  Community and national resources, included eligibility based alana assistance and  pharmaceutical access programs, if you are in need of additional information.     [facility] offers services that include:  Behavioral Health  Genetic counseling & testing  Acupuncture  Lymphedema prevention/treatment program  Palliative care services.        I hope you have an excellent patient experience.  Please feel free to share with me your comments regarding the care you have received- we value your feedback.    Sincerely,     Sincerely,     Yin YOO RN OCN   Cancer Nurse Navigator    Office Mainline: [phone]  Desk voice mail: [phone]  Email: [email or web address]

## 2025-01-03 NOTE — ADDENDUM NOTE
Encounter addended by: Zeny Paulino, Med Ass't on: 1/3/2025 9:55 AM   Actions taken: Charge Capture section accepted

## 2025-01-05 ENCOUNTER — OUTPATIENT INFUSION SERVICES (OUTPATIENT)
Dept: ONCOLOGY | Facility: MEDICAL CENTER | Age: 60
End: 2025-01-05
Attending: STUDENT IN AN ORGANIZED HEALTH CARE EDUCATION/TRAINING PROGRAM
Payer: COMMERCIAL

## 2025-01-05 VITALS
HEART RATE: 65 BPM | OXYGEN SATURATION: 95 % | TEMPERATURE: 96.7 F | DIASTOLIC BLOOD PRESSURE: 58 MMHG | SYSTOLIC BLOOD PRESSURE: 95 MMHG | RESPIRATION RATE: 18 BRPM

## 2025-01-05 DIAGNOSIS — C20 RECTAL CANCER (HCC): ICD-10-CM

## 2025-01-05 PROCEDURE — 700111 HCHG RX REV CODE 636 W/ 250 OVERRIDE (IP): Performed by: STUDENT IN AN ORGANIZED HEALTH CARE EDUCATION/TRAINING PROGRAM

## 2025-01-05 PROCEDURE — 96523 IRRIG DRUG DELIVERY DEVICE: CPT

## 2025-01-05 RX ADMIN — HEPARIN 500 UNITS: 100 SYRINGE at 16:34

## 2025-01-06 NOTE — PROGRESS NOTES
Booker came into infusion services today for 5-FU CADD pump disconnect. No complaints. CADD pump had 0ml left in reservoir. Port had + blood return after, flushed per [facility] policy, de-accessed, needle intact, insertion site covered with sterile gauze and paper tape. Pt has future appointments. Discharged to self care.

## 2025-01-16 RX ORDER — PROCHLORPERAZINE MALEATE 10 MG
10 TABLET ORAL EVERY 6 HOURS PRN
Status: CANCELLED | OUTPATIENT
Start: 2025-01-18

## 2025-01-16 RX ORDER — 0.9 % SODIUM CHLORIDE 0.9 %
20 VIAL (ML) INJECTION PRN
Status: CANCELLED | OUTPATIENT
Start: 2025-01-20

## 2025-01-16 RX ORDER — METHYLPREDNISOLONE SODIUM SUCCINATE 125 MG/2ML
125 INJECTION, POWDER, LYOPHILIZED, FOR SOLUTION INTRAMUSCULAR; INTRAVENOUS PRN
Status: CANCELLED | OUTPATIENT
Start: 2025-01-18

## 2025-01-16 RX ORDER — LOPERAMIDE HYDROCHLORIDE 2 MG/1
4 CAPSULE ORAL PRN
Status: CANCELLED | OUTPATIENT
Start: 2025-01-18

## 2025-01-16 RX ORDER — LOPERAMIDE HYDROCHLORIDE 2 MG/1
2 CAPSULE ORAL
Status: CANCELLED | OUTPATIENT
Start: 2025-01-18

## 2025-01-16 RX ORDER — ONDANSETRON 2 MG/ML
4 INJECTION INTRAMUSCULAR; INTRAVENOUS PRN
Status: CANCELLED | OUTPATIENT
Start: 2025-01-18

## 2025-01-16 RX ORDER — 0.9 % SODIUM CHLORIDE 0.9 %
VIAL (ML) INJECTION PRN
Status: CANCELLED | OUTPATIENT
Start: 2025-01-18

## 2025-01-16 RX ORDER — 0.9 % SODIUM CHLORIDE 0.9 %
VIAL (ML) INJECTION PRN
Status: CANCELLED | OUTPATIENT
Start: 2025-01-17

## 2025-01-16 RX ORDER — 0.9 % SODIUM CHLORIDE 0.9 %
3 VIAL (ML) INJECTION PRN
Status: CANCELLED | OUTPATIENT
Start: 2025-01-18

## 2025-01-16 RX ORDER — ONDANSETRON 2 MG/ML
16 INJECTION INTRAMUSCULAR; INTRAVENOUS ONCE
Status: CANCELLED
Start: 2025-01-18 | End: 2025-01-18

## 2025-01-16 RX ORDER — 0.9 % SODIUM CHLORIDE 0.9 %
10 VIAL (ML) INJECTION PRN
Status: CANCELLED | OUTPATIENT
Start: 2025-01-18

## 2025-01-16 RX ORDER — 0.9 % SODIUM CHLORIDE 0.9 %
3 VIAL (ML) INJECTION PRN
Status: CANCELLED | OUTPATIENT
Start: 2025-01-17

## 2025-01-16 RX ORDER — DIPHENHYDRAMINE HYDROCHLORIDE 50 MG/ML
50 INJECTION INTRAMUSCULAR; INTRAVENOUS PRN
Status: CANCELLED | OUTPATIENT
Start: 2025-01-18

## 2025-01-16 RX ORDER — ATROPINE SULFATE 1 MG/ML
0.5 INJECTION, SOLUTION INTRAVENOUS PRN
Status: CANCELLED | OUTPATIENT
Start: 2025-01-18

## 2025-01-16 RX ORDER — DEXAMETHASONE SODIUM PHOSPHATE 4 MG/ML
12 INJECTION, SOLUTION INTRA-ARTICULAR; INTRALESIONAL; INTRAMUSCULAR; INTRAVENOUS; SOFT TISSUE ONCE
Status: CANCELLED
Start: 2025-01-18 | End: 2025-01-18

## 2025-01-16 RX ORDER — 0.9 % SODIUM CHLORIDE 0.9 %
10 VIAL (ML) INJECTION PRN
Status: CANCELLED | OUTPATIENT
Start: 2025-01-17

## 2025-01-16 RX ORDER — EPINEPHRINE 1 MG/ML(1)
0.5 AMPUL (ML) INJECTION PRN
Status: CANCELLED | OUTPATIENT
Start: 2025-01-18

## 2025-01-16 RX ORDER — ONDANSETRON 8 MG/1
8 TABLET, ORALLY DISINTEGRATING ORAL PRN
Status: CANCELLED | OUTPATIENT
Start: 2025-01-18

## 2025-01-16 RX ORDER — DEXTROSE MONOHYDRATE 50 MG/ML
INJECTION, SOLUTION INTRAVENOUS CONTINUOUS
Status: CANCELLED | OUTPATIENT
Start: 2025-01-18

## 2025-01-16 ASSESSMENT — ENCOUNTER SYMPTOMS
BACK PAIN: 0
SORE THROAT: 0
BRUISES/BLEEDS EASILY: 0
DIARRHEA: 0
BLURRED VISION: 0
PALPITATIONS: 0
VOMITING: 0
NERVOUS/ANXIOUS: 0
CONSTIPATION: 1
HEARTBURN: 0
HEADACHES: 0
TINGLING: 1
SHORTNESS OF BREATH: 0
COUGH: 0
SPUTUM PRODUCTION: 0
WHEEZING: 0
ORTHOPNEA: 0
MYALGIAS: 0
CHILLS: 0
WEIGHT LOSS: 0
DIZZINESS: 0
ABDOMINAL PAIN: 0
FEVER: 0
WEAKNESS: 0
NAUSEA: 0
INSOMNIA: 0
SINUS PAIN: 0
BLOOD IN STOOL: 0
DOUBLE VISION: 0
DIAPHORESIS: 0

## 2025-01-16 NOTE — PROGRESS NOTES
Follow Up Note:  Hematology/Oncology      Primary Care:  Rickie Naranjo M.D.    Diagnosis: Rectal adenocarcinoma    Chief Complaint: On-treatment visit    Current Treatment: mFOLFIRINOX, followed by capecitabine with concurrent XRT, and then surgical evaluation    Prior Treatment: NA    Oncology History of Presenting Illness:  Booker Saucedo is a 59 y.o.  man who presents to the clinic for evaluation for systemic therapy for a diagnosis of rectal carcinoma. He started having back pain and changes in his bowel habits in July and went to the hospital where CT scan revealed right hydronephrosis due to a 1 mm calculus. However, he was also noted to have rectal wall thickening with perirectal lymph nodes. He subsequently underwent a colonoscopy which revealed a fungating circumferential mass, 5 cm from the anal verge, with biopsy revealing invasive adenocarcinoma, moderately differentiated, MMR proficient. Staging scans did not reveal any definitive metastatic disease, though there was a 1.5 cm left hepatic lobe lesion which was not PET avid. He was seen by radiation oncology and colorectal surgery and ultimately was staged on rectal MRI as a [id number] stage IIIC disease. He has been referred for treatment accordingly.     Treatment History:   11/08/2024: C1 FOLFIRINOX   11/22/2024: C2 FOLFIRINOX   12/06/2024: C3 FOLFIRINOX  12/20/2024: C4 FOLFIRINOX  01/03/2025: C5 FOLFIRINOX (decrease oxaliplatin to 65 mg/m2 for neuropathy)  01/17/2025: C6 FOLFIRINOX (planned)    Interval History:  Patient is here for follow-up visit and prechemo check prior to cycle 6 FOLFIRINOX.  Patient reports doing well, with good appetite and energy levels.  He states he is having very little pain and that his constipation has almost completely resolved.  He did say he had less neuropathy on the reduced dose of oxaliplatin with the last treatment.  States that his cold sensitivity resolved more quickly, and he was able to go  diomedes.  He did develop hiccups for approximately 3 days following his infusion.  He also states he has noticed more problems swallowing, with the feeling that food sometimes does get stuck.  He reports that this has been going on for quite some time, even before starting the chemotherapy, however it is getting worse lately.   No other complaints or concerns provided.      Allergies as of 01/17/2025    (No Known Allergies)         Current Outpatient Medications:     oxyCODONE immediate release (ROXICODONE) 10 MG immediate release tablet, Take 1 Tablet by mouth every 6 hours as needed for Moderate Pain for up to 30 days., Disp: 90 Tablet, Rfl: 0    tamsulosin (FLOMAX) 0.4 MG capsule, Take 1 Capsule by mouth every day., Disp: 30 Capsule, Rfl: 0    ondansetron (ZOFRAN ODT) 4 MG TABLET DISPERSIBLE, Take 1 Tablet by mouth every four hours as needed for Nausea/Vomiting., Disp: 30 Tablet, Rfl: 3    polyethylene glycol/lytes (MIRALAX) Pack, Take 17 g by mouth 1 time a day as needed., Disp: , Rfl:     melatonin 3 MG Tab, Take 3 mg by mouth at bedtime., Disp: , Rfl:     naproxen (ALEVE) 220 MG tablet, Take 220 mg by mouth 2 times a day as needed (pain)., Disp: , Rfl:     atorvastatin (LIPITOR) 40 MG Tab, Take 40 mg by mouth every day., Disp: , Rfl:     carvedilol (COREG) 12.5 MG Tab, Take 12.5 mg by mouth 2 times a day., Disp: , Rfl:     citalopram (CELEXA) 20 MG Tab, Take 20 mg by mouth every day., Disp: , Rfl:     hydrOXYzine HCl (ATARAX) 25 MG Tab, Take 25 mg by mouth every 8 hours as needed for Anxiety., Disp: , Rfl:     ondansetron (ZOFRAN) 4 MG Tab tablet, Take 1 Tablet by mouth every four hours as needed for Nausea/Vomiting (for nausea, vomiting)., Disp: 30 Tablet, Rfl: 6    prochlorperazine (COMPAZINE) 10 MG Tab, Take 1 Tablet by mouth every 6 hours as needed (for nausea, vomiting)., Disp: 30 Tablet, Rfl: 6    OLANZapine (ZYPREXA) 5 MG Tab, Take 1 Tablet by mouth every evening., Disp: 30 Tablet, Rfl: 6    loperamide  "(IMODIUM A-D) 2 MG tablet, Take 1 Tablet by mouth see administration instructions., Disp: 30 Tablet, Rfl: 6      Review of Systems:  Review of Systems   Constitutional:  Negative for chills, diaphoresis, fever, malaise/fatigue and weight loss.   HENT:  Negative for hearing loss, nosebleeds, sinus pain and sore throat.    Eyes:  Negative for blurred vision and double vision.   Respiratory:  Negative for cough, sputum production, shortness of breath and wheezing.    Cardiovascular:  Negative for chest pain, palpitations, orthopnea and leg swelling.   Gastrointestinal:  Positive for constipation. Negative for abdominal pain, blood in stool, diarrhea, heartburn, melena, nausea and vomiting.   Genitourinary:  Negative for dysuria, frequency, hematuria and urgency.   Musculoskeletal:  Negative for back pain, joint pain and myalgias.   Skin:  Negative for rash.   Neurological:  Positive for tingling (Mild, for a few days after therapy, but worse with cold exposure). Negative for dizziness, weakness and headaches.   Endo/Heme/Allergies:  Does not bruise/bleed easily.   Psychiatric/Behavioral:  The patient is not nervous/anxious and does not have insomnia.          Physical Exam:  /88 (BP Location: Right arm, Patient Position: Sitting, BP Cuff Size: Large adult)   Pulse 65   Temp 36.1 °C (97 °F) (Temporal)   Ht 1.71 m (5' 7.32\")   Wt 81.8 kg (180 lb 7.1 oz)   SpO2 96%   BMI 27.99 kg/m²       DESC; KARNOFSKY SCALE WITH ECOG EQUIVALENT: 100, Fully active, able to carry on all pre-disease performed without restriction (ECOG equivalent 0)    DISTRESS LEVEL: no apparent distress    Physical Exam  Vitals reviewed.   Constitutional:       General: He is awake. He is not in acute distress.     Appearance: Normal appearance. He is normal weight. He is not ill-appearing, toxic-appearing or diaphoretic.   HENT:      Head: Normocephalic and atraumatic.      Nose: Nose normal. No congestion.      Mouth/Throat:      Mouth: " Mucous membranes are moist.      Pharynx: Oropharynx is clear. No oropharyngeal exudate or posterior oropharyngeal erythema.   Eyes:      General: No scleral icterus.     Extraocular Movements: Extraocular movements intact.      Conjunctiva/sclera: Conjunctivae normal.      Pupils: Pupils are equal, round, and reactive to light.   Cardiovascular:      Rate and Rhythm: Normal rate and regular rhythm.      Pulses: Normal pulses.      Heart sounds: Normal heart sounds. No murmur heard.     No friction rub. No gallop.   Pulmonary:      Effort: Pulmonary effort is normal.      Breath sounds: Normal breath sounds. No decreased air movement. No wheezing, rhonchi or rales.   Abdominal:      General: Abdomen is flat. Bowel sounds are normal. There is no distension.      Palpations: Abdomen is soft.      Tenderness: There is no abdominal tenderness.   Musculoskeletal:         General: No deformity. Normal range of motion.      Cervical back: Normal range of motion and neck supple. No tenderness.      Right lower leg: No edema.      Left lower leg: No edema.   Lymphadenopathy:      Cervical: No cervical adenopathy.      Upper Body:      Right upper body: No supraclavicular, axillary or pectoral adenopathy.      Left upper body: No supraclavicular, axillary or pectoral adenopathy.      Lower Body: No right inguinal adenopathy. No left inguinal adenopathy.   Skin:     General: Skin is warm and dry.      Coloration: Skin is not jaundiced.      Findings: No erythema or rash.   Neurological:      General: No focal deficit present.      Mental Status: He is alert and oriented to person, place, and time.      Sensory: Sensation is intact.      Motor: Motor function is intact. No weakness.      Gait: Gait is intact.   Psychiatric:         Attention and Perception: Attention normal.         Mood and Affect: Mood normal.         Behavior: Behavior normal. Behavior is cooperative.         Thought Content: Thought content normal.          Judgment: Judgment normal.           Labs:  Hospital Outpatient Visit on 01/02/2025   Component Date Value Ref Range Status    Sodium 01/02/2025 143  135 - 145 mmol/L Final    Potassium 01/02/2025 3.9  3.6 - 5.5 mmol/L Final    Chloride 01/02/2025 104  96 - 112 mmol/L Final    Co2 01/02/2025 29  20 - 33 mmol/L Final    Anion Gap 01/02/2025 10.0  7.0 - 16.0 Final    Glucose 01/02/2025 105 (H)  65 - 99 mg/dL Final    Bun 01/02/2025 12  8 - 22 mg/dL Final    Creatinine 01/02/2025 0.79  0.50 - 1.40 mg/dL Final    Calcium 01/02/2025 10.2  8.5 - 10.5 mg/dL Final    Correct Calcium 01/02/2025 9.8  8.5 - 10.5 mg/dL Final    AST(SGOT) 01/02/2025 30  12 - 45 U/L Final    ALT(SGPT) 01/02/2025 32  2 - 50 U/L Final    Alkaline Phosphatase 01/02/2025 105 (H)  30 - 99 U/L Final    Total Bilirubin 01/02/2025 0.3  0.1 - 1.5 mg/dL Final    Albumin 01/02/2025 4.5  3.2 - 4.9 g/dL Final    Total Protein 01/02/2025 7.0  6.0 - 8.2 g/dL Final    Globulin 01/02/2025 2.5  1.9 - 3.5 g/dL Final    A-G Ratio 01/02/2025 1.8  g/dL Final    WBC 01/02/2025 6.0  4.8 - 10.8 K/uL Final    RBC 01/02/2025 4.39 (L)  4.70 - 6.10 M/uL Final    Hemoglobin 01/02/2025 13.5 (L)  14.0 - 18.0 g/dL Final    Hematocrit 01/02/2025 38.3 (L)  42.0 - 52.0 % Final    MCV 01/02/2025 87.2  81.4 - 97.8 fL Final    MCH 01/02/2025 30.8  27.0 - 33.0 pg Final    MCHC 01/02/2025 35.2  32.3 - 36.5 g/dL Final    RDW 01/02/2025 48.0  35.9 - 50.0 fL Final    Platelet Count 01/02/2025 182  164 - 446 K/uL Final    MPV 01/02/2025 10.4  9.0 - 12.9 fL Final    Neutrophils-Polys 01/02/2025 53.90  44.00 - 72.00 % Final    Lymphocytes 01/02/2025 30.90  22.00 - 41.00 % Final    Monocytes 01/02/2025 11.00  0.00 - 13.40 % Final    Eosinophils 01/02/2025 3.70  0.00 - 6.90 % Final    Basophils 01/02/2025 0.30  0.00 - 1.80 % Final    Immature Granulocytes 01/02/2025 0.20  0.00 - 0.90 % Final    Nucleated RBC 01/02/2025 0.00  0.00 - 0.20 /100 WBC Final    Neutrophils (Absolute) 01/02/2025 3.24   "1.82 - 7.42 K/uL Final    Includes immature neutrophils, if present.    Lymphs (Absolute) 01/02/2025 1.86  1.00 - 4.80 K/uL Final    Monos (Absolute) 01/02/2025 0.66  0.00 - 0.85 K/uL Final    Eos (Absolute) 01/02/2025 0.22  0.00 - 0.51 K/uL Final    Baso (Absolute) 01/02/2025 0.02  0.00 - 0.12 K/uL Final    Immature Granulocytes (abs) 01/02/2025 0.01  0.00 - 0.11 K/uL Final    NRBC (Absolute) 01/02/2025 0.00  K/uL Final    GFR (CKD-EPI) 01/02/2025 102  >60 mL/min/1.73 m 2 Final    Comment: Estimated Glomerular Filtration Rate is calculated using  race neutral CKD-EPI 2021 equation per NKF-ASN recommendations.       10/4/2024 CEA 2.5  11/8/2024 CEA 2.6  11/22/2024 CEA 2.9  12/6/2024 CEA 2.2  12/20/2024 CEA 3.1 H  1/3/2025 CEA 2.3      Imaging:     10/9/2024 PET body imaging.  1.  Large hypermetabolic rectal mass consistent with known malignancy.  2.  Local involvement of 2 perirectal/presacral lymph nodes.  3.  No evidence of distant metastatic disease.  4.  No PET correlate for peripherally enhancing lesion seen in the LEFT lobe liver superiorly favoring benign etiology.  5.  Mild uptake in distal esophagus likely inflammatory.        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 Dr. Prasad reviewed the slides of this case prior to requesting   consultative opinion by [facility].                                         Diagnosis performed by:                                       KHOA PRASAD MD            Assessment & Plan:  No diagnosis found.    This is a 59 year old  man with rectal adenocarcinoma, [id number] stage IIIC disease. He presents for evaluation.      Current Diagnosis and " Staging: Rectal adenocarcinoma, [id number] stage IIIC disease    Update: The patient is doing well with treatment but has some mild neuropathy so we will dose-reduce oxaliplatin accordingly with C5. Continue with C6 of mFOLFIRINOX today.      Treatment Plan: mFOLFIRINOX followed by capecitabine with concurrent XRT and then surgical evaluation     Treatment Citation: NCCN     Plan of Care:     Primary Therapy: mFOLFIRINOX C6 today  Supportive Therapy: Antiemetics per protocol, will add baclofen for hiccups, as well as pantoprazole 40 mg every morning for worsening GERD.  Toxicity: Patient is getting antineoplastic therapy and needs monitoring of blood counts, hepatic function, and renal function due to potential for organ dysfunction.   Labs: CBC with diff, CMP, CEA, ctDNA monitoring  Imaging: Repeat MRI rectal protocol, CT scans after completion of JOSEMANUEL  Treatment Planning: Patient will start with chemotherapy due to concern of aggressive spread of disease, and then will do capecitabine with concurrent XRT and surgical evaluation.  Consultations: Colorectal surgery (Dr. Tse); Radiation oncology (Dr. Huerta), Palliative Care (Dr. Styles)  Code Status: Full  Miscellaneous: Referred to medical genetics, patient will follow up.   Return for Follow Up: 2 weeks    Plan of care reviewed with patient and all questions answered.  Patient verbalizes understanding, denies questions, and agrees with plan of care.  Patient instructed to call or message clinic with any questions or concerns, contact information provided.    Thank you for allowing me the privilege of caring for this patient.  If if you have any questions, please do not hesitate to reach out.  I may be contacted at [phone].    Britney Pendleton, MSN, AOCNP, FNP-C    Please note that this dictation was created using voice recognition software. I have made every reasonable attempt to correct obvious errors, but I expect that there are errors of grammar, and  possibly content, that I did not discover before finalizing the note.

## 2025-01-16 NOTE — PROGRESS NOTES
"Pharmacy Chemotherapy Calculation:    DX: Rectal adenocarcinoma   Cycle 6  Previous treatment = C5 1/3/24    Regimen: mFOLFIRINOX  *Dosing Reference*  Oxaliplatin  IV over 2 hours on Day 1   1/3/25 - Dose reduced to 65 mg/m2 for tolerance starting with C5  Leucovorin 400 mg/m2 IV over 2 hours on Day 1  Irinotecan 150 mg/m2 IV over 30-90 minutes on Day 1  Fluorouracil 1200 mg/m2 IV continuous infusion daily on Days 1-2 (2400 mg/m2 IV over 48 hours)  14-day cycle for 6-8 cycles (total neoadjuvant therapy) or until disease progression or unacceptable toxicity (advanced or metastatic)  NCCN Guidelines for Rectal Cancer V.1.2024.  Jim J, et al. Clin Colorectal Cancer. 2019;18(1):e69-e73.  Awa WEST, et al. Beena Surg Oncol. 2013;20(13):4289-97.    Allergies: Patient has no known allergies.   BP (!) 157/90 Comment: Rn notified   Pulse 69   Temp 36.1 °C (96.9 °F) (Temporal)   Resp 18   Ht 1.71 m (5' 7.32\")   Wt 81.9 kg (180 lb 8.9 oz)   SpO2 97%   BMI 28.01 kg/m²   Body surface area is 1.97 meters squared.    Labs 1/17/25:  ANC~ 2060 Plt = 192k   Hgb = 12.6     SCr = 0.72 mg/dL CrCl ~ 115.9 mL/min   LFTs= 27/21/114      TBili = 0.3     Irinotecan 150 mg/m2 x 1.97 m2 = 295.5 mg   <10% difference, okay to treat with final dose = 295.6 mg IV    Oxaliplatin 65 mg/m2 x 1.97 m2 = 128.1 mg   <10% difference, okay to treat with final dose = 130 mg IV    Leucovorin 400 mg/m2 x 1.97 m2 = 788 mg   <10% difference, okay to treat with final dose = 790 mg IV    Fluorouracil 2400 mg/m2 x 1.97 m2 = 4728 mg   <10% difference, okay to treat with final dose = 4730 mg IV over 48 hours @ 2 mL/hour    Norma Freeman, PharmD    "

## 2025-01-17 ENCOUNTER — HOSPITAL ENCOUNTER (OUTPATIENT)
Dept: HEMATOLOGY ONCOLOGY | Facility: MEDICAL CENTER | Age: 60
End: 2025-01-17
Payer: COMMERCIAL

## 2025-01-17 ENCOUNTER — PHARMACY VISIT (OUTPATIENT)
Dept: PHARMACY | Facility: MEDICAL CENTER | Age: 60
End: 2025-01-17
Payer: MEDICARE

## 2025-01-17 ENCOUNTER — OUTPATIENT INFUSION SERVICES (OUTPATIENT)
Dept: ONCOLOGY | Facility: MEDICAL CENTER | Age: 60
End: 2025-01-17
Attending: STUDENT IN AN ORGANIZED HEALTH CARE EDUCATION/TRAINING PROGRAM
Payer: COMMERCIAL

## 2025-01-17 ENCOUNTER — HOSPITAL ENCOUNTER (OUTPATIENT)
Dept: LAB | Facility: MEDICAL CENTER | Age: 60
End: 2025-01-17
Attending: STUDENT IN AN ORGANIZED HEALTH CARE EDUCATION/TRAINING PROGRAM
Payer: COMMERCIAL

## 2025-01-17 ENCOUNTER — PATIENT OUTREACH (OUTPATIENT)
Dept: ONCOLOGY | Facility: MEDICAL CENTER | Age: 60
End: 2025-01-17

## 2025-01-17 VITALS
HEART RATE: 65 BPM | HEIGHT: 67 IN | TEMPERATURE: 97 F | WEIGHT: 180.45 LBS | OXYGEN SATURATION: 96 % | DIASTOLIC BLOOD PRESSURE: 88 MMHG | SYSTOLIC BLOOD PRESSURE: 130 MMHG | BODY MASS INDEX: 28.32 KG/M2

## 2025-01-17 VITALS
WEIGHT: 180.56 LBS | OXYGEN SATURATION: 97 % | RESPIRATION RATE: 18 BRPM | HEART RATE: 69 BPM | BODY MASS INDEX: 28.34 KG/M2 | HEIGHT: 67 IN | SYSTOLIC BLOOD PRESSURE: 157 MMHG | DIASTOLIC BLOOD PRESSURE: 90 MMHG | TEMPERATURE: 96.9 F

## 2025-01-17 DIAGNOSIS — Z79.899 ENCOUNTER FOR LONG-TERM CURRENT USE OF HIGH RISK MEDICATION: ICD-10-CM

## 2025-01-17 DIAGNOSIS — C20 RECTAL CANCER (HCC): ICD-10-CM

## 2025-01-17 DIAGNOSIS — G89.3 CANCER RELATED PAIN: ICD-10-CM

## 2025-01-17 DIAGNOSIS — Z79.899 ENCOUNTER FOR LONG-TERM (CURRENT) USE OF HIGH-RISK MEDICATION: ICD-10-CM

## 2025-01-17 DIAGNOSIS — R06.6 INTRACTABLE HICCUPS: ICD-10-CM

## 2025-01-17 DIAGNOSIS — K21.00 GASTROESOPHAGEAL REFLUX DISEASE WITH ESOPHAGITIS WITHOUT HEMORRHAGE: ICD-10-CM

## 2025-01-17 LAB
ALBUMIN SERPL BCP-MCNC: 4.3 G/DL (ref 3.2–4.9)
ALBUMIN/GLOB SERPL: 1.7 G/DL
ALP SERPL-CCNC: 114 U/L (ref 30–99)
ALT SERPL-CCNC: 21 U/L (ref 2–50)
ANION GAP SERPL CALC-SCNC: 10 MMOL/L (ref 7–16)
AST SERPL-CCNC: 27 U/L (ref 12–45)
BASOPHILS # BLD AUTO: 0.6 % (ref 0–1.8)
BASOPHILS # BLD: 0.03 K/UL (ref 0–0.12)
BILIRUB SERPL-MCNC: 0.3 MG/DL (ref 0.1–1.5)
BUN SERPL-MCNC: 16 MG/DL (ref 8–22)
CALCIUM ALBUM COR SERPL-MCNC: 10.7 MG/DL (ref 8.5–10.5)
CALCIUM SERPL-MCNC: 10.9 MG/DL (ref 8.5–10.5)
CEA SERPL-MCNC: 2 NG/ML (ref 0–3)
CHLORIDE SERPL-SCNC: 105 MMOL/L (ref 96–112)
CO2 SERPL-SCNC: 26 MMOL/L (ref 20–33)
CREAT SERPL-MCNC: 0.72 MG/DL (ref 0.5–1.4)
EOSINOPHIL # BLD AUTO: 0.18 K/UL (ref 0–0.51)
EOSINOPHIL NFR BLD: 3.6 % (ref 0–6.9)
ERYTHROCYTE [DISTWIDTH] IN BLOOD BY AUTOMATED COUNT: 51.8 FL (ref 35.9–50)
GFR SERPLBLD CREATININE-BSD FMLA CKD-EPI: 105 ML/MIN/1.73 M 2
GLOBULIN SER CALC-MCNC: 2.5 G/DL (ref 1.9–3.5)
GLUCOSE SERPL-MCNC: 100 MG/DL (ref 65–99)
HCT VFR BLD AUTO: 35.1 % (ref 42–52)
HGB BLD-MCNC: 12.6 G/DL (ref 14–18)
IMM GRANULOCYTES # BLD AUTO: 0.01 K/UL (ref 0–0.11)
IMM GRANULOCYTES NFR BLD AUTO: 0.2 % (ref 0–0.9)
LYMPHOCYTES # BLD AUTO: 1.8 K/UL (ref 1–4.8)
LYMPHOCYTES NFR BLD: 36.1 % (ref 22–41)
MCH RBC QN AUTO: 31.7 PG (ref 27–33)
MCHC RBC AUTO-ENTMCNC: 35.9 G/DL (ref 32.3–36.5)
MCV RBC AUTO: 88.4 FL (ref 81.4–97.8)
MONOCYTES # BLD AUTO: 0.91 K/UL (ref 0–0.85)
MONOCYTES NFR BLD AUTO: 18.2 % (ref 0–13.4)
NEUTROPHILS # BLD AUTO: 2.06 K/UL (ref 1.82–7.42)
NEUTROPHILS NFR BLD: 41.3 % (ref 44–72)
NRBC # BLD AUTO: 0 K/UL
NRBC BLD-RTO: 0 /100 WBC (ref 0–0.2)
PLATELET # BLD AUTO: 192 K/UL (ref 164–446)
PMV BLD AUTO: 10.2 FL (ref 9–12.9)
POTASSIUM SERPL-SCNC: 4 MMOL/L (ref 3.6–5.5)
PROT SERPL-MCNC: 6.8 G/DL (ref 6–8.2)
RBC # BLD AUTO: 3.97 M/UL (ref 4.7–6.1)
SODIUM SERPL-SCNC: 141 MMOL/L (ref 135–145)
WBC # BLD AUTO: 5 K/UL (ref 4.8–10.8)

## 2025-01-17 PROCEDURE — 82378 CARCINOEMBRYONIC ANTIGEN: CPT

## 2025-01-17 PROCEDURE — 96417 CHEMO IV INFUS EACH ADDL SEQ: CPT

## 2025-01-17 PROCEDURE — 80053 COMPREHEN METABOLIC PANEL: CPT

## 2025-01-17 PROCEDURE — 700111 HCHG RX REV CODE 636 W/ 250 OVERRIDE (IP): Performed by: STUDENT IN AN ORGANIZED HEALTH CARE EDUCATION/TRAINING PROGRAM

## 2025-01-17 PROCEDURE — 700102 HCHG RX REV CODE 250 W/ 637 OVERRIDE(OP): Performed by: STUDENT IN AN ORGANIZED HEALTH CARE EDUCATION/TRAINING PROGRAM

## 2025-01-17 PROCEDURE — 96367 TX/PROPH/DG ADDL SEQ IV INF: CPT

## 2025-01-17 PROCEDURE — 96415 CHEMO IV INFUSION ADDL HR: CPT

## 2025-01-17 PROCEDURE — RXMED WILLOW AMBULATORY MEDICATION CHARGE

## 2025-01-17 PROCEDURE — 99214 OFFICE O/P EST MOD 30 MIN: CPT

## 2025-01-17 PROCEDURE — 36415 COLL VENOUS BLD VENIPUNCTURE: CPT

## 2025-01-17 PROCEDURE — 700101 HCHG RX REV CODE 250: Performed by: STUDENT IN AN ORGANIZED HEALTH CARE EDUCATION/TRAINING PROGRAM

## 2025-01-17 PROCEDURE — 96375 TX/PRO/DX INJ NEW DRUG ADDON: CPT

## 2025-01-17 PROCEDURE — 99212 OFFICE O/P EST SF 10 MIN: CPT

## 2025-01-17 PROCEDURE — 85025 COMPLETE CBC W/AUTO DIFF WBC: CPT

## 2025-01-17 PROCEDURE — 96413 CHEMO IV INFUSION 1 HR: CPT

## 2025-01-17 PROCEDURE — 96411 CHEMO IV PUSH ADDL DRUG: CPT

## 2025-01-17 PROCEDURE — 700105 HCHG RX REV CODE 258: Performed by: STUDENT IN AN ORGANIZED HEALTH CARE EDUCATION/TRAINING PROGRAM

## 2025-01-17 PROCEDURE — 96376 TX/PRO/DX INJ SAME DRUG ADON: CPT

## 2025-01-17 RX ORDER — ONDANSETRON 2 MG/ML
16 INJECTION INTRAMUSCULAR; INTRAVENOUS ONCE
Status: COMPLETED | OUTPATIENT
Start: 2025-01-17 | End: 2025-01-17

## 2025-01-17 RX ORDER — BACLOFEN 5 MG/1
TABLET ORAL
Qty: 30 TABLET | Refills: 1 | Status: SHIPPED | OUTPATIENT
Start: 2025-01-17

## 2025-01-17 RX ORDER — ASPIRIN 81 MG/1
TABLET ORAL
COMMUNITY

## 2025-01-17 RX ORDER — ONDANSETRON 2 MG/ML
4 INJECTION INTRAMUSCULAR; INTRAVENOUS PRN
Status: COMPLETED | OUTPATIENT
Start: 2025-01-17 | End: 2025-01-17

## 2025-01-17 RX ORDER — DEXAMETHASONE SODIUM PHOSPHATE 4 MG/ML
12 INJECTION, SOLUTION INTRA-ARTICULAR; INTRALESIONAL; INTRAMUSCULAR; INTRAVENOUS; SOFT TISSUE ONCE
Status: COMPLETED | OUTPATIENT
Start: 2025-01-17 | End: 2025-01-17

## 2025-01-17 RX ORDER — ATROPINE SULFATE 1 MG/ML
0.5 INJECTION, SOLUTION INTRAVENOUS PRN
Status: DISCONTINUED | OUTPATIENT
Start: 2025-01-17 | End: 2025-01-17 | Stop reason: HOSPADM

## 2025-01-17 RX ORDER — PANTOPRAZOLE SODIUM 40 MG/1
40 TABLET, DELAYED RELEASE ORAL DAILY
Qty: 30 TABLET | Refills: 3 | Status: SHIPPED | OUTPATIENT
Start: 2025-01-17

## 2025-01-17 RX ORDER — PROCHLORPERAZINE MALEATE 10 MG
10 TABLET ORAL EVERY 6 HOURS PRN
Status: DISCONTINUED | OUTPATIENT
Start: 2025-01-17 | End: 2025-01-17 | Stop reason: HOSPADM

## 2025-01-17 RX ADMIN — DEXAMETHASONE SODIUM PHOSPHATE 12 MG: 4 INJECTION INTRA-ARTICULAR; INTRALESIONAL; INTRAMUSCULAR; INTRAVENOUS; SOFT TISSUE at 09:41

## 2025-01-17 RX ADMIN — ONDANSETRON 16 MG: 2 INJECTION INTRAMUSCULAR; INTRAVENOUS at 09:54

## 2025-01-17 RX ADMIN — OXALIPLATIN 130 MG: 5 INJECTION, SOLUTION INTRAVENOUS at 12:58

## 2025-01-17 RX ADMIN — PROCHLORPERAZINE MALEATE 10 MG: 10 TABLET ORAL at 12:53

## 2025-01-17 RX ADMIN — ONDANSETRON 4 MG: 2 INJECTION INTRAMUSCULAR; INTRAVENOUS at 15:04

## 2025-01-17 RX ADMIN — DEXTROSE MONOHYDRATE 295.6 MG: 50 INJECTION, SOLUTION INTRAVENOUS at 11:06

## 2025-01-17 RX ADMIN — FLUOROURACIL 4730 MG: 50 INJECTION, SOLUTION INTRAVENOUS at 15:18

## 2025-01-17 RX ADMIN — FOSAPREPITANT 150 MG: 150 INJECTION, POWDER, LYOPHILIZED, FOR SOLUTION INTRAVENOUS at 10:43

## 2025-01-17 RX ADMIN — LEUCOVORIN CALCIUM 790 MG: 500 INJECTION, POWDER, LYOPHILIZED, FOR SOLUTION INTRAMUSCULAR; INTRAVENOUS at 12:54

## 2025-01-17 RX ADMIN — LIDOCAINE HYDROCHLORIDE 0.5 ML: 10 INJECTION, SOLUTION EPIDURAL; INFILTRATION; INTRACAUDAL; PERINEURAL at 08:58

## 2025-01-17 ASSESSMENT — FIBROSIS 4 INDEX
FIB4 SCORE: 1.72
FIB4 SCORE: 1.72

## 2025-01-17 ASSESSMENT — PAIN SCALES - GENERAL: PAINLEVEL_OUTOF10: NO PAIN

## 2025-01-17 NOTE — PROGRESS NOTES
Pt enrolled in services, asking for assistance in coordinating vacation. Received message from СВЕТЛАНА Pendleton in [facility].  Pt to complete C8 of FOLFIRINOX, per Dr. Kim.  Discussed with Dr Huerta's nurse, Dr. Bradley BENAJMIN today.  Patient is requesting 10 days vacation between Chemo and Radiation treatment.  Will discuss with Dr Huerta on Monday to coordinate infusion completion, Scans, and leave on day of ALBARADO, begin Radiation treatment on return.  Will follow up on 1/20/2025, after discussion with RAD, and [facility].  Pt requests response on 1/20/25 to purchase flights.

## 2025-01-17 NOTE — PROGRESS NOTES
"Pharmacy Chemotherapy Verification:    DX: Rectal adenocarcinoma [id number] stage IIIC     Regimen: mFOLFIRINOX  *Dosing Reference*  Oxaliplatin  IV over 2 hours on Day 1  *dose reduced to 65 mg/m2 prior to C5 for tolerance  Leucovorin 400 mg/m2 IV over 2 hours on Day 1  Irinotecan 150 mg/m2 IV over 30-90 minutes on Day 1  Fluorouracil 1200 mg/m2 IV continuous infusion daily on Days 1-2 (2400 mg/m2 IV over 46 - 48 hours)  14-day cycle for 6-8 cycles (total neoadjuvant therapy) or until disease progression or unacceptable toxicity (advanced or metastatic)    NCCN Guidelines for Rectal Cancer V.1.2024.  Jim J, et al. Clin Colorectal Cancer. 2019;18(1):e69-e73.  Awa WEST, et al. Beena Surg Oncol. 2013;20(13):3934-97.    Allergies:  Patient has no known allergies.     BP (!) 157/90 Comment: Rn notified   Pulse 69   Temp 36.1 °C (96.9 °F) (Temporal)   Resp 18   Ht 1.71 m (5' 7.32\")   Wt 81.9 kg (180 lb 8.9 oz)   SpO2 97%   BMI 28.01 kg/m²  Body surface area is 1.97 meters squared.    Labs 1/17/25:  ANC~ 2060 Plt = 192k   Hgb = 12.6     SCr = 0.72 mg/dL CrCl ~ 125 mL/min   AST/ALT/AP = 27/21/114 TBili = 0.3      Drug Order   (Drug name, dose, route, IV Fluid & volume, frequency, number of doses) Cycle 6    Previous treatment: C5 1/3/25     Medication = OXALIplatin  Base Dose = 65 mg/m²  Calc Dose: Base Dose x 1.97 m² = 128 mg  Final Dose = 130 mg  Route = IV  Fluid & Volume = D5W 250 mL  Admin Duration = Over 2 hours   Infuse concurrently with LCV       <10% difference, OK to treat with final dose     Medication = Leucovorin  Base Dose = 400 mg/m²  Calc Dose: Base Dose x  1.97 m² = 788 mg  Final Dose = 790 mg  Route = IV  Fluid & Volume = D5W 250 mL  Admin Duration = Over 2 hours   Infuse concurrently with OXALIplatin        <10% difference, OK to treat with final dose     Medication = Irinotecan  Base Dose = 150 mg/m²  Calc Dose:Base Dose x 1.97 m² = 295.5 mg  Final Dose = 295.6 mg  Route = IV  Fluid & Volume = " D5W 500 mL  Admin Duration = Over 90 minutes          <10% difference, OK to treat with final dose     Medication = Fluorouracil  Base Dose = 2400 mg/m²  Calc Dose: Base Dose x 1.97 m² = 4728 mg  Final Dose = 4730 mg  Route = CIVI via CADD  Concentration = 50 mg/mL  Fluid and Volume = 94.6 mL (+ 3 mL overfill)  Admin Duration = Over 48 hours @ 2 mL/hr            <10% difference,  OK to treat with final dose       By my signature below, I confirm this process was performed independently with the BSA and all final chemotherapy dosing calculations congruent. I have reviewed the above chemotherapy order and that my calculation of the final dose and BSA (when applicable) corroborate those calculations of the  pharmacist.     Queenie Jalloh, PharmD

## 2025-01-17 NOTE — PROGRESS NOTES
Chemotherapy Verification - SECONDARY RN       Height = 171 cm  Weight = 81.9 kg  BSA = 1.97 m2       Medication: Irinotecan  Dose: 150 mg/m2  Calculated Dose: 295.5 mg                             (In mg/m2, AUC, mg/kg)     Medication: Oxaliplatin  Dose: 65 mg/m2  Calculated Dose: 128.05 mg                             (In mg/m2, AUC, mg/kg)    Medication: Fluorouracil  Dose: 2,400 mg/m2  Calculated Dose: 4,728 mg                             (In mg/m2, AUC, mg/kg)      I confirm that this process was performed independently.

## 2025-01-17 NOTE — PROGRESS NOTES
Chemotherapy Verification - PRIMARY RN      Height = 171 cm  Weight = 81.9 kg  BSA = 1.97 m2       Medication: irinotecan  Dose: 150 mg/m2  Calculated Dose: 295.5 mg                             (In mg/m2, AUC, mg/kg)     Medication: oxaliplatin  Dose: 65 mg/m2  Calculated Dose: 128 mg                            (In mg/m2, AUC, mg/kg)    Medication: leucovorin  Dose: 400 mg/m2  Calculated Dose: 788 mg                             (In mg/m2, AUC, mg/kg)    Medication: fluorouracil  Dose: 2400 mg/m2  Calculated Dose: 4728 mg                                 I confirm this process was performed independently with the BSA and all final chemotherapy dosing calculations congruent.  Any discrepancies of 10% or greater have been addressed with the chemotherapy pharmacist. The resolution of the discrepancy has been documented in the EPIC progress notes.        [facility] Hospitalist Progress Note     Geneva Bush Patient Status:  Inpatient    1940 MRN [id number]   [facility] 6NE-A Attending Marita Martinez MD   Hosp Day # 8 PCP Luz So     CC: follow up    SUBJECTIVE:  Remains intuba 10/07/19  0400 10/08/19  0530 10/09/19  0416 10/10/19  0316 10/11/19  0416   ALT  --   --  24 20 18   AST  --   --  48* 36 35   ALB 1.4* 1.1* 1.1* 1.1* 1.0*       Recent Labs   Lab 10/10/19  1453 10/10/19  1728 10/11/19  0105 10/11/19  0646 10/11/19  1139 PE, b/l DVTs  - hep gtt, monitor for bleeding     # Seizure  # Severe metabolic encephalopathy  - apprec neuro recommendations - prognosis grim for any functional neuro recovery  - palliative care following for continued [address] discussions     # GI bleed  - s aggressive measures are pursued he would require trach/vent and long-term acute care at skilled facility. cesarmir expressed understanding of his current scenario.  The alternative of CMO/hospice-based care was also offered and explained at length by sarah

## 2025-01-18 NOTE — PROGRESS NOTES
Booker arrives ambulatory to IS for Cycle 6 FOLFIRINOX for colon cancer.  Booker denies any new or acute changes, reports tolerating past infusions well. right chest port accessed in sterile fashion, brisk blood return observed.  Labs collected and reviewed, parameters met for treatment today.  Pre-medications administered per MAR, atropine declined.  Irinotecan infused as ordered.  Oxaliplatin and leucovorin infused concurrently as ordered.  Booker reports increased nausea.  Port flushed, + blood return observed.  5FU CADD pump connected, all connections secure, all clamps unclamped, pump in RUN mode.  Booker tolerated all treatments well, discharged in no apparent distress, next appointment confirmed.

## 2025-01-19 ENCOUNTER — OUTPATIENT INFUSION SERVICES (OUTPATIENT)
Dept: ONCOLOGY | Facility: MEDICAL CENTER | Age: 60
End: 2025-01-19
Attending: STUDENT IN AN ORGANIZED HEALTH CARE EDUCATION/TRAINING PROGRAM
Payer: COMMERCIAL

## 2025-01-19 VITALS
SYSTOLIC BLOOD PRESSURE: 120 MMHG | RESPIRATION RATE: 18 BRPM | DIASTOLIC BLOOD PRESSURE: 65 MMHG | OXYGEN SATURATION: 98 % | HEART RATE: 65 BPM | TEMPERATURE: 96.9 F

## 2025-01-19 DIAGNOSIS — C20 RECTAL CANCER (HCC): ICD-10-CM

## 2025-01-19 PROCEDURE — 96523 IRRIG DRUG DELIVERY DEVICE: CPT

## 2025-01-19 PROCEDURE — 700111 HCHG RX REV CODE 636 W/ 250 OVERRIDE (IP): Performed by: STUDENT IN AN ORGANIZED HEALTH CARE EDUCATION/TRAINING PROGRAM

## 2025-01-19 RX ADMIN — HEPARIN 500 UNITS: 100 SYRINGE at 16:40

## 2025-01-20 ENCOUNTER — PATIENT OUTREACH (OUTPATIENT)
Dept: ONCOLOGY | Facility: MEDICAL CENTER | Age: 60
End: 2025-01-20
Payer: COMMERCIAL

## 2025-01-20 DIAGNOSIS — C20 RECTAL CANCER (HCC): ICD-10-CM

## 2025-01-20 NOTE — PROGRESS NOTES
Here for 5 FU pump disconnect after 48 hrs of continuous infusion. See chemotherapy flow sheet for volume / dose administration. Port flushed per protocol, site deaccessed, needle intact compression dressing applied. Patient discharge home with care giver via wheelchair in [facility]. Next appointment confirmed.

## 2025-01-20 NOTE — PROGRESS NOTES
Per Dr Huerta, pt is able to travel the  3/7 - 3/17 for international vacation without increasing re-occurrence risks. Pt aware.

## 2025-01-22 ENCOUNTER — PATIENT OUTREACH (OUTPATIENT)
Dept: ONCOLOGY | Facility: MEDICAL CENTER | Age: 60
End: 2025-01-22
Payer: COMMERCIAL

## 2025-01-22 NOTE — PROGRESS NOTES
Called patient to follow up on DST.  Pt states he is a little concerns about the skin and burning.  Explained process, support and access to Dr. Huerta and his support staff.  Pt states cycle 6 was difficult to drive home after treatment.  He is requesting [facility] in for Cycle 7 and Cycle, day of treatment only.  Will forward to .

## 2025-01-28 DIAGNOSIS — C20 RECTAL CANCER (HCC): ICD-10-CM

## 2025-01-28 DIAGNOSIS — G89.3 CANCER RELATED PAIN: ICD-10-CM

## 2025-01-28 RX ORDER — OXYCODONE HYDROCHLORIDE 10 MG/1
10 TABLET ORAL EVERY 6 HOURS PRN
Qty: 90 TABLET | Refills: 0 | Status: SHIPPED | OUTPATIENT
Start: 2025-01-28 | End: 2025-02-27

## 2025-01-29 ENCOUNTER — HOSPITAL ENCOUNTER (OUTPATIENT)
Dept: LAB | Facility: MEDICAL CENTER | Age: 60
End: 2025-01-29
Attending: STUDENT IN AN ORGANIZED HEALTH CARE EDUCATION/TRAINING PROGRAM
Payer: COMMERCIAL

## 2025-01-29 DIAGNOSIS — Z79.899 ENCOUNTER FOR LONG-TERM (CURRENT) USE OF HIGH-RISK MEDICATION: ICD-10-CM

## 2025-01-29 DIAGNOSIS — C20 RECTAL CANCER (HCC): ICD-10-CM

## 2025-01-29 LAB
ALBUMIN SERPL BCP-MCNC: 4 G/DL (ref 3.2–4.9)
ALBUMIN/GLOB SERPL: 1.7 G/DL
ALP SERPL-CCNC: 96 U/L (ref 30–99)
ALT SERPL-CCNC: 19 U/L (ref 2–50)
ANION GAP SERPL CALC-SCNC: 12 MMOL/L (ref 7–16)
AST SERPL-CCNC: 23 U/L (ref 12–45)
BASOPHILS # BLD AUTO: 0.4 % (ref 0–1.8)
BASOPHILS # BLD: 0.02 K/UL (ref 0–0.12)
BILIRUB SERPL-MCNC: 0.2 MG/DL (ref 0.1–1.5)
BUN SERPL-MCNC: 11 MG/DL (ref 8–22)
CALCIUM ALBUM COR SERPL-MCNC: 9.4 MG/DL (ref 8.5–10.5)
CALCIUM SERPL-MCNC: 9.4 MG/DL (ref 8.5–10.5)
CHLORIDE SERPL-SCNC: 106 MMOL/L (ref 96–112)
CO2 SERPL-SCNC: 23 MMOL/L (ref 20–33)
CREAT SERPL-MCNC: 0.62 MG/DL (ref 0.5–1.4)
EOSINOPHIL # BLD AUTO: 0.25 K/UL (ref 0–0.51)
EOSINOPHIL NFR BLD: 4.5 % (ref 0–6.9)
ERYTHROCYTE [DISTWIDTH] IN BLOOD BY AUTOMATED COUNT: 55.4 FL (ref 35.9–50)
GFR SERPLBLD CREATININE-BSD FMLA CKD-EPI: 110 ML/MIN/1.73 M 2
GLOBULIN SER CALC-MCNC: 2.3 G/DL (ref 1.9–3.5)
GLUCOSE SERPL-MCNC: 108 MG/DL (ref 65–99)
HCT VFR BLD AUTO: 32.5 % (ref 42–52)
HGB BLD-MCNC: 11.1 G/DL (ref 14–18)
IMM GRANULOCYTES # BLD AUTO: 0.01 K/UL (ref 0–0.11)
IMM GRANULOCYTES NFR BLD AUTO: 0.2 % (ref 0–0.9)
LYMPHOCYTES # BLD AUTO: 1.74 K/UL (ref 1–4.8)
LYMPHOCYTES NFR BLD: 31.1 % (ref 22–41)
MCH RBC QN AUTO: 32.1 PG (ref 27–33)
MCHC RBC AUTO-ENTMCNC: 34.2 G/DL (ref 32.3–36.5)
MCV RBC AUTO: 93.9 FL (ref 81.4–97.8)
MONOCYTES # BLD AUTO: 0.91 K/UL (ref 0–0.85)
MONOCYTES NFR BLD AUTO: 16.3 % (ref 0–13.4)
NEUTROPHILS # BLD AUTO: 2.67 K/UL (ref 1.82–7.42)
NEUTROPHILS NFR BLD: 47.5 % (ref 44–72)
NRBC # BLD AUTO: 0 K/UL
NRBC BLD-RTO: 0 /100 WBC (ref 0–0.2)
PLATELET # BLD AUTO: 159 K/UL (ref 164–446)
PMV BLD AUTO: 10.6 FL (ref 9–12.9)
POTASSIUM SERPL-SCNC: 4.2 MMOL/L (ref 3.6–5.5)
PROT SERPL-MCNC: 6.3 G/DL (ref 6–8.2)
RBC # BLD AUTO: 3.46 M/UL (ref 4.7–6.1)
SODIUM SERPL-SCNC: 141 MMOL/L (ref 135–145)
WBC # BLD AUTO: 5.6 K/UL (ref 4.8–10.8)

## 2025-01-29 PROCEDURE — 85025 COMPLETE CBC W/AUTO DIFF WBC: CPT

## 2025-01-29 PROCEDURE — 36415 COLL VENOUS BLD VENIPUNCTURE: CPT

## 2025-01-29 PROCEDURE — 80053 COMPREHEN METABOLIC PANEL: CPT

## 2025-01-29 ASSESSMENT — ENCOUNTER SYMPTOMS
WEIGHT LOSS: 0
INSOMNIA: 0
DIARRHEA: 0
DOUBLE VISION: 0
DIZZINESS: 0
WHEEZING: 0
PALPITATIONS: 0
ORTHOPNEA: 0
HEARTBURN: 0
BRUISES/BLEEDS EASILY: 0
TINGLING: 1
MYALGIAS: 0
CONSTIPATION: 1
CHILLS: 0
SHORTNESS OF BREATH: 0
SPUTUM PRODUCTION: 0
COUGH: 0
BLOOD IN STOOL: 0
HEADACHES: 0
BLURRED VISION: 0
WEAKNESS: 0
VOMITING: 0
SORE THROAT: 0
SINUS PAIN: 0
BACK PAIN: 0
DIAPHORESIS: 0
FEVER: 0
ABDOMINAL PAIN: 0

## 2025-01-29 NOTE — PROGRESS NOTES
Follow Up Note:  Hematology/Oncology      Primary Care:  Rickie Naranjo M.D.    Diagnosis: Rectal adenocarcinoma    Chief Complaint: On-treatment visit    Current Treatment: mFOLFIRINOX, followed by capecitabine with concurrent XRT, and then surgical evaluation    Prior Treatment: NA      This evaluation was conducted via Teams using secure and encrypted videoconferencing technology. The patient was in their home in the [facility].    The patient's identity was confirmed and verbal consent was obtained for this virtual visit.     Oncology History of Presenting Illness:  Booker Saucedo is a 59 y.o.  man who presents to the clinic for evaluation for systemic therapy for a diagnosis of rectal carcinoma. He started having back pain and changes in his bowel habits in July and went to the hospital where CT scan revealed right hydronephrosis due to a 1 mm calculus. However, he was also noted to have rectal wall thickening with perirectal lymph nodes. He subsequently underwent a colonoscopy which revealed a fungating circumferential mass, 5 cm from the anal verge, with biopsy revealing invasive adenocarcinoma, moderately differentiated, MMR proficient. Staging scans did not reveal any definitive metastatic disease, though there was a 1.5 cm left hepatic lobe lesion which was not PET avid. He was seen by radiation oncology and colorectal surgery and ultimately was staged on rectal MRI as a [id number] stage IIIC disease. He has been referred for treatment accordingly.     Treatment History:   11/08/2024: C1 FOLFIRINOX   11/22/2024: C2 FOLFIRINOX   12/06/2024: C3 FOLFIRINOX  12/20/2024: C4 FOLFIRINOX  01/03/2025: C5 FOLFIRINOX (decrease oxaliplatin to 65 mg/m2 for neuropathy)  01/17/2025: C6 FOLFIRINOX   01/31/2025: C7 FOLFIRINOX (planned)    Interval History:  Patient is seen today via virtual visit for prechemo check prior to cycle 7 FOLFIRINOX.  Patient reports doing okay today, however he does not  some ongoing fatigue.  He states he had difficulty with the last infusion, and developed agitation and anxiety while in the treatment room, severe fatigue while driving home, and nausea and vomiting later that evening.  He reports that on the second day the symptoms were much better, although the fatigue continued to persist.  He has arranged for a  to take him home the day of chemotherapy, in case the symptoms happen again.  He did have difficulty with hiccups 1 time, which resolved with the use of the baclofen.  He has been taking the pantoprazole before breakfast, has not noticed any additional swallowing difficulties.  He reports his rectal pain has been a little bit more intense lately, and he has been taking his pain medication a little more frequently, which has resulted in some increased constipation.  He reports that this week is he is better, and he is working.  He is looking forward to being done with treatment, and has planned a trip to [place] in March, after finishing chemotherapy.          Allergies as of 01/30/2025    (No Known Allergies)         Current Outpatient Medications:     oxyCODONE immediate release (ROXICODONE) 10 MG immediate release tablet, Take 1 Tablet by mouth every 6 hours as needed for Moderate Pain for up to 30 days., Disp: 90 Tablet, Rfl: 0    aspirin 81 MG EC tablet, tablet, Disp: , Rfl:     baclofen (LIORESAL) 5 MG Tab, Take 1 tablet by mouth every 8 hours as needed for hiccups related to chemotherapy treatment., Disp: 30 Tablet, Rfl: 1    pantoprazole (PROTONIX) 40 MG Tablet Delayed Response, Take 1 Tablet by mouth every day., Disp: 30 Tablet, Rfl: 3    tamsulosin (FLOMAX) 0.4 MG capsule, Take 1 Capsule by mouth every day., Disp: 30 Capsule, Rfl: 0    ondansetron (ZOFRAN ODT) 4 MG TABLET DISPERSIBLE, Take 1 Tablet by mouth every four hours as needed for Nausea/Vomiting., Disp: 30 Tablet, Rfl: 3    polyethylene glycol/lytes (MIRALAX) Pack, Take 17 g by mouth 1 time a day  as needed., Disp: , Rfl:     melatonin 3 MG Tab, Take 3 mg by mouth at bedtime., Disp: , Rfl:     naproxen (ALEVE) 220 MG tablet, Take 220 mg by mouth 2 times a day as needed (pain)., Disp: , Rfl:     atorvastatin (LIPITOR) 40 MG Tab, Take 40 mg by mouth every day., Disp: , Rfl:     carvedilol (COREG) 12.5 MG Tab, Take 12.5 mg by mouth 2 times a day., Disp: , Rfl:     citalopram (CELEXA) 20 MG Tab, Take 20 mg by mouth every day., Disp: , Rfl:     hydrOXYzine HCl (ATARAX) 25 MG Tab, Take 25 mg by mouth every 8 hours as needed for Anxiety., Disp: , Rfl:     ondansetron (ZOFRAN) 4 MG Tab tablet, Take 1 Tablet by mouth every four hours as needed for Nausea/Vomiting (for nausea, vomiting)., Disp: 30 Tablet, Rfl: 6    prochlorperazine (COMPAZINE) 10 MG Tab, Take 1 Tablet by mouth every 6 hours as needed (for nausea, vomiting)., Disp: 30 Tablet, Rfl: 6    OLANZapine (ZYPREXA) 5 MG Tab, Take 1 Tablet by mouth every evening., Disp: 30 Tablet, Rfl: 6    loperamide (IMODIUM A-D) 2 MG tablet, Take 1 Tablet by mouth see administration instructions., Disp: 30 Tablet, Rfl: 6      Review of Systems:  Review of Systems   Constitutional:  Positive for malaise/fatigue (Mild fatigue after chemotherapy treatments). Negative for chills, diaphoresis, fever and weight loss.   HENT:  Negative for hearing loss, nosebleeds, sinus pain and sore throat.    Eyes:  Negative for blurred vision and double vision.   Respiratory:  Negative for cough, sputum production, shortness of breath and wheezing.    Cardiovascular:  Negative for chest pain, palpitations, orthopnea and leg swelling.   Gastrointestinal:  Positive for constipation and nausea (Day of chemotherapy infusion). Negative for abdominal pain, blood in stool, diarrhea, heartburn, melena and vomiting.   Genitourinary:  Negative for dysuria, frequency, hematuria and urgency.   Musculoskeletal:  Negative for back pain, joint pain and myalgias.   Skin:  Negative for rash.   Neurological:   Positive for tingling (Mild, for a few days after therapy, but worse with cold exposure). Negative for dizziness, weakness and headaches.   Endo/Heme/Allergies:  Does not bruise/bleed easily.   Psychiatric/Behavioral:  The patient is nervous/anxious (After chemotherapy infusion). The patient does not have insomnia.          Physical Exam:    Physical exam not performed today, as this visit was conducted via virtual platform.  Physical exam from previous visit as below:      There were no vitals taken for this visit.      DESC; KARNOFSKY SCALE WITH ECOG EQUIVALENT: 100, Fully active, able to carry on all pre-disease performed without restriction (ECOG equivalent 0)    DISTRESS LEVEL: no apparent distress    Physical Exam  Vitals reviewed.   Constitutional:       General: He is awake. He is not in acute distress.     Appearance: Normal appearance. He is normal weight. He is not ill-appearing, toxic-appearing or diaphoretic.   HENT:      Head: Normocephalic and atraumatic.      Nose: Nose normal. No congestion.      Mouth/Throat:      Mouth: Mucous membranes are moist.      Pharynx: Oropharynx is clear. No oropharyngeal exudate or posterior oropharyngeal erythema.   Eyes:      General: No scleral icterus.     Extraocular Movements: Extraocular movements intact.      Conjunctiva/sclera: Conjunctivae normal.      Pupils: Pupils are equal, round, and reactive to light.   Cardiovascular:      Rate and Rhythm: Normal rate and regular rhythm.      Pulses: Normal pulses.      Heart sounds: Normal heart sounds. No murmur heard.     No friction rub. No gallop.   Pulmonary:      Effort: Pulmonary effort is normal.      Breath sounds: Normal breath sounds. No decreased air movement. No wheezing, rhonchi or rales.   Abdominal:      General: Abdomen is flat. Bowel sounds are normal. There is no distension.      Palpations: Abdomen is soft.      Tenderness: There is no abdominal tenderness.   Musculoskeletal:         General: No  deformity. Normal range of motion.      Cervical back: Normal range of motion and neck supple. No tenderness.      Right lower leg: No edema.      Left lower leg: No edema.   Lymphadenopathy:      Cervical: No cervical adenopathy.      Upper Body:      Right upper body: No supraclavicular, axillary or pectoral adenopathy.      Left upper body: No supraclavicular, axillary or pectoral adenopathy.      Lower Body: No right inguinal adenopathy. No left inguinal adenopathy.   Skin:     General: Skin is warm and dry.      Coloration: Skin is not jaundiced.      Findings: No erythema or rash.   Neurological:      General: No focal deficit present.      Mental Status: He is alert and oriented to person, place, and time.      Sensory: Sensation is intact.      Motor: Motor function is intact. No weakness.      Gait: Gait is intact.   Psychiatric:         Attention and Perception: Attention normal.         Mood and Affect: Mood normal.         Behavior: Behavior normal. Behavior is cooperative.         Thought Content: Thought content normal.         Judgment: Judgment normal.           Labs:     Latest Reference Range & Units 01/29/25 06:23   WBC 4.8 - 10.8 K/uL 5.6   RBC 4.70 - 6.10 M/uL 3.46 (L)   Hemoglobin 14.0 - 18.0 g/dL 11.1 (L)   Hematocrit 42.0 - 52.0 % 32.5 (L)   MCV 81.4 - 97.8 fL 93.9   MCH 27.0 - 33.0 pg 32.1   MCHC 32.3 - 36.5 g/dL 34.2   RDW 35.9 - 50.0 fL 55.4 (H)   Platelet Count 164 - 446 K/uL 159 (L)   MPV 9.0 - 12.9 fL 10.6   Neutrophils-Polys 44.00 - 72.00 % 47.50   Neutrophils (Absolute) 1.82 - 7.42 K/uL 2.67   Lymphocytes 22.00 - 41.00 % 31.10   Lymphs (Absolute) 1.00 - 4.80 K/uL 1.74   Monocytes 0.00 - 13.40 % 16.30 (H)   Monos (Absolute) 0.00 - 0.85 K/uL 0.91 (H)   Eosinophils 0.00 - 6.90 % 4.50   Eos (Absolute) 0.00 - 0.51 K/uL 0.25   Basophils 0.00 - 1.80 % 0.40   Baso (Absolute) 0.00 - 0.12 K/uL 0.02   Immature Granulocytes 0.00 - 0.90 % 0.20   Immature Granulocytes (abs) 0.00 - 0.11 K/uL 0.01  "  Nucleated RBC 0.00 - 0.20 /100 WBC 0.00   NRBC (Absolute) K/uL 0.00   Sodium 135 - 145 mmol/L 141   Potassium 3.6 - 5.5 mmol/L 4.2   Chloride 96 - 112 mmol/L 106   Co2 20 - 33 mmol/L 23   Anion Gap 7.0 - 16.0  12.0   Glucose 65 - 99 mg/dL 108 (H)   Bun 8 - 22 mg/dL 11   Creatinine 0.50 - 1.40 mg/dL 0.62   GFR (CKD-EPI) >60 mL/min/1.73 m 2 110   Calcium 8.5 - 10.5 mg/dL 9.4   Correct Calcium 8.5 - 10.5 mg/dL 9.4   AST(SGOT) 12 - 45 U/L 23   ALT(SGPT) 2 - 50 U/L 19   Alkaline Phosphatase 30 - 99 U/L 96   Total Bilirubin 0.1 - 1.5 mg/dL 0.2   Albumin 3.2 - 4.9 g/dL 4.0   Total Protein 6.0 - 8.2 g/dL 6.3   Globulin 1.9 - 3.5 g/dL 2.3   A-G Ratio g/dL 1.7   (L): Data is abnormally low  (H): Data is abnormally high      10/4/2024 CEA 2.5  11/8/2024 CEA 2.6  11/22/2024 CEA 2.9  12/6/2024 CEA 2.2  12/20/2024 CEA 3.1 H  1/3/2025 CEA 2.3  1/17/2025 CEA 2.0      Imaging:     10/9/2024 PET body imaging.  1.  Large hypermetabolic rectal mass consistent with known malignancy.  2.  Local involvement of 2 perirectal/presacral lymph nodes.  3.  No evidence of distant metastatic disease.  4.  No PET correlate for peripherally enhancing lesion seen in the LEFT lobe liver superiorly favoring benign etiology.  5.  Mild uptake in distal esophagus likely inflammatory.        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 Dr. Prasad reviewed the slides of this case prior to requesting   consultative opinion by [facility].                                         Diagnosis performed by:                                       KHOA PRASAD MD            Assessment & Plan:  No diagnosis found.    This is a 59 year old  " man with rectal adenocarcinoma, [id number] stage IIIC disease. He presents for evaluation.      Current Diagnosis and Staging: Rectal adenocarcinoma, [id number] stage IIIC disease    Update: The patient is doing well with treatment but has some mild neuropathy so we will dose-reduce oxaliplatin accordingly with C5. Continue with C7 of mFOLFIRINOX tomorrow.      Treatment Plan: mFOLFIRINOX followed by capecitabine with concurrent XRT and then surgical evaluation     Treatment Citation: NCCN     Plan of Care:     Primary Therapy: mFOLFIRINOX C7 tomorrow  Supportive Therapy: Antiemetics per protocol, will add baclofen for hiccups, as well as pantoprazole 40 mg every morning for worsening GERD.  Will also add Ativan 1 mg premedication with chemotherapy infusion to help with anxiety/restlessness/nausea.  Patient has been cautioned that this will cause sedation, and he has a ride to take him home from the infusion clinic.  Toxicity: Patient is getting antineoplastic therapy and needs monitoring of blood counts, hepatic function, and renal function due to potential for organ dysfunction.   Labs: CBC with diff, CMP, CEA, ctDNA monitoring  Imaging: Repeat MRI rectal protocol, CT scans after completion of JOSEMANUEL  Treatment Planning: Patient will start with chemotherapy due to concern of aggressive spread of disease, and then will do capecitabine with concurrent XRT and surgical evaluation.  Consultations: Colorectal surgery (Dr. Tse); Radiation oncology (Dr. Huerta), Palliative Care (Dr. Styles)  Code Status: Full  Miscellaneous: Referred to medical genetics, patient will follow up.   Return for Follow Up: 2 weeks    Patient, symptoms, and plan reviewed with Dr. Kim who agrees with this treatment plan.    Plan of care reviewed with patient and all questions answered.  Patient verbalizes understanding, denies questions, and agrees with plan of care.  Patient instructed to call or message clinic with any questions or  concerns, contact information provided.    Thank you for allowing me the privilege of caring for this patient.  If if you have any questions, please do not hesitate to reach out.  I may be contacted at [phone].    Britney Pendleton, MSN, AOCNP, FNP-C    Please note that this dictation was created using voice recognition software. I have made every reasonable attempt to correct obvious errors, but I expect that there are errors of grammar, and possibly content, that I did not discover before finalizing the note.

## 2025-01-30 ENCOUNTER — HOSPITAL ENCOUNTER (OUTPATIENT)
Dept: HEMATOLOGY ONCOLOGY | Facility: MEDICAL CENTER | Age: 60
End: 2025-01-30
Attending: STUDENT IN AN ORGANIZED HEALTH CARE EDUCATION/TRAINING PROGRAM
Payer: COMMERCIAL

## 2025-01-30 VITALS — BODY MASS INDEX: 28.01 KG/M2 | HEIGHT: 67 IN

## 2025-01-30 DIAGNOSIS — C20 RECTAL ADENOCARCINOMA (HCC): ICD-10-CM

## 2025-01-30 DIAGNOSIS — Z79.899 ENCOUNTER FOR LONG-TERM CURRENT USE OF HIGH RISK MEDICATION: ICD-10-CM

## 2025-01-30 DIAGNOSIS — G89.3 CANCER RELATED PAIN: ICD-10-CM

## 2025-01-30 DIAGNOSIS — Z79.899 ENCOUNTER FOR LONG-TERM (CURRENT) USE OF HIGH-RISK MEDICATION: ICD-10-CM

## 2025-01-30 DIAGNOSIS — C20 RECTAL CANCER (HCC): ICD-10-CM

## 2025-01-30 RX ORDER — ONDANSETRON 2 MG/ML
4 INJECTION INTRAMUSCULAR; INTRAVENOUS PRN
Status: CANCELLED | OUTPATIENT
Start: 2025-02-01

## 2025-01-30 RX ORDER — LORAZEPAM 1 MG/1
1 TABLET ORAL ONCE
Status: CANCELLED
Start: 2025-02-01 | End: 2025-02-01

## 2025-01-30 RX ORDER — 0.9 % SODIUM CHLORIDE 0.9 %
10 VIAL (ML) INJECTION PRN
Status: CANCELLED | OUTPATIENT
Start: 2025-02-01

## 2025-01-30 RX ORDER — DIPHENHYDRAMINE HYDROCHLORIDE 50 MG/ML
50 INJECTION INTRAMUSCULAR; INTRAVENOUS PRN
Status: CANCELLED | OUTPATIENT
Start: 2025-02-01

## 2025-01-30 RX ORDER — ATROPINE SULFATE 1 MG/ML
0.5 INJECTION, SOLUTION INTRAVENOUS PRN
Status: CANCELLED | OUTPATIENT
Start: 2025-02-01

## 2025-01-30 RX ORDER — METHYLPREDNISOLONE SODIUM SUCCINATE 125 MG/2ML
125 INJECTION, POWDER, LYOPHILIZED, FOR SOLUTION INTRAMUSCULAR; INTRAVENOUS PRN
Status: CANCELLED | OUTPATIENT
Start: 2025-02-01

## 2025-01-30 RX ORDER — 0.9 % SODIUM CHLORIDE 0.9 %
3 VIAL (ML) INJECTION PRN
Status: CANCELLED | OUTPATIENT
Start: 2025-02-01

## 2025-01-30 RX ORDER — 0.9 % SODIUM CHLORIDE 0.9 %
VIAL (ML) INJECTION PRN
Status: CANCELLED | OUTPATIENT
Start: 2025-01-31

## 2025-01-30 RX ORDER — 0.9 % SODIUM CHLORIDE 0.9 %
10 VIAL (ML) INJECTION PRN
Status: CANCELLED | OUTPATIENT
Start: 2025-01-31

## 2025-01-30 RX ORDER — LOPERAMIDE HYDROCHLORIDE 2 MG/1
4 CAPSULE ORAL PRN
Status: CANCELLED | OUTPATIENT
Start: 2025-02-01

## 2025-01-30 RX ORDER — 0.9 % SODIUM CHLORIDE 0.9 %
3 VIAL (ML) INJECTION PRN
Status: CANCELLED | OUTPATIENT
Start: 2025-01-31

## 2025-01-30 RX ORDER — ONDANSETRON 2 MG/ML
16 INJECTION INTRAMUSCULAR; INTRAVENOUS ONCE
Status: CANCELLED
Start: 2025-02-01 | End: 2025-02-01

## 2025-01-30 RX ORDER — DEXTROSE MONOHYDRATE 50 MG/ML
INJECTION, SOLUTION INTRAVENOUS CONTINUOUS
Status: CANCELLED | OUTPATIENT
Start: 2025-02-01

## 2025-01-30 RX ORDER — 0.9 % SODIUM CHLORIDE 0.9 %
20 VIAL (ML) INJECTION PRN
OUTPATIENT
Start: 2025-02-03

## 2025-01-30 RX ORDER — 0.9 % SODIUM CHLORIDE 0.9 %
VIAL (ML) INJECTION PRN
Status: CANCELLED | OUTPATIENT
Start: 2025-02-01

## 2025-01-30 RX ORDER — LOPERAMIDE HYDROCHLORIDE 2 MG/1
2 CAPSULE ORAL
Status: CANCELLED | OUTPATIENT
Start: 2025-02-01

## 2025-01-30 RX ORDER — PROCHLORPERAZINE MALEATE 10 MG
10 TABLET ORAL EVERY 6 HOURS PRN
Status: CANCELLED | OUTPATIENT
Start: 2025-02-01

## 2025-01-30 RX ORDER — EPINEPHRINE 1 MG/ML(1)
0.5 AMPUL (ML) INJECTION PRN
Status: CANCELLED | OUTPATIENT
Start: 2025-02-01

## 2025-01-30 RX ORDER — DEXAMETHASONE SODIUM PHOSPHATE 4 MG/ML
12 INJECTION, SOLUTION INTRA-ARTICULAR; INTRALESIONAL; INTRAMUSCULAR; INTRAVENOUS; SOFT TISSUE ONCE
Status: CANCELLED
Start: 2025-02-01 | End: 2025-02-01

## 2025-01-30 RX ORDER — ONDANSETRON 8 MG/1
8 TABLET, ORALLY DISINTEGRATING ORAL PRN
Status: CANCELLED | OUTPATIENT
Start: 2025-02-01

## 2025-01-30 ASSESSMENT — ENCOUNTER SYMPTOMS
NERVOUS/ANXIOUS: 1
NAUSEA: 1

## 2025-01-30 NOTE — PROGRESS NOTES
"Pharmacy Chemotherapy Verification:    DX: Rectal adenocarcinoma [id number] stage IIIC     Regimen: mFOLFIRINOX  *Dosing Reference*  Oxaliplatin  IV over 2 hours on Day 1  *dose reduced to 65 mg/m2 prior to C5 for tolerance  Leucovorin 400 mg/m2 IV over 2 hours on Day 1  Irinotecan 150 mg/m2 IV over 30-90 minutes on Day 1  Fluorouracil 1200 mg/m2 IV continuous infusion daily on Days 1-2 (2400 mg/m2 IV over 46 - 48 hours)  14-day cycle for 6-8 cycles (total neoadjuvant therapy) or until disease progression or unacceptable toxicity (advanced or metastatic)    NCCN Guidelines for Rectal Cancer V.1.2024.  Jim J, et al. Clin Colorectal Cancer. 2019;18(1):e69-e73.  Awa WEST, et al. Beena Surg Oncol. 2013;20(13):4289-97.    Allergies:  Patient has no known allergies.     /66   Pulse 80   Temp 36.4 °C (97.5 °F) (Temporal)   Resp 18   Ht 1.71 m (5' 7.32\")   Wt 84.5 kg (186 lb 4.6 oz)   SpO2 93%   BMI 28.90 kg/m²  Body surface area is 2 meters squared.    Labs 1/29/25:  ANC~ 2670 Plt = 159 k   Hgb = 11.1     SCr = 0.62 mg/dL CrCl > 125 mL/min (min SCr 0.7 used)  AST/ALT/AP = WNL TBili = 0.2   K+ = 4.2     Drug Order   (Drug name, dose, route, IV Fluid & volume, frequency, number of doses) Cycle 7  Previous treatment: C6 1/17/25     Medication = OXALIplatin  Base Dose = 65 mg/m²  Calc Dose: Base Dose x 2 m² = 130 mg  Final Dose = 130 mg  Route = IV  Fluid & Volume = D5W 250 mL  Admin Duration = Over 2 hours   Infuse concurrently with LCV       <10% difference, OK to treat with final dose     Medication = Leucovorin  Base Dose = 400 mg/m²  Calc Dose: Base Dose x  2 m² = 800 mg  Final Dose = 790 mg  Route = IV  Fluid & Volume = D5W 250 mL  Admin Duration = Over 2 hours   Infuse concurrently with OXALIplatin        <10% difference, OK to treat with final dose     Medication = Irinotecan  Base Dose = 150 mg/m²  Calc Dose:Base Dose x 2 m² = 300 mg  Final Dose = 295.6 mg  Route = IV  Fluid & Volume = D5W 500 mL  Admin " Duration = Over 90 minutes          <10% difference, OK to treat with final dose     Medication = Fluorouracil  Base Dose = 2400 mg/m²  Calc Dose: Base Dose x 2 m² = 4800 mg  Final Dose = 4730 mg  Route = CIVI via CADD  Concentration = 50 mg/mL  Fluid and Volume = 94.6 mL (+ 3 mL overfill)  Admin Duration = Over 48 hours @ 2 mL/hr            <10% difference,  OK to treat with final dose       By my signature below, I confirm this process was performed independently with the BSA and all final chemotherapy dosing calculations congruent. I have reviewed the above chemotherapy order and that my calculation of the final dose and BSA (when applicable) corroborate those calculations of the  pharmacist.     Polly Carmen, PharmD, BCOP

## 2025-01-31 ENCOUNTER — OUTPATIENT INFUSION SERVICES (OUTPATIENT)
Dept: ONCOLOGY | Facility: MEDICAL CENTER | Age: 60
End: 2025-01-31
Attending: STUDENT IN AN ORGANIZED HEALTH CARE EDUCATION/TRAINING PROGRAM
Payer: COMMERCIAL

## 2025-01-31 VITALS
HEIGHT: 67 IN | WEIGHT: 186.29 LBS | TEMPERATURE: 97.5 F | HEART RATE: 80 BPM | OXYGEN SATURATION: 93 % | BODY MASS INDEX: 29.24 KG/M2 | SYSTOLIC BLOOD PRESSURE: 101 MMHG | RESPIRATION RATE: 18 BRPM | DIASTOLIC BLOOD PRESSURE: 66 MMHG

## 2025-01-31 DIAGNOSIS — C20 RECTAL CANCER (HCC): ICD-10-CM

## 2025-01-31 LAB — CEA SERPL-MCNC: 1.8 NG/ML (ref 0–3)

## 2025-01-31 PROCEDURE — 96375 TX/PRO/DX INJ NEW DRUG ADDON: CPT

## 2025-01-31 PROCEDURE — G0498 CHEMO EXTEND IV INFUS W/PUMP: HCPCS

## 2025-01-31 PROCEDURE — 700102 HCHG RX REV CODE 250 W/ 637 OVERRIDE(OP): Performed by: STUDENT IN AN ORGANIZED HEALTH CARE EDUCATION/TRAINING PROGRAM

## 2025-01-31 PROCEDURE — 700101 HCHG RX REV CODE 250: Performed by: STUDENT IN AN ORGANIZED HEALTH CARE EDUCATION/TRAINING PROGRAM

## 2025-01-31 PROCEDURE — 96417 CHEMO IV INFUS EACH ADDL SEQ: CPT

## 2025-01-31 PROCEDURE — A9270 NON-COVERED ITEM OR SERVICE: HCPCS | Performed by: STUDENT IN AN ORGANIZED HEALTH CARE EDUCATION/TRAINING PROGRAM

## 2025-01-31 PROCEDURE — 96413 CHEMO IV INFUSION 1 HR: CPT

## 2025-01-31 PROCEDURE — A4212 NON CORING NEEDLE OR STYLET: HCPCS

## 2025-01-31 PROCEDURE — 700105 HCHG RX REV CODE 258: Performed by: STUDENT IN AN ORGANIZED HEALTH CARE EDUCATION/TRAINING PROGRAM

## 2025-01-31 PROCEDURE — 96367 TX/PROPH/DG ADDL SEQ IV INF: CPT

## 2025-01-31 PROCEDURE — 96415 CHEMO IV INFUSION ADDL HR: CPT

## 2025-01-31 PROCEDURE — 700111 HCHG RX REV CODE 636 W/ 250 OVERRIDE (IP): Mod: JZ | Performed by: STUDENT IN AN ORGANIZED HEALTH CARE EDUCATION/TRAINING PROGRAM

## 2025-01-31 RX ORDER — ONDANSETRON 2 MG/ML
16 INJECTION INTRAMUSCULAR; INTRAVENOUS ONCE
Status: COMPLETED | OUTPATIENT
Start: 2025-01-31 | End: 2025-01-31

## 2025-01-31 RX ORDER — ATROPINE SULFATE 1 MG/ML
0.5 INJECTION, SOLUTION INTRAVENOUS PRN
Status: DISCONTINUED | OUTPATIENT
Start: 2025-01-31 | End: 2025-01-31 | Stop reason: HOSPADM

## 2025-01-31 RX ORDER — LORAZEPAM 1 MG/1
1 TABLET ORAL ONCE
Status: COMPLETED | OUTPATIENT
Start: 2025-01-31 | End: 2025-01-31

## 2025-01-31 RX ORDER — DEXAMETHASONE SODIUM PHOSPHATE 4 MG/ML
12 INJECTION, SOLUTION INTRA-ARTICULAR; INTRALESIONAL; INTRAMUSCULAR; INTRAVENOUS; SOFT TISSUE ONCE
Status: COMPLETED | OUTPATIENT
Start: 2025-01-31 | End: 2025-01-31

## 2025-01-31 RX ADMIN — LIDOCAINE HYDROCHLORIDE 0.5 ML: 10 INJECTION, SOLUTION EPIDURAL; INFILTRATION; INTRACAUDAL; PERINEURAL at 08:49

## 2025-01-31 RX ADMIN — ONDANSETRON 16 MG: 2 INJECTION INTRAMUSCULAR; INTRAVENOUS at 09:00

## 2025-01-31 RX ADMIN — DEXAMETHASONE SODIUM PHOSPHATE 12 MG: 4 INJECTION INTRA-ARTICULAR; INTRALESIONAL; INTRAMUSCULAR; INTRAVENOUS; SOFT TISSUE at 09:00

## 2025-01-31 RX ADMIN — FOSAPREPITANT 150 MG: 150 INJECTION, POWDER, LYOPHILIZED, FOR SOLUTION INTRAVENOUS at 09:09

## 2025-01-31 RX ADMIN — LORAZEPAM 1 MG: 1 TABLET ORAL at 09:01

## 2025-01-31 RX ADMIN — DEXTROSE MONOHYDRATE 295.6 MG: 50 INJECTION, SOLUTION INTRAVENOUS at 10:42

## 2025-01-31 RX ADMIN — LEUCOVORIN CALCIUM 790 MG: 500 INJECTION, POWDER, LYOPHILIZED, FOR SOLUTION INTRAMUSCULAR; INTRAVENOUS at 12:20

## 2025-01-31 RX ADMIN — OXALIPLATIN 130 MG: 5 INJECTION, SOLUTION INTRAVENOUS at 12:21

## 2025-01-31 RX ADMIN — FLUOROURACIL 4730 MG: 50 INJECTION, SOLUTION INTRAVENOUS at 15:01

## 2025-01-31 ASSESSMENT — FIBROSIS 4 INDEX: FIB4 SCORE: 1.96

## 2025-01-31 NOTE — PROGRESS NOTES
"Pharmacy Chemotherapy Calculation:    DX: Rectal adenocarcinoma   Cycle 7  Previous treatment = C6 1/17/24    Regimen: mFOLFIRINOX  *Dosing Reference*  Oxaliplatin  IV over 2 hours on Day 1   1/3/25 - Dose reduced to 65 mg/m2 for tolerance starting with C5  Leucovorin 400 mg/m2 IV over 2 hours on Day 1  Irinotecan 150 mg/m2 IV over 30-90 minutes on Day 1  Fluorouracil 1200 mg/m2 IV continuous infusion daily on Days 1-2 (2400 mg/m2 IV over 48 hours)  14-day cycle for 6-8 cycles (total neoadjuvant therapy) or until disease progression or unacceptable toxicity (advanced or metastatic)  NCCN Guidelines for Rectal Cancer V.1.2024.  Jim J, et al. Clin Colorectal Cancer. 2019;18(1):e69-e73.  Awa WEST, et al. Beena Surg Oncol. 2013;20(13):4289-97.    Allergies: Patient has no known allergies.   /66   Pulse 80   Temp 36.4 °C (97.5 °F) (Temporal)   Resp 18   Ht 1.71 m (5' 7.32\")   Wt 84.5 kg (186 lb 4.6 oz)   SpO2 93%   BMI 28.90 kg/m²   Body surface area is 2 meters squared.    Labs 1/29/25  ANC~ 2670 Plt = 159k   Hgb = 11.1     SCr = 0.62 mg/dL CrCl ~ >125 mL/min (minimum SCr of 0.7)   LFT's = WNLs  TBili = 0.2     Irinotecan 150 mg/m2 x 2 m2 = 300 mg   <10% difference, okay to treat with final dose = 295.6 mg IV    Oxaliplatin 65 mg/m2 x 2 m2 = 130 mg   <10% difference, okay to treat with final dose = 130 mg IV    Leucovorin 400 mg/m2 x 2 m2 = 800 mg   <10% difference, okay to treat with final dose = 790 mg IV    Fluorouracil 2400 mg/m2 x 2 m2 = 4800 mg   <10% difference, okay to treat with final dose = 4730 mg IV over 48 hours @ 2 mL/hour    Tadeo [place], PharmD    "

## 2025-01-31 NOTE — PROGRESS NOTES
Chemotherapy Verification - SECONDARY RN       Height = 171 cm  Weight = 84.5 kg  BSA = 2 m2       Medication: Irinotecan  Dose: 150 mg/m2  Calculated Dose: 300 mg                             (In mg/m2, AUC, mg/kg)     Medication: Oxaliplatin  Dose: 65 mg/m2  Calculated Dose: 130 mg                             (In mg/m2, AUC, mg/kg)    Medication: Fluorouracil  Dose 2,400 mg/m2  Calculated Dose: 4,800                             (In mg/m2, AUC, mg/kg)      I confirm that this process was performed independently.

## 2025-01-31 NOTE — PROGRESS NOTES
Pt presented to Infusion for D1C7 OP Colon mFOLFIRINOX. POC discussed and pt verbalized understanding. Port accessed per [facility] policy, brisk blood return noted, line flushes with ease, and labs drawn. Pt tolerated well. Labs reviewed. Pre-medications except for atropine administered per eMAR. Pt refuses atropine.  Chemotherapy administered per MAR. Pt tolerated well. No s/s of adverse reactions or complications noted.   Port flushed per [facility] policy, brisk blood return noted and 5FU CADD pump connected with 2 RN verification of settings. Pump in RUN mode, connections secured, clamps open and pump placed in carrying bag. Educated pt on when to contact provider including fever, s/s of infection, worrisome symptoms, pump troubleshooting and/or defer judgment to ED for provider evaluation. Pt verbalized understanding. Pt confirmed pump deaccess appt and discharged to self care, accompanied by his friend. Pt in NAD at time of discharge.

## 2025-01-31 NOTE — PROGRESS NOTES
Chemotherapy Verification - PRIMARY RN      Height = 1.71m  Weight = 84.5 kg  BSA = 2 m^2       Medication: irinotecan (Camptosar)  Dose: 150 mg/m^2  Calculated Dose: 300 mg                             (In mg/m2, AUC, mg/kg)     Medication: oxaliplatin (Eloxatin)  Dose: 65 mg/m^2  Calculated Dose: 130mg                             (In mg/m2, AUC, mg/kg)    Medication: leucovorin  Dose: 400 mg/m^2  Calculated Dose: 800 mg                             (In mg/m2, AUC, mg/kg)    Medication: fluorouracil (Adrucil) CADD  Dose: 2400 mg/m^2  Calculated Dose: 4800 mg                             (In mg/m2, AUC, mg/kg)      I confirm this process was performed independently with the BSA and all final chemotherapy dosing calculations congruent.  Any discrepancies of 10% or greater have been addressed with the chemotherapy pharmacist. The resolution of the discrepancy has been documented in the EPIC progress notes.

## 2025-02-02 ENCOUNTER — OUTPATIENT INFUSION SERVICES (OUTPATIENT)
Dept: ONCOLOGY | Facility: MEDICAL CENTER | Age: 60
End: 2025-02-02
Attending: STUDENT IN AN ORGANIZED HEALTH CARE EDUCATION/TRAINING PROGRAM
Payer: COMMERCIAL

## 2025-02-02 ENCOUNTER — PHARMACY VISIT (OUTPATIENT)
Dept: PHARMACY | Facility: MEDICAL CENTER | Age: 60
End: 2025-02-02
Payer: MEDICARE

## 2025-02-02 VITALS
TEMPERATURE: 96.8 F | OXYGEN SATURATION: 97 % | RESPIRATION RATE: 18 BRPM | SYSTOLIC BLOOD PRESSURE: 134 MMHG | DIASTOLIC BLOOD PRESSURE: 76 MMHG | HEART RATE: 60 BPM

## 2025-02-02 DIAGNOSIS — C20 RECTAL CANCER (HCC): ICD-10-CM

## 2025-02-02 PROCEDURE — RXMED WILLOW AMBULATORY MEDICATION CHARGE: Performed by: FAMILY MEDICINE

## 2025-02-02 PROCEDURE — 96523 IRRIG DRUG DELIVERY DEVICE: CPT

## 2025-02-03 NOTE — PROGRESS NOTES
Pt arrived ambulatory to [facility] for CADD pump disconnect. POC discussed with pt and he agrees with plan.     Pump volume reads zero in reservoir. Pump disconnected and port with brisk blood return, flushed with NS. Port e-accessed, gauze dressing applied.     Pt discharged to [facility]. Pt's next appointment confirmed 2/14/2025.

## 2025-02-07 ENCOUNTER — PATIENT OUTREACH (OUTPATIENT)
Dept: ONCOLOGY | Facility: MEDICAL CENTER | Age: 60
End: 2025-02-07
Payer: COMMERCIAL

## 2025-02-07 DIAGNOSIS — C20 RECTAL ADENOCARCINOMA (HCC): ICD-10-CM

## 2025-02-07 DIAGNOSIS — C20 RECTAL CANCER (HCC): ICD-10-CM

## 2025-02-07 DIAGNOSIS — Z79.899 HIGH RISK MEDICATION USE: ICD-10-CM

## 2025-02-07 NOTE — PROGRESS NOTES
Followed up with pt via telephone from [facility]cology [facility].  Pt reports he has not had any episodes of rectal bleeding since Wednesday.  Pt states he has had a couple of nose bleeds and has had increased pain and nausea.  Pt also states he has had a headache for the past couple days that feels like a sinus headache.  Pt has increased his oral intake of water.  Pt denies fevers.  Pt reports he had a formed BM today and passing stool did not improve his pain.  Pt states he feels worse than previous cycles and wonders if his symptoms are related to cumulative dose.  Discussed symptoms and plan of care with Dr. Kim.  Pt will continue to monitor and proceed to the ER if his symptoms worsen.  Pt scheduled for lab draw with RN on 2/10 at 0730 prior to his MD appointments in Medical Oncology.

## 2025-02-07 NOTE — PROGRESS NOTES
On February 7, 2025, Oncology Social Worker Kathy Tse received lodging request from SHELBI Salguero for pt. on 2/14-2/15/2025.  Pt. had informed OSLEOPOLDO Salguero no one had contacted him.     Oncology Social Worker Kathy Tse processed pt.'s [facility] [employer] application request.  Pt. will be covered for a total of 1 nights.    [facility]20[id number]

## 2025-02-07 NOTE — PROGRESS NOTES
On February 7, 2025, , An Salguero, spoke to pt. via telephone. SAMUEL Salguero introduced self and explained the role of support services at the [facility]. Pt. reported that he is in a lot of pain and was bleeding from his rectum this week. Pt. stated that he had called Dr. Kim's office to report the symptoms and was waiting on a call back. SAMUEL Salguero offered to contact Dr. Kim's office and ask them to reach out to pt. again.     Pt. confirmed that he is coming from [place], [place] and drives himself to his appointments. SAMUEL Salguero inquired about lodging and pt. stated that he would like to stay when he has chemo and for his upcoming radiation appointments. Pt.'s next chemo is on 02/14/2025. Pt. stated that he has already filled out the application for lodging assistance. SAMUEL Salguero stated that the team will work on getting him booked for that day and will follow back up with him.     Pt.'s radiation appointment dates are not booked yet for pt. SAMUEL Salguero asked pt. to please contact her when he has his dates so the lodging can be booked.     Pt. denied needing assistance with gas or anything else at this time.     SAMUEL Salguero reached out to Nurse Deja Dominguez from Dr. Kim's office via Teams to inform her about pt.'s reported pain.

## 2025-02-07 NOTE — PROGRESS NOTES
On February 7, 2025, ,  An Salguero, sent the follow email to pt. regarding his upcoming lodging stay at the [facility].    “Good afternoon Booker,     We have your room booked at the [facility] for Friday, February 14, 2025. We have a large alana for lodging so please feel free to utilize this room. If you decide to not use it, please email me back that day, so I can cancel it and we can free up the spot.     Please note that you will have to put a card down for the incidental deposit, which will go back on your card.     My direct line is listed below and you can always get ahold of me via email. I spoke to Nurse Deja from Dr. Kim's office, and she should be reach out to you.     As soon as you know the dates for radiation, please let me know and we can book those for you.    Thank you,   ________________________________________  An Salguero LCSW, MSW  Outpatient Oncology Social Worker  [address] [place] [address]  [id number]  Mailstop: L-11  P: [phone]  F: [phone]  [email or web address]  Read Our Annual Report, Growing With Our Community”

## 2025-02-10 ENCOUNTER — HOSPITAL ENCOUNTER (OUTPATIENT)
Dept: HEMATOLOGY ONCOLOGY | Facility: MEDICAL CENTER | Age: 60
End: 2025-02-10
Attending: STUDENT IN AN ORGANIZED HEALTH CARE EDUCATION/TRAINING PROGRAM
Payer: COMMERCIAL

## 2025-02-10 ENCOUNTER — HOSPITAL ENCOUNTER (OUTPATIENT)
Dept: HEMATOLOGY ONCOLOGY | Facility: MEDICAL CENTER | Age: 60
End: 2025-02-10
Attending: FAMILY MEDICINE
Payer: COMMERCIAL

## 2025-02-10 ENCOUNTER — HOSPITAL ENCOUNTER (OUTPATIENT)
Facility: MEDICAL CENTER | Age: 60
End: 2025-02-10
Attending: STUDENT IN AN ORGANIZED HEALTH CARE EDUCATION/TRAINING PROGRAM
Payer: COMMERCIAL

## 2025-02-10 VITALS
OXYGEN SATURATION: 98 % | TEMPERATURE: 96.8 F | HEART RATE: 77 BPM | BODY MASS INDEX: 27.47 KG/M2 | HEIGHT: 67 IN | WEIGHT: 175 LBS | SYSTOLIC BLOOD PRESSURE: 130 MMHG | DIASTOLIC BLOOD PRESSURE: 74 MMHG

## 2025-02-10 VITALS
BODY MASS INDEX: 27.3 KG/M2 | SYSTOLIC BLOOD PRESSURE: 132 MMHG | HEART RATE: 74 BPM | RESPIRATION RATE: 16 BRPM | TEMPERATURE: 96.5 F | WEIGHT: 176 LBS | DIASTOLIC BLOOD PRESSURE: 77 MMHG | OXYGEN SATURATION: 97 %

## 2025-02-10 VITALS
BODY MASS INDEX: 27.62 KG/M2 | SYSTOLIC BLOOD PRESSURE: 132 MMHG | HEIGHT: 67 IN | WEIGHT: 176 LBS | DIASTOLIC BLOOD PRESSURE: 77 MMHG | HEART RATE: 74 BPM | TEMPERATURE: 96.5 F | OXYGEN SATURATION: 97 %

## 2025-02-10 DIAGNOSIS — C20 RECTAL CANCER (HCC): ICD-10-CM

## 2025-02-10 DIAGNOSIS — G89.3 CANCER RELATED PAIN: ICD-10-CM

## 2025-02-10 DIAGNOSIS — Z79.899 HIGH RISK MEDICATION USE: ICD-10-CM

## 2025-02-10 DIAGNOSIS — K59.03 DRUG INDUCED CONSTIPATION: ICD-10-CM

## 2025-02-10 LAB
ALBUMIN SERPL BCP-MCNC: 4.2 G/DL (ref 3.2–4.9)
ALBUMIN/GLOB SERPL: 1.6 G/DL
ALP SERPL-CCNC: 107 U/L (ref 30–99)
ALT SERPL-CCNC: 43 U/L (ref 2–50)
ANION GAP SERPL CALC-SCNC: 11 MMOL/L (ref 7–16)
AST SERPL-CCNC: 44 U/L (ref 12–45)
BASOPHILS # BLD AUTO: 0.5 % (ref 0–1.8)
BASOPHILS # BLD: 0.02 K/UL (ref 0–0.12)
BILIRUB SERPL-MCNC: 0.3 MG/DL (ref 0.1–1.5)
BUN SERPL-MCNC: 11 MG/DL (ref 8–22)
CALCIUM ALBUM COR SERPL-MCNC: 9.4 MG/DL (ref 8.5–10.5)
CALCIUM SERPL-MCNC: 9.6 MG/DL (ref 8.5–10.5)
CEA SERPL-MCNC: 2.1 NG/ML (ref 0–3)
CHLORIDE SERPL-SCNC: 101 MMOL/L (ref 96–112)
CO2 SERPL-SCNC: 24 MMOL/L (ref 20–33)
CREAT SERPL-MCNC: 0.89 MG/DL (ref 0.5–1.4)
EOSINOPHIL # BLD AUTO: 0.1 K/UL (ref 0–0.51)
EOSINOPHIL NFR BLD: 2.3 % (ref 0–6.9)
ERYTHROCYTE [DISTWIDTH] IN BLOOD BY AUTOMATED COUNT: 50.5 FL (ref 35.9–50)
GFR SERPLBLD CREATININE-BSD FMLA CKD-EPI: 98 ML/MIN/1.73 M 2
GLOBULIN SER CALC-MCNC: 2.7 G/DL (ref 1.9–3.5)
GLUCOSE SERPL-MCNC: 103 MG/DL (ref 65–99)
HCT VFR BLD AUTO: 32.5 % (ref 42–52)
HGB BLD-MCNC: 11.6 G/DL (ref 14–18)
IMM GRANULOCYTES # BLD AUTO: 0.01 K/UL (ref 0–0.11)
IMM GRANULOCYTES NFR BLD AUTO: 0.2 % (ref 0–0.9)
LYMPHOCYTES # BLD AUTO: 1.3 K/UL (ref 1–4.8)
LYMPHOCYTES NFR BLD: 30.1 % (ref 22–41)
MCH RBC QN AUTO: 32 PG (ref 27–33)
MCHC RBC AUTO-ENTMCNC: 35.7 G/DL (ref 32.3–36.5)
MCV RBC AUTO: 89.5 FL (ref 81.4–97.8)
MONOCYTES # BLD AUTO: 0.76 K/UL (ref 0–0.85)
MONOCYTES NFR BLD AUTO: 17.6 % (ref 0–13.4)
NEUTROPHILS # BLD AUTO: 2.13 K/UL (ref 1.82–7.42)
NEUTROPHILS NFR BLD: 49.3 % (ref 44–72)
NRBC # BLD AUTO: 0 K/UL
NRBC BLD-RTO: 0 /100 WBC (ref 0–0.2)
PLATELET # BLD AUTO: 175 K/UL (ref 164–446)
PMV BLD AUTO: 9.8 FL (ref 9–12.9)
POTASSIUM SERPL-SCNC: 4 MMOL/L (ref 3.6–5.5)
PROT SERPL-MCNC: 6.9 G/DL (ref 6–8.2)
RBC # BLD AUTO: 3.63 M/UL (ref 4.7–6.1)
SODIUM SERPL-SCNC: 136 MMOL/L (ref 135–145)
WBC # BLD AUTO: 4.3 K/UL (ref 4.8–10.8)

## 2025-02-10 PROCEDURE — 99212 OFFICE O/P EST SF 10 MIN: CPT | Performed by: STUDENT IN AN ORGANIZED HEALTH CARE EDUCATION/TRAINING PROGRAM

## 2025-02-10 PROCEDURE — 82378 CARCINOEMBRYONIC ANTIGEN: CPT

## 2025-02-10 PROCEDURE — 99213 OFFICE O/P EST LOW 20 MIN: CPT | Performed by: FAMILY MEDICINE

## 2025-02-10 PROCEDURE — 36415 COLL VENOUS BLD VENIPUNCTURE: CPT

## 2025-02-10 PROCEDURE — 99214 OFFICE O/P EST MOD 30 MIN: CPT | Performed by: STUDENT IN AN ORGANIZED HEALTH CARE EDUCATION/TRAINING PROGRAM

## 2025-02-10 PROCEDURE — 99212 OFFICE O/P EST SF 10 MIN: CPT | Performed by: FAMILY MEDICINE

## 2025-02-10 PROCEDURE — 80053 COMPREHEN METABOLIC PANEL: CPT

## 2025-02-10 PROCEDURE — 85025 COMPLETE CBC W/AUTO DIFF WBC: CPT

## 2025-02-10 RX ORDER — CAPECITABINE 500 MG/1
TABLET, FILM COATED ORAL
Qty: 180 TABLET | Refills: 0 | Status: SHIPPED | OUTPATIENT
Start: 2025-02-10

## 2025-02-10 ASSESSMENT — ENCOUNTER SYMPTOMS
BRUISES/BLEEDS EASILY: 0
DIZZINESS: 0
BLOOD IN STOOL: 0
SINUS PAIN: 0
FEVER: 0
WEIGHT LOSS: 0
HEARTBURN: 0
TINGLING: 1
PALPITATIONS: 0
DOUBLE VISION: 0
NAUSEA: 1
INSOMNIA: 0
CONSTIPATION: 1
VOMITING: 0
VOMITING: 1
WHEEZING: 0
SPUTUM PRODUCTION: 0
BACK PAIN: 0
COUGH: 0
MYALGIAS: 1
CHILLS: 0
CONSTIPATION: 1
SORE THROAT: 0
WEAKNESS: 0
DIARRHEA: 0
HEADACHES: 0
ORTHOPNEA: 0
ABDOMINAL PAIN: 1
NAUSEA: 0
COUGH: 1
BLURRED VISION: 0
PALPITATIONS: 0
DIAPHORESIS: 0
DIARRHEA: 0
NERVOUS/ANXIOUS: 0
CHILLS: 0
SHORTNESS OF BREATH: 0
SHORTNESS OF BREATH: 0
FEVER: 0

## 2025-02-10 ASSESSMENT — FIBROSIS 4 INDEX
FIB4 SCORE: 1.96
FIB4 SCORE: 1.96
FIB4 SCORE: 1.78

## 2025-02-10 NOTE — Clinical Note
Can you get capecitabine 825 mg/m2 PO BID for 30 days with radiation therapy? He won't start for 4 weeks or so.

## 2025-02-10 NOTE — PROGRESS NOTES
Follow Up Note:  Hematology/Oncology      Primary Care:  Rickie Naranjo M.D.    Diagnosis: Rectal adenocarcinoma    Chief Complaint: On-treatment visit    Current Treatment: mFOLFIRINOX, followed by capecitabine with concurrent XRT, and then surgical evaluation    Prior Treatment: NA    Oncology History of Presenting Illness:  Booker Saucedo is a 59 y.o.  man who presents to the clinic for evaluation for systemic therapy for a diagnosis of rectal carcinoma. He started having back pain and changes in his bowel habits in July and went to the hospital where CT scan revealed right hydronephrosis due to a 1 mm calculus. However, he was also noted to have rectal wall thickening with perirectal lymph nodes. He subsequently underwent a colonoscopy which revealed a fungating circumferential mass, 5 cm from the anal verge, with biopsy revealing invasive adenocarcinoma, moderately differentiated, MMR proficient. Staging scans did not reveal any definitive metastatic disease, though there was a 1.5 cm left hepatic lobe lesion which was not PET avid. He was seen by radiation oncology and colorectal surgery and ultimately was staged on rectal MRI as a [id number] stage IIIC disease. He has been referred for treatment accordingly.     Treatment History:   11/08/2024: C1 FOLFIRINOX   11/22/2024: C2 FOLFIRINOX   12/06/2024: C3 FOLFIRINOX  12/20/2024: C4 FOLFIRINOX  01/03/2025: C5 FOLFIRINOX (decrease oxaliplatin to 65 mg/m2 for neuropathy)  01/17/25: C6 FOLFIRINOX  01/31/25: C7 FOLFIRINOX  02/14/25: C8 FOLFIRINOX scheduled    Interval History:  Patient is here for follow up visit. He had a rough time with this last round of chemotherapy. He noted blood in his stool, as well as increased fatigue and pain. He had been doing well with chemotherapy up until this last round. He also has an upper respiratory infection that came on in the middle of last week and he's getting over that. 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 His bowels are okay but he has some continued issues with constipation.     Allergies as of 02/10/2025    (No Known Allergies)         Current Outpatient Medications:     oxyCODONE immediate release (ROXICODONE) 10 MG immediate release tablet, Take 1 Tablet by mouth every 6 hours as needed for Moderate Pain for up to 30 days., Disp: 90 Tablet, Rfl: 0    aspirin 81 MG EC tablet, tablet, Disp: , Rfl:     baclofen (LIORESAL) 5 MG Tab, Take 1 tablet by mouth every 8 hours as needed for hiccups related to chemotherapy treatment., Disp: 30 Tablet, Rfl: 1    pantoprazole (PROTONIX) 40 MG Tablet Delayed Response, Take 1 Tablet by mouth every day., Disp: 30 Tablet, Rfl: 3    tamsulosin (FLOMAX) 0.4 MG capsule, Take 1 Capsule by mouth every day., Disp: 30 Capsule, Rfl: 0    ondansetron (ZOFRAN ODT) 4 MG TABLET DISPERSIBLE, Take 1 Tablet by mouth every four hours as needed for Nausea/Vomiting., Disp: 30 Tablet, Rfl: 3    polyethylene glycol/lytes (MIRALAX) Pack, Take 17 g by mouth 1 time a day as needed., Disp: , Rfl:     melatonin 3 MG Tab, Take 3 mg by mouth at bedtime., Disp: , Rfl:     naproxen (ALEVE) 220 MG tablet, Take 220 mg by mouth 2 times a day as needed (pain)., Disp: , Rfl:     atorvastatin (LIPITOR) 40 MG Tab, Take 40 mg by mouth every day., Disp: , Rfl:     carvedilol (COREG) 12.5 MG Tab, Take 12.5 mg by mouth 2 times a day., Disp: , Rfl:     citalopram (CELEXA) 20 MG Tab, Take 20 mg by mouth every day., Disp: , Rfl:     hydrOXYzine HCl (ATARAX) 25 MG Tab, Take 25 mg by mouth every 8 hours as needed for Anxiety., Disp: , Rfl:     ondansetron (ZOFRAN) 4 MG Tab tablet, Take 1 Tablet by mouth every four hours as needed for Nausea/Vomiting (for nausea, vomiting)., Disp: 30 Tablet, Rfl: 6    prochlorperazine (COMPAZINE) 10 MG Tab, Take 1 Tablet by mouth every 6 hours as needed (for nausea, vomiting)., Disp: 30 Tablet, Rfl: 6    OLANZapine (ZYPREXA) 5 MG Tab,  "Take 1 Tablet by mouth every evening., Disp: 30 Tablet, Rfl: 6    loperamide (IMODIUM A-D) 2 MG tablet, Take 1 Tablet by mouth see administration instructions., Disp: 30 Tablet, Rfl: 6      Review of Systems:  Review of Systems   Constitutional:  Positive for malaise/fatigue. Negative for chills, diaphoresis, fever and weight loss.   HENT:  Positive for congestion. Negative for hearing loss, nosebleeds, sinus pain and sore throat.    Eyes:  Negative for blurred vision and double vision.   Respiratory:  Positive for cough. Negative for sputum production, shortness of breath and wheezing.    Cardiovascular:  Negative for chest pain, palpitations, orthopnea and leg swelling.   Gastrointestinal:  Positive for abdominal pain (Cramping at times) and constipation. Negative for blood in stool, diarrhea, heartburn, melena, nausea and vomiting.        Increased rectal pain   Genitourinary:  Negative for dysuria, frequency, hematuria and urgency.   Musculoskeletal:  Positive for myalgias. Negative for back pain and joint pain.   Skin:  Negative for rash.   Neurological:  Positive for tingling (Mild, for a few days after therapy, but worse with cold exposure). Negative for dizziness, weakness and headaches.   Endo/Heme/Allergies:  Does not bruise/bleed easily.   Psychiatric/Behavioral:  The patient is not nervous/anxious and does not have insomnia.          Physical Exam:  Vitals:    02/10/25 0843   BP: 132/77   Pulse: 74   Temp: 35.8 °C (96.5 °F)   TempSrc: Temporal   SpO2: 97%   Weight: 79.8 kg (176 lb)   Height: 1.71 m (5' 7.32\")       DESC; KARNOFSKY SCALE WITH ECOG EQUIVALENT: 100, Fully active, able to carry on all pre-disease performed without restriction (ECOG equivalent 0)    DISTRESS LEVEL: no apparent distress    Physical Exam  Vitals and nursing note reviewed.   Constitutional:       General: He is awake. He is not in acute distress.     Appearance: Normal appearance. He is normal weight. He is not ill-appearing, " toxic-appearing or diaphoretic.   HENT:      Head: Normocephalic and atraumatic.      Nose: Nose normal. No congestion.      Mouth/Throat:      Pharynx: Oropharynx is clear. No oropharyngeal exudate or posterior oropharyngeal erythema.   Eyes:      General: No scleral icterus.     Extraocular Movements: Extraocular movements intact.      Conjunctiva/sclera: Conjunctivae normal.      Pupils: Pupils are equal, round, and reactive to light.   Cardiovascular:      Rate and Rhythm: Normal rate and regular rhythm.      Pulses: Normal pulses.      Heart sounds: Normal heart sounds. No murmur heard.     No friction rub. No gallop.   Pulmonary:      Effort: Pulmonary effort is normal.      Breath sounds: Normal breath sounds. No decreased air movement. No wheezing, rhonchi or rales.   Abdominal:      General: Bowel sounds are normal. There is no distension.      Tenderness: There is no abdominal tenderness.   Musculoskeletal:         General: No deformity. Normal range of motion.      Cervical back: Normal range of motion and neck supple. No tenderness.      Right lower leg: No edema.      Left lower leg: No edema.   Lymphadenopathy:      Cervical: No cervical adenopathy.      Upper Body:      Right upper body: No axillary adenopathy.      Left upper body: No axillary adenopathy.      Lower Body: No right inguinal adenopathy. No left inguinal adenopathy.   Skin:     General: Skin is warm and dry.      Coloration: Skin is pale. Skin is not jaundiced.      Findings: No erythema or rash.   Neurological:      General: No focal deficit present.      Mental Status: He is alert and oriented to person, place, and time.      Sensory: Sensation is intact.      Motor: Motor function is intact. No weakness.      Gait: Gait is intact.   Psychiatric:         Attention and Perception: Attention normal.         Mood and Affect: Mood normal.         Behavior: Behavior normal. Behavior is cooperative.         Thought Content: Thought content  normal.         Judgment: Judgment normal.           Labs:  Hospital Outpatient Visit on 02/10/2025   Component Date Value Ref Range Status    Sodium 02/10/2025 136  135 - 145 mmol/L Final    Potassium 02/10/2025 4.0  3.6 - 5.5 mmol/L Final    Chloride 02/10/2025 101  96 - 112 mmol/L Final    Co2 02/10/2025 24  20 - 33 mmol/L Final    Anion Gap 02/10/2025 11.0  7.0 - 16.0 Final    Glucose 02/10/2025 103 (H)  65 - 99 mg/dL Final    Bun 02/10/2025 11  8 - 22 mg/dL Final    Creatinine 02/10/2025 0.89  0.50 - 1.40 mg/dL Final    Calcium 02/10/2025 9.6  8.5 - 10.5 mg/dL Final    Correct Calcium 02/10/2025 9.4  8.5 - 10.5 mg/dL Final    AST(SGOT) 02/10/2025 44  12 - 45 U/L Final    ALT(SGPT) 02/10/2025 43  2 - 50 U/L Final    Alkaline Phosphatase 02/10/2025 107 (H)  30 - 99 U/L Final    Total Bilirubin 02/10/2025 0.3  0.1 - 1.5 mg/dL Final    Albumin 02/10/2025 4.2  3.2 - 4.9 g/dL Final    Total Protein 02/10/2025 6.9  6.0 - 8.2 g/dL Final    Globulin 02/10/2025 2.7  1.9 - 3.5 g/dL Final    A-G Ratio 02/10/2025 1.6  g/dL Final    WBC 02/10/2025 4.3 (L)  4.8 - 10.8 K/uL Final    RBC 02/10/2025 3.63 (L)  4.70 - 6.10 M/uL Final    Hemoglobin 02/10/2025 11.6 (L)  14.0 - 18.0 g/dL Final    Hematocrit 02/10/2025 32.5 (L)  42.0 - 52.0 % Final    MCV 02/10/2025 89.5  81.4 - 97.8 fL Final    MCH 02/10/2025 32.0  27.0 - 33.0 pg Final    MCHC 02/10/2025 35.7  32.3 - 36.5 g/dL Final    RDW 02/10/2025 50.5 (H)  35.9 - 50.0 fL Final    Platelet Count 02/10/2025 175  164 - 446 K/uL Final    MPV 02/10/2025 9.8  9.0 - 12.9 fL Final    Neutrophils-Polys 02/10/2025 49.30  44.00 - 72.00 % Final    Lymphocytes 02/10/2025 30.10  22.00 - 41.00 % Final    Monocytes 02/10/2025 17.60 (H)  0.00 - 13.40 % Final    Eosinophils 02/10/2025 2.30  0.00 - 6.90 % Final    Basophils 02/10/2025 0.50  0.00 - 1.80 % Final    Immature Granulocytes 02/10/2025 0.20  0.00 - 0.90 % Final    Nucleated RBC 02/10/2025 0.00  0.00 - 0.20 /100 WBC Final    Neutrophils  "(Absolute) 02/10/2025 2.13  1.82 - 7.42 K/uL Final    Includes immature neutrophils, if present.    Lymphs (Absolute) 02/10/2025 1.30  1.00 - 4.80 K/uL Final    Monos (Absolute) 02/10/2025 0.76  0.00 - 0.85 K/uL Final    Eos (Absolute) 02/10/2025 0.10  0.00 - 0.51 K/uL Final    Baso (Absolute) 02/10/2025 0.02  0.00 - 0.12 K/uL Final    Immature Granulocytes (abs) 02/10/2025 0.01  0.00 - 0.11 K/uL Final    NRBC (Absolute) 02/10/2025 0.00  K/uL Final    GFR (CKD-EPI) 02/10/2025 98  >60 mL/min/1.73 m 2 Final    Comment: Estimated Glomerular Filtration Rate is calculated using  race neutral CKD-EPI 2021 equation per NKF-ASN recommendations.         Imaging:     All listed images below have been independently reviewed by me. I agree with the findings as summarized below:    No results found.    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 Dr. Prasad reviewed the slides of this case prior to requesting   consultative opinion by [facility].                                         Diagnosis performed by:                                       KHOA PRASAD MD            Assessment & Plan:  1. Rectal cancer (HCC)          This is a 59 year old  man with rectal adenocarcinoma, [id number] stage IIIC disease. He presents for evaluation.      Current Diagnosis and Staging: Rectal adenocarcinoma, [id number] stage IIIC disease    Update: The patient is doing okay overall but the effects of chemotherapy are getting to him. He will finish with C8 of therapy on Friday. Continue supportive care. Advised to continue bowel regimen, though if cramping is a problem then senna may be contributing to that. The patient is " otherwise okay from a lab standpoint though so will continue with C8 and then transition to chemoRT.      Treatment Plan: mFOLFIRINOX followed by capecitabine with concurrent XRT and then surgical evaluation     Treatment Citation: NCCN     Plan of Care:     Primary Therapy: mFOLFIRINOX C8 on Friday  Supportive Therapy: Antiemetics per protocol  Toxicity: Patient is getting antineoplastic therapy and needs monitoring of blood counts, hepatic function, and renal function due to potential for organ dysfunction.   Labs: CBC with diff, CMP, CEA, ctDNA monitoring  Imaging: Repeat MRI rectal protocol, CT scans after completion of JOSEMANUEL  Treatment Planning: Patient will start with chemotherapy due to concern of aggressive spread of disease, and then will do capecitabine with concurrent XRT and surgical evaluation.  Consultations: Colorectal surgery (Dr. Tse); Radiation oncology (Dr. Huerta), Palliative Care (Dr. Styles)  Code Status: Full  Miscellaneous: Referred to medical genetics, patient will follow up.   Return for Follow Up: 4 weeks    Any questions and concerns raised by the patient were answered to the best of my ability. Thank you for allowing me to participate in the care for this patient. Please feel free to contact me for any questions or concerns.       Total time spent on chart review, clinic encounter, and documentation: 25 minutes.

## 2025-02-10 NOTE — Clinical Note
Hey was there any reason you wanted the scans on Friday for him? I figured we'd scan him after completion of therapy.

## 2025-02-10 NOTE — ASSESSMENT & PLAN NOTE
Recently worsened.  Potentially related to constipation.  Will be entering his last cycle of treatment before radiation.  Is using oxycodone 10 mg up to 3 times daily as needed for pain.  Did advise he can also use 1000 mg of Tylenol 3 times daily.  PDMP reviewed, no signs of diversion or abuse, risk and benefits of medication discussed, controlled subs agreement on file.  No refill needed today.

## 2025-02-10 NOTE — PROGRESS NOTES
Written orders received from Dr. Kim for Capecitabine (Xeloda) 825mg/m2 PO BID for 30days of radiation. Pt will start in approximately 4 weeks.    825mgxBSA 1.95= 1,608.75 mg   Physician decision to round down to 1500mg PO BID.    Rx: Take 3 tabs of 500mg strength (total dose of 1500 mg) by mouth 2 times a day (at least 12 hrs apart and 30 min after food) Monday-Friday on days of radiation x 30 treatments.      Rx sent to [facility] updated.

## 2025-02-10 NOTE — PROGRESS NOTES
Subjective:     Chief Complaint   Patient presents with    Cancer     [place]inger/ 6 wk FV     Booker Saucedo is a 59 y.o. male presenting for follow-up on symptom management for oncological pain.  Patient reports he has had worsening side effects from his treatments which have included increased fatigue, shakiness, worsening rectal pain, and constipation.  He also reports URI symptoms over the last 4 to 5 days.  He has been using oxycodone up to 3 times daily.  We discussed supportive options as he enters his last cycle of treatment.  Recommended that he use MiraLAX more regularly particularly the week of his treatment when he said his constipation worsens.  I did recommend using Tylenol 3 times daily as well.  He does have an upcoming trip out of the country and we concluded the visit with a plan to follow-up prior to his departure to ensure he had what he needed to help him while he was in [place].    Problem   Drug Induced Constipation   Cancer Related Pain        Current medicines (including changes today)  Current Outpatient Medications   Medication Sig Dispense Refill    oxyCODONE immediate release (ROXICODONE) 10 MG immediate release tablet Take 1 Tablet by mouth every 6 hours as needed for Moderate Pain for up to 30 days. 90 Tablet 0    aspirin 81 MG EC tablet tablet      baclofen (LIORESAL) 5 MG Tab Take 1 tablet by mouth every 8 hours as needed for hiccups related to chemotherapy treatment. 30 Tablet 1    pantoprazole (PROTONIX) 40 MG Tablet Delayed Response Take 1 Tablet by mouth every day. 30 Tablet 3    tamsulosin (FLOMAX) 0.4 MG capsule Take 1 Capsule by mouth every day. 30 Capsule 0    ondansetron (ZOFRAN ODT) 4 MG TABLET DISPERSIBLE Take 1 Tablet by mouth every four hours as needed for Nausea/Vomiting. 30 Tablet 3    polyethylene glycol/lytes (MIRALAX) Pack Take 17 g by mouth 1 time a day as needed.      melatonin 3 MG Tab Take 3 mg by mouth at bedtime.      naproxen (ALEVE) 220 MG tablet Take  "220 mg by mouth 2 times a day as needed (pain).      atorvastatin (LIPITOR) 40 MG Tab Take 40 mg by mouth every day.      carvedilol (COREG) 12.5 MG Tab Take 12.5 mg by mouth 2 times a day.      citalopram (CELEXA) 20 MG Tab Take 20 mg by mouth every day.      hydrOXYzine HCl (ATARAX) 25 MG Tab Take 25 mg by mouth every 8 hours as needed for Anxiety.      ondansetron (ZOFRAN) 4 MG Tab tablet Take 1 Tablet by mouth every four hours as needed for Nausea/Vomiting (for nausea, vomiting). 30 Tablet 6    prochlorperazine (COMPAZINE) 10 MG Tab Take 1 Tablet by mouth every 6 hours as needed (for nausea, vomiting). 30 Tablet 6    OLANZapine (ZYPREXA) 5 MG Tab Take 1 Tablet by mouth every evening. 30 Tablet 6    loperamide (IMODIUM A-D) 2 MG tablet Take 1 Tablet by mouth see administration instructions. 30 Tablet 6     No current facility-administered medications for this encounter.       Social History     Tobacco Use    Smoking status: Former     Types: Cigars     Quit date: 2024     Years since quittin.1    Smokeless tobacco: Never    Tobacco comments:     Former cigars    Vaping Use    Vaping status: Never Used   Substance Use Topics    Alcohol use: Not Currently     Comment: Remote history of alcohol abuse.  None for 6 1/2 years.    Drug use: Never     Past Medical History:   Diagnosis Date    Bowel habit changes     Cancer (HCC) 2024    colorectal cancer    High cholesterol     Hypertension     Kidney stone     4-5x    MI (myocardial infarction) (HCC)       Family History   Problem Relation Age of Onset    Bladder cancer Father     Breast Cancer Sister          Objective:     Vitals:    02/10/25 0730   BP: 130/74   Pulse: 77   Temp: 36 °C (96.8 °F)   TempSrc: Temporal   SpO2: 98%   Weight: 79.4 kg (175 lb)   Height: 1.71 m (5' 7.32\")     Body mass index is 27.15 kg/m².     Review of Systems   Constitutional:  Positive for malaise/fatigue. Negative for chills and fever.   HENT:  Positive for congestion.  "   Respiratory:  Negative for cough and shortness of breath.    Cardiovascular:  Negative for chest pain and palpitations.   Gastrointestinal:  Positive for constipation, nausea and vomiting. Negative for diarrhea.     Physical Exam:   Gen: No acute distress.   Skin: Pink, warm, and dry  HEENT: conjunctiva non-injected, sclera non-icteric. EOMs intact.   Nasal mucosa without edema nor erythema.   Pinna normal.    Neck: Supple, trachea midline. No adenopathy or masses in the neck or supraclavicular regions.  Lungs: Effort is normal.   CV: regular rate and rhythm  Abdomen: Flat  Ext: no edema, color normal, vascularity normal, temperature normal  Alert and oriented Eye contact is good, speech goal directed, affect calm    Assessment and Plan:   Cancer related pain  Recently worsened.  Potentially related to constipation.  Will be entering his last cycle of treatment before radiation.  Is using oxycodone 10 mg up to 3 times daily as needed for pain.  Did advise he can also use 1000 mg of Tylenol 3 times daily.  PDMP reviewed, no signs of diversion or abuse, risk and benefits of medication discussed, controlled subs agreement on file.  No refill needed today.    Drug induced constipation  Worsens a week of treatment.  Likely secondary to antiemetic use as well as opioids.  Discussed using 1/4-1 capful of MiraLAX daily plus stool softeners as needed.  Will follow-up at next visit for reevaluation.      28 minutes in total including chart review and consultation with patients oncological providers.     Followup: Return in about 3 weeks (around 3/3/2025).    Quoc Goel M.D.

## 2025-02-10 NOTE — PROGRESS NOTES
Booker Saucedo is a 59 y.o. male here for a non-provider visit for a lab draw on 2/10/2025 at 7:40 AM.    Procedure performed:  Venipuncture     Anatomical site:  Left Antecubital Area    Equipment used:  23 g butterfly     Labs drawn:  Comp Metabolic Panel , CBC with differential , and CEA    Ordering provider:  Iron Kim DO    Lab draw completed by:  Maris Almazan R.N.     Pt c/o respiratory symptoms; cough, congestion, dyspnea since Friday 2/7/25. Pt c/o rectal bleeding w/diarrhea x 1 also on Friday 2/7/25. Pt is due to see Dr. Kim today and will discuss symptoms further at provider visit.

## 2025-02-10 NOTE — ASSESSMENT & PLAN NOTE
Worsens a week of treatment.  Likely secondary to antiemetic use as well as opioids.  Discussed using 1/4-1 capful of MiraLAX daily plus stool softeners as needed.  Will follow-up at next visit for reevaluation.

## 2025-02-13 RX ORDER — LOPERAMIDE HYDROCHLORIDE 2 MG/1
2 CAPSULE ORAL
Status: CANCELLED | OUTPATIENT
Start: 2025-02-15

## 2025-02-13 RX ORDER — DEXTROSE MONOHYDRATE 50 MG/ML
INJECTION, SOLUTION INTRAVENOUS CONTINUOUS
Status: CANCELLED | OUTPATIENT
Start: 2025-02-15

## 2025-02-13 RX ORDER — 0.9 % SODIUM CHLORIDE 0.9 %
3 VIAL (ML) INJECTION PRN
Status: CANCELLED | OUTPATIENT
Start: 2025-02-14

## 2025-02-13 RX ORDER — ONDANSETRON 2 MG/ML
4 INJECTION INTRAMUSCULAR; INTRAVENOUS PRN
Status: CANCELLED | OUTPATIENT
Start: 2025-02-15

## 2025-02-13 RX ORDER — ONDANSETRON 8 MG/1
8 TABLET, ORALLY DISINTEGRATING ORAL PRN
Status: CANCELLED | OUTPATIENT
Start: 2025-02-15

## 2025-02-13 RX ORDER — LOPERAMIDE HYDROCHLORIDE 2 MG/1
4 CAPSULE ORAL PRN
Status: CANCELLED | OUTPATIENT
Start: 2025-02-15

## 2025-02-13 RX ORDER — LORAZEPAM 1 MG/1
1 TABLET ORAL ONCE
Status: CANCELLED
Start: 2025-02-15 | End: 2025-02-15

## 2025-02-13 RX ORDER — 0.9 % SODIUM CHLORIDE 0.9 %
VIAL (ML) INJECTION PRN
Status: CANCELLED | OUTPATIENT
Start: 2025-02-14

## 2025-02-13 RX ORDER — ATROPINE SULFATE 1 MG/ML
0.5 INJECTION, SOLUTION INTRAVENOUS PRN
Status: CANCELLED | OUTPATIENT
Start: 2025-02-15

## 2025-02-13 RX ORDER — 0.9 % SODIUM CHLORIDE 0.9 %
10 VIAL (ML) INJECTION PRN
Status: CANCELLED | OUTPATIENT
Start: 2025-02-15

## 2025-02-13 RX ORDER — 0.9 % SODIUM CHLORIDE 0.9 %
3 VIAL (ML) INJECTION PRN
Status: CANCELLED | OUTPATIENT
Start: 2025-02-15

## 2025-02-13 RX ORDER — EPINEPHRINE 1 MG/ML(1)
0.5 AMPUL (ML) INJECTION PRN
Status: CANCELLED | OUTPATIENT
Start: 2025-02-15

## 2025-02-13 RX ORDER — 0.9 % SODIUM CHLORIDE 0.9 %
10 VIAL (ML) INJECTION PRN
Status: CANCELLED | OUTPATIENT
Start: 2025-02-14

## 2025-02-13 RX ORDER — 0.9 % SODIUM CHLORIDE 0.9 %
VIAL (ML) INJECTION PRN
Status: CANCELLED | OUTPATIENT
Start: 2025-02-15

## 2025-02-13 RX ORDER — DIPHENHYDRAMINE HYDROCHLORIDE 50 MG/ML
50 INJECTION INTRAMUSCULAR; INTRAVENOUS PRN
Status: CANCELLED | OUTPATIENT
Start: 2025-02-15

## 2025-02-13 RX ORDER — 0.9 % SODIUM CHLORIDE 0.9 %
20 VIAL (ML) INJECTION PRN
Status: CANCELLED | OUTPATIENT
Start: 2025-02-17

## 2025-02-13 RX ORDER — PROCHLORPERAZINE MALEATE 10 MG
10 TABLET ORAL EVERY 6 HOURS PRN
Status: CANCELLED | OUTPATIENT
Start: 2025-02-15

## 2025-02-13 RX ORDER — DEXAMETHASONE SODIUM PHOSPHATE 4 MG/ML
12 INJECTION, SOLUTION INTRA-ARTICULAR; INTRALESIONAL; INTRAMUSCULAR; INTRAVENOUS; SOFT TISSUE ONCE
Status: CANCELLED
Start: 2025-02-15 | End: 2025-02-15

## 2025-02-13 RX ORDER — ONDANSETRON 2 MG/ML
16 INJECTION INTRAMUSCULAR; INTRAVENOUS ONCE
Status: CANCELLED
Start: 2025-02-15 | End: 2025-02-15

## 2025-02-13 RX ORDER — METHYLPREDNISOLONE SODIUM SUCCINATE 125 MG/2ML
125 INJECTION, POWDER, LYOPHILIZED, FOR SOLUTION INTRAMUSCULAR; INTRAVENOUS PRN
Status: CANCELLED | OUTPATIENT
Start: 2025-02-15

## 2025-02-13 NOTE — PROGRESS NOTES
"Pharmacy Chemotherapy Verification:    DX: Rectal adenocarcinoma [id number] stage IIIC     Regimen: mFOLFIRINOX  *Dosing Reference*  Oxaliplatin  IV over 2 hours on Day 1  *dose reduced to 65 mg/m2 prior to C5 for tolerance  Leucovorin 400 mg/m2 IV over 2 hours on Day 1  Irinotecan 150 mg/m2 IV over 30-90 minutes on Day 1  Fluorouracil 1200 mg/m2 IV continuous infusion daily on Days 1-2 (2400 mg/m2 IV over 46 - 48 hours)  14-day cycle for 6-8 cycles (total neoadjuvant therapy) or until disease progression or unacceptable toxicity (advanced or metastatic)    NCCN Guidelines for Rectal Cancer V.1.2024.  Jim J, et al. Clin Colorectal Cancer. 2019;18(1):e69-e73.  Awa WEST, et al. Beena Surg Oncol. 2013;20(13):4289-97.    Allergies:  Patient has no known allergies.     BP (!) 150/86   Pulse 76   Temp 36.1 °C (97 °F) (Temporal)   Resp 18   Ht 1.71 m (5' 7.32\")   Wt 80 kg (176 lb 5.9 oz)   SpO2 100%   BMI 27.36 kg/m²  Body surface area is 1.95 meters squared.    Labs 2/10/25:  ANC~ 2130 Plt = 175k   Hgb = 11.6     SCr = 0.89 mg/dL CrCl ~ 100.4 mL/min   AST/ALT/AP = 44/43/107 TBili = 0.3       Drug Order   (Drug name, dose, route, IV Fluid & volume, frequency, number of doses) Cycle 8  Previous treatment: C7 1/31/25     Medication = OXALIplatin  Base Dose = 65 mg/m²  Calc Dose: Base Dose x 1.95² = 126.75 mg  Final Dose = 130 mg  Route = IV  Fluid & Volume = D5W 250 mL  Admin Duration = Over 2 hours   Infuse concurrently with LCV       <10% difference, OK to treat with final dose     Medication = Leucovorin  Base Dose = 400 mg/m²  Calc Dose: Base Dose x  1.95 m² = 780 mg  Final Dose = 790 mg  Route = IV  Fluid & Volume = D5W 250 mL  Admin Duration = Over 2 hours   Infuse concurrently with OXALIplatin        <10% difference, OK to treat with final dose     Medication = Irinotecan  Base Dose = 150 mg/m²  Calc Dose:Base Dose x 1.95 m² = 292.5 mg  Final Dose = 295.6 mg  Route = IV  Fluid & Volume = D5W 500 mL  Admin " Duration = Over 90 minutes          <10% difference, OK to treat with final dose     Medication = Fluorouracil  Base Dose = 2400 mg/m²  Calc Dose: Base Dose x 1.95 m² = 4680 mg  Final Dose = 4730 mg  Route = CIVI via CADD  Concentration = 50 mg/mL  Fluid and Volume = 94.6 mL (+ 3 mL overfill)  Admin Duration = Over 48 hours @ 2 mL/hr            <10% difference,  OK to treat with final dose       By my signature below, I confirm this process was performed independently with the BSA and all final chemotherapy dosing calculations congruent. I have reviewed the above chemotherapy order and that my calculation of the final dose and BSA (when applicable) corroborate those calculations of the  pharmacist.     Aliyah Xiao, PharmD

## 2025-02-14 ENCOUNTER — OUTPATIENT INFUSION SERVICES (OUTPATIENT)
Dept: ONCOLOGY | Facility: MEDICAL CENTER | Age: 60
End: 2025-02-14
Attending: STUDENT IN AN ORGANIZED HEALTH CARE EDUCATION/TRAINING PROGRAM
Payer: COMMERCIAL

## 2025-02-14 ENCOUNTER — APPOINTMENT (OUTPATIENT)
Dept: RADIOLOGY | Facility: MEDICAL CENTER | Age: 60
End: 2025-02-14
Attending: RADIOLOGY
Payer: COMMERCIAL

## 2025-02-14 VITALS
HEIGHT: 67 IN | TEMPERATURE: 97 F | BODY MASS INDEX: 27.68 KG/M2 | WEIGHT: 176.37 LBS | DIASTOLIC BLOOD PRESSURE: 86 MMHG | RESPIRATION RATE: 18 BRPM | HEART RATE: 76 BPM | OXYGEN SATURATION: 100 % | SYSTOLIC BLOOD PRESSURE: 150 MMHG

## 2025-02-14 DIAGNOSIS — C20 RECTAL CANCER (HCC): ICD-10-CM

## 2025-02-14 PROCEDURE — 700101 HCHG RX REV CODE 250: Performed by: STUDENT IN AN ORGANIZED HEALTH CARE EDUCATION/TRAINING PROGRAM

## 2025-02-14 PROCEDURE — A4212 NON CORING NEEDLE OR STYLET: HCPCS

## 2025-02-14 PROCEDURE — G0498 CHEMO EXTEND IV INFUS W/PUMP: HCPCS

## 2025-02-14 PROCEDURE — A9270 NON-COVERED ITEM OR SERVICE: HCPCS | Performed by: STUDENT IN AN ORGANIZED HEALTH CARE EDUCATION/TRAINING PROGRAM

## 2025-02-14 PROCEDURE — 700111 HCHG RX REV CODE 636 W/ 250 OVERRIDE (IP): Performed by: STUDENT IN AN ORGANIZED HEALTH CARE EDUCATION/TRAINING PROGRAM

## 2025-02-14 PROCEDURE — 96415 CHEMO IV INFUSION ADDL HR: CPT

## 2025-02-14 PROCEDURE — 700102 HCHG RX REV CODE 250 W/ 637 OVERRIDE(OP): Performed by: STUDENT IN AN ORGANIZED HEALTH CARE EDUCATION/TRAINING PROGRAM

## 2025-02-14 PROCEDURE — 96413 CHEMO IV INFUSION 1 HR: CPT

## 2025-02-14 PROCEDURE — 96375 TX/PRO/DX INJ NEW DRUG ADDON: CPT

## 2025-02-14 PROCEDURE — 700105 HCHG RX REV CODE 258: Performed by: STUDENT IN AN ORGANIZED HEALTH CARE EDUCATION/TRAINING PROGRAM

## 2025-02-14 PROCEDURE — 96367 TX/PROPH/DG ADDL SEQ IV INF: CPT

## 2025-02-14 PROCEDURE — 96417 CHEMO IV INFUS EACH ADDL SEQ: CPT

## 2025-02-14 RX ORDER — DEXAMETHASONE SODIUM PHOSPHATE 4 MG/ML
12 INJECTION, SOLUTION INTRA-ARTICULAR; INTRALESIONAL; INTRAMUSCULAR; INTRAVENOUS; SOFT TISSUE ONCE
Status: COMPLETED | OUTPATIENT
Start: 2025-02-14 | End: 2025-02-14

## 2025-02-14 RX ORDER — ONDANSETRON 2 MG/ML
16 INJECTION INTRAMUSCULAR; INTRAVENOUS ONCE
Status: COMPLETED | OUTPATIENT
Start: 2025-02-14 | End: 2025-02-14

## 2025-02-14 RX ORDER — LORAZEPAM 1 MG/1
1 TABLET ORAL ONCE
Status: COMPLETED | OUTPATIENT
Start: 2025-02-14 | End: 2025-02-14

## 2025-02-14 RX ORDER — ONDANSETRON 8 MG/1
8 TABLET, ORALLY DISINTEGRATING ORAL PRN
Status: COMPLETED | OUTPATIENT
Start: 2025-02-14 | End: 2025-02-14

## 2025-02-14 RX ORDER — ATROPINE SULFATE 1 MG/ML
0.5 INJECTION, SOLUTION INTRAVENOUS PRN
Status: DISCONTINUED | OUTPATIENT
Start: 2025-02-14 | End: 2025-02-14 | Stop reason: HOSPADM

## 2025-02-14 RX ADMIN — LEUCOVORIN CALCIUM 790 MG: 500 INJECTION, POWDER, LYOPHILIZED, FOR SOLUTION INTRAMUSCULAR; INTRAVENOUS at 12:50

## 2025-02-14 RX ADMIN — DEXTROSE 295.6 MG: 5 SOLUTION INTRAVENOUS at 10:46

## 2025-02-14 RX ADMIN — FLUOROURACIL 4730 MG: 50 INJECTION, SOLUTION INTRAVENOUS at 15:18

## 2025-02-14 RX ADMIN — FOSAPREPITANT 150 MG: 150 INJECTION, POWDER, LYOPHILIZED, FOR SOLUTION INTRAVENOUS at 09:55

## 2025-02-14 RX ADMIN — ONDANSETRON 8 MG: 8 TABLET, ORALLY DISINTEGRATING ORAL at 14:58

## 2025-02-14 RX ADMIN — DEXAMETHASONE SODIUM PHOSPHATE 12 MG: 4 INJECTION INTRA-ARTICULAR; INTRALESIONAL; INTRAMUSCULAR; INTRAVENOUS; SOFT TISSUE at 09:43

## 2025-02-14 RX ADMIN — ONDANSETRON 16 MG: 2 INJECTION INTRAMUSCULAR; INTRAVENOUS at 09:43

## 2025-02-14 RX ADMIN — LIDOCAINE HYDROCHLORIDE 0.5 ML: 10 INJECTION, SOLUTION EPIDURAL; INFILTRATION; INTRACAUDAL; PERINEURAL at 09:25

## 2025-02-14 RX ADMIN — LORAZEPAM 1 MG: 1 TABLET ORAL at 09:43

## 2025-02-14 RX ADMIN — OXALIPLATIN 130 MG: 5 INJECTION, SOLUTION INTRAVENOUS at 12:51

## 2025-02-14 ASSESSMENT — FIBROSIS 4 INDEX: FIB4 SCORE: 2.26

## 2025-02-14 ASSESSMENT — PAIN DESCRIPTION - PAIN TYPE: TYPE: ACUTE PAIN

## 2025-02-14 NOTE — PROGRESS NOTES
"Pharmacy Chemotherapy Calculation:    DX: Rectal adenocarcinoma   Cycle 8  Previous treatment = C7 1/31/24    Regimen: mFOLFIRINOX  *Dosing Reference*  Oxaliplatin  IV over 2 hours on Day 1   1/3/25 - Dose reduced to 65 mg/m2 for tolerance starting with C5  Leucovorin 400 mg/m2 IV over 2 hours on Day 1  Irinotecan 150 mg/m2 IV over 30-90 minutes on Day 1  Fluorouracil 1200 mg/m2 IV continuous infusion daily on Days 1-2 (2400 mg/m2 IV over 48 hours)  14-day cycle for 6-8 cycles (total neoadjuvant therapy) or until disease progression or unacceptable toxicity (advanced or metastatic)  NCCN Guidelines for Rectal Cancer V.1.2024.  Jim J, et al. Clin Colorectal Cancer. 2019;18(1):e69-e73.  Awa WEST, et al. Beena Surg Oncol. 2013;20(13):4289-97.    Allergies: Patient has no known allergies.   BP (!) 150/86   Pulse 76   Temp 36.1 °C (97 °F) (Temporal)   Resp 18   Ht 1.71 m (5' 7.32\")   Wt 80 kg (176 lb 5.9 oz)   SpO2 100%   BMI 27.36 kg/m²   Body surface area is 1.95 meters squared.    Labs 2/10/25  ANC~ 2130 Plt = 175k   Hgb = 11.6     SCr = 0.89 mg/dL CrCl ~ 100.4 mL/min   LFT's = 44/43/107 TBili = 0.3     Irinotecan 150 mg/m2 x 1.95 m2 = 292.5 mg   <10% difference, okay to treat with final dose = 295.6 mg IV    Oxaliplatin 65 mg/m2 x 1.95 m2 = 126.75 mg   <10% difference, okay to treat with final dose = 130 mg IV    Leucovorin 400 mg/m2 x 1.95 m2 = 780 mg   <10% difference, okay to treat with final dose = 790 mg IV    Fluorouracil 2400 mg/m2 x 1.95 m2 = 4680 mg   <10% difference, okay to treat with final dose = 4730 mg IV over 48 hours @ 2 mL/hour    Tadeo Lake, PharmD    "

## 2025-02-14 NOTE — PROGRESS NOTES
Chemotherapy Verification - SECONDARY RN       Height = 171 cm  Weight = 80 kg  BSA = 1.95 m^2       Medication: Irinotecan  Dose: 150 mg/m^2  Calculated Dose: 292.5 mg                             (In mg/m2, AUC, mg/kg)     Medication: Oxaliplatin  Dose: 65 mg/m^2  Calculated Dose: 126.75 mg                             (In mg/m2, AUC, mg/kg)    Medication: Fluorouracil CADD pump  Dose: 2,400 mg/m^2  Calculated Dose: 4,680 mg infused over 48 hours                             (In mg/m2, AUC, mg/kg)    I confirm that this process was performed independently.

## 2025-02-14 NOTE — PROGRESS NOTES
Booker presents to infusion for cycle 8/ day 1 of Folfirinox. Pt denies having any new or acute complaints reports some nausea with treatment. Advised can give PRN meds if requested.  Port accessed using sterile technique, flushed with NS with positive blood return and labs drawn off of port.    Labs reviewed by RN, within parameters for treatment today.     Pre-treatment meds given as ordered. Booker declined Atropine.     Irinotecan infused over 90 minutes. Oxaliplatin given with concurrent leucovorin over 2 hours. PRN ondansetron given, otherwise Booker tolerated well with no adverse effects noted.     Port flushed post infusion with positive blood return and patient connected to 48H 5-FU CADD pump. Pump settings confirmed and ensured all clamps were open and pump running prior to patient departure.     Booker left unit in stable condition, knows when to return for CADD pump disconnect. Booker rang maldonado today as today is last scheduled treatment.

## 2025-02-14 NOTE — PROGRESS NOTES
Chemotherapy Verification - PRIMARY RN      Height = 1.71 m  Weight = 80 kg  BSA = 1.95 m2       Medication: irinotecan  Dose: 150 mg/m2  Calculated Dose: 292.5 mg.                              (In mg/m2, AUC, mg/kg)     Medication: oxaliplatin  Dose: 65 mg/m2  Calculated Dose: 126.75 mg.                              (In mg/m2, AUC, mg/kg)    Medication: fluorouracil  Dose: 2,400 mg/m2  Calculated Dose: 4,680 mg.                              (In mg/m2, AUC, mg/kg)        I confirm this process was performed independently with the BSA and all final chemotherapy dosing calculations congruent.  Any discrepancies of 10% or greater have been addressed with the chemotherapy pharmacist. The resolution of the discrepancy has been documented in the EPIC progress notes.

## 2025-02-16 ENCOUNTER — OUTPATIENT INFUSION SERVICES (OUTPATIENT)
Dept: ONCOLOGY | Facility: MEDICAL CENTER | Age: 60
End: 2025-02-16
Attending: STUDENT IN AN ORGANIZED HEALTH CARE EDUCATION/TRAINING PROGRAM
Payer: COMMERCIAL

## 2025-02-16 VITALS
HEIGHT: 67 IN | SYSTOLIC BLOOD PRESSURE: 133 MMHG | TEMPERATURE: 97 F | RESPIRATION RATE: 18 BRPM | BODY MASS INDEX: 27.13 KG/M2 | DIASTOLIC BLOOD PRESSURE: 71 MMHG | OXYGEN SATURATION: 97 % | HEART RATE: 68 BPM | WEIGHT: 172.84 LBS

## 2025-02-16 DIAGNOSIS — C20 RECTAL CANCER (HCC): ICD-10-CM

## 2025-02-16 PROCEDURE — 96523 IRRIG DRUG DELIVERY DEVICE: CPT

## 2025-02-16 ASSESSMENT — FIBROSIS 4 INDEX: FIB4 SCORE: 2.26

## 2025-02-16 ASSESSMENT — PAIN DESCRIPTION - PAIN TYPE: TYPE: ACUTE PAIN

## 2025-02-17 NOTE — PROGRESS NOTES
Pt arrived ambulatory to [facility] for 5FU CADD pump disconnect. Plan of care reviewed, assessment completed, patient reports fatigue.   CADD pump reads 0.0 mls remaining, CADD pump clamped, stopped, and disconnected from patient. Port flushed per protocol, positive blood return noted. Port de-accessed, site dressed with gauze and tape. Patient knows how to contact scheduling for any future appointments. Patient discharged home in [facility].

## 2025-02-18 ENCOUNTER — HOSPITAL ENCOUNTER (OUTPATIENT)
Dept: RADIATION ONCOLOGY | Facility: MEDICAL CENTER | Age: 60
End: 2025-02-18

## 2025-02-18 ENCOUNTER — HOSPITAL ENCOUNTER (OUTPATIENT)
Dept: RADIATION ONCOLOGY | Facility: MEDICAL CENTER | Age: 60
End: 2025-02-18
Attending: RADIOLOGY
Payer: COMMERCIAL

## 2025-02-18 VITALS
BODY MASS INDEX: 26.47 KG/M2 | RESPIRATION RATE: 16 BRPM | DIASTOLIC BLOOD PRESSURE: 76 MMHG | HEART RATE: 105 BPM | WEIGHT: 170.64 LBS | TEMPERATURE: 97.3 F | SYSTOLIC BLOOD PRESSURE: 130 MMHG | OXYGEN SATURATION: 98 %

## 2025-02-18 PROCEDURE — 77470 SPECIAL RADIATION TREATMENT: CPT | Performed by: RADIOLOGY

## 2025-02-18 PROCEDURE — 99214 OFFICE O/P EST MOD 30 MIN: CPT | Mod: 25 | Performed by: RADIOLOGY

## 2025-02-18 PROCEDURE — 77290 THER RAD SIMULAJ FIELD CPLX: CPT | Performed by: RADIOLOGY

## 2025-02-18 PROCEDURE — 77334 RADIATION TREATMENT AID(S): CPT | Performed by: RADIOLOGY

## 2025-02-18 ASSESSMENT — FIBROSIS 4 INDEX: FIB4 SCORE: 2.26

## 2025-02-18 ASSESSMENT — PAIN SCALES - GENERAL: PAINLEVEL_OUTOF10: 5=MODERATE PAIN

## 2025-02-18 ASSESSMENT — LIFESTYLE VARIABLES
TOBACCO_USE: NO
SMOKING_STATUS: NO

## 2025-02-18 NOTE — PROGRESS NOTES
Patient was seen today in clinic with Dr. Huerta for follow-up.  Vitals signs and weight were obtained and pain assessment was completed.  Allergies and medications were reviewed with the patient.       Vitals/Pain:  Vitals:    02/18/25 1403   BP: 130/76   BP Location: Left arm   Patient Position: Sitting   BP Cuff Size: Adult   Pulse: (!) 105   Resp: 16   Temp: 36.3 °C (97.3 °F)   SpO2: 98%   Weight: 77.4 kg (170 lb 10.2 oz)   Pain Score: 5=Moderate Pain        Allergies:   Patient has no known allergies.    Current Medications:  Current Outpatient Medications   Medication Sig Dispense Refill    oxyCODONE immediate release (ROXICODONE) 10 MG immediate release tablet Take 1 Tablet by mouth every 6 hours as needed for Moderate Pain for up to 30 days. 90 Tablet 0    aspirin 81 MG EC tablet tablet      baclofen (LIORESAL) 5 MG Tab Take 1 tablet by mouth every 8 hours as needed for hiccups related to chemotherapy treatment. 30 Tablet 1    pantoprazole (PROTONIX) 40 MG Tablet Delayed Response Take 1 Tablet by mouth every day. 30 Tablet 3    tamsulosin (FLOMAX) 0.4 MG capsule Take 1 Capsule by mouth every day. 30 Capsule 0    ondansetron (ZOFRAN ODT) 4 MG TABLET DISPERSIBLE Take 1 Tablet by mouth every four hours as needed for Nausea/Vomiting. 30 Tablet 3    polyethylene glycol/lytes (MIRALAX) Pack Take 17 g by mouth 1 time a day as needed.      melatonin 3 MG Tab Take 3 mg by mouth at bedtime.      naproxen (ALEVE) 220 MG tablet Take 220 mg by mouth 2 times a day as needed (pain).      atorvastatin (LIPITOR) 40 MG Tab Take 40 mg by mouth every day.      carvedilol (COREG) 12.5 MG Tab Take 12.5 mg by mouth 2 times a day.      citalopram (CELEXA) 20 MG Tab Take 20 mg by mouth every day.      hydrOXYzine HCl (ATARAX) 25 MG Tab Take 25 mg by mouth every 8 hours as needed for Anxiety.      ondansetron (ZOFRAN) 4 MG Tab tablet Take 1 Tablet by mouth every four hours as needed for Nausea/Vomiting (for nausea, vomiting). 30  Tablet 6    prochlorperazine (COMPAZINE) 10 MG Tab Take 1 Tablet by mouth every 6 hours as needed (for nausea, vomiting). 30 Tablet 6    OLANZapine (ZYPREXA) 5 MG Tab Take 1 Tablet by mouth every evening. 30 Tablet 6    loperamide (IMODIUM A-D) 2 MG tablet Take 1 Tablet by mouth see administration instructions. 30 Tablet 6    capecitabine (XELODA) 500 MG tablet Take 3 tabs of 500mg strength (total dose of 1500 mg) by mouth 2 times a day (at least 12 hrs apart and 30 min after food) Monday-Friday on days of radiation x 30 treatments 180 Tablet 0     No current facility-administered medications for this encounter.           Monika Mendoza R.N.

## 2025-02-18 NOTE — PROGRESS NOTES
RADIATION ONCOLOGY FOLLOW-UP    Patient name:  Booker Saucedo    Primary Physician:  Rickie Naranjo M.D. MRN: [id number]  [facility]: [id number]   Referring physician:  Hector Tse M.D.  : 1965, 59 y.o.     DATE OF SERVICE: 2025    IDENTIFICATION:   A 59 y.o. male with    Rectal cancer (HCC)  Staging form: Colon and Rectum, AJCC 8th Edition  - Clinical stage from 10/2/2024: Stage PATO (cT4a, cN2a, cM1a) - Signed by Jason Huerta M.D. on 10/16/2024  Histologic grade (G): G2  Histologic grading system: 4 grade system    HISTORY OF PRESENT ILLNESS:  Patient is a pleasant 59-year-old male who presented with lower back pain since July and started to have GI symptoms had CT abdomen pelvis to rule out kidney stone 2024 which showed right hydronephrosis obstructed 1 mm calculus at right ureteral vesicular junction. Rectal wall thickening and mildly enlarged perirectal lymph nodes 7 mm. Underwent colonoscopy 2024 [facility] who found fungating circumferential mass in the rectum at 5 cm from the anus partially obstructing with biopsy showing invasive adenocarcinoma moderately differentiated MMR pending. Patient had CT chest abdomen pelvis 2024 which showed segmental area of distal rectal wall thickening and mildly prominent perirectal lymph nodes. Indeterminate right hepatic lobe lesion. Patient was seen by Dr. Tse who had recommended MRI rectal protocol CEA middle and right referral. Currently patient having thin appearing stools occasional blood with alternating diarrhea constipation symptoms.      10/16/24  Patient had MRI rectal protocol 2024 which showed T4a N2 mid upper rectal mass starting peritoneal reflection with multiple perirectal lymph nodes and tumor deposits.  MRI liver 2024 showed peripheral enhancing lesion in the medial segment left lobe of liver superiorly measuring 15 mm concerning for  metastasis.  PET/CT October 9, 2024 showed large hypermetabolic rectal mass consistent with known malignancy local involvement of 2 perirectal presacral lymph nodes no evidence of distant metastatic disease.  No PET correlate for peripheral enhancing lesion seen in the left lobe liver cyst favoring benign etiology.  Patient overall feeling well.    INTERVAL HISTORY:  Patient completed 8 cycles of modified FOLFIRINOX doing well but does have some expected nausea and cold intolerance.    PROBLEM LIST:  Patient Active Problem List   Diagnosis    Rectal cancer (HCC)    Cancer related pain    Drug induced constipation       CURRENT MEDICATIONS:  Current Outpatient Medications   Medication Sig Dispense Refill    oxyCODONE immediate release (ROXICODONE) 10 MG immediate release tablet Take 1 Tablet by mouth every 6 hours as needed for Moderate Pain for up to 30 days. 90 Tablet 0    aspirin 81 MG EC tablet tablet      baclofen (LIORESAL) 5 MG Tab Take 1 tablet by mouth every 8 hours as needed for hiccups related to chemotherapy treatment. 30 Tablet 1    pantoprazole (PROTONIX) 40 MG Tablet Delayed Response Take 1 Tablet by mouth every day. 30 Tablet 3    tamsulosin (FLOMAX) 0.4 MG capsule Take 1 Capsule by mouth every day. 30 Capsule 0    ondansetron (ZOFRAN ODT) 4 MG TABLET DISPERSIBLE Take 1 Tablet by mouth every four hours as needed for Nausea/Vomiting. 30 Tablet 3    polyethylene glycol/lytes (MIRALAX) Pack Take 17 g by mouth 1 time a day as needed.      melatonin 3 MG Tab Take 3 mg by mouth at bedtime.      naproxen (ALEVE) 220 MG tablet Take 220 mg by mouth 2 times a day as needed (pain).      atorvastatin (LIPITOR) 40 MG Tab Take 40 mg by mouth every day.      carvedilol (COREG) 12.5 MG Tab Take 12.5 mg by mouth 2 times a day.      citalopram (CELEXA) 20 MG Tab Take 20 mg by mouth every day.      hydrOXYzine HCl (ATARAX) 25 MG Tab Take 25 mg by mouth every 8 hours as needed for Anxiety.      ondansetron (ZOFRAN) 4 MG  Tab tablet Take 1 Tablet by mouth every four hours as needed for Nausea/Vomiting (for nausea, vomiting). 30 Tablet 6    prochlorperazine (COMPAZINE) 10 MG Tab Take 1 Tablet by mouth every 6 hours as needed (for nausea, vomiting). 30 Tablet 6    OLANZapine (ZYPREXA) 5 MG Tab Take 1 Tablet by mouth every evening. 30 Tablet 6    loperamide (IMODIUM A-D) 2 MG tablet Take 1 Tablet by mouth see administration instructions. 30 Tablet 6    capecitabine (XELODA) 500 MG tablet Take 3 tabs of 500mg strength (total dose of 1500 mg) by mouth 2 times a day (at least 12 hrs apart and 30 min after food) Monday-Friday on days of radiation x 30 treatments 180 Tablet 0     No current facility-administered medications for this encounter.       ALLERGIES:  Patient has no known allergies.    REVIEW OF SYSTEMS:    A complete review of system taken. Pertinent items in HPI.  All others negative.    PHYSICAL EXAM:  PERFORMANCE STATUS:      10/2/2024     1:29 PM   ECOG Performance Review   ECOG Performance Status Fully active, able to carry on all pre-disease performance without restriction         10/2/2024     1:29 PM   Karnofsky Score   Karnofsky Score 100     /76 (BP Location: Left arm, Patient Position: Sitting, BP Cuff Size: Adult)   Pulse (!) 105   Temp 36.3 °C (97.3 °F)   Resp 16   Wt 77.4 kg (170 lb 10.2 oz)   SpO2 98%   BMI 26.47 kg/m²   Physical Exam  Vitals reviewed.   Eyes:      Pupils: Pupils are equal, round, and reactive to light.   Cardiovascular:      Rate and Rhythm: Normal rate.   Pulmonary:      Effort: Pulmonary effort is normal.   Abdominal:      General: Abdomen is flat.   Musculoskeletal:         General: Normal range of motion.   Neurological:      General: No focal deficit present.      Mental Status: He is alert.   Psychiatric:         Mood and Affect: Mood normal.         LABORATORY DATA:   Lab Results   Component Value Date/Time    WBC 4.3 (L) 02/10/2025 07:23 AM    RBC 3.63 (L) 02/10/2025 07:23 AM     HEMOGLOBIN 11.6 (L) 02/10/2025 07:23 AM    HEMATOCRIT 32.5 (L) 02/10/2025 07:23 AM    MCV 89.5 02/10/2025 07:23 AM    MCH 32.0 02/10/2025 07:23 AM    MCHC 35.7 02/10/2025 07:23 AM    RDW 50.5 (H) 02/10/2025 07:23 AM    PLATELETCT 175 02/10/2025 07:23 AM    MPV 9.8 02/10/2025 07:23 AM    NEUTSPOLYS 49.30 02/10/2025 07:23 AM    LYMPHOCYTES 30.10 02/10/2025 07:23 AM    MONOCYTES 17.60 (H) 02/10/2025 07:23 AM    EOSINOPHILS 2.30 02/10/2025 07:23 AM    BASOPHILS 0.50 02/10/2025 07:23 AM      Lab Results   Component Value Date/Time    SODIUM 136 02/10/2025 07:23 AM    POTASSIUM 4.0 02/10/2025 07:23 AM    CHLORIDE 101 02/10/2025 07:23 AM    CO2 24 02/10/2025 07:23 AM    GLUCOSE 103 (H) 02/10/2025 07:23 AM    BUN 11 02/10/2025 07:23 AM    CREATININE 0.89 02/10/2025 07:23 AM         RADIOLOGY DATA:  No results found.    IMPRESSION:    A 59 y.o. with   Rectal cancer (HCC)  Staging form: Colon and Rectum, AJCC 8th Edition  - Clinical stage from 10/2/2024: Stage PATO (cT4a, cN2a, cM1a) - Signed by Jason Huerta M.D. on 10/16/2024  Histologic grade (G): G2  Histologic grading system: 4 grade system      CANCER STATUS:  No Evidence of Disease    RECOMMENDATIONS:   I discussed with patient that I would typically start radiation 3 to 4 weeks post last chemotherapy he is not a trip to [place] which I would not advise waiting a total 6 weeks post chemo.  I tentatively put him to start March 17 with concurrent Xeloda 54 Parrish in 30 fractions.  We discussed risk and benefits patient is amenable to treatment.  He will contact us after the airlines if he switches time of treatment.    Thank you for the opportunity to participate in his care.  If any questions or comments, please do not hesitate in calling.    No orders of the defined types were placed in this encounter.

## 2025-02-19 NOTE — RADIATION COMPLETION NOTES
Clinical Treatment Planning Note    DATE OF SERVICE: 2/18/2025    DIAGNOSIS:  Rectal cancer (HCC)  Staging form: Colon and Rectum, AJCC 8th Edition  - Clinical stage from 10/2/2024: Stage PATO (cT4a, cN2a, cM1a) - Signed by Jason Huerta M.D. on 10/16/2024  Histologic grade (G): G2  Histologic grading system: 4 grade system         IMAGING REVIEWED:  [] CT     [x] MRI     [] PET/CT     [] BONE SCAN     [] MAMMO     [] OTHER      TREATMENT INTENT:   [x] CURATIVE     [] MAINTENANCE     []  PALLIATIVE      []  SUPPORTIVE     []  PROPHYLACTIC     [] BENIGN     []  CONSOLIDATIVE      [] DEFINITIVE   []  OLOGIMETASTATIC      LINE OF TREATMENT:  [] ADJUVANT   [x] DEFINITIVE   [] NEOADJUVANT   [] RE-TREATMENT      TECHNIQUE PLANNED:  [x] IMRT   [] 3D   [] SBRT   [] SRS/SRT   [] HDR   [] ELECTRON       IMRT JUSTIFICATION:  []   An immediately adjacent area has been previously irradiated and abutting portals must be established with high precision.    []  Dose escalation is planned to deliver radiation doses in excess of those commonly utilized for similar tumors with conventional treatment.    [x]  The target volume is concave or convex, and the critical normal tissues are within or around that convexity or concavity.    []  The target volume is in close proximity to critical structures that must be protected.    []  The volume of interest must be covered with narrow margins to adequately protect  immediately adjacent structures.      FIELDS & BLOCKING:  [x] COMPLEX BLOCKS     []  = 3 TX AREAS     []  ARCS     []  CUSTOM SHEILD        []  SIMPLE BLOCK      CHEMOTHERAPY:  [x]  CONCURRENT     []  INDUCTION     [] SEQUENTIAL     []  <30 DAYS FROM XRT      NOTES:  OAR CONSTRAINTS: (GUIDELINES ONLY NOT ABSOLUTE)   Target Prescribed Coverage   PTV 95% of PTV covered by 100% (cGy) of RX Dose     PTV 99% of PTV covered by 93% (cGy) of RX Dose       WOODY Goal   R Femoral Head Max Dose < 50Gy   L Femoral Head Max Dose < 50Gy    Individual Small Bowel Loops V15Gy < 120cc   Entire Cavity Small Bowel V45Gy < 195cc   Bladder V40Gy < 40%   Bladder V45Gy < 15%   *RTOG 0822, QUANTEC

## 2025-02-19 NOTE — RADIATION COMPLETION NOTES
DATE OF SERVICE: 2/18/2025    DIAGNOSIS:  Rectal cancer (HCC)  Staging form: Colon and Rectum, AJCC 8th Edition  - Clinical stage from 10/2/2024: Stage PATO (cT4a, cN2a, cM1a) - Signed by Jason Huerta M.D. on 10/16/2024  Histologic grade (G): G2  Histologic grading system: 4 grade system       DATE OF SERVICE: 2/18/2025    TYPE OF SIMULATION: Pelvis    GOAL OF TREATMENT:   [x] Curative  [] Palliative  [] Oligometastatic    CONTRAST:    [] IV Contrast*  [] Small Bowel  [] Rectal  [] Urethral              POSITION:    [x]  Supine  [] Prone with belly board    COMPLEX:  [x] Complex Blocking   [x]Arcs  [] Custom Blocks  [] >3 Sites    PROCEDURE: Patient positioned on CT table in Vac-Chavez immobilization device with or without belly board depending on position. CT acquired thorough the entire volume of interest.  Images reviewed and exported to treatment planning system.    I have personally reviewed the relevant data, performed the target localization, and determined all relevant factors for this patient’s simulation.    *Omnipaque 80 -100cc IVP in conjunction with 500cc NS

## 2025-02-19 NOTE — RADIATION COMPLETION NOTES
PATIENT NAME Booker Saucedo   PRIMARY PHYSICIAN Rickie Naranjo [phone]   REFERRING PHYSICIAN No ref. provider found 1965       DATE OF SERVICE: 2/18/2025    DIAGNOSIS:  Rectal cancer (HCC)  Staging form: Colon and Rectum, AJCC 8th Edition  - Clinical stage from 10/2/2024: Stage PATO (cT4a, cN2a, cM1a) - Signed by Jason Huerta M.D. on 10/16/2024  Histologic grade (G): G2  Histologic grading system: 4 grade system       SPECIAL TREATMENT PROCEDURE NOTE:  Considerable additional effort required in the management of this case because of administration of concurrent xeloda chemotherapy which may result in increased normal tissue toxicity. This includes longer and possibly more frequent on treatment visits.

## 2025-02-21 ENCOUNTER — PATIENT OUTREACH (OUTPATIENT)
Dept: ONCOLOGY | Facility: MEDICAL CENTER | Age: 60
End: 2025-02-21
Payer: COMMERCIAL

## 2025-02-21 NOTE — PROGRESS NOTES
"On February 21, 2025, , An Salguero, emailed pt. regarding his upcoming appointments.       \"Good afternoon Booker,     I hope you have had a great week. I see that you have radiation coming up for several weeks starting March 17th, 2025,- April 25, 2025. Do you want us to work on booking a room for you at the [facility]?    We do not pay for weekends so the alana would go Monday-Friday (check out).     Please let me know your decision.     Thank you!  An Salguero LCSW, MSW  Outpatient Oncology Social Worker  [address] [place] [address]  [id number]  Mailstop: L-11  P: [phone]  F: [phone]  [email or web address]  Read Our Annual Report, Growing With Our Community\"  "

## 2025-02-24 NOTE — ADDENDUM NOTE
Encounter addended by: Aida Wild on: 2/24/2025 10:55 AM   Actions taken: Charge Capture section accepted

## 2025-02-28 DIAGNOSIS — C20 RECTAL CANCER (HCC): ICD-10-CM

## 2025-02-28 DIAGNOSIS — G89.3 CANCER RELATED PAIN: ICD-10-CM

## 2025-02-28 RX ORDER — OXYCODONE HYDROCHLORIDE 10 MG/1
10 TABLET ORAL EVERY 6 HOURS PRN
Qty: 90 TABLET | Refills: 0 | Status: SHIPPED | OUTPATIENT
Start: 2025-02-28 | End: 2025-03-30

## 2025-03-03 ENCOUNTER — HOSPITAL ENCOUNTER (OUTPATIENT)
Dept: HEMATOLOGY ONCOLOGY | Facility: MEDICAL CENTER | Age: 60
End: 2025-03-03
Attending: FAMILY MEDICINE
Payer: COMMERCIAL

## 2025-03-03 ENCOUNTER — HOSPITAL ENCOUNTER (OUTPATIENT)
Dept: RADIATION ONCOLOGY | Facility: MEDICAL CENTER | Age: 60
End: 2025-03-03
Attending: RADIOLOGY
Payer: COMMERCIAL

## 2025-03-03 DIAGNOSIS — C20 RECTAL CANCER (HCC): ICD-10-CM

## 2025-03-03 DIAGNOSIS — G89.3 CANCER RELATED PAIN: ICD-10-CM

## 2025-03-03 PROCEDURE — 99212 OFFICE O/P EST SF 10 MIN: CPT | Performed by: FAMILY MEDICINE

## 2025-03-03 PROCEDURE — 99213 OFFICE O/P EST LOW 20 MIN: CPT | Mod: 95 | Performed by: FAMILY MEDICINE

## 2025-03-04 ENCOUNTER — PHARMACY VISIT (OUTPATIENT)
Dept: PHARMACY | Facility: MEDICAL CENTER | Age: 60
End: 2025-03-04
Payer: MEDICARE

## 2025-03-04 PROCEDURE — RXMED WILLOW AMBULATORY MEDICATION CHARGE: Performed by: FAMILY MEDICINE

## 2025-03-10 ASSESSMENT — ENCOUNTER SYMPTOMS
CHILLS: 0
COUGH: 0
PALPITATIONS: 0
CONSTIPATION: 0
VOMITING: 0
ABDOMINAL PAIN: 1
FEVER: 0
DIARRHEA: 0
NAUSEA: 0
SHORTNESS OF BREATH: 0

## 2025-03-10 NOTE — ADDENDUM NOTE
Encounter addended by: Aida Wild on: 3/10/2025 7:56 AM   Actions taken: Charge Capture section accepted

## 2025-03-10 NOTE — PROGRESS NOTES
Virtual Visit: Established Patient   This visit was conducted via Teams using secure and encrypted videoconferencing technology.   The patient was in their home in the [facility].    The patient's identity was confirmed and verbal consent was obtained for this virtual visit.     Subjective:   CC:   Chief Complaint   Patient presents with    Cancer      6 wk FV/ rectal cancer / palliative care/       Booker Oral Saucedo is a 59 y.o. male presenting to palliative care for follow-up on symptom management for oncological pain.  Patient since last visit has started his chemoradiation for his colorectal cancer.  He does continue struggle with lower abdominal pain at times.  Does have oxycodone 10 mg as needed for breakthrough.  Is looking forward to a trip once he completes his radiation.  Does not endorse any significant nausea vomiting or constipation or diarrhea.  Concluded visit with a plan to follow-up prior to his trip if he is in need of further symptom evaluation for travel.    ROS   Review of Systems   Constitutional:  Negative for chills and fever.   Respiratory:  Negative for cough and shortness of breath.    Cardiovascular:  Negative for chest pain and palpitations.   Gastrointestinal:  Positive for abdominal pain. Negative for constipation, diarrhea, nausea and vomiting.         Current medicines (including changes today)  Current Outpatient Medications   Medication Sig Dispense Refill    oxyCODONE immediate release (ROXICODONE) 10 MG immediate release tablet Take 1 Tablet by mouth every 6 hours as needed for Moderate Pain for up to 30 days. 90 Tablet 0    capecitabine (XELODA) 500 MG tablet Take 3 tabs of 500mg strength (total dose of 1500 mg) by mouth 2 times a day (at least 12 hrs apart and 30 min after food) Monday-Friday on days of radiation x 30 treatments 180 Tablet 0    aspirin 81 MG EC tablet tablet      baclofen (LIORESAL) 5 MG Tab Take 1 tablet by mouth every 8 hours as needed for hiccups  related to chemotherapy treatment. 30 Tablet 1    pantoprazole (PROTONIX) 40 MG Tablet Delayed Response Take 1 Tablet by mouth every day. 30 Tablet 3    tamsulosin (FLOMAX) 0.4 MG capsule Take 1 Capsule by mouth every day. 30 Capsule 0    ondansetron (ZOFRAN ODT) 4 MG TABLET DISPERSIBLE Take 1 Tablet by mouth every four hours as needed for Nausea/Vomiting. 30 Tablet 3    polyethylene glycol/lytes (MIRALAX) Pack Take 17 g by mouth 1 time a day as needed.      melatonin 3 MG Tab Take 3 mg by mouth at bedtime.      naproxen (ALEVE) 220 MG tablet Take 220 mg by mouth 2 times a day as needed (pain).      atorvastatin (LIPITOR) 40 MG Tab Take 40 mg by mouth every day.      carvedilol (COREG) 12.5 MG Tab Take 12.5 mg by mouth 2 times a day.      citalopram (CELEXA) 20 MG Tab Take 20 mg by mouth every day.      hydrOXYzine HCl (ATARAX) 25 MG Tab Take 25 mg by mouth every 8 hours as needed for Anxiety.      ondansetron (ZOFRAN) 4 MG Tab tablet Take 1 Tablet by mouth every four hours as needed for Nausea/Vomiting (for nausea, vomiting). 30 Tablet 6    prochlorperazine (COMPAZINE) 10 MG Tab Take 1 Tablet by mouth every 6 hours as needed (for nausea, vomiting). 30 Tablet 6    OLANZapine (ZYPREXA) 5 MG Tab Take 1 Tablet by mouth every evening. 30 Tablet 6    loperamide (IMODIUM A-D) 2 MG tablet Take 1 Tablet by mouth see administration instructions. 30 Tablet 6     No current facility-administered medications for this encounter.       Patient Active Problem List    Diagnosis Date Noted    Drug induced constipation 02/10/2025    Cancer related pain 12/19/2024    Rectal cancer (HCC) 10/02/2024        Objective:   There were no vitals taken for this visit.    Physical Exam:  Constitutional: Alert, no distress, well-groomed.  Skin: No rashes in visible areas.  Eye: Round. Conjunctiva clear, lids normal. No icterus.   ENMT: Lips pink without lesions, good dentition, moist mucous membranes. Phonation normal.  Neck: No masses, no  thyromegaly. Moves freely without pain.  Respiratory: Unlabored respiratory effort, no cough or audible wheeze  Psych: Alert and oriented x3, normal affect and mood.     Assessment and Plan:   The following treatment plan was discussed: Patient with known colorectal cancer.  Is now completing chemoradiation.  Is still continuing to experience abdominal pain and has oxycodone 10 mg for breakthrough.  Is not in need of refills today.  PDMP reviewed.  Does have upcoming trip out of the country.  Did advise he could follow-up if he needed additional symptom management medications to take while he travels.    1. Cancer related pain    2. Rectal cancer (HCC)    28 minutes in total spent on patient encounter including chart review and consultation with patient's oncological providers.    Follow-up: Return if symptoms worsen or fail to improve.

## 2025-03-12 ENCOUNTER — PATIENT OUTREACH (OUTPATIENT)
Dept: ONCOLOGY | Facility: MEDICAL CENTER | Age: 60
End: 2025-03-12
Payer: COMMERCIAL

## 2025-03-12 NOTE — PROGRESS NOTES
On March 12, 2025, Oncology Social Worker Kathy Tse processed pt.'s [facility] [place] [facility] [employer] application request.  Pt. will be covered for a total of 24 nights.      3/17/2025-3/21/2025-[facility] #[id number] -4 nights  3/24/2025[facility]28[id number]  3/31/2025-4/4/2025[id number]  4/7/2025-4/11/2025[id number]  4/14/2025-4/18/2025[id number]  4/21/2025-4/25/2025[id number]

## 2025-03-13 PROCEDURE — 77338 DESIGN MLC DEVICE FOR IMRT: CPT | Mod: 26 | Performed by: RADIOLOGY

## 2025-03-13 PROCEDURE — 77300 RADIATION THERAPY DOSE PLAN: CPT | Mod: 26 | Performed by: RADIOLOGY

## 2025-03-13 PROCEDURE — 77301 RADIOTHERAPY DOSE PLAN IMRT: CPT | Mod: 26 | Performed by: RADIOLOGY

## 2025-03-13 PROCEDURE — 77301 RADIOTHERAPY DOSE PLAN IMRT: CPT | Performed by: RADIOLOGY

## 2025-03-13 PROCEDURE — 77300 RADIATION THERAPY DOSE PLAN: CPT | Performed by: RADIOLOGY

## 2025-03-13 PROCEDURE — 77338 DESIGN MLC DEVICE FOR IMRT: CPT | Performed by: RADIOLOGY

## 2025-03-18 ENCOUNTER — HOSPITAL ENCOUNTER (OUTPATIENT)
Dept: HEMATOLOGY ONCOLOGY | Facility: MEDICAL CENTER | Age: 60
End: 2025-03-18
Attending: FAMILY MEDICINE
Payer: COMMERCIAL

## 2025-03-18 ENCOUNTER — PHARMACY VISIT (OUTPATIENT)
Dept: PHARMACY | Facility: MEDICAL CENTER | Age: 60
End: 2025-03-18
Payer: MEDICARE

## 2025-03-18 ENCOUNTER — TELEPHONE (OUTPATIENT)
Dept: RADIATION ONCOLOGY | Facility: MEDICAL CENTER | Age: 60
End: 2025-03-18
Payer: COMMERCIAL

## 2025-03-18 ENCOUNTER — HOSPITAL ENCOUNTER (OUTPATIENT)
Dept: RADIATION ONCOLOGY | Facility: MEDICAL CENTER | Age: 60
End: 2025-03-18

## 2025-03-18 VITALS
WEIGHT: 174 LBS | TEMPERATURE: 96.2 F | HEIGHT: 67 IN | HEART RATE: 63 BPM | BODY MASS INDEX: 27.31 KG/M2 | OXYGEN SATURATION: 98 %

## 2025-03-18 DIAGNOSIS — G89.3 CANCER RELATED PAIN: ICD-10-CM

## 2025-03-18 DIAGNOSIS — C20 RECTAL CANCER (HCC): ICD-10-CM

## 2025-03-18 LAB

## 2025-03-18 PROCEDURE — 99213 OFFICE O/P EST LOW 20 MIN: CPT | Performed by: FAMILY MEDICINE

## 2025-03-18 PROCEDURE — 77280 THER RAD SIMULAJ FIELD SMPL: CPT | Performed by: RADIOLOGY

## 2025-03-18 PROCEDURE — RXMED WILLOW AMBULATORY MEDICATION CHARGE: Performed by: FAMILY MEDICINE

## 2025-03-18 PROCEDURE — 99212 OFFICE O/P EST SF 10 MIN: CPT | Performed by: FAMILY MEDICINE

## 2025-03-18 PROCEDURE — 77386 HCHG IMRT DELIVERY COMPLEX: CPT | Performed by: RADIOLOGY

## 2025-03-18 RX ORDER — OXYCODONE HCL 20 MG/1
20 TABLET, FILM COATED, EXTENDED RELEASE ORAL EVERY 12 HOURS
Qty: 60 TABLET | Refills: 0 | Status: SHIPPED | OUTPATIENT
Start: 2025-03-18 | End: 2025-03-18

## 2025-03-18 RX ORDER — GABAPENTIN 100 MG/1
100 CAPSULE ORAL 3 TIMES DAILY
Qty: 45 CAPSULE | Refills: 0 | Status: SHIPPED | OUTPATIENT
Start: 2025-03-18 | End: 2025-04-02

## 2025-03-18 RX ORDER — MORPHINE SULFATE 15 MG/1
15 TABLET, FILM COATED, EXTENDED RELEASE ORAL EVERY 12 HOURS
Qty: 30 TABLET | Refills: 0 | Status: SHIPPED | OUTPATIENT
Start: 2025-03-18 | End: 2025-03-26 | Stop reason: SDUPTHER

## 2025-03-18 ASSESSMENT — FIBROSIS 4 INDEX: FIB4 SCORE: 2.26

## 2025-03-18 NOTE — PROCEDURES
DATE OF SERVICE: 3/18/2025    Radiation Therapy Episodes       Active Episodes       Radiation Therapy: IMRT (10/21/2024)                   Radiation Treatments         Plan Last Treated On Elapsed Days Fractions Treated Prescribed Fraction Dose (cGy) Prescribed Total Dose (cGy)    Rectum 3/18/2025 0 @ 03/18/2025 1 of 25 180 4,500                  Reference Point Last Treated On Elapsed Days Most Recent Session Dose (cGy) Total Dose (cGy)    Rectum 3/18/2025 0 @ 03/18/2025 180 180                            First Visit Simple Simulation: Called by Truebeam machine to verify treatment parameters including:  treatment site, treatment dose, and treatment setup prior to first treatment. Image derived shifts reviewed in all appropriate planes.  Shifts approved.  Patient treated.    I have personally reviewed the relevant data, performed the target localization, and determined all relevant factors for this patient’s simulation.

## 2025-03-18 NOTE — TELEPHONE ENCOUNTER
Nutrition Services: Telephone Encounter   Booker Saucedo is a 59 y.o. male with diagnosis of Rectal cancer     RD called for nutrition check-in, as pt started radiation therapy with Dr. Huerta. RD previously connected with pt while undergoing chemotherapy. Pt did not answer, though RD able to leave a brief voice message with re-introduction and contact information for follow-up as desired.     RD available PRN for consult  [phone]

## 2025-03-19 ENCOUNTER — HOSPITAL ENCOUNTER (OUTPATIENT)
Dept: RADIATION ONCOLOGY | Facility: MEDICAL CENTER | Age: 60
End: 2025-03-19
Payer: COMMERCIAL

## 2025-03-19 ENCOUNTER — HOSPITAL ENCOUNTER (OUTPATIENT)
Dept: RADIATION ONCOLOGY | Facility: MEDICAL CENTER | Age: 60
End: 2025-03-19
Attending: RADIOLOGY
Payer: COMMERCIAL

## 2025-03-19 VITALS
HEART RATE: 74 BPM | SYSTOLIC BLOOD PRESSURE: 112 MMHG | DIASTOLIC BLOOD PRESSURE: 64 MMHG | TEMPERATURE: 98.1 F | WEIGHT: 175.04 LBS | BODY MASS INDEX: 27.15 KG/M2 | RESPIRATION RATE: 18 BRPM | OXYGEN SATURATION: 95 %

## 2025-03-19 LAB

## 2025-03-19 PROCEDURE — 77386 HCHG IMRT DELIVERY COMPLEX: CPT | Performed by: RADIOLOGY

## 2025-03-19 PROCEDURE — 77014 PR CT GUIDANCE PLACEMENT RAD THERAPY FIELDS: CPT | Mod: 26 | Performed by: RADIOLOGY

## 2025-03-19 ASSESSMENT — PAIN SCALES - GENERAL: PAINLEVEL_OUTOF10: 3=SLIGHT PAIN

## 2025-03-19 ASSESSMENT — FIBROSIS 4 INDEX: FIB4 SCORE: 2.26

## 2025-03-19 NOTE — ON TREATMENT VISIT
"ON TREATMENT  NOTE  RADIATION ONCOLOGY DEPARTMENT    Patient name:  Booker Saucedo    Primary Physician:  Rickie Naranjo M.D. MRN: [id number]  CSN: [id number]   Referring physician:  Hector Tse M.D.   : 1965, 59 y.o.     ENCOUNTER DATE:  3/19/2025      DIAGNOSIS:  Rectal cancer (HCC)  Staging form: Colon and Rectum, AJCC 8th Edition  - Clinical stage from 10/2/2024: Stage PATO (cT4a, cN2a, cM1a) - Signed by Jason Huerta M.D. on 10/16/2024  Histologic grade (G): G2  Histologic grading system: 4 grade system      TREATMENT SUMMARY:  Course First Treatment Date 2025  Course Last Treatment Date 2025  Radiation Treatments       Active   Plans   Rectum   Most recent treatment: Dose planned: 180 cGy (fraction 2 of 25 on 3/19/2025)   Total: Dose planned: 4,500 cGy   Elapsed Days: 1 @ 2025           Historical   No historical radiation treatments to show.                 SUBJECTIVE:  Rectal pain    VITAL SIGNS:      3/19/2025     7:52 AM 3/18/2025    11:26 AM 2025     2:03 PM 2025     4:10 PM 2025     9:13 AM 2/10/2025     8:43 AM 2/10/2025     7:38 AM   Vitals   SYSTOLIC 112  130 133 150 132 132   DIASTOLIC 64  76 71 86 77 77   Pulse 74 63 105 68 76 74 74   Temperature 36.7 °C (98.1 °F) 35.7 °C (96.2 °F) 36.3 °C (97.3 °F) 36.1 °C (97 °F) 36.1 °C (97 °F) 35.8 °C (96.5 °F) 35.8 °C (96.5 °F)   Respiration 18  16 18 18  16   Weight 175.05 174 170.64 172.84 176.37 176 176   Height  1.71 m (5' 7.32\")  1.71 m (5' .32\") 1.71 m (5' 7.32\") 1.71 m (5' 7.32\")    BMI 27.15 kg/m2 26.99 kg/m2 26.47 kg/m2 26.81 kg/m2 27.36 kg/m2 27.3 kg/m2 27.3 kg/m2   Pulse Oximetry 95 % 98 % 98 % 97 % 100 % 97 % 97 %     KPS: 100, Fully active, able to carry on all pre-disease performed without restriction (ECOG equivalent 0)  Encounter Vitals  Temperature: 36.7 °C (98.1 °F)  Blood Pressure: 112/64  Pulse: 74  Respiration: 18  Pulse Oximetry: 95 %  Weight: 79.4 kg (175 lb 0.7 oz)  Weight " Source: Stand Up Scale  Pain Score: 3=Slight Pain      3/19/2025     7:52 AM 2/18/2025     2:03 PM 1/17/2025     7:54 AM 12/19/2024     8:29 AM 12/6/2024     7:57 AM 10/16/2024     8:13 AM   Pain Assessment   Pain Score 3=SLIGHT JESS 5=MODERATE P NO PAIN 6=MODERATE P 2=MINIMAL PA 2=MINIMAL PA   Pain Loc ABDOMEN RECTUM  ABDOMEN ABDOMEN --          PHYSICAL EXAM:  Physical Exam         3/19/2025     7:53 AM   Toxicity Assessment   Toxicity Assessment Male Pelvis   Fatigue (lethargy, malaise, asthenia) None   Radiation Dermatitis None   Anorexia Loss of appetite   Colitis Abdominal pain with mucus and/or blood in stool   Constipation Requiring stool softener or dietary modification   Dehydration None   Diarrhea w/o Colostomy None   Flatulence None   Nausea None   Proctitis None   Vomiting None   RT - Pain due to RT None   Tumor Pain (onset or exacerbation of tumor pain due to treatment) Moderate pain, pain or analgesics interfering with function, but not interfering with activities of daily living   Dysuria (painful urination) None   Urinary Frequency Normal   Urinary Urgency None   Bladder Spasms Absent   Incontinence None   Urinary Retention Hesitency or dribbling, but no significant residual urine and/or retention occuring during the immediate postoperative period       CURRENT MEDICATIONS:    Current Outpatient Medications:     morphine ER (MS CONTIN) 15 MG Tab CR tablet, Take 1 Tablet by mouth every 12 hours for 15 days., Disp: 30 Tablet, Rfl: 0    gabapentin (NEURONTIN) 100 MG Cap, Take 1 Capsule by mouth 3 times a day for 15 days., Disp: 45 Capsule, Rfl: 0    oxyCODONE immediate release (ROXICODONE) 10 MG immediate release tablet, Take 1 Tablet by mouth every 6 hours as needed for Moderate Pain for up to 30 days., Disp: 90 Tablet, Rfl: 0    capecitabine (XELODA) 500 MG tablet, Take 3 tabs of 500mg strength (total dose of 1500 mg) by mouth 2 times a day (at least 12 hrs apart and 30 min after food) Monday-Friday  on days of radiation x 30 treatments, Disp: 180 Tablet, Rfl: 0    aspirin 81 MG EC tablet, tablet, Disp: , Rfl:     baclofen (LIORESAL) 5 MG Tab, Take 1 tablet by mouth every 8 hours as needed for hiccups related to chemotherapy treatment., Disp: 30 Tablet, Rfl: 1    pantoprazole (PROTONIX) 40 MG Tablet Delayed Response, Take 1 Tablet by mouth every day., Disp: 30 Tablet, Rfl: 3    tamsulosin (FLOMAX) 0.4 MG capsule, Take 1 Capsule by mouth every day., Disp: 30 Capsule, Rfl: 0    ondansetron (ZOFRAN ODT) 4 MG TABLET DISPERSIBLE, Take 1 Tablet by mouth every four hours as needed for Nausea/Vomiting., Disp: 30 Tablet, Rfl: 3    polyethylene glycol/lytes (MIRALAX) Pack, Take 17 g by mouth 1 time a day as needed., Disp: , Rfl:     melatonin 3 MG Tab, Take 3 mg by mouth at bedtime., Disp: , Rfl:     naproxen (ALEVE) 220 MG tablet, Take 220 mg by mouth 2 times a day as needed (pain)., Disp: , Rfl:     atorvastatin (LIPITOR) 40 MG Tab, Take 40 mg by mouth every day., Disp: , Rfl:     carvedilol (COREG) 12.5 MG Tab, Take 12.5 mg by mouth 2 times a day., Disp: , Rfl:     citalopram (CELEXA) 20 MG Tab, Take 20 mg by mouth every day., Disp: , Rfl:     hydrOXYzine HCl (ATARAX) 25 MG Tab, Take 25 mg by mouth every 8 hours as needed for Anxiety., Disp: , Rfl:     ondansetron (ZOFRAN) 4 MG Tab tablet, Take 1 Tablet by mouth every four hours as needed for Nausea/Vomiting (for nausea, vomiting)., Disp: 30 Tablet, Rfl: 6    prochlorperazine (COMPAZINE) 10 MG Tab, Take 1 Tablet by mouth every 6 hours as needed (for nausea, vomiting)., Disp: 30 Tablet, Rfl: 6    OLANZapine (ZYPREXA) 5 MG Tab, Take 1 Tablet by mouth every evening., Disp: 30 Tablet, Rfl: 6    loperamide (IMODIUM A-D) 2 MG tablet, Take 1 Tablet by mouth see administration instructions., Disp: 30 Tablet, Rfl: 6    LABORATORY DATA:   Lab Results   Component Value Date/Time    SODIUM 136 02/10/2025 07:23 AM    POTASSIUM 4.0 02/10/2025 07:23 AM    CHLORIDE 101 02/10/2025  07:23 AM    CO2 24 02/10/2025 07:23 AM    GLUCOSE 103 (H) 02/10/2025 07:23 AM    BUN 11 02/10/2025 07:23 AM    CREATININE 0.89 02/10/2025 07:23 AM       Lab Results   Component Value Date/Time    WBC 4.3 (L) 02/10/2025 07:23 AM    RBC 3.63 (L) 02/10/2025 07:23 AM    HEMOGLOBIN 11.6 (L) 02/10/2025 07:23 AM    HEMATOCRIT 32.5 (L) 02/10/2025 07:23 AM    MCV 89.5 02/10/2025 07:23 AM    MCH 32.0 02/10/2025 07:23 AM    MCHC 35.7 02/10/2025 07:23 AM    PLATELETCT 175 02/10/2025 07:23 AM         RADIOLOGY DATA:  No results found.    IMPRESSION:  Cancer Staging   Rectal cancer (HCC)  Staging form: Colon and Rectum, AJCC 8th Edition  - Clinical stage from 10/2/2024: Stage PATO (cT4a, cN2a, cM1a) - Signed by Jason Huerta M.D. on 10/16/2024      PLAN:  No change in treatment plan    Disposition:  Treatment plan and imaging reviewed. Questions answered. Continue therapy outlined.     Jason Huerta M.D.    No orders of the defined types were placed in this encounter.

## 2025-03-20 ENCOUNTER — HOSPITAL ENCOUNTER (OUTPATIENT)
Dept: RADIATION ONCOLOGY | Facility: MEDICAL CENTER | Age: 60
End: 2025-03-20
Payer: COMMERCIAL

## 2025-03-20 LAB

## 2025-03-20 PROCEDURE — 77336 RADIATION PHYSICS CONSULT: CPT | Performed by: RADIOLOGY

## 2025-03-20 PROCEDURE — 77014 PR CT GUIDANCE PLACEMENT RAD THERAPY FIELDS: CPT | Mod: 26 | Performed by: RADIOLOGY

## 2025-03-20 PROCEDURE — 77386 HCHG IMRT DELIVERY COMPLEX: CPT | Performed by: RADIOLOGY

## 2025-03-21 ENCOUNTER — HOSPITAL ENCOUNTER (OUTPATIENT)
Dept: RADIATION ONCOLOGY | Facility: MEDICAL CENTER | Age: 60
End: 2025-03-21
Payer: COMMERCIAL

## 2025-03-21 LAB

## 2025-03-21 PROCEDURE — 77386 HCHG IMRT DELIVERY COMPLEX: CPT | Performed by: RADIOLOGY

## 2025-03-21 PROCEDURE — 77014 PR CT GUIDANCE PLACEMENT RAD THERAPY FIELDS: CPT | Mod: 26 | Performed by: RADIOLOGY

## 2025-03-24 ENCOUNTER — TELEPHONE (OUTPATIENT)
Dept: HEMATOLOGY ONCOLOGY | Facility: MEDICAL CENTER | Age: 60
End: 2025-03-24
Payer: COMMERCIAL

## 2025-03-24 ENCOUNTER — PHARMACY VISIT (OUTPATIENT)
Dept: PHARMACY | Facility: MEDICAL CENTER | Age: 60
End: 2025-03-24
Payer: MEDICARE

## 2025-03-24 ENCOUNTER — HOSPITAL ENCOUNTER (OUTPATIENT)
Dept: RADIATION ONCOLOGY | Facility: MEDICAL CENTER | Age: 60
End: 2025-03-24
Payer: COMMERCIAL

## 2025-03-24 DIAGNOSIS — C20 RECTAL CANCER (HCC): ICD-10-CM

## 2025-03-24 LAB

## 2025-03-24 PROCEDURE — RXMED WILLOW AMBULATORY MEDICATION CHARGE: Performed by: RADIOLOGY

## 2025-03-24 PROCEDURE — 77427 RADIATION TX MANAGEMENT X5: CPT | Performed by: RADIOLOGY

## 2025-03-24 PROCEDURE — 77014 PR CT GUIDANCE PLACEMENT RAD THERAPY FIELDS: CPT | Mod: 26 | Performed by: RADIOLOGY

## 2025-03-24 PROCEDURE — 77386 HCHG IMRT DELIVERY COMPLEX: CPT | Performed by: RADIOLOGY

## 2025-03-24 RX ORDER — DEXAMETHASONE 4 MG/1
4 TABLET ORAL 2 TIMES DAILY
Qty: 20 TABLET | Refills: 0 | Status: SHIPPED | OUTPATIENT
Start: 2025-03-24 | End: 2025-04-03

## 2025-03-24 ASSESSMENT — ENCOUNTER SYMPTOMS
CONSTIPATION: 0
VOMITING: 0
DIARRHEA: 0
FEVER: 0
NAUSEA: 0
COUGH: 0
CHILLS: 0
ABDOMINAL PAIN: 1
PALPITATIONS: 0
SHORTNESS OF BREATH: 0

## 2025-03-24 NOTE — PROGRESS NOTES
Subjective:     Chief Complaint   Patient presents with    Cancer     FV     Booker Saucedo is a 59 y.o. male presenting to palliative care for follow-up on symptom management for oncological pain.  Patient reports since last visit he did have his international trip to [place].  Over this time he has developed worsening lower rectal pain that is similar to the pain he had when he was first diagnosed.  He has been using his breakthrough opioids without significant improvement in his symptoms.  Through shared decision making he was open to a trial of long-acting opioids and due to insurance requirements will start morphine extended release 15 mg twice daily.  Does not endorse any specific constipation or diarrhea.  Is starting radiation.  Concluded the visit with the plan to follow-up in 2 weeks to reevaluate symptoms.    Problem   Cancer Related Pain        Current medicines (including changes today)  Current Outpatient Medications   Medication Sig Dispense Refill    morphine ER (MS CONTIN) 15 MG Tab CR tablet Take 1 Tablet by mouth every 12 hours for 15 days. 30 Tablet 0    gabapentin (NEURONTIN) 100 MG Cap Take 1 Capsule by mouth 3 times a day for 15 days. 45 Capsule 0    oxyCODONE immediate release (ROXICODONE) 10 MG immediate release tablet Take 1 Tablet by mouth every 6 hours as needed for Moderate Pain for up to 30 days. 90 Tablet 0    capecitabine (XELODA) 500 MG tablet Take 3 tabs of 500mg strength (total dose of 1500 mg) by mouth 2 times a day (at least 12 hrs apart and 30 min after food) Monday-Friday on days of radiation x 30 treatments 180 Tablet 0    aspirin 81 MG EC tablet tablet      baclofen (LIORESAL) 5 MG Tab Take 1 tablet by mouth every 8 hours as needed for hiccups related to chemotherapy treatment. 30 Tablet 1    pantoprazole (PROTONIX) 40 MG Tablet Delayed Response Take 1 Tablet by mouth every day. 30 Tablet 3    tamsulosin (FLOMAX) 0.4 MG capsule Take 1 Capsule by mouth every day. 30  Capsule 0    ondansetron (ZOFRAN ODT) 4 MG TABLET DISPERSIBLE Take 1 Tablet by mouth every four hours as needed for Nausea/Vomiting. 30 Tablet 3    polyethylene glycol/lytes (MIRALAX) Pack Take 17 g by mouth 1 time a day as needed.      melatonin 3 MG Tab Take 3 mg by mouth at bedtime.      naproxen (ALEVE) 220 MG tablet Take 220 mg by mouth 2 times a day as needed (pain).      atorvastatin (LIPITOR) 40 MG Tab Take 40 mg by mouth every day.      carvedilol (COREG) 12.5 MG Tab Take 12.5 mg by mouth 2 times a day.      citalopram (CELEXA) 20 MG Tab Take 20 mg by mouth every day.      hydrOXYzine HCl (ATARAX) 25 MG Tab Take 25 mg by mouth every 8 hours as needed for Anxiety.      ondansetron (ZOFRAN) 4 MG Tab tablet Take 1 Tablet by mouth every four hours as needed for Nausea/Vomiting (for nausea, vomiting). 30 Tablet 6    prochlorperazine (COMPAZINE) 10 MG Tab Take 1 Tablet by mouth every 6 hours as needed (for nausea, vomiting). 30 Tablet 6    OLANZapine (ZYPREXA) 5 MG Tab Take 1 Tablet by mouth every evening. 30 Tablet 6    loperamide (IMODIUM A-D) 2 MG tablet Take 1 Tablet by mouth see administration instructions. 30 Tablet 6     No current facility-administered medications for this encounter.       Social History     Tobacco Use    Smoking status: Former     Types: Cigars     Quit date: 2024     Years since quittin.2    Smokeless tobacco: Never    Tobacco comments:     Former cigars    Vaping Use    Vaping status: Never Used   Substance Use Topics    Alcohol use: Not Currently     Comment: Remote history of alcohol abuse.  None for 6 1/2 years.    Drug use: Never     Past Medical History:   Diagnosis Date    Bowel habit changes     Cancer (HCC) 2024    colorectal cancer    High cholesterol     Hypertension     Kidney stone     4-5x    MI (myocardial infarction) (HCC)       Family History   Problem Relation Age of Onset    Bladder cancer Father     Breast Cancer Sister          Objective:     Vitals:  "   03/18/25 1126   Pulse: 63   Temp: (!) 35.7 °C (96.2 °F)   TempSrc: Temporal   SpO2: 98%   Weight: 78.9 kg (174 lb)   Height: 1.71 m (5' 7.32\")     Body mass index is 26.99 kg/m².     Review of Systems   Constitutional:  Negative for chills and fever.   Respiratory:  Negative for cough and shortness of breath.    Cardiovascular:  Negative for chest pain and palpitations.   Gastrointestinal:  Positive for abdominal pain. Negative for constipation, diarrhea, nausea and vomiting.     Physical Exam:   Gen: No acute distress.   Skin: Pink, warm, and dry  HEENT: conjunctiva non-injected, sclera non-icteric. EOMs intact.   Nasal mucosa without edema nor erythema.   Pinna normal.    Neck: Supple, trachea midline. No adenopathy or masses in the neck or supraclavicular regions.  Lungs: Effort is normal.   CV: regular rate and rhythm  Abdomen: Flat  Ext: no edema, color normal, vascularity normal, temperature normal  Alert and oriented Eye contact is good, speech goal directed, affect calm    Assessment and Plan:   Cancer related pain  Patient with worsening rectal pain.  Unclear etiology.  Is also going to be starting radiation which may hopefully address some of his symptoms.  Has been using oxycodone 10 mg for breakthrough but is having to regular use of these and worsening pain.  Would like to start long-acting oxycodone unfortunately his insurance is not covering that at this time.  Will go ahead and initiate morphine extended release 15 mg twice daily and continue with oxycodone for breakthrough.  Will reevaluate in 2 weeks.  PDMP reviewed, no signs of diversion or abuse, risk and benefits of medication discussed.      27 minutes in total including chart review and consultation with patients oncological providers.     Followup: Return in about 2 weeks (around 4/1/2025).    Quoc Goel M.D.    "

## 2025-03-24 NOTE — ASSESSMENT & PLAN NOTE
Patient with worsening rectal pain.  Unclear etiology.  Is also going to be starting radiation which may hopefully address some of his symptoms.  Has been using oxycodone 10 mg for breakthrough but is having to regular use of these and worsening pain.  Would like to start long-acting oxycodone unfortunately his insurance is not covering that at this time.  Will go ahead and initiate morphine extended release 15 mg twice daily and continue with oxycodone for breakthrough.  Will reevaluate in 2 weeks.  PDMP reviewed, no signs of diversion or abuse, risk and benefits of medication discussed.

## 2025-03-25 ENCOUNTER — HOSPITAL ENCOUNTER (OUTPATIENT)
Dept: RADIATION ONCOLOGY | Facility: MEDICAL CENTER | Age: 60
End: 2025-03-25
Payer: COMMERCIAL

## 2025-03-25 LAB

## 2025-03-25 PROCEDURE — 77014 PR CT GUIDANCE PLACEMENT RAD THERAPY FIELDS: CPT | Mod: 26 | Performed by: RADIOLOGY

## 2025-03-25 PROCEDURE — 77386 HCHG IMRT DELIVERY COMPLEX: CPT | Performed by: RADIOLOGY

## 2025-03-26 ENCOUNTER — HOSPITAL ENCOUNTER (OUTPATIENT)
Dept: RADIATION ONCOLOGY | Facility: MEDICAL CENTER | Age: 60
End: 2025-03-26
Attending: RADIOLOGY
Payer: COMMERCIAL

## 2025-03-26 ENCOUNTER — PHARMACY VISIT (OUTPATIENT)
Dept: PHARMACY | Facility: MEDICAL CENTER | Age: 60
End: 2025-03-26
Payer: COMMERCIAL

## 2025-03-26 ENCOUNTER — HOSPITAL ENCOUNTER (OUTPATIENT)
Dept: RADIATION ONCOLOGY | Facility: MEDICAL CENTER | Age: 60
End: 2025-03-26

## 2025-03-26 VITALS
OXYGEN SATURATION: 96 % | SYSTOLIC BLOOD PRESSURE: 139 MMHG | HEART RATE: 60 BPM | DIASTOLIC BLOOD PRESSURE: 81 MMHG | TEMPERATURE: 97.3 F | WEIGHT: 170.19 LBS | BODY MASS INDEX: 26.4 KG/M2

## 2025-03-26 DIAGNOSIS — C20 RECTAL CANCER (HCC): ICD-10-CM

## 2025-03-26 DIAGNOSIS — G89.3 CANCER RELATED PAIN: ICD-10-CM

## 2025-03-26 LAB
CHEMOTHERAPY INFUSION START DATE: NORMAL
CHEMOTHERAPY RECORDS: 1.8 GY
CHEMOTHERAPY RECORDS: 4500 CGY
CHEMOTHERAPY RECORDS: NORMAL
CHEMOTHERAPY RX CANCER: NORMAL
DATE 1ST CHEMO CANCER: NORMAL
RAD ONC ARIA COURSE LAST TREATMENT DATE: NORMAL
RAD ONC ARIA COURSE TREATMENT ELAPSED DAYS: NORMAL
RAD ONC ARIA REFERENCE POINT DOSAGE GIVEN TO DATE: 12.6 GY
RAD ONC ARIA REFERENCE POINT ID: NORMAL
RAD ONC ARIA REFERENCE POINT SESSION DOSAGE GIVEN: 1.8 GY

## 2025-03-26 PROCEDURE — RXMED WILLOW AMBULATORY MEDICATION CHARGE: Performed by: FAMILY MEDICINE

## 2025-03-26 PROCEDURE — 77386 HCHG IMRT DELIVERY COMPLEX: CPT | Performed by: RADIOLOGY

## 2025-03-26 PROCEDURE — RXOTC WILLOW AMBULATORY OTC CHARGE

## 2025-03-26 PROCEDURE — 77014 PR CT GUIDANCE PLACEMENT RAD THERAPY FIELDS: CPT | Mod: 26 | Performed by: RADIOLOGY

## 2025-03-26 RX ORDER — MORPHINE SULFATE 30 MG/1
30 TABLET, FILM COATED, EXTENDED RELEASE ORAL EVERY 12 HOURS
Qty: 30 TABLET | Refills: 0 | Status: SHIPPED | OUTPATIENT
Start: 2025-03-26 | End: 2025-04-10

## 2025-03-26 RX ORDER — OXYCODONE HYDROCHLORIDE 10 MG/1
10 TABLET ORAL EVERY 6 HOURS PRN
Qty: 60 TABLET | Refills: 0 | Status: SHIPPED | OUTPATIENT
Start: 2025-03-26 | End: 2025-04-10

## 2025-03-26 ASSESSMENT — PAIN SCALES - GENERAL: PAINLEVEL_OUTOF10: 2=MINIMAL-SLIGHT

## 2025-03-26 ASSESSMENT — FIBROSIS 4 INDEX: FIB4 SCORE: 2.26

## 2025-03-26 NOTE — ON TREATMENT VISIT
"ON TREATMENT  NOTE  RADIATION ONCOLOGY DEPARTMENT    Patient name:  Booker Saucedo    Primary Physician:  Rickie Naranjo M.D. MRN: [id number]  [facility]: [id number]   Referring physician:  Hector Tse M.D.   : 1965, 59 y.o.     ENCOUNTER DATE:  3/26/2025      DIAGNOSIS:  Rectal cancer (HCC)  Staging form: Colon and Rectum, AJCC 8th Edition  - Clinical stage from 10/2/2024: Stage PATO (cT4a, cN2a, cM1a) - Signed by Jason Huerta M.D. on 10/16/2024  Histologic grade (G): G2  Histologic grading system: 4 grade system      TREATMENT SUMMARY:  Course First Treatment Date 2025  Course Last Treatment Date 2025  Radiation Treatments       Active   Plans   Rectum   Most recent treatment: Dose planned: 180 cGy (fraction 7 of 25 on 3/26/2025)   Total: Dose planned: 4,500 cGy   Elapsed Days: 8 @ 2025           Historical   No historical radiation treatments to show.                 SUBJECTIVE:  Well appearing proctitis improved on dex    VITAL SIGNS:      3/26/2025    12:15 PM 3/19/2025     7:52 AM 3/18/2025    11:26 AM 2025     2:03 PM 2025     4:10 PM 2025     9:13 AM 2/10/2025     8:43 AM   Vitals   SYSTOLIC 139 112  130 133 150 132   DIASTOLIC 81 64  76 71 86 77   Pulse 60 74 63 105 68 76 74   Temperature 36.3 °C (97.3 °F) 36.7 °C (98.1 °F) 35.7 °C (96.2 °F) 36.3 °C (97.3 °F) 36.1 °C (97 °F) 36.1 °C (97 °F) 35.8 °C (96.5 °F)   Respiration  18  16 18 18    Weight 170.2 175.05 174 170.64 172.84 176.37 176   Height   1.71 m (5' 7.32\")  1.71 m (' .32\") 1.71 m (5' .32\") 1.71 m (5' 7.\")   BMI 26.4 kg/m2 27.15 kg/m2 26.99 kg/m2 26.47 kg/m2 26.81 kg/m2 27.36 kg/m2 27.3 kg/m2   Pulse Oximetry 96 % 95 % 98 % 98 % 97 % 100 % 97 %     KPS: 100, Fully active, able to carry on all pre-disease performed without restriction (ECOG equivalent 0)  Encounter Vitals  Temperature: 36.3 °C (97.3 °F)  Temp src: Temporal  Blood Pressure: 139/81  Pulse: 60  Pulse Oximetry: 96 %  Weight: " 77.2 kg (170 lb 3.1 oz)  Pain Score: 2=Minimal-Slight      3/26/2025    12:15 PM 3/19/2025     7:52 AM 2/18/2025     2:03 PM 1/17/2025     7:54 AM 12/19/2024     8:29 AM 12/6/2024     7:57 AM   Pain Assessment   Pain Score 2=MINIMAL PA 3=SLIGHT JESS 5=MODERATE P NO PAIN 6=MODERATE P 2=MINIMAL PA   Pain Loc -- ABDOMEN RECTUM  ABDOMEN ABDOMEN          PHYSICAL EXAM:  Physical Exam         3/26/2025    12:16 PM 3/19/2025     7:53 AM   Toxicity Assessment   Toxicity Assessment Male Pelvis Male Pelvis   Fatigue (lethargy, malaise, asthenia) Increased fatigue over baseline, but not altering normal activities None   Radiation Dermatitis None None   Anorexia None Loss of appetite   Colitis None Abdominal pain with mucus and/or blood in stool   Constipation None Requiring stool softener or dietary modification   Dehydration None None   Diarrhea w/o Colostomy None None   Flatulence None None   Nausea None None   Proctitis None None   Vomiting None None   RT - Pain due to RT None None   Tumor Pain (onset or exacerbation of tumor pain due to treatment) Mild pain not interfering with function Moderate pain, pain or analgesics interfering with function, but not interfering with activities of daily living   Dysuria (painful urination) None None   Urinary Frequency Normal Normal   Urinary Urgency None None   Bladder Spasms Absent Absent   Incontinence None None   Urinary Retention Hesitency or dribbling, but no significant residual urine and/or retention occuring during the immediate postoperative period Hesitency or dribbling, but no significant residual urine and/or retention occuring during the immediate postoperative period       CURRENT MEDICATIONS:    Current Outpatient Medications:     oxyCODONE immediate release (ROXICODONE) 10 MG immediate release tablet, Take 1 Tablet by mouth every 6 hours as needed for Moderate Pain for up to 15 days., Disp: 60 Tablet, Rfl: 0    morphine ER (MS CONTIN) 30 MG Tab CR tablet, Take 1 Tablet  by mouth every 12 hours for 15 days., Disp: 30 Tablet, Rfl: 0    dexamethasone (DECADRON) 4 MG Tab, Take 1 Tablet by mouth 2 times a day for 10 days., Disp: 20 Tablet, Rfl: 0    gabapentin (NEURONTIN) 100 MG Cap, Take 1 Capsule by mouth 3 times a day for 15 days., Disp: 45 Capsule, Rfl: 0    capecitabine (XELODA) 500 MG tablet, Take 3 tabs of 500mg strength (total dose of 1500 mg) by mouth 2 times a day (at least 12 hrs apart and 30 min after food) Monday-Friday on days of radiation x 30 treatments, Disp: 180 Tablet, Rfl: 0    aspirin 81 MG EC tablet, tablet, Disp: , Rfl:     baclofen (LIORESAL) 5 MG Tab, Take 1 tablet by mouth every 8 hours as needed for hiccups related to chemotherapy treatment., Disp: 30 Tablet, Rfl: 1    pantoprazole (PROTONIX) 40 MG Tablet Delayed Response, Take 1 Tablet by mouth every day., Disp: 30 Tablet, Rfl: 3    tamsulosin (FLOMAX) 0.4 MG capsule, Take 1 Capsule by mouth every day., Disp: 30 Capsule, Rfl: 0    ondansetron (ZOFRAN ODT) 4 MG TABLET DISPERSIBLE, Take 1 Tablet by mouth every four hours as needed for Nausea/Vomiting., Disp: 30 Tablet, Rfl: 3    polyethylene glycol/lytes (MIRALAX) Pack, Take 17 g by mouth 1 time a day as needed., Disp: , Rfl:     melatonin 3 MG Tab, Take 3 mg by mouth at bedtime., Disp: , Rfl:     naproxen (ALEVE) 220 MG tablet, Take 220 mg by mouth 2 times a day as needed (pain)., Disp: , Rfl:     atorvastatin (LIPITOR) 40 MG Tab, Take 40 mg by mouth every day., Disp: , Rfl:     carvedilol (COREG) 12.5 MG Tab, Take 12.5 mg by mouth 2 times a day., Disp: , Rfl:     citalopram (CELEXA) 20 MG Tab, Take 20 mg by mouth every day., Disp: , Rfl:     hydrOXYzine HCl (ATARAX) 25 MG Tab, Take 25 mg by mouth every 8 hours as needed for Anxiety., Disp: , Rfl:     ondansetron (ZOFRAN) 4 MG Tab tablet, Take 1 Tablet by mouth every four hours as needed for Nausea/Vomiting (for nausea, vomiting)., Disp: 30 Tablet, Rfl: 6    prochlorperazine (COMPAZINE) 10 MG Tab, Take 1  Tablet by mouth every 6 hours as needed (for nausea, vomiting)., Disp: 30 Tablet, Rfl: 6    OLANZapine (ZYPREXA) 5 MG Tab, Take 1 Tablet by mouth every evening., Disp: 30 Tablet, Rfl: 6    loperamide (IMODIUM A-D) 2 MG tablet, Take 1 Tablet by mouth see administration instructions., Disp: 30 Tablet, Rfl: 6    LABORATORY DATA:   Lab Results   Component Value Date/Time    SODIUM 136 02/10/2025 07:23 AM    POTASSIUM 4.0 02/10/2025 07:23 AM    CHLORIDE 101 02/10/2025 07:23 AM    CO2 24 02/10/2025 07:23 AM    GLUCOSE 103 (H) 02/10/2025 07:23 AM    BUN 11 02/10/2025 07:23 AM    CREATININE 0.89 02/10/2025 07:23 AM       Lab Results   Component Value Date/Time    WBC 4.3 (L) 02/10/2025 07:23 AM    RBC 3.63 (L) 02/10/2025 07:23 AM    HEMOGLOBIN 11.6 (L) 02/10/2025 07:23 AM    HEMATOCRIT 32.5 (L) 02/10/2025 07:23 AM    MCV 89.5 02/10/2025 07:23 AM    MCH 32.0 02/10/2025 07:23 AM    MCHC 35.7 02/10/2025 07:23 AM    PLATELETCT 175 02/10/2025 07:23 AM         RADIOLOGY DATA:  No results found.    IMPRESSION:  Cancer Staging   Rectal cancer (HCC)  Staging form: Colon and Rectum, AJCC 8th Edition  - Clinical stage from 10/2/2024: Stage PATO (cT4a, cN2a, cM1a) - Signed by Jason Huerta M.D. on 10/16/2024      PLAN:  No change in treatment plan    Disposition:  Treatment plan and imaging reviewed. Questions answered. Continue therapy outlined.     Jason Huerta M.D.    No orders of the defined types were placed in this encounter.

## 2025-03-27 ENCOUNTER — HOSPITAL ENCOUNTER (OUTPATIENT)
Dept: RADIATION ONCOLOGY | Facility: MEDICAL CENTER | Age: 60
End: 2025-03-27

## 2025-03-27 LAB

## 2025-03-27 PROCEDURE — 77386 HCHG IMRT DELIVERY COMPLEX: CPT | Performed by: RADIOLOGY

## 2025-03-27 PROCEDURE — 77014 PR CT GUIDANCE PLACEMENT RAD THERAPY FIELDS: CPT | Mod: 26 | Performed by: RADIOLOGY

## 2025-03-27 PROCEDURE — 77336 RADIATION PHYSICS CONSULT: CPT | Performed by: RADIOLOGY

## 2025-03-28 ENCOUNTER — HOSPITAL ENCOUNTER (OUTPATIENT)
Dept: RADIATION ONCOLOGY | Facility: MEDICAL CENTER | Age: 60
End: 2025-03-28

## 2025-03-28 LAB

## 2025-03-28 PROCEDURE — 77386 HCHG IMRT DELIVERY COMPLEX: CPT | Performed by: RADIOLOGY

## 2025-03-28 PROCEDURE — 77014 PR CT GUIDANCE PLACEMENT RAD THERAPY FIELDS: CPT | Mod: 26 | Performed by: RADIOLOGY

## 2025-03-31 ENCOUNTER — HOSPITAL ENCOUNTER (OUTPATIENT)
Dept: RADIATION ONCOLOGY | Facility: MEDICAL CENTER | Age: 60
End: 2025-03-31

## 2025-03-31 ENCOUNTER — HOSPITAL ENCOUNTER (OUTPATIENT)
Dept: HEMATOLOGY ONCOLOGY | Facility: MEDICAL CENTER | Age: 60
End: 2025-03-31
Attending: STUDENT IN AN ORGANIZED HEALTH CARE EDUCATION/TRAINING PROGRAM
Payer: COMMERCIAL

## 2025-03-31 ENCOUNTER — HOSPITAL ENCOUNTER (OUTPATIENT)
Facility: MEDICAL CENTER | Age: 60
End: 2025-03-31
Attending: STUDENT IN AN ORGANIZED HEALTH CARE EDUCATION/TRAINING PROGRAM
Payer: COMMERCIAL

## 2025-03-31 VITALS
TEMPERATURE: 97.2 F | OXYGEN SATURATION: 98 % | HEART RATE: 62 BPM | HEIGHT: 67 IN | DIASTOLIC BLOOD PRESSURE: 78 MMHG | BODY MASS INDEX: 27.72 KG/M2 | SYSTOLIC BLOOD PRESSURE: 122 MMHG | WEIGHT: 176.6 LBS

## 2025-03-31 DIAGNOSIS — C20 RECTAL CANCER (HCC): ICD-10-CM

## 2025-03-31 DIAGNOSIS — N20.1 URETERAL STONE: ICD-10-CM

## 2025-03-31 DIAGNOSIS — Z79.899 ENCOUNTER FOR LONG-TERM CURRENT USE OF HIGH RISK MEDICATION: ICD-10-CM

## 2025-03-31 LAB
ALBUMIN SERPL BCP-MCNC: 4.1 G/DL (ref 3.2–4.9)
ALBUMIN/GLOB SERPL: 1.3 G/DL
ALP SERPL-CCNC: 112 U/L (ref 30–99)
ALT SERPL-CCNC: 16 U/L (ref 2–50)
ANION GAP SERPL CALC-SCNC: 10 MMOL/L (ref 7–16)
AST SERPL-CCNC: 18 U/L (ref 12–45)
BASOPHILS # BLD AUTO: 0.2 % (ref 0–1.8)
BASOPHILS # BLD: 0.02 K/UL (ref 0–0.12)
BILIRUB SERPL-MCNC: 0.2 MG/DL (ref 0.1–1.5)
BUN SERPL-MCNC: 26 MG/DL (ref 8–22)
CALCIUM ALBUM COR SERPL-MCNC: 10.3 MG/DL (ref 8.5–10.5)
CALCIUM SERPL-MCNC: 10.4 MG/DL (ref 8.5–10.5)
CHEMOTHERAPY INFUSION START DATE: NORMAL
CHEMOTHERAPY RECORDS: 1.8 GY
CHEMOTHERAPY RECORDS: 4500 CGY
CHEMOTHERAPY RECORDS: NORMAL
CHEMOTHERAPY RX CANCER: NORMAL
CHLORIDE SERPL-SCNC: 100 MMOL/L (ref 96–112)
CO2 SERPL-SCNC: 27 MMOL/L (ref 20–33)
CREAT SERPL-MCNC: 0.63 MG/DL (ref 0.5–1.4)
DATE 1ST CHEMO CANCER: NORMAL
EOSINOPHIL # BLD AUTO: 0.01 K/UL (ref 0–0.51)
EOSINOPHIL NFR BLD: 0.1 % (ref 0–6.9)
ERYTHROCYTE [DISTWIDTH] IN BLOOD BY AUTOMATED COUNT: 42.6 FL (ref 35.9–50)
GFR SERPLBLD CREATININE-BSD FMLA CKD-EPI: 109 ML/MIN/1.73 M 2
GLOBULIN SER CALC-MCNC: 3.1 G/DL (ref 1.9–3.5)
GLUCOSE SERPL-MCNC: 110 MG/DL (ref 65–99)
HCT VFR BLD AUTO: 33.6 % (ref 42–52)
HGB BLD-MCNC: 11.3 G/DL (ref 14–18)
IMM GRANULOCYTES # BLD AUTO: 0.12 K/UL (ref 0–0.11)
IMM GRANULOCYTES NFR BLD AUTO: 1 % (ref 0–0.9)
LYMPHOCYTES # BLD AUTO: 0.54 K/UL (ref 1–4.8)
LYMPHOCYTES NFR BLD: 4.6 % (ref 22–41)
MCH RBC QN AUTO: 30.6 PG (ref 27–33)
MCHC RBC AUTO-ENTMCNC: 33.6 G/DL (ref 32.3–36.5)
MCV RBC AUTO: 91.1 FL (ref 81.4–97.8)
MONOCYTES # BLD AUTO: 0.96 K/UL (ref 0–0.85)
MONOCYTES NFR BLD AUTO: 8.1 % (ref 0–13.4)
NEUTROPHILS # BLD AUTO: 10.14 K/UL (ref 1.82–7.42)
NEUTROPHILS NFR BLD: 86 % (ref 44–72)
NRBC # BLD AUTO: 0 K/UL
NRBC BLD-RTO: 0 /100 WBC (ref 0–0.2)
PLATELET # BLD AUTO: 221 K/UL (ref 164–446)
PMV BLD AUTO: 9.1 FL (ref 9–12.9)
POTASSIUM SERPL-SCNC: 4.5 MMOL/L (ref 3.6–5.5)
PROT SERPL-MCNC: 7.2 G/DL (ref 6–8.2)
RAD ONC ARIA COURSE LAST TREATMENT DATE: NORMAL
RAD ONC ARIA COURSE TREATMENT ELAPSED DAYS: NORMAL
RAD ONC ARIA REFERENCE POINT DOSAGE GIVEN TO DATE: 18 GY
RAD ONC ARIA REFERENCE POINT ID: NORMAL
RAD ONC ARIA REFERENCE POINT SESSION DOSAGE GIVEN: 1.8 GY
RBC # BLD AUTO: 3.69 M/UL (ref 4.7–6.1)
SODIUM SERPL-SCNC: 137 MMOL/L (ref 135–145)
WBC # BLD AUTO: 11.8 K/UL (ref 4.8–10.8)

## 2025-03-31 PROCEDURE — 77014 PR CT GUIDANCE PLACEMENT RAD THERAPY FIELDS: CPT | Mod: 26 | Performed by: RADIOLOGY

## 2025-03-31 PROCEDURE — 85025 COMPLETE CBC W/AUTO DIFF WBC: CPT

## 2025-03-31 PROCEDURE — 99212 OFFICE O/P EST SF 10 MIN: CPT | Performed by: STUDENT IN AN ORGANIZED HEALTH CARE EDUCATION/TRAINING PROGRAM

## 2025-03-31 PROCEDURE — 999999 HB NO CHARGE

## 2025-03-31 PROCEDURE — 80053 COMPREHEN METABOLIC PANEL: CPT

## 2025-03-31 PROCEDURE — A4212 NON CORING NEEDLE OR STYLET: HCPCS

## 2025-03-31 PROCEDURE — 77386 HCHG IMRT DELIVERY COMPLEX: CPT | Performed by: RADIOLOGY

## 2025-03-31 PROCEDURE — 77427 RADIATION TX MANAGEMENT X5: CPT | Performed by: RADIOLOGY

## 2025-03-31 PROCEDURE — 36591 DRAW BLOOD OFF VENOUS DEVICE: CPT

## 2025-03-31 PROCEDURE — RXMED WILLOW AMBULATORY MEDICATION CHARGE: Performed by: STUDENT IN AN ORGANIZED HEALTH CARE EDUCATION/TRAINING PROGRAM

## 2025-03-31 RX ORDER — TAMSULOSIN HYDROCHLORIDE 0.4 MG/1
0.4 CAPSULE ORAL DAILY
Qty: 30 CAPSULE | Refills: 0 | Status: SHIPPED | OUTPATIENT
Start: 2025-03-31

## 2025-03-31 ASSESSMENT — ENCOUNTER SYMPTOMS
DOUBLE VISION: 0
BRUISES/BLEEDS EASILY: 0
MYALGIAS: 0
VOMITING: 0
BLOOD IN STOOL: 0
SORE THROAT: 0
ORTHOPNEA: 0
INSOMNIA: 0
NAUSEA: 0
SHORTNESS OF BREATH: 0
CHILLS: 0
TINGLING: 1
HEADACHES: 0
WHEEZING: 0
NERVOUS/ANXIOUS: 0
DIZZINESS: 0
SPUTUM PRODUCTION: 0
HEARTBURN: 0
BACK PAIN: 0
COUGH: 0
WEIGHT LOSS: 0
SINUS PAIN: 0
CONSTIPATION: 0
WEAKNESS: 0
FEVER: 0
ABDOMINAL PAIN: 1
DIAPHORESIS: 0
DIARRHEA: 0
BLURRED VISION: 0
PALPITATIONS: 0

## 2025-03-31 ASSESSMENT — PAIN SCALES - GENERAL: PAINLEVEL_OUTOF10: 3=SLIGHT PAIN

## 2025-03-31 ASSESSMENT — FIBROSIS 4 INDEX: FIB4 SCORE: 2.26

## 2025-03-31 NOTE — PROGRESS NOTES
Pt arrives to medical oncology for port flush with labs. R chest Port accessed in sterile manner. Brisk blood return obtained. Labs collected.  Port flushed per [facility] policy and site covered with sterile gauze and paper tape.  Pt released ambulatory from clinic in no apparent distress.

## 2025-03-31 NOTE — PROGRESS NOTES
Follow Up Note:  Hematology/Oncology      Primary Care:  Rickie Naranjo M.D.    Diagnosis: Rectal adenocarcinoma    Chief Complaint: On-treatment visit    Current Treatment: mFOLFIRINOX, followed by capecitabine with concurrent XRT, and then surgical evaluation    Prior Treatment: NA    Oncology History of Presenting Illness:  Booker Saucedo is a 59 y.o.  man who presents to the clinic for evaluation for systemic therapy for a diagnosis of rectal carcinoma. He started having back pain and changes in his bowel habits in July and went to the hospital where CT scan revealed right hydronephrosis due to a 1 mm calculus. However, he was also noted to have rectal wall thickening with perirectal lymph nodes. He subsequently underwent a colonoscopy which revealed a fungating circumferential mass, 5 cm from the anal verge, with biopsy revealing invasive adenocarcinoma, moderately differentiated, MMR proficient. Staging scans did not reveal any definitive metastatic disease, though there was a 1.5 cm left hepatic lobe lesion which was not PET avid. He was seen by radiation oncology and colorectal surgery and ultimately was staged on rectal MRI as a [id number] stage IIIC disease. He has been referred for treatment accordingly.     Treatment History:   11/08/2024: C1 FOLFIRINOX   11/22/2024: C2 FOLFIRINOX   12/06/2024: C3 FOLFIRINOX  12/20/2024: C4 FOLFIRINOX  01/03/2025: C5 FOLFIRINOX (decrease oxaliplatin to 65 mg/m2 for neuropathy)  01/17/25: C6 FOLFIRINOX  01/31/25: C7 FOLFIRINOX  02/14/25: C8 FOLFIRINOX   03/18/25: Start capecitabine with concurrent XRT    Interval History:  Patient is here for follow up visit. He is doing well. He was started on dexamethasone by Dr. Huerta and that has helped his pain significantly. He has reached a nice balance with medication that has helped him to function at an optimal level. He otherwise has no complaints.     Allergies as of 03/31/2025    (No Known Allergies)          Current Outpatient Medications:     tamsulosin (FLOMAX) 0.4 MG capsule, Take 1 Capsule by mouth every day., Disp: 30 Capsule, Rfl: 0    oxyCODONE immediate release (ROXICODONE) 10 MG immediate release tablet, Take 1 Tablet by mouth every 6 hours as needed for Moderate Pain for up to 15 days., Disp: 60 Tablet, Rfl: 0    morphine ER (MS CONTIN) 30 MG Tab CR tablet, Take 1 Tablet by mouth every 12 hours for 15 days., Disp: 30 Tablet, Rfl: 0    dexamethasone (DECADRON) 4 MG Tab, Take 1 Tablet by mouth 2 times a day for 10 days., Disp: 20 Tablet, Rfl: 0    gabapentin (NEURONTIN) 100 MG Cap, Take 1 Capsule by mouth 3 times a day for 15 days., Disp: 45 Capsule, Rfl: 0    capecitabine (XELODA) 500 MG tablet, Take 3 tabs of 500mg strength (total dose of 1500 mg) by mouth 2 times a day (at least 12 hrs apart and 30 min after food) Monday-Friday on days of radiation x 30 treatments, Disp: 180 Tablet, Rfl: 0    aspirin 81 MG EC tablet, tablet, Disp: , Rfl:     baclofen (LIORESAL) 5 MG Tab, Take 1 tablet by mouth every 8 hours as needed for hiccups related to chemotherapy treatment., Disp: 30 Tablet, Rfl: 1    pantoprazole (PROTONIX) 40 MG Tablet Delayed Response, Take 1 Tablet by mouth every day., Disp: 30 Tablet, Rfl: 3    ondansetron (ZOFRAN ODT) 4 MG TABLET DISPERSIBLE, Take 1 Tablet by mouth every four hours as needed for Nausea/Vomiting., Disp: 30 Tablet, Rfl: 3    polyethylene glycol/lytes (MIRALAX) Pack, Take 17 g by mouth 1 time a day as needed., Disp: , Rfl:     melatonin 3 MG Tab, Take 3 mg by mouth at bedtime., Disp: , Rfl:     naproxen (ALEVE) 220 MG tablet, Take 220 mg by mouth 2 times a day as needed (pain)., Disp: , Rfl:     atorvastatin (LIPITOR) 40 MG Tab, Take 40 mg by mouth every day., Disp: , Rfl:     carvedilol (COREG) 12.5 MG Tab, Take 12.5 mg by mouth 2 times a day., Disp: , Rfl:     citalopram (CELEXA) 20 MG Tab, Take 20 mg by mouth every day., Disp: , Rfl:     hydrOXYzine HCl (ATARAX) 25 MG Tab,  "Take 25 mg by mouth every 8 hours as needed for Anxiety., Disp: , Rfl:     ondansetron (ZOFRAN) 4 MG Tab tablet, Take 1 Tablet by mouth every four hours as needed for Nausea/Vomiting (for nausea, vomiting)., Disp: 30 Tablet, Rfl: 6    prochlorperazine (COMPAZINE) 10 MG Tab, Take 1 Tablet by mouth every 6 hours as needed (for nausea, vomiting)., Disp: 30 Tablet, Rfl: 6    OLANZapine (ZYPREXA) 5 MG Tab, Take 1 Tablet by mouth every evening., Disp: 30 Tablet, Rfl: 6    loperamide (IMODIUM A-D) 2 MG tablet, Take 1 Tablet by mouth see administration instructions., Disp: 30 Tablet, Rfl: 6      Review of Systems:  Review of Systems   Constitutional:  Negative for chills, diaphoresis, fever, malaise/fatigue and weight loss.   HENT:  Negative for congestion, hearing loss, nosebleeds, sinus pain and sore throat.    Eyes:  Negative for blurred vision and double vision.   Respiratory:  Negative for cough, sputum production, shortness of breath and wheezing.    Cardiovascular:  Negative for chest pain, palpitations, orthopnea and leg swelling.   Gastrointestinal:  Positive for abdominal pain (Cramping at times). Negative for blood in stool, constipation, diarrhea, heartburn, melena, nausea and vomiting.   Genitourinary:  Negative for dysuria, frequency, hematuria and urgency.   Musculoskeletal:  Negative for back pain, joint pain and myalgias.   Skin:  Negative for rash.   Neurological:  Positive for tingling (Mild, for a few days after therapy, but worse with cold exposure). Negative for dizziness, weakness and headaches.   Endo/Heme/Allergies:  Does not bruise/bleed easily.   Psychiatric/Behavioral:  The patient is not nervous/anxious and does not have insomnia.          Physical Exam:  Vitals:    03/31/25 0759   BP: 122/78   Pulse: 62   Temp: 36.2 °C (97.2 °F)   TempSrc: Temporal   SpO2: 98%   Weight: 80.1 kg (176 lb 9.6 oz)   Height: 1.71 m (5' 7.32\")       DESC; KARNOFSKY SCALE WITH ECOG EQUIVALENT: 100, Fully active, able " to carry on all pre-disease performed without restriction (ECOG equivalent 0)    DISTRESS LEVEL: no apparent distress    Physical Exam  Vitals and nursing note reviewed.   Constitutional:       General: He is awake. He is not in acute distress.     Appearance: Normal appearance. He is normal weight. He is not ill-appearing, toxic-appearing or diaphoretic.   HENT:      Head: Normocephalic and atraumatic.      Nose: Nose normal. No congestion.      Mouth/Throat:      Pharynx: Oropharynx is clear. No oropharyngeal exudate or posterior oropharyngeal erythema.   Eyes:      General: No scleral icterus.     Extraocular Movements: Extraocular movements intact.      Conjunctiva/sclera: Conjunctivae normal.      Pupils: Pupils are equal, round, and reactive to light.   Cardiovascular:      Rate and Rhythm: Normal rate and regular rhythm.      Pulses: Normal pulses.      Heart sounds: Normal heart sounds. No murmur heard.     No friction rub. No gallop.   Pulmonary:      Effort: Pulmonary effort is normal.      Breath sounds: Normal breath sounds. No decreased air movement. No wheezing, rhonchi or rales.   Abdominal:      General: Bowel sounds are normal. There is no distension.      Tenderness: There is abdominal tenderness in the suprapubic area.   Musculoskeletal:         General: No deformity. Normal range of motion.      Cervical back: Normal range of motion and neck supple. No tenderness.      Right lower leg: No edema.      Left lower leg: No edema.   Lymphadenopathy:      Cervical: No cervical adenopathy.      Upper Body:      Right upper body: No axillary adenopathy.      Left upper body: No axillary adenopathy.      Lower Body: No right inguinal adenopathy. No left inguinal adenopathy.   Skin:     General: Skin is warm and dry.      Coloration: Skin is not jaundiced or pale.      Findings: No erythema or rash.   Neurological:      General: No focal deficit present.      Mental Status: He is alert and oriented to  person, place, and time.      Sensory: Sensation is intact.      Motor: Motor function is intact. No weakness.      Gait: Gait is intact.   Psychiatric:         Attention and Perception: Attention normal.         Mood and Affect: Mood normal.         Behavior: Behavior normal. Behavior is cooperative.         Thought Content: Thought content normal.         Judgment: Judgment normal.           Labs:  Hospital Outpatient Visit on 02/10/2025   Component Date Value Ref Range Status    Sodium 02/10/2025 136  135 - 145 mmol/L Final    Potassium 02/10/2025 4.0  3.6 - 5.5 mmol/L Final    Chloride 02/10/2025 101  96 - 112 mmol/L Final    Co2 02/10/2025 24  20 - 33 mmol/L Final    Anion Gap 02/10/2025 11.0  7.0 - 16.0 Final    Glucose 02/10/2025 103 (H)  65 - 99 mg/dL Final    Bun 02/10/2025 11  8 - 22 mg/dL Final    Creatinine 02/10/2025 0.89  0.50 - 1.40 mg/dL Final    Calcium 02/10/2025 9.6  8.5 - 10.5 mg/dL Final    Correct Calcium 02/10/2025 9.4  8.5 - 10.5 mg/dL Final    AST(SGOT) 02/10/2025 44  12 - 45 U/L Final    ALT(SGPT) 02/10/2025 43  2 - 50 U/L Final    Alkaline Phosphatase 02/10/2025 107 (H)  30 - 99 U/L Final    Total Bilirubin 02/10/2025 0.3  0.1 - 1.5 mg/dL Final    Albumin 02/10/2025 4.2  3.2 - 4.9 g/dL Final    Total Protein 02/10/2025 6.9  6.0 - 8.2 g/dL Final    Globulin 02/10/2025 2.7  1.9 - 3.5 g/dL Final    A-G Ratio 02/10/2025 1.6  g/dL Final    WBC 02/10/2025 4.3 (L)  4.8 - 10.8 K/uL Final    RBC 02/10/2025 3.63 (L)  4.70 - 6.10 M/uL Final    Hemoglobin 02/10/2025 11.6 (L)  14.0 - 18.0 g/dL Final    Hematocrit 02/10/2025 32.5 (L)  42.0 - 52.0 % Final    MCV 02/10/2025 89.5  81.4 - 97.8 fL Final    MCH 02/10/2025 32.0  27.0 - 33.0 pg Final    MCHC 02/10/2025 35.7  32.3 - 36.5 g/dL Final    RDW 02/10/2025 50.5 (H)  35.9 - 50.0 fL Final    Platelet Count 02/10/2025 175  164 - 446 K/uL Final    MPV 02/10/2025 9.8  9.0 - 12.9 fL Final    Neutrophils-Polys 02/10/2025 49.30  44.00 - 72.00 % Final     "Lymphocytes 02/10/2025 30.10  22.00 - 41.00 % Final    Monocytes 02/10/2025 17.60 (H)  0.00 - 13.40 % Final    Eosinophils 02/10/2025 2.30  0.00 - 6.90 % Final    Basophils 02/10/2025 0.50  0.00 - 1.80 % Final    Immature Granulocytes 02/10/2025 0.20  0.00 - 0.90 % Final    Nucleated RBC 02/10/2025 0.00  0.00 - 0.20 /100 WBC Final    Neutrophils (Absolute) 02/10/2025 2.13  1.82 - 7.42 K/uL Final    Includes immature neutrophils, if present.    Lymphs (Absolute) 02/10/2025 1.30  1.00 - 4.80 K/uL Final    Monos (Absolute) 02/10/2025 0.76  0.00 - 0.85 K/uL Final    Eos (Absolute) 02/10/2025 0.10  0.00 - 0.51 K/uL Final    Baso (Absolute) 02/10/2025 0.02  0.00 - 0.12 K/uL Final    Immature Granulocytes (abs) 02/10/2025 0.01  0.00 - 0.11 K/uL Final    NRBC (Absolute) 02/10/2025 0.00  K/uL Final    GFR (CKD-EPI) 02/10/2025 98  >60 mL/min/1.73 m 2 Final    Comment: Estimated Glomerular Filtration Rate is calculated using  race neutral CKD-EPI 2021 equation per NKF-ASN recommendations.         Imaging:     All listed images below have been independently reviewed by me. I agree with the findings as summarized below:    No results found.    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 Dr. Prasad reviewed the slides of this case prior to requesting   consultative opinion by [facility].                                         Diagnosis performed by:                                       KHOA PRASAD MD            Assessment & Plan:  1. Rectal cancer (HCC)  CBC WITH DIFFERENTIAL    Comp Metabolic Panel      2. Ureteral stone  tamsulosin (FLOMAX) 0.4 MG capsule        This is a 59 year old  man with rectal " adenocarcinoma, [id number] stage IIIC disease. He presents for evaluation.      Current Diagnosis and Staging: Rectal adenocarcinoma, [id number] stage IIIC disease    Update: The patient is doing well at this time. We discussed that we need to wean him off dexamethasone so will try to do that with a taper. He will continue with other medications and we will monitor him accordingly. Continue with chemoRT.      Treatment Plan: mFOLFIRINOX followed by capecitabine with concurrent XRT and then surgical evaluation     Treatment Citation: NCCN     Plan of Care:     Primary Therapy: Capecitabine with concurrent XRT to continue  Supportive Therapy: Antiemetics per protocol  Toxicity: Patient is getting antineoplastic therapy and needs monitoring of blood counts, hepatic function, and renal function due to potential for organ dysfunction.   Labs: CBC with diff, CMP, CEA, ctDNA monitoring  Imaging: Repeat MRI rectal protocol, CT scans after completion of JOSEMANUEL  Treatment Planning: Patient will start with chemotherapy due to concern of aggressive spread of disease, and then will do capecitabine with concurrent XRT and surgical evaluation.  Consultations: Colorectal surgery (Dr. Tse); Radiation oncology (Dr. Huerta), Palliative Care (Dr. Styles)  Code Status: Full  Miscellaneous: Referred to medical genetics, patient will follow up.   Return for Follow Up: 2 weeks    Any questions and concerns raised by the patient were answered to the best of my ability. Thank you for allowing me to participate in the care for this patient. Please feel free to contact me for any questions or concerns.       Total time spent on chart review, clinic encounter, and documentation: 27 minutes.

## 2025-04-01 ENCOUNTER — TELEPHONE (OUTPATIENT)
Dept: RADIATION ONCOLOGY | Facility: MEDICAL CENTER | Age: 60
End: 2025-04-01
Payer: COMMERCIAL

## 2025-04-01 ENCOUNTER — HOSPITAL ENCOUNTER (OUTPATIENT)
Dept: RADIATION ONCOLOGY | Facility: MEDICAL CENTER | Age: 60
End: 2025-04-01
Attending: RADIOLOGY
Payer: COMMERCIAL

## 2025-04-01 ENCOUNTER — HOSPITAL ENCOUNTER (OUTPATIENT)
Dept: RADIATION ONCOLOGY | Facility: MEDICAL CENTER | Age: 60
End: 2025-04-01

## 2025-04-01 ENCOUNTER — HOSPITAL ENCOUNTER (OUTPATIENT)
Dept: HEMATOLOGY ONCOLOGY | Facility: MEDICAL CENTER | Age: 60
DRG: 330 | End: 2025-04-01
Attending: FAMILY MEDICINE
Payer: COMMERCIAL

## 2025-04-01 VITALS
WEIGHT: 176.6 LBS | BODY MASS INDEX: 27.72 KG/M2 | OXYGEN SATURATION: 97 % | HEIGHT: 67 IN | HEART RATE: 69 BPM | TEMPERATURE: 96.6 F

## 2025-04-01 DIAGNOSIS — G89.3 CANCER RELATED PAIN: ICD-10-CM

## 2025-04-01 DIAGNOSIS — C20 RECTAL CANCER (HCC): ICD-10-CM

## 2025-04-01 LAB

## 2025-04-01 PROCEDURE — 77386 HCHG IMRT DELIVERY COMPLEX: CPT | Performed by: RADIOLOGY

## 2025-04-01 PROCEDURE — 99212 OFFICE O/P EST SF 10 MIN: CPT | Performed by: FAMILY MEDICINE

## 2025-04-01 PROCEDURE — 99213 OFFICE O/P EST LOW 20 MIN: CPT | Performed by: FAMILY MEDICINE

## 2025-04-01 PROCEDURE — 77014 PR CT GUIDANCE PLACEMENT RAD THERAPY FIELDS: CPT | Mod: 26 | Performed by: RADIOLOGY

## 2025-04-01 RX ORDER — MORPHINE SULFATE 30 MG/1
30 TABLET, FILM COATED, EXTENDED RELEASE ORAL EVERY 12 HOURS
Qty: 60 TABLET | Refills: 0 | Status: ON HOLD | OUTPATIENT
Start: 2025-03-10 | End: 2025-04-14

## 2025-04-01 ASSESSMENT — FIBROSIS 4 INDEX: FIB4 SCORE: 1.2

## 2025-04-01 NOTE — TELEPHONE ENCOUNTER
Nutrition Services: Telephone Encounter   Booker Saucedo is a 59 y.o. male with diagnosis of Rectal cancer     RD called for nutrition check-in, as has started concurrent radiation + Xeloda. Pt answers briefly, states is unable to talk right now, but is agreeable to RD sending over contact information. RD able to send contact information via [employer].     RD available PRN for consultation or support  [phone]

## 2025-04-02 ENCOUNTER — HOSPITAL ENCOUNTER (OUTPATIENT)
Dept: RADIATION ONCOLOGY | Facility: MEDICAL CENTER | Age: 60
End: 2025-04-02
Payer: COMMERCIAL

## 2025-04-02 ENCOUNTER — HOSPITAL ENCOUNTER (OUTPATIENT)
Dept: RADIATION ONCOLOGY | Facility: MEDICAL CENTER | Age: 60
End: 2025-04-02
Attending: RADIOLOGY
Payer: COMMERCIAL

## 2025-04-02 ENCOUNTER — PHARMACY VISIT (OUTPATIENT)
Dept: PHARMACY | Facility: MEDICAL CENTER | Age: 60
End: 2025-04-02
Payer: COMMERCIAL

## 2025-04-02 VITALS
OXYGEN SATURATION: 96 % | BODY MASS INDEX: 27.26 KG/M2 | RESPIRATION RATE: 18 BRPM | WEIGHT: 175.71 LBS | TEMPERATURE: 97.9 F | DIASTOLIC BLOOD PRESSURE: 87 MMHG | SYSTOLIC BLOOD PRESSURE: 136 MMHG | HEART RATE: 72 BPM

## 2025-04-02 DIAGNOSIS — C20 RECTAL CANCER (HCC): ICD-10-CM

## 2025-04-02 DIAGNOSIS — Z95.828 PORT-A-CATH IN PLACE: ICD-10-CM

## 2025-04-02 DIAGNOSIS — G89.3 CANCER RELATED PAIN: ICD-10-CM

## 2025-04-02 LAB

## 2025-04-02 PROCEDURE — 77386 HCHG IMRT DELIVERY COMPLEX: CPT | Performed by: RADIOLOGY

## 2025-04-02 PROCEDURE — RXMED WILLOW AMBULATORY MEDICATION CHARGE: Performed by: RADIOLOGY

## 2025-04-02 PROCEDURE — 77014 PR CT GUIDANCE PLACEMENT RAD THERAPY FIELDS: CPT | Mod: 26 | Performed by: RADIOLOGY

## 2025-04-02 RX ORDER — 0.9 % SODIUM CHLORIDE 0.9 %
VIAL (ML) INJECTION PRN
OUTPATIENT
Start: 2025-04-02

## 2025-04-02 RX ORDER — HYDROCORTISONE ACETATE 25 MG/1
25 SUPPOSITORY RECTAL EVERY 12 HOURS
Qty: 12 SUPPOSITORY | Refills: 0 | Status: ON HOLD | OUTPATIENT
Start: 2025-04-02 | End: 2025-04-14

## 2025-04-02 RX ORDER — DEXAMETHASONE 2 MG/1
2 TABLET ORAL
Qty: 30 TABLET | Refills: 0 | Status: ON HOLD | OUTPATIENT
Start: 2025-04-02 | End: 2025-04-14

## 2025-04-02 RX ORDER — GABAPENTIN 100 MG/1
100 CAPSULE ORAL 3 TIMES DAILY
Qty: 45 CAPSULE | Refills: 0 | Status: ON HOLD | OUTPATIENT
Start: 2025-04-02 | End: 2025-04-19

## 2025-04-02 ASSESSMENT — FIBROSIS 4 INDEX: FIB4 SCORE: 1.2

## 2025-04-02 ASSESSMENT — PAIN SCALES - GENERAL: PAINLEVEL_OUTOF10: 5=MODERATE PAIN

## 2025-04-02 NOTE — PROGRESS NOTES
"Follow Up Note:  Hematology/Oncology    Current Diagnosis and Staging: rectal adenocarcinoma, [id number] stage IIIC disease.   Date of Diagnosis: fall 2024    Chief Complaint: Patient seen today for evaluation prior to week 4 for continued monitoring of symptoms and side effects of cancer treatments.     Care Team:   Rickie Naranjo M.D.  Colorectal surgery (Dr. Tse)  Radiation oncology (Dr. Huerta)   Palliative Care (Dr. Goel)   Med Onc (Dr Kim)    Oncology History of Presenting Illness:   Per Dr Kim \"Booker Saucedo is a 59 y.o.  man who presents to the clinic for evaluation for systemic therapy for a diagnosis of rectal carcinoma. He started having back pain and changes in his bowel habits in July and went to the hospital where CT scan revealed right hydronephrosis due to a 1 mm calculus. However, he was also noted to have rectal wall thickening with perirectal lymph nodes. He subsequently underwent a colonoscopy which revealed a fungating circumferential mass, 5 cm from the anal verge, with biopsy revealing invasive adenocarcinoma, moderately differentiated, MMR proficient. Staging scans did not reveal any definitive metastatic disease, though there was a 1.5 cm left hepatic lobe lesion which was not PET avid. He was seen by radiation oncology and colorectal surgery and ultimately was staged on rectal MRI as a [id number] stage IIIC disease. He has been referred for treatment accordingly. \"    Prior Treatment: mFOLFIRINOX  Current Treatment: capecitabine with concurrent XRT, and then surgical evaluation   Treatment History:   11/8/24-2/14/25: C1-8 FOLFIRINOX  03/18/25: start capecitabine with concurrent XRT  04/08/25: creation of end colostomy, I&D perirectal abscess  04/15/25-TBD: capecitabine held d/t uncontrolled pain & rectal abscess, XRT to continue  04/28/25: last day chemoRT (planned)    Interval History Joanne RUTH:  Booker returns to clinic for follow-up, he is currently in " week 4 of chemo RT.   He was recently hospitalized 4/4-4/13/25 for rectal pain & was found to have a perirectal abscess . He underwent I&D with creation of end colostomy by Dr Tse, completed IV Abx (switched to augmentin at discharge, planned through 4/18).  He reported slight improvement in his pain the day after surgery But otherwise reports very poor control of his pain for the past few weeks.  He tells me that the pain has not significantly changed in character it continues to be a localized tearing, sharp, pulling sensation at the rectal sphincter extending slightly inwardly.  He was discharged on morphine  mg every 12 hours (increased from 30 mg every 12), continued on oxycodone IR 10 mg every 6 hours.  He is also on duloxetine and continues to take this in addition to his Xeloda.  He is using some topical lidocaine cream with minimal relief he notes some occasional serous discharge.  He notes some bruising on his left lower abdomen adjacent to his colostomy site which started approximately 24 to 48 hours after surgery.  He does not report any concern about his colostomy site in regards to infection, abdominal bloating, distention, pain, discharge.  He tells me he does not feel toxic, but rather the acute localized pain is debilitating.        Allergies as of 04/15/2025    (No Known Allergies)       Current Outpatient Medications:     hydrocortisone (ANUSOL-HC) 25 MG Suppos, Insert 1 Suppository into the rectum every 12 hours for 12 doses., Disp: 12 Suppository, Rfl: 0    dexamethasone (DECADRON) 2 MG tablet, Take 1 Tablet by mouth 3 times a day with meals for 10 days., Disp: 30 Tablet, Rfl: 0    gabapentin (NEURONTIN) 100 MG Cap, Take 1 Capsule by mouth 3 times a day for 15 days., Disp: 45 Capsule, Rfl: 0    morphine ER (MS CONTIN) 30 MG Tab CR tablet, Take 1 Tablet by mouth every 12 hours for 30 days., Disp: 60 Tablet, Rfl: 0    tamsulosin (FLOMAX) 0.4 MG capsule, Take 1 Capsule by mouth every day.,  Disp: 30 Capsule, Rfl: 0    oxyCODONE immediate release (ROXICODONE) 10 MG immediate release tablet, Take 1 Tablet by mouth every 6 hours as needed for Moderate Pain for up to 15 days., Disp: 60 Tablet, Rfl: 0    capecitabine (XELODA) 500 MG tablet, Take 3 tabs of 500mg strength (total dose of 1500 mg) by mouth 2 times a day (at least 12 hrs apart and 30 min after food) Monday-Friday on days of radiation x 30 treatments, Disp: 180 Tablet, Rfl: 0    aspirin 81 MG EC tablet, tablet, Disp: , Rfl:     baclofen (LIORESAL) 5 MG Tab, Take 1 tablet by mouth every 8 hours as needed for hiccups related to chemotherapy treatment., Disp: 30 Tablet, Rfl: 1    pantoprazole (PROTONIX) 40 MG Tablet Delayed Response, Take 1 Tablet by mouth every day., Disp: 30 Tablet, Rfl: 3    ondansetron (ZOFRAN ODT) 4 MG TABLET DISPERSIBLE, Take 1 Tablet by mouth every four hours as needed for Nausea/Vomiting., Disp: 30 Tablet, Rfl: 3    polyethylene glycol/lytes (MIRALAX) Pack, Take 17 g by mouth 1 time a day as needed., Disp: , Rfl:     melatonin 3 MG Tab, Take 3 mg by mouth at bedtime., Disp: , Rfl:     naproxen (ALEVE) 220 MG tablet, Take 220 mg by mouth 2 times a day as needed (pain)., Disp: , Rfl:     atorvastatin (LIPITOR) 40 MG Tab, Take 40 mg by mouth every day., Disp: , Rfl:     carvedilol (COREG) 12.5 MG Tab, Take 12.5 mg by mouth 2 times a day., Disp: , Rfl:     citalopram (CELEXA) 20 MG Tab, Take 20 mg by mouth every day., Disp: , Rfl:     hydrOXYzine HCl (ATARAX) 25 MG Tab, Take 25 mg by mouth every 8 hours as needed for Anxiety., Disp: , Rfl:     ondansetron (ZOFRAN) 4 MG Tab tablet, Take 1 Tablet by mouth every four hours as needed for Nausea/Vomiting (for nausea, vomiting)., Disp: 30 Tablet, Rfl: 6    prochlorperazine (COMPAZINE) 10 MG Tab, Take 1 Tablet by mouth every 6 hours as needed (for nausea, vomiting)., Disp: 30 Tablet, Rfl: 6    OLANZapine (ZYPREXA) 5 MG Tab, Take 1 Tablet by mouth every evening., Disp: 30 Tablet, Rfl:  "6    loperamide (IMODIUM A-D) 2 MG tablet, Take 1 Tablet by mouth see administration instructions., Disp: 30 Tablet, Rfl: 6  Review of Systems:  Review of Systems   Constitutional:  Positive for diaphoresis (associated with pain). Negative for chills, fever, malaise/fatigue and weight loss.   HENT:  Negative for tinnitus.    Eyes:  Negative for blurred vision and double vision.   Respiratory:  Negative for cough, hemoptysis, sputum production and shortness of breath.    Cardiovascular:  Negative for chest pain, palpitations, orthopnea and leg swelling.   Gastrointestinal:  Negative for abdominal pain, blood in stool, constipation, diarrhea, heartburn, melena, nausea and vomiting.        Acute localized rectal sphincter pain   Genitourinary:  Negative for dysuria, flank pain, hematuria and urgency.   Musculoskeletal:  Negative for back pain and myalgias.   Skin:  Negative for rash.   Neurological:  Negative for dizziness, tingling, sensory change and headaches.   Endo/Heme/Allergies:  Does not bruise/bleed easily.   Psychiatric/Behavioral:  The patient is nervous/anxious (diagnosis related).      Physical Exam:  Vitals:    04/15/25 0752   BP: 110/72   BP Location: Right arm   Patient Position: Sitting   BP Cuff Size: Adult   Pulse: 82   Temp: 36.9 °C (98.5 °F)   TempSrc: Temporal   SpO2: 98%   Height: 1.71 m (5' 7.32\")     Karnofsky Performance Status: 90  Physical Exam  Exam conducted with a chaperone present.   Constitutional:       General: He is in acute distress (pt is in moderate distress due to pain, unable to find comfortable position).      Appearance: He is normal weight. He is not toxic-appearing or diaphoretic.   HENT:      Head: Normocephalic and atraumatic.   Eyes:      Conjunctiva/sclera: Conjunctivae normal.      Pupils: Pupils are equal, round, and reactive to light.   Cardiovascular:      Rate and Rhythm: Normal rate and regular rhythm.      Pulses: Normal pulses.      Heart sounds: Normal heart " sounds.   Pulmonary:      Effort: Pulmonary effort is normal.      Breath sounds: Normal breath sounds.   Abdominal:      General: Bowel sounds are normal. There is distension (very slight distention around colostomy site consistent with recent surgery, no signs of infection. incision sites are clean, dry, intact without erythema. there is ecchymosis left lower lateral abdomen adjacent to surgery site).      Palpations: Abdomen is soft.      Tenderness: There is no abdominal tenderness. There is no guarding or rebound.      Hernia: No hernia is present.   Genitourinary:     Comments: CHRIS Pollock present for exam. Rectum with mild erythema and presence of purulent discharge vs residue from topical lidocaine cream, no blood    Musculoskeletal:         General: Normal range of motion.   Skin:     General: Skin is warm and dry.      Coloration: Skin is not jaundiced.             Labs: reviewed with pt today   Latest Reference Range & Units 04/14/25 00:33   WBC 4.8 - 10.8 K/uL 4.3 (L)   RBC 4.70 - 6.10 M/uL 3.51 (L)   Hemoglobin 14.0 - 18.0 g/dL 11.0 (L)   Hematocrit 42.0 - 52.0 % 33.0 (L)   MCV 81.4 - 97.8 fL 94.0   MCH 27.0 - 33.0 pg 31.3   MCHC 32.3 - 36.5 g/dL 33.3   RDW 35.9 - 50.0 fL 52.1 (H)   Platelet Count 164 - 446 K/uL 106 (L)   MPV 9.0 - 12.9 fL 10.2   Sodium 135 - 145 mmol/L 138   Potassium 3.6 - 5.5 mmol/L 4.1   Chloride 96 - 112 mmol/L 104   Co2 20 - 33 mmol/L 27   Anion Gap 7.0 - 16.0  7.0   Glucose 65 - 99 mg/dL 138 (H)   Bun 8 - 22 mg/dL 14   Creatinine 0.50 - 1.40 mg/dL 0.76   GFR (CKD-EPI) >60 mL/min/1.73 m 2 103   Calcium 8.5 - 10.5 mg/dL 9.0   Phosphorus 2.5 - 4.5 mg/dL 2.6   Magnesium 1.5 - 2.5 mg/dL 2.1         Assessment & Plan:  1. Rectal cancer (HCC)  fentaNYL (DURAGESIC) 50 MCG/HR PATCH 72 HR      2. Encounter for long-term current use of high risk medication        3. Encounter for chemotherapy management        4. Oncology follow-up encounter        5. Cancer related pain  fentaNYL  (DURAGESIC) 50 MCG/HR PATCH 72 HR      6. Uncontrolled pain            Rectal adenocarcinoma, [id number] stage IIIC disease. Dx Fall 2024, s/p neoadjuvant mFOLFIRINOX x7 cycles, currently on chemoRT   Stability of condition: waxing and waning    Treatment Plan: Capecitabine with concurrent radiation (through 4/28/25)  Hold capecitabine starting today, will consider restarting it once antibiotic PO course has been completed & pain is under better control.  Continue XRT per rad onc  Port flushes     2. Encounter for High Risk Medication Use  Toxicity: Patient is getting antineoplastic therapy and needs monitoring of blood counts, hepatic function, and renal function due to potential for organ dysfunction. Appropriate lab work has been ordered per treatment plan.   - CBC w/ diff, CMP, CEA weekly   -ctDNA: insufficient tissue sample    3. Uncontrolled pain- same character as baseline, poor control despite prior dose adjustments to med regimen, was discharged on morphine ER 100mg q12, oxycodone 10mg q6hr. On duloxetine  I discussed pt's presentation this am with Dr Kim & Dr Goel. Per Dr Goel, stop ER morphine & switch to fentanyl 50mcg patch, continue oxycodone IR 10mg for breakthrough. Pt has apt with Dr Goel on Monday, will consider adjusting regimen further vs transitioning to methadone at that time based on his presentation.   Pt has naloxone with him & has been educated on signs of OD. Informed consent obtained, low risk per verbal ORT,  reviewed by myself, as well as in conjunction with Dr Goel this am.     I advised pt that we recommend he go to ED for readmission if he develops any signs of sepsis, new or worsening symptoms, and uncontrolled pain. I offered to escort him to the ED at this time based on his uncontrolled pain, which he declined & would like to see if different opioid regimen is able to work better. His vitals & labs are reassuring, but I advised him very low threshold to  return to ED. He is an RN (formerly in ICU) & is very well versed in all aspects of our discussion today & verbalized understanding & agreement with this plan.       Future Imaging: Repeat MRI rectal protocol, CT scans after completion of JOSEMANUEL (not yet ordered)  Return for Follow Up: Monday with Dr Goel, will see him for f/up that day as well. Sooner as needed  4/25/25 with Dr Kim    Any questions and concerns raised by the patient were answered to the best of my ability. Thank you for allowing me to participate in the care for this patient. Please feel free to contact me for any questions or concerns.   Total time spent on chart review, clinic encounter, documentation, coordination of care: 45 minutes.   Please note that this dictation was created using voice recognition software. I have made every reasonable attempt to correct obvious errors, but I expect that there are errors of grammar and possibly content that I did not discover before finalizing the note.   Plan discussed with Dr Goel & Dr Kim

## 2025-04-02 NOTE — ON TREATMENT VISIT
"ON TREATMENT  NOTE  RADIATION ONCOLOGY DEPARTMENT    Patient name:  Booker Saucedo    Primary Physician:  Rickie Naranjo M.D. MRN: [id number]  [facility]: [id number]   Referring physician:  Hector Tse M.D.   : 1965, 59 y.o.     ENCOUNTER DATE:  2025      DIAGNOSIS:  Rectal cancer (HCC)  Staging form: Colon and Rectum, AJCC 8th Edition  - Clinical stage from 10/2/2024: Stage PATO (cT4a, cN2a, cM1a) - Signed by Jason Huerta M.D. on 10/16/2024  Histologic grade (G): G2  Histologic grading system: 4 grade system      TREATMENT SUMMARY:  Course First Treatment Date 2025  Course Last Treatment Date 2025  Radiation Treatments       Active   Plans   Rectum   Most recent treatment: Dose planned: 180 cGy (fraction 12 of 25 on 2025)   Total: Dose planned: 4,500 cGy   Elapsed Days: 15 @ 2025           Historical   No historical radiation treatments to show.                 SUBJECTIVE:  Worsening proctitis    VITAL SIGNS:      2025     7:50 AM 2025     7:56 AM 3/31/2025     7:59 AM 3/26/2025    12:15 PM 3/19/2025     7:52 AM 3/18/2025    11:26 AM 2025     2:03 PM   Vitals   SYSTOLIC 136  122 139 112  130   DIASTOLIC 87  78 81 64  76   Pulse 72 69 62 60 74 63 105   Temperature 36.6 °C (97.9 °F) 35.9 °C (96.6 °F) 36.2 °C (97.2 °F) 36.3 °C (97.3 °F) 36.7 °C (98.1 °F) 35.7 °C (96.2 °F) 36.3 °C (97.3 °F)   Respiration 18    18  16   Weight 175.71 176.6 176.6 170.2 175.05 174 170.64   Height  1.71 m (5' 7.32\") 1.71 m (5' 7.32\")   1.71 m (5' 7.32\")    BMI 27.26 kg/m2 27.39 kg/m2 27.39 kg/m2 26.4 kg/m2 27.15 kg/m2 26.99 kg/m2 26.47 kg/m2   Pulse Oximetry 96 % 97 % 98 % 96 % 95 % 98 % 98 %     KPS: 100, Fully active, able to carry on all pre-disease performed without restriction (ECOG equivalent 0)  Encounter Vitals  Temperature: 36.6 °C (97.9 °F)  Blood Pressure: 136/87  Pulse: 72  Respiration: 18  Pulse Oximetry: 96 %  Weight: 79.7 kg (175 lb 11.3 oz)  Weight Source: Stand " Up Scale  Pain Score: 5=Moderate Pain      4/2/2025     7:50 AM 3/31/2025     7:59 AM 3/26/2025    12:15 PM 3/19/2025     7:52 AM 2/18/2025     2:03 PM 1/17/2025     7:54 AM   Pain Assessment   Pain Score 5=MODERATE P 3=SLIGHT JESS 2=MINIMAL PA 3=SLIGHT JESS 5=MODERATE P NO PAIN   Pain Loc ABDOMEN  -- ABDOMEN RECTUM           PHYSICAL EXAM:  Physical Exam         4/2/2025     7:53 AM 3/26/2025    12:16 PM 3/19/2025     7:53 AM   Toxicity Assessment   Toxicity Assessment Male Pelvis Male Pelvis Male Pelvis   Fatigue (lethargy, malaise, asthenia) Increased fatigue over baseline, but not altering normal activities Increased fatigue over baseline, but not altering normal activities None   Radiation Dermatitis None None None   Anorexia None None Loss of appetite   Colitis None None Abdominal pain with mucus and/or blood in stool   Constipation Requiring stool softener or dietary modification None Requiring stool softener or dietary modification   Dehydration None None None   Diarrhea w/o Colostomy None None None   Flatulence None None None   Nausea None None None   Proctitis Increased stool frequency, occasional blood-streaked stools or rectal discomfort (including hemorrhoids) not requiring medication None None   Vomiting None None None   RT - Pain due to RT None None None   Tumor Pain (onset or exacerbation of tumor pain due to treatment) Moderate pain, pain or analgesics interfering with function, but not interfering with activities of daily living Mild pain not interfering with function Moderate pain, pain or analgesics interfering with function, but not interfering with activities of daily living   Dysuria (painful urination) None None None   Urinary Frequency Normal Normal Normal   Urinary Urgency None None None   Bladder Spasms Absent Absent Absent   Incontinence None None None   Urinary Retention Hesitency or dribbling, but no significant residual urine and/or retention occuring during the immediate postoperative  period Hesitency or dribbling, but no significant residual urine and/or retention occuring during the immediate postoperative period Hesitency or dribbling, but no significant residual urine and/or retention occuring during the immediate postoperative period       CURRENT MEDICATIONS:    Current Outpatient Medications:     hydrocortisone (ANUSOL-HC) 25 MG Suppos, Insert 1 Suppository into the rectum every 12 hours for 12 doses., Disp: 12 Suppository, Rfl: 0    dexamethasone (DECADRON) 2 MG tablet, Take 1 Tablet by mouth 3 times a day with meals for 10 days., Disp: 30 Tablet, Rfl: 0    gabapentin (NEURONTIN) 100 MG Cap, Take 1 Capsule by mouth 3 times a day for 15 days., Disp: 45 Capsule, Rfl: 0    morphine ER (MS CONTIN) 30 MG Tab CR tablet, Take 1 Tablet by mouth every 12 hours for 30 days., Disp: 60 Tablet, Rfl: 0    tamsulosin (FLOMAX) 0.4 MG capsule, Take 1 Capsule by mouth every day., Disp: 30 Capsule, Rfl: 0    oxyCODONE immediate release (ROXICODONE) 10 MG immediate release tablet, Take 1 Tablet by mouth every 6 hours as needed for Moderate Pain for up to 15 days., Disp: 60 Tablet, Rfl: 0    capecitabine (XELODA) 500 MG tablet, Take 3 tabs of 500mg strength (total dose of 1500 mg) by mouth 2 times a day (at least 12 hrs apart and 30 min after food) Monday-Friday on days of radiation x 30 treatments, Disp: 180 Tablet, Rfl: 0    aspirin 81 MG EC tablet, tablet, Disp: , Rfl:     baclofen (LIORESAL) 5 MG Tab, Take 1 tablet by mouth every 8 hours as needed for hiccups related to chemotherapy treatment., Disp: 30 Tablet, Rfl: 1    pantoprazole (PROTONIX) 40 MG Tablet Delayed Response, Take 1 Tablet by mouth every day., Disp: 30 Tablet, Rfl: 3    ondansetron (ZOFRAN ODT) 4 MG TABLET DISPERSIBLE, Take 1 Tablet by mouth every four hours as needed for Nausea/Vomiting., Disp: 30 Tablet, Rfl: 3    polyethylene glycol/lytes (MIRALAX) Pack, Take 17 g by mouth 1 time a day as needed., Disp: , Rfl:     melatonin 3 MG Tab,  Take 3 mg by mouth at bedtime., Disp: , Rfl:     naproxen (ALEVE) 220 MG tablet, Take 220 mg by mouth 2 times a day as needed (pain)., Disp: , Rfl:     atorvastatin (LIPITOR) 40 MG Tab, Take 40 mg by mouth every day., Disp: , Rfl:     carvedilol (COREG) 12.5 MG Tab, Take 12.5 mg by mouth 2 times a day., Disp: , Rfl:     citalopram (CELEXA) 20 MG Tab, Take 20 mg by mouth every day., Disp: , Rfl:     hydrOXYzine HCl (ATARAX) 25 MG Tab, Take 25 mg by mouth every 8 hours as needed for Anxiety., Disp: , Rfl:     ondansetron (ZOFRAN) 4 MG Tab tablet, Take 1 Tablet by mouth every four hours as needed for Nausea/Vomiting (for nausea, vomiting)., Disp: 30 Tablet, Rfl: 6    prochlorperazine (COMPAZINE) 10 MG Tab, Take 1 Tablet by mouth every 6 hours as needed (for nausea, vomiting)., Disp: 30 Tablet, Rfl: 6    OLANZapine (ZYPREXA) 5 MG Tab, Take 1 Tablet by mouth every evening., Disp: 30 Tablet, Rfl: 6    loperamide (IMODIUM A-D) 2 MG tablet, Take 1 Tablet by mouth see administration instructions., Disp: 30 Tablet, Rfl: 6    LABORATORY DATA:   Lab Results   Component Value Date/Time    SODIUM 137 03/31/2025 08:56 AM    POTASSIUM 4.5 03/31/2025 08:56 AM    CHLORIDE 100 03/31/2025 08:56 AM    CO2 27 03/31/2025 08:56 AM    GLUCOSE 110 (H) 03/31/2025 08:56 AM    BUN 26 (H) 03/31/2025 08:56 AM    CREATININE 0.63 03/31/2025 08:56 AM       Lab Results   Component Value Date/Time    WBC 11.8 (H) 03/31/2025 08:56 AM    RBC 3.69 (L) 03/31/2025 08:56 AM    HEMOGLOBIN 11.3 (L) 03/31/2025 08:56 AM    HEMATOCRIT 33.6 (L) 03/31/2025 08:56 AM    MCV 91.1 03/31/2025 08:56 AM    MCH 30.6 03/31/2025 08:56 AM    MCHC 33.6 03/31/2025 08:56 AM    PLATELETCT 221 03/31/2025 08:56 AM         RADIOLOGY DATA:  No results found.    IMPRESSION:  Cancer Staging   Rectal cancer (HCC)  Staging form: Colon and Rectum, AJCC 8th Edition  - Clinical stage from 10/2/2024: Stage PATO (cT4a, cN2a, cM1a) - Signed by Jason Huerta M.D. on 10/16/2024      PLAN:  No  change in treatment plan    Disposition:  Treatment plan and imaging reviewed. Questions answered. Continue therapy outlined.     Jason Huerta M.D.    Orders Placed This Encounter    hydrocortisone (ANUSOL-HC) 25 MG Suppos    dexamethasone (DECADRON) 2 MG tablet    gabapentin (NEURONTIN) 100 MG Cap

## 2025-04-03 ENCOUNTER — HOSPITAL ENCOUNTER (OUTPATIENT)
Dept: RADIATION ONCOLOGY | Facility: MEDICAL CENTER | Age: 60
End: 2025-04-03
Payer: COMMERCIAL

## 2025-04-03 LAB

## 2025-04-03 PROCEDURE — 77336 RADIATION PHYSICS CONSULT: CPT | Performed by: RADIOLOGY

## 2025-04-03 PROCEDURE — 77386 HCHG IMRT DELIVERY COMPLEX: CPT | Performed by: RADIOLOGY

## 2025-04-03 PROCEDURE — 77014 PR CT GUIDANCE PLACEMENT RAD THERAPY FIELDS: CPT | Mod: 26 | Performed by: RADIOLOGY

## 2025-04-03 RX ORDER — MORPHINE SULFATE 30 MG/1
30 TABLET, FILM COATED, EXTENDED RELEASE ORAL EVERY 12 HOURS
Qty: 30 TABLET | Refills: 0 | OUTPATIENT
Start: 2025-04-03 | End: 2025-04-18

## 2025-04-04 ENCOUNTER — TELEPHONE (OUTPATIENT)
Dept: HEMATOLOGY ONCOLOGY | Facility: MEDICAL CENTER | Age: 60
End: 2025-04-04
Payer: COMMERCIAL

## 2025-04-04 ENCOUNTER — PATIENT MESSAGE (OUTPATIENT)
Dept: HEMATOLOGY ONCOLOGY | Facility: MEDICAL CENTER | Age: 60
End: 2025-04-04
Payer: COMMERCIAL

## 2025-04-04 ENCOUNTER — HOSPITAL ENCOUNTER (INPATIENT)
Facility: MEDICAL CENTER | Age: 60
LOS: 10 days | DRG: 330 | End: 2025-04-14
Attending: EMERGENCY MEDICINE | Admitting: HOSPITALIST
Payer: COMMERCIAL

## 2025-04-04 ENCOUNTER — APPOINTMENT (OUTPATIENT)
Dept: RADIOLOGY | Facility: MEDICAL CENTER | Age: 60
DRG: 330 | End: 2025-04-04
Attending: EMERGENCY MEDICINE
Payer: COMMERCIAL

## 2025-04-04 ENCOUNTER — HOSPITAL ENCOUNTER (OUTPATIENT)
Dept: RADIATION ONCOLOGY | Facility: MEDICAL CENTER | Age: 60
End: 2025-04-04

## 2025-04-04 DIAGNOSIS — G89.3 CANCER RELATED PAIN: ICD-10-CM

## 2025-04-04 DIAGNOSIS — Z71.89 OSTOMY NURSE CONSULTATION: ICD-10-CM

## 2025-04-04 DIAGNOSIS — C20 RECTAL CANCER (HCC): ICD-10-CM

## 2025-04-04 DIAGNOSIS — K61.1 RECTAL ABSCESS: ICD-10-CM

## 2025-04-04 PROBLEM — I10 PRIMARY HYPERTENSION: Status: ACTIVE | Noted: 2025-04-04

## 2025-04-04 PROBLEM — I25.10 CORONARY ARTERY DISEASE: Status: ACTIVE | Noted: 2025-04-04

## 2025-04-04 LAB
ALBUMIN SERPL BCP-MCNC: 3.9 G/DL (ref 3.2–4.9)
ALBUMIN/GLOB SERPL: 1.3 G/DL
ALP SERPL-CCNC: 88 U/L (ref 30–99)
ALT SERPL-CCNC: 22 U/L (ref 2–50)
ANION GAP SERPL CALC-SCNC: 10 MMOL/L (ref 7–16)
AST SERPL-CCNC: 20 U/L (ref 12–45)
BASOPHILS # BLD AUTO: 0.1 % (ref 0–1.8)
BASOPHILS # BLD: 0.01 K/UL (ref 0–0.12)
BILIRUB SERPL-MCNC: 0.4 MG/DL (ref 0.1–1.5)
BUN SERPL-MCNC: 30 MG/DL (ref 8–22)
CALCIUM ALBUM COR SERPL-MCNC: 10.2 MG/DL (ref 8.5–10.5)
CALCIUM SERPL-MCNC: 10.1 MG/DL (ref 8.5–10.5)
CHEMOTHERAPY INFUSION START DATE: NORMAL
CHEMOTHERAPY RECORDS: 1.8 GY
CHEMOTHERAPY RECORDS: 4500 CGY
CHEMOTHERAPY RECORDS: NORMAL
CHEMOTHERAPY RX CANCER: NORMAL
CHLORIDE SERPL-SCNC: 99 MMOL/L (ref 96–112)
CO2 SERPL-SCNC: 26 MMOL/L (ref 20–33)
CREAT SERPL-MCNC: 0.77 MG/DL (ref 0.5–1.4)
CRP SERPL HS-MCNC: <0.3 MG/DL (ref 0–0.75)
DATE 1ST CHEMO CANCER: NORMAL
EOSINOPHIL # BLD AUTO: 0.46 K/UL (ref 0–0.51)
EOSINOPHIL NFR BLD: 3.3 % (ref 0–6.9)
ERYTHROCYTE [DISTWIDTH] IN BLOOD BY AUTOMATED COUNT: 47.7 FL (ref 35.9–50)
GFR SERPLBLD CREATININE-BSD FMLA CKD-EPI: 103 ML/MIN/1.73 M 2
GLOBULIN SER CALC-MCNC: 2.9 G/DL (ref 1.9–3.5)
GLUCOSE SERPL-MCNC: 84 MG/DL (ref 65–99)
HCT VFR BLD AUTO: 37.2 % (ref 42–52)
HGB BLD-MCNC: 12.3 G/DL (ref 14–18)
IMM GRANULOCYTES # BLD AUTO: 0.12 K/UL (ref 0–0.11)
IMM GRANULOCYTES NFR BLD AUTO: 0.9 % (ref 0–0.9)
LACTATE SERPL-SCNC: 1.2 MMOL/L (ref 0.5–2)
LIPASE SERPL-CCNC: 13 U/L (ref 11–82)
LYMPHOCYTES # BLD AUTO: 0.51 K/UL (ref 1–4.8)
LYMPHOCYTES NFR BLD: 3.7 % (ref 22–41)
MCH RBC QN AUTO: 31.1 PG (ref 27–33)
MCHC RBC AUTO-ENTMCNC: 33.1 G/DL (ref 32.3–36.5)
MCV RBC AUTO: 94.2 FL (ref 81.4–97.8)
MONOCYTES # BLD AUTO: 1.26 K/UL (ref 0–0.85)
MONOCYTES NFR BLD AUTO: 9 % (ref 0–13.4)
NEUTROPHILS # BLD AUTO: 11.61 K/UL (ref 1.82–7.42)
NEUTROPHILS NFR BLD: 83 % (ref 44–72)
NRBC # BLD AUTO: 0 K/UL
NRBC BLD-RTO: 0 /100 WBC (ref 0–0.2)
PLATELET # BLD AUTO: 209 K/UL (ref 164–446)
PMV BLD AUTO: 9.4 FL (ref 9–12.9)
POTASSIUM SERPL-SCNC: 4.4 MMOL/L (ref 3.6–5.5)
PROT SERPL-MCNC: 6.8 G/DL (ref 6–8.2)
RAD ONC ARIA COURSE LAST TREATMENT DATE: NORMAL
RAD ONC ARIA COURSE TREATMENT ELAPSED DAYS: NORMAL
RAD ONC ARIA REFERENCE POINT DOSAGE GIVEN TO DATE: 25.2 GY
RAD ONC ARIA REFERENCE POINT ID: NORMAL
RAD ONC ARIA REFERENCE POINT SESSION DOSAGE GIVEN: 1.8 GY
RBC # BLD AUTO: 3.95 M/UL (ref 4.7–6.1)
SCCMEC + MECA PNL NOSE NAA+PROBE: NEGATIVE
SODIUM SERPL-SCNC: 135 MMOL/L (ref 135–145)
WBC # BLD AUTO: 14 K/UL (ref 4.8–10.8)

## 2025-04-04 PROCEDURE — 770006 HCHG ROOM/CARE - MED/SURG/GYN SEMI*

## 2025-04-04 PROCEDURE — 700105 HCHG RX REV CODE 258: Performed by: EMERGENCY MEDICINE

## 2025-04-04 PROCEDURE — 96366 THER/PROPH/DIAG IV INF ADDON: CPT

## 2025-04-04 PROCEDURE — 99223 1ST HOSP IP/OBS HIGH 75: CPT | Performed by: HOSPITALIST

## 2025-04-04 PROCEDURE — A9270 NON-COVERED ITEM OR SERVICE: HCPCS | Performed by: HOSPITALIST

## 2025-04-04 PROCEDURE — 96376 TX/PRO/DX INJ SAME DRUG ADON: CPT

## 2025-04-04 PROCEDURE — 96365 THER/PROPH/DIAG IV INF INIT: CPT

## 2025-04-04 PROCEDURE — 87040 BLOOD CULTURE FOR BACTERIA: CPT

## 2025-04-04 PROCEDURE — 77014 PR CT GUIDANCE PLACEMENT RAD THERAPY FIELDS: CPT | Mod: 26 | Performed by: RADIOLOGY

## 2025-04-04 PROCEDURE — 99221 1ST HOSP IP/OBS SF/LOW 40: CPT | Performed by: STUDENT IN AN ORGANIZED HEALTH CARE EDUCATION/TRAINING PROGRAM

## 2025-04-04 PROCEDURE — 700117 HCHG RX CONTRAST REV CODE 255: Performed by: EMERGENCY MEDICINE

## 2025-04-04 PROCEDURE — 700105 HCHG RX REV CODE 258: Performed by: HOSPITALIST

## 2025-04-04 PROCEDURE — 96375 TX/PRO/DX INJ NEW DRUG ADDON: CPT

## 2025-04-04 PROCEDURE — 700102 HCHG RX REV CODE 250 W/ 637 OVERRIDE(OP): Performed by: HOSPITALIST

## 2025-04-04 PROCEDURE — 85025 COMPLETE CBC W/AUTO DIFF WBC: CPT

## 2025-04-04 PROCEDURE — 77386 HCHG IMRT DELIVERY COMPLEX: CPT | Performed by: RADIOLOGY

## 2025-04-04 PROCEDURE — 87641 MR-STAPH DNA AMP PROBE: CPT

## 2025-04-04 PROCEDURE — 86140 C-REACTIVE PROTEIN: CPT

## 2025-04-04 PROCEDURE — 36415 COLL VENOUS BLD VENIPUNCTURE: CPT

## 2025-04-04 PROCEDURE — 700111 HCHG RX REV CODE 636 W/ 250 OVERRIDE (IP): Mod: JZ | Performed by: EMERGENCY MEDICINE

## 2025-04-04 PROCEDURE — 80053 COMPREHEN METABOLIC PANEL: CPT

## 2025-04-04 PROCEDURE — 700111 HCHG RX REV CODE 636 W/ 250 OVERRIDE (IP): Mod: JZ | Performed by: HOSPITALIST

## 2025-04-04 PROCEDURE — 74177 CT ABD & PELVIS W/CONTRAST: CPT

## 2025-04-04 PROCEDURE — 83605 ASSAY OF LACTIC ACID: CPT

## 2025-04-04 PROCEDURE — 83690 ASSAY OF LIPASE: CPT

## 2025-04-04 PROCEDURE — 99285 EMERGENCY DEPT VISIT HI MDM: CPT

## 2025-04-04 RX ORDER — CARVEDILOL 12.5 MG/1
12.5 TABLET ORAL 2 TIMES DAILY
Status: DISCONTINUED | OUTPATIENT
Start: 2025-04-04 | End: 2025-04-14 | Stop reason: HOSPADM

## 2025-04-04 RX ORDER — CEFTRIAXONE 1 G/1
1000 INJECTION, POWDER, FOR SOLUTION INTRAMUSCULAR; INTRAVENOUS ONCE
Status: COMPLETED | OUTPATIENT
Start: 2025-04-04 | End: 2025-04-04

## 2025-04-04 RX ORDER — HYDROXYZINE HYDROCHLORIDE 25 MG/1
25 TABLET, FILM COATED ORAL EVERY 8 HOURS PRN
Status: DISCONTINUED | OUTPATIENT
Start: 2025-04-04 | End: 2025-04-14 | Stop reason: HOSPADM

## 2025-04-04 RX ORDER — HYDROMORPHONE HYDROCHLORIDE 1 MG/ML
1 INJECTION, SOLUTION INTRAMUSCULAR; INTRAVENOUS; SUBCUTANEOUS EVERY 4 HOURS PRN
Status: DISCONTINUED | OUTPATIENT
Start: 2025-04-04 | End: 2025-04-05

## 2025-04-04 RX ORDER — ATORVASTATIN CALCIUM 40 MG/1
40 TABLET, FILM COATED ORAL DAILY
Status: DISCONTINUED | OUTPATIENT
Start: 2025-04-05 | End: 2025-04-14 | Stop reason: HOSPADM

## 2025-04-04 RX ORDER — GABAPENTIN 100 MG/1
100 CAPSULE ORAL 3 TIMES DAILY
Status: DISCONTINUED | OUTPATIENT
Start: 2025-04-04 | End: 2025-04-09

## 2025-04-04 RX ORDER — HYDROMORPHONE HYDROCHLORIDE 1 MG/ML
1 INJECTION, SOLUTION INTRAMUSCULAR; INTRAVENOUS; SUBCUTANEOUS ONCE
Status: COMPLETED | OUTPATIENT
Start: 2025-04-04 | End: 2025-04-04

## 2025-04-04 RX ORDER — DEXAMETHASONE 1 MG
2 TABLET ORAL
Status: DISCONTINUED | OUTPATIENT
Start: 2025-04-04 | End: 2025-04-05

## 2025-04-04 RX ORDER — CITALOPRAM HYDROBROMIDE 20 MG/1
20 TABLET ORAL DAILY
Status: DISCONTINUED | OUTPATIENT
Start: 2025-04-05 | End: 2025-04-11

## 2025-04-04 RX ORDER — HYDROMORPHONE HYDROCHLORIDE 2 MG/ML
1 INJECTION, SOLUTION INTRAMUSCULAR; INTRAVENOUS; SUBCUTANEOUS ONCE
Status: COMPLETED | OUTPATIENT
Start: 2025-04-04 | End: 2025-04-04

## 2025-04-04 RX ORDER — MORPHINE SULFATE 30 MG/1
30 TABLET, FILM COATED, EXTENDED RELEASE ORAL EVERY 12 HOURS
Refills: 0 | Status: DISCONTINUED | OUTPATIENT
Start: 2025-04-04 | End: 2025-04-09

## 2025-04-04 RX ORDER — OXYCODONE HYDROCHLORIDE 5 MG/1
15 TABLET ORAL EVERY 4 HOURS PRN
Refills: 0 | Status: DISCONTINUED | OUTPATIENT
Start: 2025-04-04 | End: 2025-04-05

## 2025-04-04 RX ORDER — ENOXAPARIN SODIUM 100 MG/ML
40 INJECTION SUBCUTANEOUS DAILY
Status: DISCONTINUED | OUTPATIENT
Start: 2025-04-04 | End: 2025-04-14 | Stop reason: HOSPADM

## 2025-04-04 RX ORDER — ONDANSETRON 2 MG/ML
4 INJECTION INTRAMUSCULAR; INTRAVENOUS ONCE
Status: COMPLETED | OUTPATIENT
Start: 2025-04-04 | End: 2025-04-04

## 2025-04-04 RX ORDER — ACETAMINOPHEN 325 MG/1
650 TABLET ORAL EVERY 6 HOURS PRN
Status: DISCONTINUED | OUTPATIENT
Start: 2025-04-04 | End: 2025-04-14 | Stop reason: HOSPADM

## 2025-04-04 RX ADMIN — OXYCODONE HYDROCHLORIDE 15 MG: 5 TABLET ORAL at 15:43

## 2025-04-04 RX ADMIN — CEFTRIAXONE SODIUM 1000 MG: 1 INJECTION, POWDER, FOR SOLUTION INTRAMUSCULAR; INTRAVENOUS at 13:40

## 2025-04-04 RX ADMIN — HYDROMORPHONE HYDROCHLORIDE 1 MG: 1 INJECTION, SOLUTION INTRAMUSCULAR; INTRAVENOUS; SUBCUTANEOUS at 19:39

## 2025-04-04 RX ADMIN — ONDANSETRON 4 MG: 2 INJECTION INTRAMUSCULAR; INTRAVENOUS at 10:39

## 2025-04-04 RX ADMIN — PIPERACILLIN AND TAZOBACTAM 3.38 G: 3; .375 INJECTION, POWDER, FOR SOLUTION INTRAVENOUS at 20:45

## 2025-04-04 RX ADMIN — HYDROMORPHONE HYDROCHLORIDE 1 MG: 2 INJECTION INTRAMUSCULAR; INTRAVENOUS; SUBCUTANEOUS at 10:39

## 2025-04-04 RX ADMIN — PIPERACILLIN AND TAZOBACTAM 3.38 G: 3; .375 INJECTION, POWDER, FOR SOLUTION INTRAVENOUS at 19:34

## 2025-04-04 RX ADMIN — MORPHINE SULFATE 30 MG: 30 TABLET, FILM COATED, EXTENDED RELEASE ORAL at 17:09

## 2025-04-04 RX ADMIN — CARVEDILOL 12.5 MG: 12.5 TABLET, FILM COATED ORAL at 17:08

## 2025-04-04 RX ADMIN — VANCOMYCIN HYDROCHLORIDE 2000 MG: 5 INJECTION, POWDER, LYOPHILIZED, FOR SOLUTION INTRAVENOUS at 13:40

## 2025-04-04 RX ADMIN — PIPERACILLIN AND TAZOBACTAM 3.38 G: 3; .375 INJECTION, POWDER, FOR SOLUTION INTRAVENOUS at 23:19

## 2025-04-04 RX ADMIN — GABAPENTIN 100 MG: 100 CAPSULE ORAL at 17:08

## 2025-04-04 RX ADMIN — HYDROMORPHONE HYDROCHLORIDE 1 MG: 2 INJECTION INTRAMUSCULAR; INTRAVENOUS; SUBCUTANEOUS at 11:28

## 2025-04-04 RX ADMIN — IOHEXOL 100 ML: 350 INJECTION, SOLUTION INTRAVENOUS at 11:50

## 2025-04-04 RX ADMIN — HYDROMORPHONE HYDROCHLORIDE 1 MG: 1 INJECTION, SOLUTION INTRAMUSCULAR; INTRAVENOUS; SUBCUTANEOUS at 14:31

## 2025-04-04 RX ADMIN — DEXAMETHASONE 2 MG: 1 TABLET ORAL at 19:23

## 2025-04-04 ASSESSMENT — SOCIAL DETERMINANTS OF HEALTH (SDOH)
IN THE PAST 12 MONTHS, HAS THE ELECTRIC, GAS, OIL, OR WATER COMPANY THREATENED TO SHUT OFF SERVICE IN YOUR HOME?: NO
WITHIN THE PAST 12 MONTHS, YOU WORRIED THAT YOUR FOOD WOULD RUN OUT BEFORE YOU GOT THE MONEY TO BUY MORE: NEVER TRUE
WITHIN THE LAST YEAR, HAVE TO BEEN RAPED OR FORCED TO HAVE ANY KIND OF SEXUAL ACTIVITY BY YOUR PARTNER OR EX-PARTNER?: NO
WITHIN THE PAST 12 MONTHS, THE FOOD YOU BOUGHT JUST DIDN'T LAST AND YOU DIDN'T HAVE MONEY TO GET MORE: NEVER TRUE
WITHIN THE LAST YEAR, HAVE YOU BEEN HUMILIATED OR EMOTIONALLY ABUSED IN OTHER WAYS BY YOUR PARTNER OR EX-PARTNER?: NO
WITHIN THE LAST YEAR, HAVE YOU BEEN KICKED, HIT, SLAPPED, OR OTHERWISE PHYSICALLY HURT BY YOUR PARTNER OR EX-PARTNER?: NO
WITHIN THE LAST YEAR, HAVE YOU BEEN AFRAID OF YOUR PARTNER OR EX-PARTNER?: NO

## 2025-04-04 ASSESSMENT — PAIN DESCRIPTION - PAIN TYPE
TYPE: ACUTE PAIN
TYPE: ACUTE PAIN
TYPE: ACUTE PAIN;CHRONIC PAIN
TYPE: ACUTE PAIN
TYPE: ACUTE PAIN
TYPE: ACUTE PAIN;CHRONIC PAIN
TYPE: ACUTE PAIN

## 2025-04-04 ASSESSMENT — PATIENT HEALTH QUESTIONNAIRE - PHQ9
2. FEELING DOWN, DEPRESSED, IRRITABLE, OR HOPELESS: NOT AT ALL
1. LITTLE INTEREST OR PLEASURE IN DOING THINGS: NOT AT ALL
SUM OF ALL RESPONSES TO PHQ9 QUESTIONS 1 AND 2: 0

## 2025-04-04 ASSESSMENT — FIBROSIS 4 INDEX: FIB4 SCORE: 1.2

## 2025-04-04 ASSESSMENT — COGNITIVE AND FUNCTIONAL STATUS - GENERAL
SUGGESTED CMS G CODE MODIFIER MOBILITY: CH
DAILY ACTIVITIY SCORE: 24
MOBILITY SCORE: 24
SUGGESTED CMS G CODE MODIFIER DAILY ACTIVITY: CH

## 2025-04-04 ASSESSMENT — LIFESTYLE VARIABLES
TOTAL SCORE: 0
HAVE YOU EVER FELT YOU SHOULD CUT DOWN ON YOUR DRINKING: NO
CONSUMPTION TOTAL: NEGATIVE
ALCOHOL_USE: NO
AVERAGE NUMBER OF DAYS PER WEEK YOU HAVE A DRINK CONTAINING ALCOHOL: 0
EVER FELT BAD OR GUILTY ABOUT YOUR DRINKING: NO
HOW MANY TIMES IN THE PAST YEAR HAVE YOU HAD 5 OR MORE DRINKS IN A DAY: 0
TOTAL SCORE: 0
HAVE PEOPLE ANNOYED YOU BY CRITICIZING YOUR DRINKING: NO
DOES PATIENT WANT TO STOP DRINKING: NO
ON A TYPICAL DAY WHEN YOU DRINK ALCOHOL HOW MANY DRINKS DO YOU HAVE: 0
EVER HAD A DRINK FIRST THING IN THE MORNING TO STEADY YOUR NERVES TO GET RID OF A HANGOVER: NO
TOTAL SCORE: 0

## 2025-04-04 ASSESSMENT — ENCOUNTER SYMPTOMS
FEVER: 0
VOMITING: 0
ABDOMINAL PAIN: 0

## 2025-04-04 NOTE — ASSESSMENT & PLAN NOTE
Stage IV  Last chemotherapy was February 14 by Dr. Kim and actively undergoing daily radiation by Dr. Huerta  30 total treatments.    Continue Decadron

## 2025-04-04 NOTE — ASSESSMENT & PLAN NOTE
CT scan reveals a 4.2 cm x 3.6 cm x 3.6 cm necrotic mass or abscess involving the rectum.    Evaluated by colorectal surgery underwent surgery with colostomy creation on 4/8/2025 and I&D of perirectal abscess  Completed 10-day course of IV Zosyn will transition to p.o. Augmentin for 4 more days to complete 2 weeks course per surgery recommendations

## 2025-04-04 NOTE — ED TRIAGE NOTES
Pt ambulatory to triage c/o rectal pain and low back pain. Pt states hx colon cancer and getting radiation. Pt states the pain has gotten more intense and his pain medications are not helping. Pt looks in obvious discomfort.

## 2025-04-04 NOTE — ED NOTES
PT ambulated to YEL 60 with a steady gate. C/C is rectal pain. Pt is in a gown, on the monitor, and call light is within reach. Chart up for ERP.

## 2025-04-04 NOTE — ASSESSMENT & PLAN NOTE
Patient started on Dilaudid PCA for severe intractable pain    Continue MS Contin 100 twice daily  Will decrease PCA to max 2 mg every 4 hours  Continue gabapentin and Cymbalta

## 2025-04-04 NOTE — ED NOTES
Pharmacy Medication Reconciliation      ~Medication reconciliation updated and complete per patient at bedside   ~Allergies have been verified   ~No oral ABX within the last 30 days  ~Is dispense history available in EPIC: partially   ~Patient home pharmacy :  [facility]       ~Anticoagulants (rivaroxaban, apixaban, edoxaban, dabigatran, warfarin, enoxaparin) taken in the last 14 days? No

## 2025-04-04 NOTE — ASSESSMENT & PLAN NOTE
History of prior stent to the LAD 2015  Continue medical therapy with atorvastatin carvedilol and low-dose aspirin

## 2025-04-04 NOTE — CONSULTS
DATE OF CONSULTATION:  4/4/2025     REFERRING PHYSICIAN:   Gigi Lopes M.D.     CONSULTING PHYSICIAN:  Andres Trujillo M.D.     REASON FOR CONSULTATION:  I have been asked by Dr. Lopes to see the patient in surgical consultation for evaluation of rectal pain.    HISTORY OF PRESENT ILLNESS: The patient is a 59 year-old White man who presents to the Emergency Department with a 1-day history of rectal pain.  The patient has a known history of Stage PATO (cT4a, cN2a, cM1a) rectal cancer and underwent chemotherapy earlier this year which ended in mid March.  A few days later, he initiated rectal radiation, which he has been receiving for the past couple of weeks.  In the past day, he has noted a significant increase in pain in the low pelvis extending to the area of the coccyx.  He is passing flatus and having bowel movements, but notes increasing constipation for which he takes MiraLAX.  He denies any nausea or emesis.    The patient has seen Dr. Hector Tse from the colorectal surgery team as an outpatient prior to initiation of chemotherapy.  He is also being followed by medical and radiation oncology, Mis Kim and Bradley, respectively.    PAST MEDICAL HISTORY:  has a past medical history of Bowel habit changes, Cancer (HCC) (09/2024), High cholesterol, Hypertension, Kidney stone, and MI (myocardial infarction) ([facility]) (2015).    PAST SURGICAL HISTORY:  has a past surgical history that includes stent placement (2015); knee arthroscopy; appendectomy; tonsillectomy; and cath placement (Right, 10/15/2024).    ALLERGIES: No Known Allergies    CURRENT MEDICATIONS:    Home Medications       Reviewed by Albert Esparza (Pharmacy Tech) on 04/04/25 at 1249  Med List Status: Complete     Medication Last Dose Status   aspirin 81 MG EC tablet 4/4/2025 Active   atorvastatin (LIPITOR) 40 MG Tab 4/4/2025 Active   baclofen (LIORESAL) 5 MG Tab Unknown Active   capecitabine (XELODA) 500 MG tablet 4/4/2025 Active    carvedilol (COREG) 12.5 MG Tab 4/4/2025 Active   citalopram (CELEXA) 20 MG Tab 4/4/2025 Active   dexamethasone (DECADRON) 2 MG tablet 4/4/2025 Active   gabapentin (NEURONTIN) 100 MG Cap 4/4/2025 Active   hydrocortisone (ANUSOL-HC) 25 MG Suppos 4/3/2025 Active   hydrOXYzine HCl (ATARAX) 25 MG Tab 4/2/2025 Active   loperamide (IMODIUM A-D) 2 MG tablet Unknown Active   melatonin 3 MG Tab 4/3/2025 Active   morphine ER (MS CONTIN) 30 MG Tab CR tablet 4/4/2025 Active   ondansetron (ZOFRAN) 4 MG Tab tablet Unknown Active   oxyCODONE immediate release (ROXICODONE) 10 MG immediate release tablet 4/4/2025 Active   polyethylene glycol/lytes (MIRALAX) Pack 4/4/2025 Active   prochlorperazine (COMPAZINE) 10 MG Tab Unknown Active   tamsulosin (FLOMAX) 0.4 MG capsule 4/4/2025 Active                  Audit from Redirected Encounters    **Home medications have not yet been reviewed for this encounter**         FAMILY HISTORY: family history includes Bladder cancer in his father; Breast Cancer in his sister.    SOCIAL HISTORY:  reports that he quit smoking about 15 months ago. His smoking use included cigars. He has never used smokeless tobacco. He reports that he does not currently use alcohol. He reports that he does not use drugs.    REVIEW OF SYSTEMS: Comprehensive review of systems is negative with the exception of the aforementioned HPI, PMH, and PSH bullets in accordance with CMS guidelines.    PHYSICAL EXAMINATION:    CONSTITUTIONAL: Alert, awake, oriented x3.  HEENT: Normocephalic, atraumatic. PERRL. Conjunctivae normal.  NECK: Supple  CARDIOVASCULAR: Normal rate, regular rhythm.  PULMONARY/CHEST: No respiratory distress. Chest wall stable, non-tender, normal excursion.  ABDOMEN: Soft, non-distended, non-tender.  Well-healed right lower quadrant appendectomy scar, subcentimeter reducible umbilical hernia.  MUSCULOSKELETAL: Moving all extremities.  NEUROLOGICAL: CNII-XII grossly intact, no focal deficits.  SKIN: Warm and  dry. No abrasions, lacerations.  PSYCHIATRIC: Behavior appropriate for situation.    LABORATORY VALUES:   Recent Labs     04/04/25  1035   WBC 14.0*   RBC 3.95*   HEMOGLOBIN 12.3*   HEMATOCRIT 37.2*   MCV 94.2   MCH 31.1   MCHC 33.1   RDW 47.7   PLATELETCT 209   MPV 9.4     Recent Labs     04/04/25  1035   SODIUM 135   POTASSIUM 4.4   CHLORIDE 99   CO2 26   GLUCOSE 84   BUN 30*   CREATININE 0.77   CALCIUM 10.1     Recent Labs     04/04/25  1035   ASTSGOT 20   ALTSGPT 22   TBILIRUBIN 0.4   ALKPHOSPHAT 88   GLOBULIN 2.9            IMAGING:   CT-ABDOMEN-PELVIS WITH   Final Result      1.  Multiple varying sized small hepatic cysts.      2.  Mild atherosclerotic changes of the infrarenal aorta and iliac arteries.      3.  4.2 x 3.6 x 3.6 cm necrotic mass or abscess involving the rectum.          ASSESSMENT AND PLAN:   This is a 59-year-old male with a known history of Stage PATO (cT4a, cN2a, cM1a) rectal cancer, chemotherapy complete, currently undergoing radiation therapy, presenting with 1 day of rectal pain.    The patient has no clinical signs and symptoms of obstruction, no acute abdomen.  He does have a mild leukocytosis of 14.0, but is generally nontoxic, with no history of recent fever.  CT scan of the pelvis reveals a possible necrotic mass versus abscess in the area of the rectal tumor.  At this time, I recommend conservative medical management with antibiotics, pain control, treatment per the medical oncology service.  The patient does not require urgent or emergent surgical intervention and may follow-up with colorectal surgery, medical and radiation oncology as an outpatient.    DISPOSITION: No indication for surgical intervention at this time.  Appreciate care of the medical oncology service.  Please contact [facility] with any additional questions or concerns arise.     ____________________________________     Andres Trujillo M.D.    DD: 4/4/2025  1:29 PM    AAST Grading System for EGS Conditions  [facility] NSQIP  Surgical Risk Calculator

## 2025-04-04 NOTE — H&P
"Hospital Medicine History & Physical Note    Date of Service  4/4/2025    Primary Care Physician  Rickie Naranjo M.D.    Consultants  Surgery Dr. Trujillo  Oncology Dr. Kim      Code Status  No Order    Chief Complaint  Chief Complaint   Patient presents with    Low Back Pain    Other       History of Presenting Illness  Booker Saucedo is a 59 y.o. male who presented 4/4/2025 with rectal pain.  Mr. Saucedo has a past medical history of CAD with stent to LAD 2015 as well as rectal cancer.   His last chemo was Feb 14th by Dr. Kim. He started developing rectal pain which has been progressive over the past 3 weeks. He has been taking decadron by Dr. Huerta and getting radiation 5 days a week (he had it this morning). He has been taking MS Contin 30 mg BID and using break-though pain. Today the pain was so intense that he couldn't stand it. \"I can't live like this\".He has had ongoing drainage mixed with his stool with some blood in it.  In the emergency room, his white blood cell count is 14,000 and CT reveals a 4.2 cm x 3.6 cm x 3.6 cm necrotic mass or abscess involving the rectum.  Dr. Trujillo, surgery has consulted and recommends IV antibiotics and conservative medical management.    I discussed the plan of care with Dr. Lopes in person.    I have Voalted Dr. Tse his colorectal surgeon and he is aware that he has been admitted.    Review of Systems  Review of Systems   Constitutional:  Negative for fever.        He had lost 40 lbs but now stable   Gastrointestinal:  Negative for abdominal pain and vomiting.       Past Medical History   has a past medical history of Bowel habit changes, Cancer (HCC) (09/2024), High cholesterol, Hypertension, Kidney stone, and MI (myocardial infarction) ([facility]) (2015).    Surgical History   has a past surgical history that includes stent placement (2015); knee arthroscopy; appendectomy; tonsillectomy; and cath placement (Right, 10/15/2024).     Family History  family " history includes Bladder cancer in his father; Breast Cancer in his sister.   Family history reviewed with patient. There is no family history that is pertinent to the chief complaint.     Social History   reports that he quit smoking about 15 months ago. His smoking use included cigars. He has never used smokeless tobacco. He reports that he does not currently use alcohol. He reports that he does not use drugs.    Allergies  No Known Allergies    Medications  Prior to Admission Medications   Prescriptions Last Dose Informant Patient Reported? Taking?   aspirin 81 MG EC tablet 4/4/2025 at  6:00 AM Patient Yes Yes   Sig: Take 81 mg by mouth every morning.   atorvastatin (LIPITOR) 40 MG Tab 4/4/2025 at  6:00 AM Patient Yes Yes   Sig: Take 40 mg by mouth every day.   baclofen (LIORESAL) 5 MG Tab Unknown Patient No No   Sig: Take 1 tablet by mouth every 8 hours as needed for hiccups related to chemotherapy treatment.   capecitabine (XELODA) 500 MG tablet 4/4/2025 at  6:00 AM Patient No Yes   Sig: Take 3 tabs of 500mg strength (total dose of 1500 mg) by mouth 2 times a day (at least 12 hrs apart and 30 min after food) Monday-Friday on days of radiation x 30 treatments   carvedilol (COREG) 12.5 MG Tab 4/4/2025 at  6:00 AM Patient Yes Yes   Sig: Take 12.5 mg by mouth 2 times a day.   citalopram (CELEXA) 20 MG Tab 4/4/2025 at  6:00 AM Patient Yes Yes   Sig: Take 20 mg by mouth every day.   dexamethasone (DECADRON) 2 MG tablet 4/4/2025 at  6:00 AM Patient No Yes   Sig: Take 1 Tablet by mouth 3 times a day with meals for 10 days.   gabapentin (NEURONTIN) 100 MG Cap 4/4/2025 at  6:00 AM Patient No Yes   Sig: Take 1 Capsule by mouth 3 times a day for 15 days.   hydrOXYzine HCl (ATARAX) 25 MG Tab 4/2/2025 Patient Yes No   Sig: Take 25 mg by mouth every 8 hours as needed for Anxiety.   hydrocortisone (ANUSOL-HC) 25 MG Suppos 4/3/2025 Evening Patient No Yes   Sig: Insert 1 Suppository into the rectum every 12 hours for 12 doses.    loperamide (IMODIUM A-D) 2 MG tablet Unknown Patient No No   Sig: Take 1 Tablet by mouth see administration instructions.   melatonin 3 MG Tab 4/3/2025 Bedtime Patient Yes Yes   Sig: Take 3 mg by mouth at bedtime.   morphine ER (MS CONTIN) 30 MG Tab CR tablet 4/4/2025 at  6:00 AM Patient No Yes   Sig: Take 1 Tablet by mouth every 12 hours for 30 days.   ondansetron (ZOFRAN) 4 MG Tab tablet Unknown Patient No No   Sig: Take 1 Tablet by mouth every four hours as needed for Nausea/Vomiting (for nausea, vomiting).   oxyCODONE immediate release (ROXICODONE) 10 MG immediate release tablet 4/4/2025 at  6:30 AM Patient No Yes   Sig: Take 1 Tablet by mouth every 6 hours as needed for Moderate Pain for up to 15 days.   polyethylene glycol/lytes (MIRALAX) Pack 4/4/2025 at  6:00 AM Patient Yes Yes   Sig: Take 17 g by mouth 2 times a day.   prochlorperazine (COMPAZINE) 10 MG Tab Unknown Patient No No   Sig: Take 1 Tablet by mouth every 6 hours as needed (for nausea, vomiting).   tamsulosin (FLOMAX) 0.4 MG capsule 4/4/2025 at  6:00 AM Patient No Yes   Sig: Take 1 Capsule by mouth every day.      Facility-Administered Medications: None       Physical Exam  Temp:  [37.2 °C (98.9 °F)] 37.2 °C (98.9 °F)  Pulse:  [57-77] 57  Resp:  [16-24] 18  BP: (114-143)/(54-69) 114/59  SpO2:  [93 %-99 %] 94 %  Blood Pressure: 114/59   Temperature: 37.2 °C (98.9 °F)   Pulse: (!) 57   Respiration: 18   Pulse Oximetry: 94 %       Physical Exam  Vitals and nursing note reviewed.   Constitutional:       General: He is not in acute distress.     Appearance: He is not toxic-appearing.   Cardiovascular:      Rate and Rhythm: Normal rate and regular rhythm.   Pulmonary:      Effort: Pulmonary effort is normal.      Breath sounds: Normal breath sounds.   Abdominal:      General: There is no distension.      Tenderness: There is no abdominal tenderness.   Musculoskeletal:      Right lower leg: No edema.      Left lower leg: No edema.   Neurological:       "General: No focal deficit present.      Mental Status: He is alert and oriented to person, place, and time.         Laboratory:  Recent Labs     04/04/25  1035   WBC 14.0*   RBC 3.95*   HEMOGLOBIN 12.3*   HEMATOCRIT 37.2*   MCV 94.2   MCH 31.1   MCHC 33.1   RDW 47.7   PLATELETCT 209   MPV 9.4     Recent Labs     04/04/25  1035   SODIUM 135   POTASSIUM 4.4   CHLORIDE 99   CO2 26   GLUCOSE 84   BUN 30*   CREATININE 0.77   CALCIUM 10.1     Recent Labs     04/04/25  1035   ALTSGPT 22   ASTSGOT 20   ALKPHOSPHAT 88   TBILIRUBIN 0.4   LIPASE 13   GLUCOSE 84         No results for input(s): \"NTPROBNP\" in the last 72 hours.      No results for input(s): \"TROPONINT\" in the last 72 hours.    Imaging:  CT-ABDOMEN-PELVIS WITH   Final Result      1.  Multiple varying sized small hepatic cysts.      2.  Mild atherosclerotic changes of the infrarenal aorta and iliac arteries.      3.  4.2 x 3.6 x 3.6 cm necrotic mass or abscess involving the rectum.              Assessment/Plan:  Justification for Admission Status  I anticipate this patient will require at least two midnights for appropriate medical management, necessitating inpatient admission because IV antibiotics and possibly surgery due to an immunocompromised patient due to chemo/radiation/stage IV cancer with a 4x4x4 cm rectal abscess and leukocytosis.    Patient will need a Med/Surg bed on MEDICAL service .  The need is secondary to as above.    * Rectal abscess- (present on admission)  Assessment & Plan  CT scan reveals a 4.2 cm x 3.6 cm x 3.6 cm necrotic mass or abscess involving the rectum.  Dr. Trujillo general surgery has consulted does not recommend surgical intervention at this time.  IV Zosyn  Cultures have been drawn in the emergency room  Dr. Tse his colorectal surgery is aware of his admission  Increase his oral regimen that he is on for chronic pain and add IV Dilaudid for breakthrough    Rectal cancer (HCC)- (present on admission)  Assessment & Plan  Stage " IV  Last chemotherapy was February 14 by Dr. Kim and actively undergoing daily radiation by Dr. Huerta (last radiation was today)  Continue Decadron 2 mg 3 times daily that has been on as an outpatient      Cancer related pain- (present on admission)  Assessment & Plan  He is on MS Contin 30 mg twice a day with oxycodone 10 mg for breakthrough.  These will be continued.  Due to the abscess he will require IV narcotics and possibly higher doses of oxycodone.    Coronary artery disease- (present on admission)  Assessment & Plan  History of prior stent to the LAD 2015    Primary hypertension- (present on admission)  Assessment & Plan  Continue outpatient carvedilol 12.5 mg twice daily with holding parameters    Port-A-Cath in place- (present on admission)  Assessment & Plan  Secondary to chemo        VTE prophylaxis: enoxaparin ppx

## 2025-04-04 NOTE — ED NOTES
Bedside report from [facility]. Pt on gurney in lowest, locked position, on RA, and continuous cardiac monitoring. Fall precautions in place, including fall risk sign. Pt updated on plan of care. No needs at this time. Call light within reach.

## 2025-04-04 NOTE — ED PROVIDER NOTES
"ER Provider Note    Scribed for Dr. Gigi Lopes M.D. by Yakelin Sorto. 4/4/2025  10:21 AM    Primary Care Provider: Rickie Naranjo M.D.    CHIEF COMPLAINT  Chief Complaint   Patient presents with    Low Back Pain    Other       EXTERNAL RECORDS REVIEWED  Outpatient Notes Reviewed note from February. Patient had an infusion for rectal cancer.    HPI/ROS  LIMITATION TO HISTORY   Select: : None    OUTSIDE HISTORIAN(S):  None    Booker Saucedo is a 59 y.o. male who presents to the ED for rectal pain onset last night. Patient describes this 8/10 pain as \"someone punched me\" and \"someone is stepping on my coccyx.\" He notes this pain worsens to a cramping feeling when he passes a bowel movement. He reports this pain inhibits him from sleeping. He states a history of kidney stones. He adds he is taking oxycontin for the pain with minimal relief.    PAST MEDICAL HISTORY  Past Medical History:   Diagnosis Date    Bowel habit changes     Cancer (HCC) 09/2024    colorectal cancer    High cholesterol     Hypertension     Kidney stone     4-5x    MI (myocardial infarction) ([facility]) 2015       SURGICAL HISTORY  Past Surgical History:   Procedure Laterality Date    CATH PLACEMENT Right 10/15/2024    Procedure: CENTRAL VENOUS CATHETER PLACEMENT WITH SUBCUTANEOUS PORT;  Surgeon: Hector Tse MD, PhD;  Location: SURGERY [facility];  Service: Gastroenterology    STENT PLACEMENT  2015    BARE METAL STENT- CARDIAC    APPENDECTOMY      KNEE ARTHROSCOPY      MENISCECTOMY    TONSILLECTOMY         FAMILY HISTORY  Family History   Problem Relation Age of Onset    Bladder cancer Father     Breast Cancer Sister        SOCIAL HISTORY   reports that he quit smoking about 15 months ago. His smoking use included cigars. He has never used smokeless tobacco. He reports that he does not currently use alcohol. He reports that he does not use drugs.    CURRENT MEDICATIONS  Previous Medications    ASPIRIN 81 MG EC TABLET    Take 81 " mg by mouth every morning.    ATORVASTATIN (LIPITOR) 40 MG TAB    Take 40 mg by mouth every day.    BACLOFEN (LIORESAL) 5 MG TAB    Take 1 tablet by mouth every 8 hours as needed for hiccups related to chemotherapy treatment.    CAPECITABINE (XELODA) 500 MG TABLET    Take 3 tabs of 500mg strength (total dose of 1500 mg) by mouth 2 times a day (at least 12 hrs apart and 30 min after food) Monday-Friday on days of radiation x 30 treatments    CARVEDILOL (COREG) 12.5 MG TAB    Take 12.5 mg by mouth 2 times a day.    CITALOPRAM (CELEXA) 20 MG TAB    Take 20 mg by mouth every day.    DEXAMETHASONE (DECADRON) 2 MG TABLET    Take 1 Tablet by mouth 3 times a day with meals for 10 days.    GABAPENTIN (NEURONTIN) 100 MG CAP    Take 1 Capsule by mouth 3 times a day for 15 days.    HYDROCORTISONE (ANUSOL-HC) 25 MG SUPPOS    Insert 1 Suppository into the rectum every 12 hours for 12 doses.    HYDROXYZINE HCL (ATARAX) 25 MG TAB    Take 25 mg by mouth every 8 hours as needed for Anxiety.    LOPERAMIDE (IMODIUM A-D) 2 MG TABLET    Take 1 Tablet by mouth see administration instructions.    MELATONIN 3 MG TAB    Take 3 mg by mouth at bedtime.    MORPHINE ER (MS CONTIN) 30 MG TAB CR TABLET    Take 1 Tablet by mouth every 12 hours for 30 days.    ONDANSETRON (ZOFRAN) 4 MG TAB TABLET    Take 1 Tablet by mouth every four hours as needed for Nausea/Vomiting (for nausea, vomiting).    OXYCODONE IMMEDIATE RELEASE (ROXICODONE) 10 MG IMMEDIATE RELEASE TABLET    Take 1 Tablet by mouth every 6 hours as needed for Moderate Pain for up to 15 days.    POLYETHYLENE GLYCOL/LYTES (MIRALAX) PACK    Take 17 g by mouth 2 times a day.    PROCHLORPERAZINE (COMPAZINE) 10 MG TAB    Take 1 Tablet by mouth every 6 hours as needed (for nausea, vomiting).    TAMSULOSIN (FLOMAX) 0.4 MG CAPSULE    Take 1 Capsule by mouth every day.       ALLERGIES  Patient has no known allergies.    PHYSICAL EXAM  /69   Pulse 77   Temp 37.2 °C (98.9 °F) (Temporal)   Resp  (!) 24   Wt 82.1 kg (181 lb)   SpO2 99%   BMI 28.08 kg/m²   Constitutional: Alert in no apparent distress.  HENT: No signs of trauma, Bilateral external ears normal, Nose normal.   Eyes: Pupils are equal and reactive, Conjunctiva normal, Non-icteric.   Neck: Normal range of motion, No tenderness, Supple,   Cardiovascular: Regular rate and rhythm, no murmurs.   Thorax & Lungs: Normal breath sounds, No respiratory distress, No wheezing, No chest tenderness.   Abdomen: Bowel sounds normal, Soft, No tenderness,   Skin: Warm, Dry, No erythema, No rash.   Back: No bony tenderness, No CVA tenderness.   GI: Significant tenderness in coccyx and GI region, Area of redness in gluteal fold, Non thrombosed hemorrhoid.    Extremities: No edema, No tenderness, No cyanosis, no tenderness  Psychiatric: Affect normal       DIAGNOSTIC STUDIES & PROCEDURES    Labs:   Labs Reviewed   CBC WITH DIFFERENTIAL - Abnormal; Notable for the following components:       Result Value    WBC 14.0 (*)     RBC 3.95 (*)     Hemoglobin 12.3 (*)     Hematocrit 37.2 (*)     Neutrophils-Polys 83.00 (*)     Lymphocytes 3.70 (*)     Neutrophils (Absolute) 11.61 (*)     Lymphs (Absolute) 0.51 (*)     Monos (Absolute) 1.26 (*)     Immature Granulocytes (abs) 0.12 (*)     All other components within normal limits   COMP METABOLIC PANEL - Abnormal; Notable for the following components:    Bun 30 (*)     All other components within normal limits   LIPASE   CRP QUANTITIVE (NON-CARDIAC)   ESTIMATED GFR   LACTIC ACID   LACTIC ACID   LACTIC ACID   BLOOD CULTURE   BLOOD CULTURE   MRSA BY PCR (AMP)      All labs reviewed by me.      Radiology:   The attending Emergency Physician has independently interpreted the diagnostic imaging associated with this visit and is awaiting the final reading from the radiologist, which will be displayed below.    Preliminary interpretation is a follows: Necrotic mass in rectal area  Radiologist interpretation:      CT-ABDOMEN-PELVIS  WITH   Final Result      1.  Multiple varying sized small hepatic cysts.      2.  Mild atherosclerotic changes of the infrarenal aorta and iliac arteries.      3.  4.2 x 3.6 x 3.6 cm necrotic mass or abscess involving the rectum.           COURSE & MEDICAL DECISION MAKING    ED Observation Status? No; Patient does not meet criteria for ED Observation.     INITIAL ASSESSMENT AND PLAN  Care Narrative:       10:21 AM - Patient seen and evaluated at bedside. Discussed plan of care, including labs and imaging. Patient agrees to plan of care. Patient will be treated with Zofran injection 4 mg and Dilaudid injection 1 mg for his symptoms. Ordered CRP Oneal, Lipase, CMP, CBC w/ Diff, and CT-Abdomen-Pelvis w/ to evaluate.     11:25 AM - Patient will be medicated with Dilaudid injection 1 mg. Patient verbalizes understanding and agreement to this plan of care.     12:33 PM - Patient will be medicated with Vancocin 2,000 mg and Rocephin injection 1,000 mg. Patient verbalizes understanding and agreement to this plan of care.      12:38 PM I discussed the patient's case and the above findings with Dr. Trujillo (Surgery) who does not think the patient needs surgery. Patient will be medicated with Vancocin 2 g IV and Rocephin injection 1 mg. Patient verbalizes understanding and agreement to this plan of care.      1:59 PM - I reevaluated the patient at bedside. I discussed the patient's diagnostic study results. Patient tells me that they are still in pain. Patient will be medicated with Dilaudid injection 1 mg. I informed the patient of my plan to admit today given the patient's current presentation and diagnostic study results. Patient verbalizes understanding and support with my plan for admission.      2:07 PM I discussed the patient's case and the above findings with Dr. Kim (Oncologist) who recommends for the patient to be hospitalized.     2:41 PM - I discussed the patient's case and the above findings with Dr. Beltre  (Hospitalist) who agrees to evaluate the patient for hospitalization.     ADDITIONAL PROBLEM LIST AND DISPOSITION  Patient with significant pain in the rectal area.  Ultimately CT scan shows necrotic mass.  There is a leukocytosis.  Multiple doses of pain medication given.  Patient was given IV antibiotics as well.  Lactic acid is normal.  No hypotension.  Spoke to the patient's oncologist and he believes admission will be necessary.  The surgical team is coming to see the patient do not recommend operative management at this time.  Will admit the patient to the hospitalist in guarded condition.               DISPOSITION AND DISCUSSIONS  I have discussed management of the patient with the following physicians and MARTHA's: Dr. Trujillo (Surgery), Dr. Beltre (Hospitalist), Dr. Kim (Oncologist)     Discussion of management with other Hospitals in [place] or appropriate source(s): None         Barriers to care at this time, including but not limited to:  None .     Decision tools and prescription drugs considered including, but not limited to: Antibiotics for necrotic infectious mass .    DISPOSITION:  Patient will be hospitalized by Dr. Beltre in guarded condition.     FINAL IMPRESSION   1. Rectal abscess      Yakelin MCDONALD (Tyra), am scribing for, and in the presence of, Gigi Lopes M.D..    Electronically signed by: Yakelin Sorto (Tyra), 4/4/2025    Gigi MCDONALD M.D. personally performed the services described in this documentation, as scribed by Yakelin Sorto in my presence, and it is both accurate and complete.    The note accurately reflects work and decisions made by me.  Gigi Lopes M.D.  4/4/2025  3:38 PM

## 2025-04-05 LAB
ANION GAP SERPL CALC-SCNC: 9 MMOL/L (ref 7–16)
BUN SERPL-MCNC: 29 MG/DL (ref 8–22)
C DIFF TOX GENS STL QL NAA+PROBE: NEGATIVE
CALCIUM SERPL-MCNC: 9.5 MG/DL (ref 8.5–10.5)
CHLORIDE SERPL-SCNC: 103 MMOL/L (ref 96–112)
CO2 SERPL-SCNC: 24 MMOL/L (ref 20–33)
CREAT SERPL-MCNC: 0.84 MG/DL (ref 0.5–1.4)
ERYTHROCYTE [DISTWIDTH] IN BLOOD BY AUTOMATED COUNT: 46.6 FL (ref 35.9–50)
GFR SERPLBLD CREATININE-BSD FMLA CKD-EPI: 100 ML/MIN/1.73 M 2
GLUCOSE SERPL-MCNC: 119 MG/DL (ref 65–99)
HCT VFR BLD AUTO: 36.2 % (ref 42–52)
HGB BLD-MCNC: 11.9 G/DL (ref 14–18)
MCH RBC QN AUTO: 30.5 PG (ref 27–33)
MCHC RBC AUTO-ENTMCNC: 32.9 G/DL (ref 32.3–36.5)
MCV RBC AUTO: 92.8 FL (ref 81.4–97.8)
PLATELET # BLD AUTO: 170 K/UL (ref 164–446)
PMV BLD AUTO: 9.6 FL (ref 9–12.9)
POTASSIUM SERPL-SCNC: 4.8 MMOL/L (ref 3.6–5.5)
RBC # BLD AUTO: 3.9 M/UL (ref 4.7–6.1)
SODIUM SERPL-SCNC: 136 MMOL/L (ref 135–145)
WBC # BLD AUTO: 12 K/UL (ref 4.8–10.8)

## 2025-04-05 PROCEDURE — 770001 HCHG ROOM/CARE - MED/SURG/GYN PRIV*

## 2025-04-05 PROCEDURE — 87045 FECES CULTURE AEROBIC BACT: CPT

## 2025-04-05 PROCEDURE — 87493 C DIFF AMPLIFIED PROBE: CPT

## 2025-04-05 PROCEDURE — 80048 BASIC METABOLIC PNL TOTAL CA: CPT

## 2025-04-05 PROCEDURE — 700105 HCHG RX REV CODE 258: Performed by: HOSPITALIST

## 2025-04-05 PROCEDURE — 700111 HCHG RX REV CODE 636 W/ 250 OVERRIDE (IP): Mod: JZ | Performed by: HOSPITALIST

## 2025-04-05 PROCEDURE — 87046 STOOL CULTR AEROBIC BACT EA: CPT

## 2025-04-05 PROCEDURE — 99233 SBSQ HOSP IP/OBS HIGH 50: CPT | Performed by: HOSPITALIST

## 2025-04-05 PROCEDURE — 700102 HCHG RX REV CODE 250 W/ 637 OVERRIDE(OP): Performed by: HOSPITALIST

## 2025-04-05 PROCEDURE — A9270 NON-COVERED ITEM OR SERVICE: HCPCS | Performed by: HOSPITALIST

## 2025-04-05 PROCEDURE — 302129 PCA PLUS: Performed by: HOSPITALIST

## 2025-04-05 PROCEDURE — 700111 HCHG RX REV CODE 636 W/ 250 OVERRIDE (IP): Mod: JZ

## 2025-04-05 PROCEDURE — 85027 COMPLETE CBC AUTOMATED: CPT

## 2025-04-05 PROCEDURE — 87899 AGENT NOS ASSAY W/OPTIC: CPT

## 2025-04-05 PROCEDURE — 36415 COLL VENOUS BLD VENIPUNCTURE: CPT

## 2025-04-05 RX ORDER — DEXAMETHASONE SODIUM PHOSPHATE 4 MG/ML
4 INJECTION, SOLUTION INTRA-ARTICULAR; INTRALESIONAL; INTRAMUSCULAR; INTRAVENOUS; SOFT TISSUE EVERY 6 HOURS
Status: DISCONTINUED | OUTPATIENT
Start: 2025-04-05 | End: 2025-04-09

## 2025-04-05 RX ORDER — HYDROMORPHONE HYDROCHLORIDE 1 MG/ML
1 INJECTION, SOLUTION INTRAMUSCULAR; INTRAVENOUS; SUBCUTANEOUS
Status: DISCONTINUED | OUTPATIENT
Start: 2025-04-05 | End: 2025-04-05

## 2025-04-05 RX ORDER — HYDROMORPHONE HYDROCHLORIDE 1 MG/ML
1 INJECTION, SOLUTION INTRAMUSCULAR; INTRAVENOUS; SUBCUTANEOUS ONCE
Status: COMPLETED | OUTPATIENT
Start: 2025-04-05 | End: 2025-04-05

## 2025-04-05 RX ORDER — DEXAMETHASONE SODIUM PHOSPHATE 4 MG/ML
4 INJECTION, SOLUTION INTRA-ARTICULAR; INTRALESIONAL; INTRAMUSCULAR; INTRAVENOUS; SOFT TISSUE EVERY 6 HOURS
Status: DISCONTINUED | OUTPATIENT
Start: 2025-04-05 | End: 2025-04-05

## 2025-04-05 RX ORDER — SODIUM CHLORIDE 9 MG/ML
INJECTION, SOLUTION INTRAVENOUS CONTINUOUS
Status: DISCONTINUED | OUTPATIENT
Start: 2025-04-05 | End: 2025-04-14

## 2025-04-05 RX ORDER — KETOROLAC TROMETHAMINE 15 MG/ML
15 INJECTION, SOLUTION INTRAMUSCULAR; INTRAVENOUS EVERY 6 HOURS PRN
Status: DISCONTINUED | OUTPATIENT
Start: 2025-04-05 | End: 2025-04-09

## 2025-04-05 RX ORDER — HYDROMORPHONE HYDROCHLORIDE 2 MG/ML
2 INJECTION, SOLUTION INTRAMUSCULAR; INTRAVENOUS; SUBCUTANEOUS ONCE
Status: COMPLETED | OUTPATIENT
Start: 2025-04-05 | End: 2025-04-05

## 2025-04-05 RX ADMIN — Medication 3 MG: at 21:13

## 2025-04-05 RX ADMIN — PIPERACILLIN AND TAZOBACTAM 3.38 G: 3; .375 INJECTION, POWDER, FOR SOLUTION INTRAVENOUS at 13:30

## 2025-04-05 RX ADMIN — GABAPENTIN 100 MG: 100 CAPSULE ORAL at 16:35

## 2025-04-05 RX ADMIN — GABAPENTIN 100 MG: 100 CAPSULE ORAL at 11:39

## 2025-04-05 RX ADMIN — CARVEDILOL 12.5 MG: 12.5 TABLET, FILM COATED ORAL at 05:08

## 2025-04-05 RX ADMIN — PIPERACILLIN AND TAZOBACTAM 3.38 G: 3; .375 INJECTION, POWDER, FOR SOLUTION INTRAVENOUS at 21:24

## 2025-04-05 RX ADMIN — SODIUM CHLORIDE: 9 INJECTION, SOLUTION INTRAVENOUS at 21:21

## 2025-04-05 RX ADMIN — HYDROMORPHONE HYDROCHLORIDE 1 MG: 1 INJECTION, SOLUTION INTRAMUSCULAR; INTRAVENOUS; SUBCUTANEOUS at 02:35

## 2025-04-05 RX ADMIN — DEXAMETHASONE SODIUM PHOSPHATE 4 MG: 4 INJECTION INTRA-ARTICULAR; INTRALESIONAL; INTRAMUSCULAR; INTRAVENOUS; SOFT TISSUE at 20:00

## 2025-04-05 RX ADMIN — HYDROMORPHONE HYDROCHLORIDE 1 MG: 1 INJECTION, SOLUTION INTRAMUSCULAR; INTRAVENOUS; SUBCUTANEOUS at 15:45

## 2025-04-05 RX ADMIN — DEXAMETHASONE 2 MG: 1 TABLET ORAL at 11:39

## 2025-04-05 RX ADMIN — GABAPENTIN 100 MG: 100 CAPSULE ORAL at 05:09

## 2025-04-05 RX ADMIN — MORPHINE SULFATE 30 MG: 30 TABLET, FILM COATED, EXTENDED RELEASE ORAL at 16:35

## 2025-04-05 RX ADMIN — ATORVASTATIN CALCIUM 40 MG: 40 TABLET, FILM COATED ORAL at 05:09

## 2025-04-05 RX ADMIN — KETOROLAC TROMETHAMINE 15 MG: 15 INJECTION, SOLUTION INTRAMUSCULAR; INTRAVENOUS at 21:10

## 2025-04-05 RX ADMIN — DEXAMETHASONE 2 MG: 1 TABLET ORAL at 08:29

## 2025-04-05 RX ADMIN — OXYCODONE HYDROCHLORIDE 15 MG: 5 TABLET ORAL at 09:46

## 2025-04-05 RX ADMIN — SODIUM CHLORIDE: 9 INJECTION, SOLUTION INTRAVENOUS at 05:16

## 2025-04-05 RX ADMIN — CARVEDILOL 12.5 MG: 12.5 TABLET, FILM COATED ORAL at 16:35

## 2025-04-05 RX ADMIN — HYDROMORPHONE HYDROCHLORIDE 1 MG: 1 INJECTION, SOLUTION INTRAMUSCULAR; INTRAVENOUS; SUBCUTANEOUS at 19:55

## 2025-04-05 RX ADMIN — HYDROMORPHONE HYDROCHLORIDE 2 MG: 2 INJECTION INTRAMUSCULAR; INTRAVENOUS; SUBCUTANEOUS at 16:32

## 2025-04-05 RX ADMIN — HYDROMORPHONE HYDROCHLORIDE 1 MG: 1 INJECTION, SOLUTION INTRAMUSCULAR; INTRAVENOUS; SUBCUTANEOUS at 13:29

## 2025-04-05 RX ADMIN — MORPHINE SULFATE 30 MG: 30 TABLET, FILM COATED, EXTENDED RELEASE ORAL at 05:09

## 2025-04-05 RX ADMIN — CITALOPRAM HYDROBROMIDE 20 MG: 20 TABLET ORAL at 05:09

## 2025-04-05 RX ADMIN — HYDROMORPHONE HYDROCHLORIDE 1 MG: 1 INJECTION, SOLUTION INTRAMUSCULAR; INTRAVENOUS; SUBCUTANEOUS at 08:45

## 2025-04-05 RX ADMIN — PIPERACILLIN AND TAZOBACTAM 3.38 G: 3; .375 INJECTION, POWDER, FOR SOLUTION INTRAVENOUS at 05:17

## 2025-04-05 ASSESSMENT — ENCOUNTER SYMPTOMS
EYE PAIN: 0
LOSS OF CONSCIOUSNESS: 0
HEADACHES: 0
DIARRHEA: 1
WHEEZING: 0
BRUISES/BLEEDS EASILY: 0
CHILLS: 0
SPUTUM PRODUCTION: 0
DIZZINESS: 0
EYE DISCHARGE: 0
VOMITING: 0
MYALGIAS: 0
SENSORY CHANGE: 0
CLAUDICATION: 0
DIAPHORESIS: 0
CONSTIPATION: 0
DEPRESSION: 0
SORE THROAT: 0
WEAKNESS: 0
SHORTNESS OF BREATH: 0
BACK PAIN: 0
NAUSEA: 0
HEMOPTYSIS: 0
PALPITATIONS: 0
ABDOMINAL PAIN: 0
NECK PAIN: 0
FOCAL WEAKNESS: 0
SPEECH CHANGE: 0
COUGH: 0
FEVER: 0

## 2025-04-05 ASSESSMENT — PAIN DESCRIPTION - PAIN TYPE
TYPE: ACUTE PAIN

## 2025-04-05 ASSESSMENT — LIFESTYLE VARIABLES: SUBSTANCE_ABUSE: 0

## 2025-04-05 NOTE — PROGRESS NOTES
ABBE Tijerina notified that pt doesn't have medication Xeloda ordered that he has to take Monday-Friday with radiation treatment.   Per APRN medication isn't on the formulary and pt might have to provide prescription to be given and will defer this to the day shift team.    Also clarified that Pt needs radiation ordered for this AM.      Per Pt he does have medication in his car that [place]    Will notify day RN to follow up with day hospitalist to make sure patient gets the medication prior to radiation treatment and gets radiation treatment.

## 2025-04-05 NOTE — PROGRESS NOTES
"Hospital Medicine Daily Progress Note    Date of Service  4/5/2025    Chief Complaint  Booker Saucedo is a 59 y.o. male admitted 4/4/2025 with     Hospital Course  Booker Saucedo is a 59 y.o. male who presented 4/4/2025 with rectal pain.  Mr. Saucedo has a past medical history of CAD with stent to LAD 2015 as well as rectal cancer.   His last chemo was Feb 14th by Dr. Kim. He started developing rectal pain which has been progressive over the past 3 weeks. He has been taking decadron by Dr. Huerta and getting radiation 5 days a week (he had it this morning). He has been taking MS Contin 30 mg BID and using break-though pain. Today the pain was so intense that he couldn't stand it. \"I can't live like this\".He has had ongoing drainage mixed with his stool with some blood in it.  In the emergency room, his white blood cell count is 14,000 and CT reveals a 4.2 cm x 3.6 cm x 3.6 cm necrotic mass or abscess involving the rectum.  Dr. Trujillo, surgery has consulted and recommends IV antibiotics and conservative medical management.    Interval Problem Update  4/5: Continues to have severe pain of the rectum.  He is requiring increased Dilaudid 1 mg IV every 4 to every 2.  I have changed his oral Decadron to IV Decadron.  4 mg IV every 6 as well as Toradol 50 mg IV every 6 as needed.  Have also reached out to Dr. Tse colorectal surgeon as well as surgical [facility] team to see if there is any surgical indication for incision and drainage to relieve severe pain.  Patient is having diarrheal stools with necrotic appearing material and purulence noted within it.  Ordered PCA dilaudid pump with 2mg IV x1. Continous O2 monitoring.    I have discussed this patient's plan of care and discharge plan at IDT rounds today with Case Management, Nursing, Nursing leadership, and other members of the IDT team.    Consultants/Specialty  general surgery    Code Status  Full Code    Disposition  The patient is not medically cleared " for discharge to home or a post-acute facility.  Anticipate discharge to: home with close outpatient follow-up    I have placed the appropriate orders for post-discharge needs.    Review of Systems  Review of Systems   Constitutional:  Negative for chills, diaphoresis, fever and malaise/fatigue.   HENT:  Negative for congestion and sore throat.    Eyes:  Negative for pain and discharge.   Respiratory:  Negative for cough, hemoptysis, sputum production, shortness of breath and wheezing.    Cardiovascular:  Negative for chest pain, palpitations, claudication and leg swelling.   Gastrointestinal:  Positive for diarrhea. Negative for abdominal pain, constipation, melena, nausea and vomiting.        Rectal pain and diarrhea.   Genitourinary:  Negative for dysuria, frequency and urgency.   Musculoskeletal:  Negative for back pain, joint pain, myalgias and neck pain.   Skin:  Negative for itching and rash.   Neurological:  Negative for dizziness, sensory change, speech change, focal weakness, loss of consciousness, weakness and headaches.   Endo/Heme/Allergies:  Does not bruise/bleed easily.   Psychiatric/Behavioral:  Negative for depression, substance abuse and suicidal ideas.         Physical Exam  Temp:  [35.8 °C (96.5 °F)-36.9 °C (98.5 °F)] 35.8 °C (96.5 °F)  Pulse:  [53-81] 81  Resp:  [12-19] 19  BP: (111-147)/(63-80) 130/77  SpO2:  [96 %-100 %] 96 %    Physical Exam  Vitals and nursing note reviewed.   Constitutional:       General: He is not in acute distress.     Appearance: He is normal weight. He is ill-appearing. He is not diaphoretic.   HENT:      Head: Normocephalic and atraumatic.      Mouth/Throat:      Pharynx: No oropharyngeal exudate.   Eyes:      General: No scleral icterus.        Right eye: No discharge.         Left eye: No discharge.      Conjunctiva/sclera: Conjunctivae normal.      Pupils: Pupils are equal, round, and reactive to light.   Neck:      Thyroid: No thyromegaly.      Vascular: No JVD.       Trachea: No tracheal deviation.   Cardiovascular:      Rate and Rhythm: Normal rate and regular rhythm.      Heart sounds: Normal heart sounds. No murmur heard.     No friction rub. No gallop.   Pulmonary:      Effort: Pulmonary effort is normal. No respiratory distress.      Breath sounds: Normal breath sounds. No wheezing or rales.   Chest:      Chest wall: No tenderness.   Abdominal:      General: Bowel sounds are normal. There is no distension.      Palpations: Abdomen is soft. There is no mass.      Tenderness: There is no abdominal tenderness. There is no guarding or rebound.   Musculoskeletal:         General: No tenderness. Normal range of motion.      Cervical back: Normal range of motion and neck supple.   Lymphadenopathy:      Cervical: No cervical adenopathy.   Skin:     General: Skin is warm and dry.      Findings: No erythema or rash.   Neurological:      General: No focal deficit present.      Mental Status: He is alert and oriented to person, place, and time.      Cranial Nerves: No cranial nerve deficit.      Motor: No abnormal muscle tone.   Psychiatric:         Mood and Affect: Mood normal.         Behavior: Behavior normal.         Thought Content: Thought content normal.         Judgment: Judgment normal.         Fluids    Intake/Output Summary (Last 24 hours) at 4/5/2025 1622  Last data filed at 4/5/2025 1318  Gross per 24 hour   Intake 1008.05 ml   Output --   Net 1008.05 ml        Laboratory  Recent Labs     04/04/25  1035 04/05/25  0057   WBC 14.0* 12.0*   RBC 3.95* 3.90*   HEMOGLOBIN 12.3* 11.9*   HEMATOCRIT 37.2* 36.2*   MCV 94.2 92.8   MCH 31.1 30.5   MCHC 33.1 32.9   RDW 47.7 46.6   PLATELETCT 209 170   MPV 9.4 9.6     Recent Labs     04/04/25  1035 04/05/25  0057   SODIUM 135 136   POTASSIUM 4.4 4.8   CHLORIDE 99 103   CO2 26 24   GLUCOSE 84 119*   BUN 30* 29*   CREATININE 0.77 0.84   CALCIUM 10.1 9.5                   Imaging  CT-ABDOMEN-PELVIS WITH   Final Result      1.   Multiple varying sized small hepatic cysts.      2.  Mild atherosclerotic changes of the infrarenal aorta and iliac arteries.      3.  4.2 x 3.6 x 3.6 cm necrotic mass or abscess involving the rectum.           Assessment/Plan  * Rectal abscess- (present on admission)  Assessment & Plan  CT scan reveals a 4.2 cm x 3.6 cm x 3.6 cm necrotic mass or abscess involving the rectum.  Dr. Trujillo general surgery has consulted does not recommend surgical intervention at this time.  IV Zosyn  Cultures have been drawn in the emergency room  Dr. Tse his colorectal surgery is aware of his admission.  Increase his oral regimen that he is on for chronic pain and add IV Dilaudid for breakthrough.  4/5:  I have reached out to Dr. Tse to see if there is any surgical intervention to help relieve pain.  Changed po decadron to IV decadron 4mg IV q 6.  Increased IV dilaudid to q 2 hours.  Added toradol IV q 6 hours.  Held lovenox.   Will await to hear back from surgery prior to making NPO.       Coronary artery disease- (present on admission)  Assessment & Plan  History of prior stent to the LAD 2015    Primary hypertension- (present on admission)  Assessment & Plan  Continue outpatient carvedilol 12.5 mg twice daily with holding parameters    Port-A-Cath in place- (present on admission)  Assessment & Plan  Secondary to chemo    Cancer related pain- (present on admission)  Assessment & Plan  He is on MS Contin 30 mg twice a day with oxycodone 10 mg for breakthrough.  These will be continued.  Due to the abscess he will require IV narcotics and possibly higher doses of oxycodone.    Rectal cancer (HCC)- (present on admission)  Assessment & Plan  Stage IV  Last chemotherapy was February 14 by Dr. Kim and actively undergoing daily radiation by Dr. Huerta (last radiation was today)  Changed Decadron 2 mg 3 times daily that has been on as an outpatient to IV decadron 4mg IV q 6 hours to help with pain/pressure of abscess.           VTE  prophylaxis:   SCDs/TEDs      I have performed a physical exam and reviewed and updated ROS and Plan today (4/5/2025). In review of yesterday's note (4/4/2025), there are no changes except as documented above.

## 2025-04-05 NOTE — PROGRESS NOTES
4 Eyes Skin Assessment Completed by CHRIS Wilkinson and CHRIS Beyer.    Head WDL  Ears WDL  Nose WDL  Mouth WDL  Neck WDL  Breast/Chest WDL  Shoulder Blades WDL  Spine WDL  (R) Arm/Elbow/Hand Redness and Blanching  (L) Arm/Elbow/Hand Redness and Blanching  Abdomen WDL  Groin Redness, Blanching, and Excoriation to L groin, radiation sticker  Scrotum/Coccyx/Buttocks Redness and Blanching, hemorrhoid  (R) Leg WDL  (L) Leg WDL  (R) Heel/Foot/Toe Dry, flaky   (L) Heel/Foot/Toe Dry, flaky                Devices In Places Pulse Ox, PIV R AC      Interventions In Place Pillows and Pressure Redistribution Mattress    Possible Skin Injury No    Pictures Uploaded Into Epic Yes  Wound Consult Placed N/A  RN Wound Prevention Protocol Ordered No

## 2025-04-05 NOTE — CARE PLAN
The patient is Stable - Low risk of patient condition declining or worsening    Shift Goals  Clinical Goals: safety, pain control, rest, ABX, monitor stool  Patient Goals: pain control, rest  Family Goals: NADIR    Progress made toward(s) clinical / shift goals:  Pt declined pharmacological intervention for pain, educated on the different modalities of pain management. ABX done per MAR. Pt updated on care plan.      Problem: Pain - Standard  Goal: Alleviation of pain or a reduction in pain to the patient’s comfort goal  Outcome: Progressing     Problem: Knowledge Deficit - Standard  Goal: Patient and family/care givers will demonstrate understanding of plan of care, disease process/condition, diagnostic tests and medications  Outcome: Progressing

## 2025-04-05 NOTE — PROGRESS NOTES
Assumed care of patient at 1845. Bedside report received. Assessment complete.  A&Ox4. Denies CP/SOB.  Reporting 5/10 pain. Declined intervention at this time.  Educated patient regarding pharmacologic and non pharmacologic modalities for pain management.  Skin: Redness/excoriation to L groin. Sacrum is red/blanching with hemorrhoid.   Tolerating regular diet. Denies N/V.  + void. Last BM 4/4/25  Pt ambulates up self, gait steady. Low fall risk per Sara Slater, bed alarm off.   All needs met at this time. Call light within reach. Pt calls appropriately. Bed low and locked, non skid socks in place. Hourly rounding in place.

## 2025-04-05 NOTE — CARE PLAN
The patient is Stable - Low risk of patient condition declining or worsening    Shift Goals  Clinical Goals: free from injury or falls, ABX, monitor stool for bleeding, pain <5 end of shift  Patient Goals: get better, pain mgt  Family Goals: tati    Progress made toward(s) clinical / shift goals:      Problem: Pain - Standard  Goal: Alleviation of pain or a reduction in pain to the patient’s comfort goal  Description: Target End Date:  Prior to discharge or change in level of careDocument on Vitals flowsheet1.  Document pain using the appropriate pain scale per order or unit policy2.  Educate and implement non-pharmacologic comfort measures (i.e. relaxation, distraction, massage, cold/heat therapy, etc.)3.  Pain management medications as ordered4.  Reassess pain after pain med administration per policy5.  If opiods administered assess patient's response to pain medication is appropriate per POSS sedation scale6.  Follow pain management plan developed in collaboration with patient and interdisciplinary team (including palliative care or pain specialists if applicable)  Outcome: Progressing     Problem: Knowledge Deficit - Standard  Goal: Patient and family/care givers will demonstrate understanding of plan of care, disease process/condition, diagnostic tests and medications  Description: Target End Date:  1-3 days or as soon as patient condition allowsDocument in Patient Education1.  Patient and family/caregiver oriented to unit, equipment, visitation policy and means for communicating concern2.  Complete/review Learning Assessment3.  Assess knowledge level of disease process/condition, treatment plan, diagnostic tests and medications4.  Explain disease process/condition, treatment plan, diagnostic tests and medications  Outcome: Progressing

## 2025-04-05 NOTE — CARE PLAN
The patient is Stable - Low risk of patient condition declining or worsening    Shift Goals  Clinical Goals: ABX, free from injury or falls,pain <5 by end of shift,  Patient Goals: rediation therapy, pain mgt  Family Goals: tati    Axo4. Ambulates by self. VS WNL. Afebrile. Pain managed by medication as per MAR. IV ABX. Pending c.diff culture results. On special contact isolation r/o c.diff. bed low and locked. Bed laamr refused. Due medications given as ordered. Seen with right chest port not accessed. Left call light within reach. Needs met at this time.    Progress made toward(s) clinical / shift goals:      Problem: Pain - Standard  Goal: Alleviation of pain or a reduction in pain to the patient’s comfort goal  Description: Target End Date:  Prior to discharge or change in level of careDocument on Vitals flowsheet1.  Document pain using the appropriate pain scale per order or unit policy2.  Educate and implement non-pharmacologic comfort measures (i.e. relaxation, distraction, massage, cold/heat therapy, etc.)3.  Pain management medications as ordered4.  Reassess pain after pain med administration per policy5.  If opiods administered assess patient's response to pain medication is appropriate per POSS sedation scale6.  Follow pain management plan developed in collaboration with patient and interdisciplinary team (including palliative care or pain specialists if applicable)  Outcome: Progressing     Problem: Knowledge Deficit - Standard  Goal: Patient and family/care givers will demonstrate understanding of plan of care, disease process/condition, diagnostic tests and medications  Description: Target End Date:  1-3 days or as soon as patient condition allowsDocument in Patient Education1.  Patient and family/caregiver oriented to unit, equipment, visitation policy and means for communicating concern2.  Complete/review Learning Assessment3.  Assess knowledge level of disease process/condition, treatment plan,  diagnostic tests and medications4.  Explain disease process/condition, treatment plan, diagnostic tests and medications  Outcome: Progressing

## 2025-04-06 LAB
ALBUMIN SERPL BCP-MCNC: 3.8 G/DL (ref 3.2–4.9)
ALBUMIN/GLOB SERPL: 1.4 G/DL
ALP SERPL-CCNC: 90 U/L (ref 30–99)
ALT SERPL-CCNC: 20 U/L (ref 2–50)
ANION GAP SERPL CALC-SCNC: 10 MMOL/L (ref 7–16)
AST SERPL-CCNC: 20 U/L (ref 12–45)
BASOPHILS # BLD AUTO: 0.2 % (ref 0–1.8)
BASOPHILS # BLD: 0.02 K/UL (ref 0–0.12)
BILIRUB SERPL-MCNC: 0.3 MG/DL (ref 0.1–1.5)
BUN SERPL-MCNC: 23 MG/DL (ref 8–22)
CALCIUM ALBUM COR SERPL-MCNC: 9.6 MG/DL (ref 8.5–10.5)
CALCIUM SERPL-MCNC: 9.4 MG/DL (ref 8.5–10.5)
CHLORIDE SERPL-SCNC: 102 MMOL/L (ref 96–112)
CO2 SERPL-SCNC: 25 MMOL/L (ref 20–33)
CREAT SERPL-MCNC: 0.72 MG/DL (ref 0.5–1.4)
E COLI SXT1+2 STL IA: NORMAL
EOSINOPHIL # BLD AUTO: 0.02 K/UL (ref 0–0.51)
EOSINOPHIL NFR BLD: 0.2 % (ref 0–6.9)
ERYTHROCYTE [DISTWIDTH] IN BLOOD BY AUTOMATED COUNT: 47.6 FL (ref 35.9–50)
GFR SERPLBLD CREATININE-BSD FMLA CKD-EPI: 105 ML/MIN/1.73 M 2
GLOBULIN SER CALC-MCNC: 2.8 G/DL (ref 1.9–3.5)
GLUCOSE SERPL-MCNC: 148 MG/DL (ref 65–99)
HCT VFR BLD AUTO: 34.2 % (ref 42–52)
HGB BLD-MCNC: 11.3 G/DL (ref 14–18)
IMM GRANULOCYTES # BLD AUTO: 0.07 K/UL (ref 0–0.11)
IMM GRANULOCYTES NFR BLD AUTO: 0.6 % (ref 0–0.9)
INR PPP: 1.03 (ref 0.87–1.13)
LYMPHOCYTES # BLD AUTO: 0.28 K/UL (ref 1–4.8)
LYMPHOCYTES NFR BLD: 2.3 % (ref 22–41)
MCH RBC QN AUTO: 31 PG (ref 27–33)
MCHC RBC AUTO-ENTMCNC: 33 G/DL (ref 32.3–36.5)
MCV RBC AUTO: 93.7 FL (ref 81.4–97.8)
MONOCYTES # BLD AUTO: 0.82 K/UL (ref 0–0.85)
MONOCYTES NFR BLD AUTO: 6.9 % (ref 0–13.4)
NEUTROPHILS # BLD AUTO: 10.72 K/UL (ref 1.82–7.42)
NEUTROPHILS NFR BLD: 89.8 % (ref 44–72)
NRBC # BLD AUTO: 0 K/UL
NRBC BLD-RTO: 0 /100 WBC (ref 0–0.2)
PLATELET # BLD AUTO: 161 K/UL (ref 164–446)
PMV BLD AUTO: 9.4 FL (ref 9–12.9)
POTASSIUM SERPL-SCNC: 4.4 MMOL/L (ref 3.6–5.5)
PROT SERPL-MCNC: 6.6 G/DL (ref 6–8.2)
PROTHROMBIN TIME: 13.5 SEC (ref 12–14.6)
RBC # BLD AUTO: 3.65 M/UL (ref 4.7–6.1)
SIGNIFICANT IND 70042: NORMAL
SITE SITE: NORMAL
SODIUM SERPL-SCNC: 137 MMOL/L (ref 135–145)
SOURCE SOURCE: NORMAL
WBC # BLD AUTO: 11.9 K/UL (ref 4.8–10.8)

## 2025-04-06 PROCEDURE — A9270 NON-COVERED ITEM OR SERVICE: HCPCS | Performed by: HOSPITALIST

## 2025-04-06 PROCEDURE — 85610 PROTHROMBIN TIME: CPT

## 2025-04-06 PROCEDURE — 700105 HCHG RX REV CODE 258

## 2025-04-06 PROCEDURE — 700111 HCHG RX REV CODE 636 W/ 250 OVERRIDE (IP): Mod: JZ | Performed by: HOSPITALIST

## 2025-04-06 PROCEDURE — 85025 COMPLETE CBC W/AUTO DIFF WBC: CPT

## 2025-04-06 PROCEDURE — 36415 COLL VENOUS BLD VENIPUNCTURE: CPT

## 2025-04-06 PROCEDURE — 700102 HCHG RX REV CODE 250 W/ 637 OVERRIDE(OP): Performed by: HOSPITALIST

## 2025-04-06 PROCEDURE — 700105 HCHG RX REV CODE 258: Performed by: HOSPITALIST

## 2025-04-06 PROCEDURE — 700111 HCHG RX REV CODE 636 W/ 250 OVERRIDE (IP)

## 2025-04-06 PROCEDURE — 770001 HCHG ROOM/CARE - MED/SURG/GYN PRIV*

## 2025-04-06 PROCEDURE — 700111 HCHG RX REV CODE 636 W/ 250 OVERRIDE (IP): Performed by: HOSPITALIST

## 2025-04-06 PROCEDURE — 80053 COMPREHEN METABOLIC PANEL: CPT

## 2025-04-06 PROCEDURE — 99233 SBSQ HOSP IP/OBS HIGH 50: CPT | Performed by: HOSPITALIST

## 2025-04-06 RX ADMIN — CARVEDILOL 12.5 MG: 12.5 TABLET, FILM COATED ORAL at 16:54

## 2025-04-06 RX ADMIN — DEXAMETHASONE SODIUM PHOSPHATE 4 MG: 4 INJECTION INTRA-ARTICULAR; INTRALESIONAL; INTRAMUSCULAR; INTRAVENOUS; SOFT TISSUE at 09:06

## 2025-04-06 RX ADMIN — PIPERACILLIN AND TAZOBACTAM 3.38 G: 3; .375 INJECTION, POWDER, FOR SOLUTION INTRAVENOUS at 13:22

## 2025-04-06 RX ADMIN — ATORVASTATIN CALCIUM 40 MG: 40 TABLET, FILM COATED ORAL at 04:25

## 2025-04-06 RX ADMIN — HYDROMORPHONE HYDROCHLORIDE: 10 INJECTION, SOLUTION INTRAMUSCULAR; INTRAVENOUS; SUBCUTANEOUS at 04:20

## 2025-04-06 RX ADMIN — DEXAMETHASONE SODIUM PHOSPHATE 4 MG: 4 INJECTION INTRA-ARTICULAR; INTRALESIONAL; INTRAMUSCULAR; INTRAVENOUS; SOFT TISSUE at 02:34

## 2025-04-06 RX ADMIN — CARVEDILOL 12.5 MG: 12.5 TABLET, FILM COATED ORAL at 05:29

## 2025-04-06 RX ADMIN — DEXAMETHASONE SODIUM PHOSPHATE 4 MG: 4 INJECTION INTRA-ARTICULAR; INTRALESIONAL; INTRAMUSCULAR; INTRAVENOUS; SOFT TISSUE at 22:28

## 2025-04-06 RX ADMIN — HYDROMORPHONE HYDROCHLORIDE: 10 INJECTION, SOLUTION INTRAMUSCULAR; INTRAVENOUS; SUBCUTANEOUS at 13:41

## 2025-04-06 RX ADMIN — PIPERACILLIN AND TAZOBACTAM 3.38 G: 3; .375 INJECTION, POWDER, FOR SOLUTION INTRAVENOUS at 22:30

## 2025-04-06 RX ADMIN — Medication 3 MG: at 22:28

## 2025-04-06 RX ADMIN — PIPERACILLIN AND TAZOBACTAM 3.38 G: 3; .375 INJECTION, POWDER, FOR SOLUTION INTRAVENOUS at 04:26

## 2025-04-06 RX ADMIN — DEXAMETHASONE SODIUM PHOSPHATE 4 MG: 4 INJECTION INTRA-ARTICULAR; INTRALESIONAL; INTRAMUSCULAR; INTRAVENOUS; SOFT TISSUE at 15:48

## 2025-04-06 RX ADMIN — GABAPENTIN 100 MG: 100 CAPSULE ORAL at 04:26

## 2025-04-06 RX ADMIN — MORPHINE SULFATE 30 MG: 30 TABLET, FILM COATED, EXTENDED RELEASE ORAL at 04:25

## 2025-04-06 RX ADMIN — GABAPENTIN 100 MG: 100 CAPSULE ORAL at 16:54

## 2025-04-06 RX ADMIN — MORPHINE SULFATE 30 MG: 30 TABLET, FILM COATED, EXTENDED RELEASE ORAL at 16:54

## 2025-04-06 RX ADMIN — CITALOPRAM HYDROBROMIDE 20 MG: 20 TABLET ORAL at 04:25

## 2025-04-06 RX ADMIN — GABAPENTIN 100 MG: 100 CAPSULE ORAL at 13:21

## 2025-04-06 ASSESSMENT — ENCOUNTER SYMPTOMS
BRUISES/BLEEDS EASILY: 0
FOCAL WEAKNESS: 0
DIARRHEA: 1
DIZZINESS: 0
HEADACHES: 0
CONSTIPATION: 0
CHILLS: 0
SHORTNESS OF BREATH: 0
COUGH: 0
PALPITATIONS: 0
WHEEZING: 0
LOSS OF CONSCIOUSNESS: 0
FEVER: 0
CLAUDICATION: 0
HEMOPTYSIS: 0
SORE THROAT: 0
CHILLS: 0
SHORTNESS OF BREATH: 0
NAUSEA: 0
VOMITING: 0
NAUSEA: 0
FEVER: 0
SENSORY CHANGE: 0
PALPITATIONS: 0
NECK PAIN: 0
BACK PAIN: 0
DIARRHEA: 0
EYE DISCHARGE: 0
SPEECH CHANGE: 0
MYALGIAS: 0
ABDOMINAL PAIN: 0
ABDOMINAL PAIN: 1
CONSTIPATION: 0
VOMITING: 0
DEPRESSION: 0
EYE PAIN: 0
SPUTUM PRODUCTION: 0
DIAPHORESIS: 0
WEAKNESS: 0
COUGH: 0

## 2025-04-06 ASSESSMENT — LIFESTYLE VARIABLES: SUBSTANCE_ABUSE: 0

## 2025-04-06 ASSESSMENT — PAIN DESCRIPTION - PAIN TYPE
TYPE: ACUTE PAIN

## 2025-04-06 NOTE — PROGRESS NOTES
Subjective:     Chief Complaint   Patient presents with    Cancer      FV     Booker Saucedo is a 59 y.o. male presenting to [facility] for follow-up on symptom management for oncological pain.  Patient reports he continues to struggle with lower pelvic and rectal pain.  Has been using his long-acting morphine and short acting oxycodone.  He will be completing radiation soon.  Does not endorse any significant new nausea vomiting or constipation or diarrhea.  Does report that he is on a regular bowel regimen with MiraLAX and stool softeners as that helps reduce pain with bowel movements.  We did discuss that hopefully his symptoms would start improving once radiation was complete.    Problem   Cancer Related Pain        Current medicines (including changes today)  No current facility-administered medications for this encounter.     No current outpatient medications on file.     Facility-Administered Medications Ordered in Other Encounters   Medication Dose Route Frequency Provider Last Rate Last Admin    NS infusion   Intravenous Continuous Steven Beltre M.D. 30 mL/hr at 04/05/25 2121 New Bag at 04/05/25 2121    Pharmacy Consult Request  1 Each Other PHARMACY TO DOSE Anna Marie Chicas M.D.        ketorolac (Toradol) 15 MG/ML injection 15 mg  15 mg Intravenous Q6HRS PRN Anna Marie Chicas M.D.   15 mg at 04/05/25 2110    dexamethasone (Decadron) injection 4 mg  4 mg Intravenous Q6HRS Anna Marie Chicas M.D.   4 mg at 04/06/25 0234    HYDROmorphone (Dilaudid) 0.2 mg/mL in 50 mL NS (PCA)   Intravenous Continuous MYNOR Mota   New Bag at 04/06/25 0420    And    Pharmacy Consult Request ...Pain Management Review 1 Each  1 Each Other PHARMACY TO DOSE Anna Marie Chicas M.D.        atorvastatin (Lipitor) tablet 40 mg  40 mg Oral DAILY Steven Beltre M.D.   40 mg at 04/06/25 0425    carvedilol (Coreg) tablet 12.5 mg  12.5 mg Oral BID Steven Beltre M.D.   12.5 mg at 04/06/25 0529    citalopram (CeleXA) tablet 20 mg   "20 mg Oral DAILY Steven Beltre M.D.   20 mg at 25    gabapentin (Neurontin) capsule 100 mg  100 mg Oral TID Steven Beltre M.D.   100 mg at 25    hydrOXYzine HCl (Atarax) tablet 25 mg  25 mg Oral Q8HRS PRN Steven Beltre M.D.        melatonin tablet 3 mg  3 mg Oral QHS Steven Beltre M.D.   3 mg at 25    morphine ER (Ms Contin) tablet 30 mg  30 mg Oral Q12HRS Steven Beltre M.D.   30 mg at 25    [Held by provider] enoxaparin (Lovenox) inj 40 mg  40 mg Subcutaneous DAILY AT 1800 Steven Beltre M.D.        acetaminophen (Tylenol) tablet 650 mg  650 mg Oral Q6HRS PRN Steven Beltre M.D.        piperacillin-tazobactam (Zosyn) 3.375 g in  mL IVPB  3.375 g Intravenous Q8HRS Steven Beltre M.D. 25 mL/hr at 25 3.375 g at 25       Social History     Tobacco Use    Smoking status: Former     Types: Cigars     Quit date: 2024     Years since quittin.2    Smokeless tobacco: Never    Tobacco comments:     Former cigars    Vaping Use    Vaping status: Never Used   Substance Use Topics    Alcohol use: Not Currently     Comment: Remote history of alcohol abuse.  None for 6 1/2 years.    Drug use: Never     Past Medical History:   Diagnosis Date    Bowel habit changes     Cancer (HCC) 2024    colorectal cancer    High cholesterol     Hypertension     Kidney stone     4-5x    MI (myocardial infarction) (HCC)       Family History   Problem Relation Age of Onset    Bladder cancer Father     Breast Cancer Sister          Objective:     Vitals:    25 0756   Pulse: 69   Temp: 35.9 °C (96.6 °F)   TempSrc: Temporal   SpO2: 97%   Weight: 80.1 kg (176 lb 9.6 oz)   Height: 1.71 m (5' 7.32\")     Body mass index is 27.39 kg/m².     Review of Systems   Constitutional:  Positive for malaise/fatigue. Negative for chills and fever.   Respiratory:  Negative for cough and shortness of breath.    Cardiovascular:  Negative for chest pain and palpitations. "   Gastrointestinal:  Positive for abdominal pain. Negative for constipation, diarrhea, nausea and vomiting.     Physical Exam:   Gen: Mild distress.   Skin: Pink, warm, and dry  HEENT: conjunctiva non-injected, sclera non-icteric. EOMs intact.   Nasal mucosa without edema nor erythema.   Pinna normal.    Oral mucous membranes moist  Neck: Supple, trachea midline. No adenopathy or masses in the neck or supraclavicular regions.  Lungs: Effort is normal.   CV: regular rate and rhythm  Abdomen: Flat  Ext: no edema, color normal, vascularity normal, temperature normal  Alert and oriented Eye contact is good, speech goal directed, affect calm    Assessment and Plan:   Cancer related pain  Patient with known colorectal cancer.  Is struggling with lower pelvic and rectal pain secondary to his cancer.  Is also currently undergoing radiation which has been exacerbating his symptoms.  Is currently using 30 mg long-acting morphine +10 mg breakthrough oxycodone.  We did discuss that hopefully his symptoms will begin to improve once he completes radiation.  PDMP reviewed, no signs of diversion or abuse, risk and benefits of medication discussed, refill of long-acting morphine sent to pharmacy.  Will follow-up in 1 to 2 weeks for reevaluation.      28 minutes in total including chart review and consultation with patients oncological providers.     Followup: Return in about 2 weeks (around 4/15/2025).    Quoc Goel M.D.

## 2025-04-06 NOTE — CARE PLAN
The patient is Watcher - Medium risk of patient condition declining or worsening    Shift Goals  Clinical Goals: Pt will report a pin level of <6 by end of shift.  Patient Goals: Pt would like to have his pain under control  Family Goals: NADIR    Progress made toward(s) clinical / shift goals:  Pt struggled with pain control until we were able to get a PCA pump. Pt was able to get some sleep throughout the night. He is hoping to have a more solidified plan regarding his treatment prior to discharge. His personal items and call light are within reach.     Patient is not progressing towards the following goals: N/A pt progressing.

## 2025-04-06 NOTE — CARE PLAN
The patient is Stable - Low risk of patient condition declining or worsening    Shift Goals  Clinical Goals: pt will report less than 6 for pain level  Patient Goals: less pain to go home  Family Goals: n/a    Progress made toward(s) clinical / shift goals:    Problem: Pain - Standard  Goal: Alleviation of pain or a reduction in pain to the patient’s comfort goal  Description: Target End Date:  Prior to discharge or change in level of careDocument on Vitals flowsheet1.  Document pain using the appropriate pain scale per order or unit policy2.  Educate and implement non-pharmacologic comfort measures (i.e. relaxation, distraction, massage, cold/heat therapy, etc.)3.  Pain management medications as ordered4.  Reassess pain after pain med administration per policy5.  If opiods administered assess patient's response to pain medication is appropriate per POSS sedation scale6.  Follow pain management plan developed in collaboration with patient and interdisciplinary team (including palliative care or pain specialists if applicable)  Outcome: Progressing  Note: Patient on continuous dilaudid IV pump. Dose increased today by provider.      Patient complained slight decrease in pain today. Along with continuous IV infusion, also using ice packs to alleviate pain.

## 2025-04-06 NOTE — PROGRESS NOTES
"Hospital Medicine Daily Progress Note    Date of Service  4/6/2025    Chief Complaint  Booker Saucedo is a 59 y.o. male admitted 4/4/2025 with severe rectal pain.    Hospital Course  Booker Saucedo is a 59 y.o. male who presented 4/4/2025 with rectal pain.  Mr. Saucedo has a past medical history of CAD with stent to LAD 2015 as well as rectal cancer.   His last chemo was Feb 14th by Dr. Kim. He started developing rectal pain which has been progressive over the past 3 weeks. He has been taking decadron by Dr. Huerta and getting radiation 5 days a week (he had it this morning). He has been taking MS Contin 30 mg BID and using break-though pain. Today the pain was so intense that he couldn't stand it. \"I can't live like this\".He has had ongoing drainage mixed with his stool with some blood in it.  In the emergency room, his white blood cell count is 14,000 and CT reveals a 4.2 cm x 3.6 cm x 3.6 cm necrotic mass or abscess involving the rectum.  Dr. Trujillo, surgery has consulted and recommends IV antibiotics and conservative medical management.    Interval Problem Update  4/5: Continues to have severe pain of the rectum.  He is requiring increased Dilaudid 1 mg IV every 4 to every 2.  I have changed his oral Decadron to IV Decadron.  4 mg IV every 6 as well as Toradol 50 mg IV every 6 as needed.  Have also reached out to Dr. Tse colorectal surgeon as well as surgical [facility] team to see if there is any surgical indication for incision and drainage to relieve severe pain.  Patient is having diarrheal stools with necrotic appearing material and purulence noted within it.  Ordered PCA dilaudid pump with 2mg IV x1. Continous O2 monitoring.  4/6:  PCA dilaudid helpful, patient thinks he needs a slightly higher basal rate, re-ordered to 0.6mg/hr. patient has radiation treatment scheduled for tomorrow morning.  I will alert nursing staff to get him to his appointment.    I have discussed this patient's plan of " care and discharge plan at IDT rounds today with Case Management, Nursing, Nursing leadership, and other members of the IDT team.    Consultants/Specialty  general surgery    Code Status  Full Code    Disposition  Medically Cleared  I have placed the appropriate orders for post-discharge needs.    Review of Systems  Review of Systems   Constitutional:  Negative for chills, diaphoresis, fever and malaise/fatigue.   HENT:  Negative for congestion and sore throat.    Eyes:  Negative for pain and discharge.   Respiratory:  Negative for cough, hemoptysis, sputum production, shortness of breath and wheezing.    Cardiovascular:  Negative for chest pain, palpitations, claudication and leg swelling.   Gastrointestinal:  Positive for diarrhea. Negative for abdominal pain, constipation, melena, nausea and vomiting.        Rectal pain and diarrhea.   Genitourinary:  Negative for dysuria, frequency and urgency.   Musculoskeletal:  Negative for back pain, joint pain, myalgias and neck pain.   Skin:  Negative for itching and rash.   Neurological:  Negative for dizziness, sensory change, speech change, focal weakness, loss of consciousness, weakness and headaches.   Endo/Heme/Allergies:  Does not bruise/bleed easily.   Psychiatric/Behavioral:  Negative for depression, substance abuse and suicidal ideas.         Physical Exam  Temp:  [35.8 °C (96.5 °F)-36.6 °C (97.9 °F)] 36.4 °C (97.6 °F)  Pulse:  [53-81] 53  Resp:  [16-19] 16  BP: (129-137)/(70-93) 137/75  SpO2:  [94 %-97 %] 97 %    Physical Exam  Vitals and nursing note reviewed.   Constitutional:       General: He is not in acute distress.     Appearance: He is normal weight. He is ill-appearing. He is not diaphoretic.   HENT:      Head: Normocephalic and atraumatic.      Mouth/Throat:      Pharynx: No oropharyngeal exudate.   Eyes:      General: No scleral icterus.        Right eye: No discharge.         Left eye: No discharge.      Conjunctiva/sclera: Conjunctivae normal.       Pupils: Pupils are equal, round, and reactive to light.   Neck:      Thyroid: No thyromegaly.      Vascular: No JVD.      Trachea: No tracheal deviation.   Cardiovascular:      Rate and Rhythm: Normal rate and regular rhythm.      Heart sounds: Normal heart sounds. No murmur heard.     No friction rub. No gallop.   Pulmonary:      Effort: Pulmonary effort is normal. No respiratory distress.      Breath sounds: Normal breath sounds. No wheezing or rales.   Chest:      Chest wall: No tenderness.   Abdominal:      General: Bowel sounds are normal. There is no distension.      Palpations: Abdomen is soft. There is no mass.      Tenderness: There is no abdominal tenderness. There is no guarding or rebound.   Musculoskeletal:         General: No tenderness. Normal range of motion.      Cervical back: Normal range of motion and neck supple.   Lymphadenopathy:      Cervical: No cervical adenopathy.   Skin:     General: Skin is warm and dry.      Findings: No erythema or rash.   Neurological:      General: No focal deficit present.      Mental Status: He is alert and oriented to person, place, and time.      Cranial Nerves: No cranial nerve deficit.      Motor: No abnormal muscle tone.   Psychiatric:         Mood and Affect: Mood normal.         Behavior: Behavior normal.         Thought Content: Thought content normal.         Judgment: Judgment normal.         Fluids    Intake/Output Summary (Last 24 hours) at 4/6/2025 1353  Last data filed at 4/6/2025 1300  Gross per 24 hour   Intake 851 ml   Output --   Net 851 ml        Laboratory  Recent Labs     04/04/25  1035 04/05/25  0057 04/06/25  0343   WBC 14.0* 12.0* 11.9*   RBC 3.95* 3.90* 3.65*   HEMOGLOBIN 12.3* 11.9* 11.3*   HEMATOCRIT 37.2* 36.2* 34.2*   MCV 94.2 92.8 93.7   MCH 31.1 30.5 31.0   MCHC 33.1 32.9 33.0   RDW 47.7 46.6 47.6   PLATELETCT 209 170 161*   MPV 9.4 9.6 9.4     Recent Labs     04/04/25  1035 04/05/25  0057 04/06/25  0343   SODIUM 135 136 137    POTASSIUM 4.4 4.8 4.4   CHLORIDE 99 103 102   CO2 26 24 25   GLUCOSE 84 119* 148*   BUN 30* 29* 23*   CREATININE 0.77 0.84 0.72   CALCIUM 10.1 9.5 9.4     Recent Labs     04/06/25  0343   INR 1.03               Imaging  CT-ABDOMEN-PELVIS WITH   Final Result      1.  Multiple varying sized small hepatic cysts.      2.  Mild atherosclerotic changes of the infrarenal aorta and iliac arteries.      3.  4.2 x 3.6 x 3.6 cm necrotic mass or abscess involving the rectum.           Assessment/Plan  * Rectal abscess- (present on admission)  Assessment & Plan  CT scan reveals a 4.2 cm x 3.6 cm x 3.6 cm necrotic mass or abscess involving the rectum.  Dr. Trujillo general surgery has consulted does not recommend surgical intervention at this time.  IV Zosyn  Cultures have been drawn in the emergency room  Dr. Tse his colorectal surgery is aware of his admission.  Increase his oral regimen that he is on for chronic pain and add IV Dilaudid for breakthrough.  4/5:  I have reached out to Dr. Tse to see if there is any surgical intervention to help relieve pain.  Changed po decadron to IV decadron 4mg IV q 6.  Increased IV dilaudid to q 2 hours.  Added toradol IV q 6 hours.  Held lovenox.   Will await to hear back from surgery prior to making NPO.       Coronary artery disease- (present on admission)  Assessment & Plan  History of prior stent to the LAD 2015    Primary hypertension- (present on admission)  Assessment & Plan  Continue outpatient carvedilol 12.5 mg twice daily with holding parameters    Port-A-Cath in place- (present on admission)  Assessment & Plan  Secondary to chemo    Cancer related pain- (present on admission)  Assessment & Plan  He is on MS Contin 30 mg twice a day with oxycodone 10 mg for breakthrough.  These will be continued.  Due to the abscess he will require IV narcotics and possibly higher doses of oxycodone.    Rectal cancer (HCC)- (present on admission)  Assessment & Plan  Stage IV  Last  chemotherapy was February 14 by Dr. Kim and actively undergoing daily radiation by Dr. Huerta (last radiation was today)  Changed Decadron 2 mg 3 times daily that has been on as an outpatient to IV decadron 4mg IV q 6 hours to help with pain/pressure of abscess.           VTE prophylaxis: VTE Selection    I have performed a physical exam and reviewed and updated ROS and Plan today (4/6/2025). In review of yesterday's note (4/5/2025), there are no changes except as documented above.

## 2025-04-06 NOTE — PROGRESS NOTES
Pt lost his left forearm IV PCA was running in. We did not lose any amount as pt occluded line to stop the pump. New IV was place and PCA was resumed.

## 2025-04-06 NOTE — PROGRESS NOTES
Asked to evaluate patient for intractable pain.  On exam he is sitting upright, calm, and able to speak in normal conversational tones.  He recounts the entirety of his recent course.  There is no emergent surgical intervention indicated.  He does not have peritonitis.  Explained role of fecal diversion and he refused.  He desires consultation with colorectal surgery when able.

## 2025-04-06 NOTE — ASSESSMENT & PLAN NOTE
Patient with known colorectal cancer.  Is struggling with lower pelvic and rectal pain secondary to his cancer.  Is also currently undergoing radiation which has been exacerbating his symptoms.  Is currently using 30 mg long-acting morphine +10 mg breakthrough oxycodone.  We did discuss that hopefully his symptoms will begin to improve once he completes radiation.  PDMP reviewed, no signs of diversion or abuse, risk and benefits of medication discussed, refill of long-acting morphine sent to pharmacy.  Will follow-up in 1 to 2 weeks for reevaluation.

## 2025-04-07 ENCOUNTER — HOSPITAL ENCOUNTER (OUTPATIENT)
Dept: RADIATION ONCOLOGY | Facility: MEDICAL CENTER | Age: 60
End: 2025-04-07
Payer: COMMERCIAL

## 2025-04-07 LAB
ALBUMIN SERPL BCP-MCNC: 4.1 G/DL (ref 3.2–4.9)
ALBUMIN/GLOB SERPL: 1.3 G/DL
ALP SERPL-CCNC: 95 U/L (ref 30–99)
ALT SERPL-CCNC: 22 U/L (ref 2–50)
ANION GAP SERPL CALC-SCNC: 11 MMOL/L (ref 7–16)
AST SERPL-CCNC: 21 U/L (ref 12–45)
BASOPHILS # BLD AUTO: 0.1 % (ref 0–1.8)
BASOPHILS # BLD: 0.01 K/UL (ref 0–0.12)
BILIRUB SERPL-MCNC: 0.4 MG/DL (ref 0.1–1.5)
BUN SERPL-MCNC: 21 MG/DL (ref 8–22)
CALCIUM ALBUM COR SERPL-MCNC: 10.2 MG/DL (ref 8.5–10.5)
CALCIUM SERPL-MCNC: 10.3 MG/DL (ref 8.5–10.5)
CHEMOTHERAPY INFUSION START DATE: NORMAL
CHEMOTHERAPY RECORDS: 1.8 GY
CHEMOTHERAPY RECORDS: 4500 CGY
CHEMOTHERAPY RECORDS: NORMAL
CHEMOTHERAPY RX CANCER: NORMAL
CHLORIDE SERPL-SCNC: 99 MMOL/L (ref 96–112)
CO2 SERPL-SCNC: 26 MMOL/L (ref 20–33)
CREAT SERPL-MCNC: 0.91 MG/DL (ref 0.5–1.4)
DATE 1ST CHEMO CANCER: NORMAL
EOSINOPHIL # BLD AUTO: 0.01 K/UL (ref 0–0.51)
EOSINOPHIL NFR BLD: 0.1 % (ref 0–6.9)
ERYTHROCYTE [DISTWIDTH] IN BLOOD BY AUTOMATED COUNT: 48.7 FL (ref 35.9–50)
GFR SERPLBLD CREATININE-BSD FMLA CKD-EPI: 97 ML/MIN/1.73 M 2
GLOBULIN SER CALC-MCNC: 3.2 G/DL (ref 1.9–3.5)
GLUCOSE SERPL-MCNC: 154 MG/DL (ref 65–99)
HCT VFR BLD AUTO: 40.2 % (ref 42–52)
HGB BLD-MCNC: 13 G/DL (ref 14–18)
IMM GRANULOCYTES # BLD AUTO: 0.09 K/UL (ref 0–0.11)
IMM GRANULOCYTES NFR BLD AUTO: 0.8 % (ref 0–0.9)
LYMPHOCYTES # BLD AUTO: 0.26 K/UL (ref 1–4.8)
LYMPHOCYTES NFR BLD: 2.3 % (ref 22–41)
MCH RBC QN AUTO: 30.4 PG (ref 27–33)
MCHC RBC AUTO-ENTMCNC: 32.3 G/DL (ref 32.3–36.5)
MCV RBC AUTO: 93.9 FL (ref 81.4–97.8)
MONOCYTES # BLD AUTO: 0.64 K/UL (ref 0–0.85)
MONOCYTES NFR BLD AUTO: 5.7 % (ref 0–13.4)
NEUTROPHILS # BLD AUTO: 10.3 K/UL (ref 1.82–7.42)
NEUTROPHILS NFR BLD: 91 % (ref 44–72)
NRBC # BLD AUTO: 0 K/UL
NRBC BLD-RTO: 0 /100 WBC (ref 0–0.2)
PLATELET # BLD AUTO: 213 K/UL (ref 164–446)
PMV BLD AUTO: 9.3 FL (ref 9–12.9)
POTASSIUM SERPL-SCNC: 4.3 MMOL/L (ref 3.6–5.5)
PROT SERPL-MCNC: 7.3 G/DL (ref 6–8.2)
RAD ONC ARIA COURSE LAST TREATMENT DATE: NORMAL
RAD ONC ARIA COURSE TREATMENT ELAPSED DAYS: NORMAL
RAD ONC ARIA REFERENCE POINT DOSAGE GIVEN TO DATE: 27 GY
RAD ONC ARIA REFERENCE POINT ID: NORMAL
RAD ONC ARIA REFERENCE POINT SESSION DOSAGE GIVEN: 1.8 GY
RBC # BLD AUTO: 4.28 M/UL (ref 4.7–6.1)
SODIUM SERPL-SCNC: 136 MMOL/L (ref 135–145)
WBC # BLD AUTO: 11.3 K/UL (ref 4.8–10.8)

## 2025-04-07 PROCEDURE — 77386 HCHG IMRT DELIVERY COMPLEX: CPT | Performed by: RADIOLOGY

## 2025-04-07 PROCEDURE — 80053 COMPREHEN METABOLIC PANEL: CPT

## 2025-04-07 PROCEDURE — A9270 NON-COVERED ITEM OR SERVICE: HCPCS | Performed by: HOSPITALIST

## 2025-04-07 PROCEDURE — 700111 HCHG RX REV CODE 636 W/ 250 OVERRIDE (IP): Performed by: NURSE PRACTITIONER

## 2025-04-07 PROCEDURE — 700105 HCHG RX REV CODE 258: Performed by: HOSPITALIST

## 2025-04-07 PROCEDURE — 700111 HCHG RX REV CODE 636 W/ 250 OVERRIDE (IP): Mod: JZ | Performed by: HOSPITALIST

## 2025-04-07 PROCEDURE — 700102 HCHG RX REV CODE 250 W/ 637 OVERRIDE(OP): Performed by: HOSPITALIST

## 2025-04-07 PROCEDURE — 77427 RADIATION TX MANAGEMENT X5: CPT | Performed by: RADIOLOGY

## 2025-04-07 PROCEDURE — 99222 1ST HOSP IP/OBS MODERATE 55: CPT | Performed by: SURGERY

## 2025-04-07 PROCEDURE — 77014 PR CT GUIDANCE PLACEMENT RAD THERAPY FIELDS: CPT | Mod: 26 | Performed by: RADIOLOGY

## 2025-04-07 PROCEDURE — 99233 SBSQ HOSP IP/OBS HIGH 50: CPT | Performed by: HOSPITALIST

## 2025-04-07 PROCEDURE — 770004 HCHG ROOM/CARE - ONCOLOGY PRIVATE *

## 2025-04-07 PROCEDURE — 85025 COMPLETE CBC W/AUTO DIFF WBC: CPT

## 2025-04-07 PROCEDURE — 700111 HCHG RX REV CODE 636 W/ 250 OVERRIDE (IP): Performed by: HOSPITALIST

## 2025-04-07 RX ORDER — TAMSULOSIN HYDROCHLORIDE 0.4 MG/1
0.4 CAPSULE ORAL DAILY
Status: DISCONTINUED | OUTPATIENT
Start: 2025-04-07 | End: 2025-04-14 | Stop reason: HOSPADM

## 2025-04-07 RX ORDER — MORPHINE SULFATE 4 MG/ML
4 INJECTION INTRAVENOUS ONCE
Status: COMPLETED | OUTPATIENT
Start: 2025-04-07 | End: 2025-04-07

## 2025-04-07 RX ORDER — CAPECITABINE 500 MG/1
1500 TABLET, FILM COATED ORAL 2 TIMES DAILY
Status: DISCONTINUED | OUTPATIENT
Start: 2025-04-07 | End: 2025-04-12

## 2025-04-07 RX ADMIN — ATORVASTATIN CALCIUM 40 MG: 40 TABLET, FILM COATED ORAL at 06:33

## 2025-04-07 RX ADMIN — DEXAMETHASONE SODIUM PHOSPHATE 4 MG: 4 INJECTION INTRA-ARTICULAR; INTRALESIONAL; INTRAMUSCULAR; INTRAVENOUS; SOFT TISSUE at 04:23

## 2025-04-07 RX ADMIN — GABAPENTIN 100 MG: 100 CAPSULE ORAL at 16:57

## 2025-04-07 RX ADMIN — Medication 3 MG: at 21:16

## 2025-04-07 RX ADMIN — HYDROMORPHONE HYDROCHLORIDE: 10 INJECTION, SOLUTION INTRAMUSCULAR; INTRAVENOUS; SUBCUTANEOUS at 12:50

## 2025-04-07 RX ADMIN — HYDROMORPHONE HYDROCHLORIDE: 10 INJECTION, SOLUTION INTRAMUSCULAR; INTRAVENOUS; SUBCUTANEOUS at 01:12

## 2025-04-07 RX ADMIN — MORPHINE SULFATE 4 MG: 4 INJECTION INTRAVENOUS at 21:17

## 2025-04-07 RX ADMIN — CARVEDILOL 12.5 MG: 12.5 TABLET, FILM COATED ORAL at 06:33

## 2025-04-07 RX ADMIN — CITALOPRAM HYDROBROMIDE 20 MG: 20 TABLET ORAL at 06:33

## 2025-04-07 RX ADMIN — MORPHINE SULFATE 30 MG: 30 TABLET, FILM COATED, EXTENDED RELEASE ORAL at 16:57

## 2025-04-07 RX ADMIN — GABAPENTIN 100 MG: 100 CAPSULE ORAL at 06:44

## 2025-04-07 RX ADMIN — HYDROXYZINE HYDROCHLORIDE 25 MG: 25 TABLET, FILM COATED ORAL at 21:16

## 2025-04-07 RX ADMIN — GABAPENTIN 100 MG: 100 CAPSULE ORAL at 12:42

## 2025-04-07 RX ADMIN — DEXAMETHASONE SODIUM PHOSPHATE 4 MG: 4 INJECTION INTRA-ARTICULAR; INTRALESIONAL; INTRAMUSCULAR; INTRAVENOUS; SOFT TISSUE at 09:20

## 2025-04-07 RX ADMIN — CAPECITABINE 1500 MG: 500 TABLET, FILM COATED ORAL at 21:18

## 2025-04-07 RX ADMIN — PIPERACILLIN AND TAZOBACTAM 3.38 G: 3; .375 INJECTION, POWDER, FOR SOLUTION INTRAVENOUS at 16:57

## 2025-04-07 RX ADMIN — MORPHINE SULFATE 30 MG: 30 TABLET, FILM COATED, EXTENDED RELEASE ORAL at 06:43

## 2025-04-07 RX ADMIN — CARVEDILOL 12.5 MG: 12.5 TABLET, FILM COATED ORAL at 16:57

## 2025-04-07 RX ADMIN — HYDROMORPHONE HYDROCHLORIDE: 10 INJECTION, SOLUTION INTRAMUSCULAR; INTRAVENOUS; SUBCUTANEOUS at 23:16

## 2025-04-07 RX ADMIN — PIPERACILLIN AND TAZOBACTAM 3.38 G: 3; .375 INJECTION, POWDER, FOR SOLUTION INTRAVENOUS at 06:44

## 2025-04-07 RX ADMIN — DEXAMETHASONE SODIUM PHOSPHATE 4 MG: 4 INJECTION INTRA-ARTICULAR; INTRALESIONAL; INTRAMUSCULAR; INTRAVENOUS; SOFT TISSUE at 17:04

## 2025-04-07 RX ADMIN — DEXAMETHASONE SODIUM PHOSPHATE 4 MG: 4 INJECTION INTRA-ARTICULAR; INTRALESIONAL; INTRAMUSCULAR; INTRAVENOUS; SOFT TISSUE at 21:19

## 2025-04-07 RX ADMIN — TAMSULOSIN HYDROCHLORIDE 0.4 MG: 0.4 CAPSULE ORAL at 12:42

## 2025-04-07 ASSESSMENT — ENCOUNTER SYMPTOMS
NECK PAIN: 0
HEADACHES: 0
MYALGIAS: 0
ABDOMINAL PAIN: 0
BRUISES/BLEEDS EASILY: 0
SHORTNESS OF BREATH: 0
LOSS OF CONSCIOUSNESS: 0
SPEECH CHANGE: 0
SPUTUM PRODUCTION: 0
CONSTIPATION: 0
CHILLS: 0
DEPRESSION: 0
BACK PAIN: 0
SORE THROAT: 0
DIARRHEA: 1
NAUSEA: 0
COUGH: 0
FOCAL WEAKNESS: 0
WHEEZING: 0
EYE PAIN: 0
VOMITING: 0
WEAKNESS: 0
PALPITATIONS: 0
FEVER: 0
DIAPHORESIS: 0
DIZZINESS: 0
SENSORY CHANGE: 0
CLAUDICATION: 0
EYE DISCHARGE: 0
HEMOPTYSIS: 0

## 2025-04-07 ASSESSMENT — PAIN DESCRIPTION - PAIN TYPE
TYPE: ACUTE PAIN
TYPE: ACUTE PAIN
TYPE: ACUTE PAIN;CHRONIC PAIN

## 2025-04-07 ASSESSMENT — LIFESTYLE VARIABLES: SUBSTANCE_ABUSE: 0

## 2025-04-07 NOTE — PROGRESS NOTES
"Hospital Medicine Daily Progress Note    Date of Service  4/7/2025    Chief Complaint  Booker Saucedo is a 59 y.o. male admitted 4/4/2025 with severe rectal pain.    Hospital Course  Booker Saucedo is a 59 y.o. male who presented 4/4/2025 with rectal pain.  Mr. Saucedo has a past medical history of CAD with stent to LAD 2015 as well as rectal cancer.   His last chemo was Feb 14th by Dr. Kim, completed 8 cycles of Folfirinox chemotherapy.  He started chemoradiation 3/2025.  He started developing rectal pain which has been progressive over the past 3 weeks. He has been taking decadron by Dr. Huerta and getting radiation 5 days a week.   He has been taking MS Contin 30 mg BID and using break-though pain. Today the pain was so intense that he couldn't stand it. \"I can't live like this\".He has had ongoing drainage mixed with his stool with some blood in it.  In the emergency room, his white blood cell count is 14,000 and CT reveals a 4.2 cm x 3.6 cm x 3.6 cm necrotic mass or abscess involving the rectum.  Dr. Trujillo, surgery has consulted and recommends IV antibiotics and conservative medical management.    Interval Problem Update  4/5: Continues to have severe pain of the rectum.  He is requiring increased Dilaudid 1 mg IV every 4 to every 2.  I have changed his oral Decadron to IV Decadron.  4 mg IV every 6 as well as Toradol 50 mg IV every 6 as needed.  Have also reached out to Dr. Tse colorectal surgeon as well as surgical [facility] team to see if there is any surgical indication for incision and drainage to relieve severe pain.  Patient is having diarrheal stools with necrotic appearing material and purulence noted within it.  Ordered PCA dilaudid pump with 2mg IV x1. Continous O2 monitoring.  4/6:  PCA dilaudid helpful, patient thinks he needs a slightly higher basal rate, re-ordered to 0.6mg/hr. patient has radiation treatment scheduled for tomorrow morning.  I will alert nursing staff to get him to " his appointment.  4/7: Patient has been tolerating his PCA Dilaudid pump.  He did go down for radiation treatment this morning which he plans to continue while in hospital.  Of note he has 9 more radiation treatments left of 30.  Appreciate Dr. Tse's evaluation of patient who plans to take him to the OR Tuesday or Wednesday for incision and drainage of rectal abscess and left sided diverting colostomy placement.  Dr. Tse has placed him on a clear liquid diet.  Plan to transfer to oncology unit for ease of getting patient to radiation treatment daily.    I have discussed this patient's plan of care and discharge plan at IDT rounds today with Case Management, Nursing, Nursing leadership, and other members of the IDT team.    Consultants/Specialty  general surgery  Colorectal surgery    Code Status  Full Code    Disposition  The patient is not medically cleared for discharge to home or a post-acute facility.  Anticipate discharge to: home with close outpatient follow-up    I have placed the appropriate orders for post-discharge needs.    Review of Systems  Review of Systems   Constitutional:  Negative for chills, diaphoresis, fever and malaise/fatigue.   HENT:  Negative for congestion and sore throat.    Eyes:  Negative for pain and discharge.   Respiratory:  Negative for cough, hemoptysis, sputum production, shortness of breath and wheezing.    Cardiovascular:  Negative for chest pain, palpitations, claudication and leg swelling.   Gastrointestinal:  Positive for diarrhea. Negative for abdominal pain, constipation, melena, nausea and vomiting.        Rectal pain and diarrhea.   Genitourinary:  Negative for dysuria, frequency and urgency.   Musculoskeletal:  Negative for back pain, joint pain, myalgias and neck pain.   Skin:  Negative for itching and rash.   Neurological:  Negative for dizziness, sensory change, speech change, focal weakness, loss of consciousness, weakness and headaches.   Endo/Heme/Allergies:   Does not bruise/bleed easily.   Psychiatric/Behavioral:  Negative for depression, substance abuse and suicidal ideas.         Physical Exam  Temp:  [36.1 °C (97 °F)-36.4 °C (97.6 °F)] 36.4 °C (97.6 °F)  Pulse:  [56-67] 60  Resp:  [18-19] 18  BP: (119-148)/(68-84) 138/75  SpO2:  [93 %-95 %] 94 %    Physical Exam  Vitals and nursing note reviewed.   Constitutional:       General: He is not in acute distress.     Appearance: He is normal weight. He is ill-appearing. He is not diaphoretic.   HENT:      Head: Normocephalic and atraumatic.      Mouth/Throat:      Pharynx: No oropharyngeal exudate.   Eyes:      General: No scleral icterus.        Right eye: No discharge.         Left eye: No discharge.      Conjunctiva/sclera: Conjunctivae normal.      Pupils: Pupils are equal, round, and reactive to light.   Neck:      Thyroid: No thyromegaly.      Vascular: No JVD.      Trachea: No tracheal deviation.   Cardiovascular:      Rate and Rhythm: Normal rate and regular rhythm.      Heart sounds: Normal heart sounds. No murmur heard.     No friction rub. No gallop.   Pulmonary:      Effort: Pulmonary effort is normal. No respiratory distress.      Breath sounds: Normal breath sounds. No wheezing or rales.   Chest:      Chest wall: No tenderness.   Abdominal:      General: Bowel sounds are normal. There is no distension.      Palpations: Abdomen is soft. There is no mass.      Tenderness: There is no abdominal tenderness. There is no guarding or rebound.   Musculoskeletal:         General: No tenderness. Normal range of motion.      Cervical back: Normal range of motion and neck supple.   Lymphadenopathy:      Cervical: No cervical adenopathy.   Skin:     General: Skin is warm and dry.      Findings: No erythema or rash.   Neurological:      General: No focal deficit present.      Mental Status: He is alert and oriented to person, place, and time.      Cranial Nerves: No cranial nerve deficit.      Motor: No abnormal muscle  tone.   Psychiatric:         Mood and Affect: Mood normal.         Behavior: Behavior normal.         Thought Content: Thought content normal.         Judgment: Judgment normal.         Fluids    Intake/Output Summary (Last 24 hours) at 4/7/2025 1539  Last data filed at 4/6/2025 1800  Gross per 24 hour   Intake 70.2 ml   Output --   Net 70.2 ml        Laboratory  Recent Labs     04/05/25  0057 04/06/25  0343 04/07/25  1211   WBC 12.0* 11.9* 11.3*   RBC 3.90* 3.65* 4.28*   HEMOGLOBIN 11.9* 11.3* 13.0*   HEMATOCRIT 36.2* 34.2* 40.2*   MCV 92.8 93.7 93.9   MCH 30.5 31.0 30.4   MCHC 32.9 33.0 32.3   RDW 46.6 47.6 48.7   PLATELETCT 170 161* 213   MPV 9.6 9.4 9.3     Recent Labs     04/05/25  0057 04/06/25  0343 04/07/25  1211   SODIUM 136 137 136   POTASSIUM 4.8 4.4 4.3   CHLORIDE 103 102 99   CO2 24 25 26   GLUCOSE 119* 148* 154*   BUN 29* 23* 21   CREATININE 0.84 0.72 0.91   CALCIUM 9.5 9.4 10.3     Recent Labs     04/06/25  0343   INR 1.03               Imaging  CT-ABDOMEN-PELVIS WITH   Final Result      1.  Multiple varying sized small hepatic cysts.      2.  Mild atherosclerotic changes of the infrarenal aorta and iliac arteries.      3.  4.2 x 3.6 x 3.6 cm necrotic mass or abscess involving the rectum.           Assessment/Plan  * Rectal abscess- (present on admission)  Assessment & Plan  CT scan reveals a 4.2 cm x 3.6 cm x 3.6 cm necrotic mass or abscess involving the rectum.  Dr. Trujillo general surgery has consulted does not recommend surgical intervention at this time.  IV Zosyn  Cultures have been drawn in the emergency room  Dr. Tse his colorectal surgery is aware of his admission.  Increase his oral regimen that he is on for chronic pain and add IV Dilaudid for breakthrough.  4/5:  I have reached out to Dr. Tse to see if there is any surgical intervention to help relieve pain.  Changed po decadron to IV decadron 4mg IV q 6.  Increased IV dilaudid to PCA pump.  Added toradol IV q 6 hours.  Held lovenox.    4/7:  plan for rectal abscess I&D 4/8 or 4/9 with diverting colostomy placement.   CLD per surgery.  Hold lovenox.  Daily XRT to continue.   7 days zosyn IV.  C dificil negative  Medically clear for surgery.      Coronary artery disease- (present on admission)  Assessment & Plan  History of prior stent to the LAD 2015    Primary hypertension- (present on admission)  Assessment & Plan  Continue outpatient carvedilol 12.5 mg twice daily with holding parameters    Port-A-Cath in place- (present on admission)  Assessment & Plan  Secondary to chemo    Cancer related pain- (present on admission)  Assessment & Plan  He is on MS Contin 30 mg twice a day with oxycodone 10 mg for breakthrough.  These will be continued.  Due to the abscess he will require IV narcotics and possibly higher doses of oxycodone.  4/5:  PCA dilaudid pump for severe uncontrolled pain.      Rectal cancer (HCC)- (present on admission)  Assessment & Plan  Stage IV  Last chemotherapy was February 14 by Dr. Kim and actively undergoing daily radiation by Dr. Huerta  30 total treatments.  Has 10 left.  Changed Decadron 2 mg 3 times daily that has been on as an outpatient to IV decadron 4mg IV q 6 hours to help with pain/pressure of abscess.           VTE prophylaxis:   SCDs/TEDs      I have performed a physical exam and reviewed and updated ROS and Plan today (4/7/2025). In review of yesterday's note (4/6/2025), there are no changes except as documented above.

## 2025-04-07 NOTE — WOUND TEAM
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    Assessment:  Potential site (s) located for: Colostomy_X___, Ileostomy____  Urostomy Other_________  1. Procedure explained to the patient.  2. Rectus muscle identified with patient in supine position.  3. Appropriate abdominal quadrant for planned surgical procedure identified.  4. Existing scars identified.  5. Potential sites evaluated with patient:      a. Supine      b. Sitting       c. Bending forward/ twisting at waist   6. Site (s) selected with respect to skin folds, creases, abdominal contours and belt line.  7. Special considerations:      a. Wheel chair bound      b. Child      c. Continent procedure      d. Patient with multiple stomas      e. Ambulatory  8. Potential site (s) marked with an \"X\" (skin prepped with skin protectant wipe, samantha applied with indelible marker, samantha fixed with skin protectant wipe again.                       Site(s) marked: LUQ and LLQ    Patient Teachin. Reviewed nature of surgical procedure.  2. Reviewed basic anatomy and function of the GI or  Tract.  3. Reviewed and provided the patient with literature-Colostomy, Ileostomy, Urostomy,     Other_______________.  4. Pouching system with hands on demonstrated.  5. Questions and concerns addressed  6. Other    Plan:   surgery with Dr. Tse tomorrow or Wednesday.    "

## 2025-04-07 NOTE — CONSULTS
Surgical History and Physical    Date: 4/7/2025    Requesting Physician: Dr Chicas    Consulting Physician: Hector Tse M.D.    Reason for consultation: Perirectal abscess    CC: Anal rectal pain    HPI: This is a 59 y.o. male who is presenting with a history of rectal cancer.  Patient is currently undergoing total neoadjuvant therapy.  He has completed his FOLFIRINOX followed by chemoradiation.  He is currently receiving chemoradiation.  After finishing 8 cycles of FOLFIRINOX in February he started his chemoradiation on 3/18/2025.  He had been in his usual state of health when he developed rectal pain beginning 3 weeks ago.  He is currently taking high-dose narcotics and the pain has become more intensified with blood and purulent drainage mixed with his stool.  In the emergency room he was found to have leukocytosis to 14 as well as a CT imaging with a 4.2 x 3.6 x 3.6 necrotic mass or abscess involving the rectum.  He was evaluated by acute care surgery who recommended nonoperative therapy.  I am consulted for possible surgical interventions.    Past Medical History:   Diagnosis Date    Bowel habit changes     Cancer (HCC) 09/2024    colorectal cancer    High cholesterol     Hypertension     Kidney stone     4-5x    MI (myocardial infarction) (HCC) 2015       Past Surgical History:   Procedure Laterality Date    CATH PLACEMENT Right 10/15/2024    Procedure: CENTRAL VENOUS CATHETER PLACEMENT WITH SUBCUTANEOUS PORT;  Surgeon: Hector Tse MD, PhD;  Location: SURGERY [facility];  Service: Gastroenterology    STENT PLACEMENT  2015    BARE METAL STENT- CARDIAC    APPENDECTOMY      KNEE ARTHROSCOPY      MENISCECTOMY    TONSILLECTOMY         Current Facility-Administered Medications   Medication Dose Route Frequency Provider Last Rate Last Admin    capecitabine (Xeloda) 1,500 mg  1,500 mg Oral BID Anna Marie Chicas M.D.        tamsulosin (Flomax) capsule 0.4 mg  0.4 mg Oral DAILY Anna Marie Chicas M.D.   0.4 mg at  04/07/25 1242    HYDROmorphone (Dilaudid) 0.2 mg/mL in 50 mL NS (PCA)   Intravenous Continuous Anna Marie Chicas M.D.   New Bag at 04/07/25 1250    And    Pharmacy Consult Request ...Pain Management Review 1 Each  1 Each Other PHARMACY TO DOSE Anna Marie Chicas M.D.        NS infusion   Intravenous Continuous Steven Beltre M.D. 30 mL/hr at 04/05/25 2121 New Bag at 04/05/25 2121    ketorolac (Toradol) 15 MG/ML injection 15 mg  15 mg Intravenous Q6HRS PRN Anna Marie Chicas M.D.   15 mg at 04/05/25 2110    dexamethasone (Decadron) injection 4 mg  4 mg Intravenous Q6HRS Anna Marie Chicas M.D.   4 mg at 04/07/25 0920    atorvastatin (Lipitor) tablet 40 mg  40 mg Oral DAILY Steven Beltre M.D.   40 mg at 04/07/25 0633    carvedilol (Coreg) tablet 12.5 mg  12.5 mg Oral BID Steven Beltre M.D.   12.5 mg at 04/07/25 0633    citalopram (CeleXA) tablet 20 mg  20 mg Oral DAILY Steven Beltre M.D.   20 mg at 04/07/25 0633    gabapentin (Neurontin) capsule 100 mg  100 mg Oral TID Steven Beltre M.D.   100 mg at 04/07/25 1242    hydrOXYzine HCl (Atarax) tablet 25 mg  25 mg Oral Q8HRS PRN Steven Beltre M.D.        melatonin tablet 3 mg  3 mg Oral QHS Steven Beltre M.D.   3 mg at 04/06/25 2228    morphine ER (Ms Contin) tablet 30 mg  30 mg Oral Q12HRS Steven Beltre M.D.   30 mg at 04/07/25 0643    [Held by provider] enoxaparin (Lovenox) inj 40 mg  40 mg Subcutaneous DAILY AT 1800 Steven Beltre M.D.        acetaminophen (Tylenol) tablet 650 mg  650 mg Oral Q6HRS PRN Steven Beltre M.D.        piperacillin-tazobactam (Zosyn) 3.375 g in  mL IVPB  3.375 g Intravenous Q8HRS Steven Beltre M.D.   Stopped at 04/07/25 1044       Social History     Socioeconomic History    Marital status:      Spouse name: Not on file    Number of children: Not on file    Years of education: Not on file    Highest education level: Not on file   Occupational History    Not on file   Tobacco Use    Smoking status: Former     Types: Cigars     Quit date:  2024     Years since quittin.2    Smokeless tobacco: Never    Tobacco comments:     Former cigars    Vaping Use    Vaping status: Never Used   Substance and Sexual Activity    Alcohol use: Not Currently     Comment: Remote history of alcohol abuse.  None for 6 1/2 years.    Drug use: Never    Sexual activity: Not on file   Other Topics Concern    Not on file   Social History Narrative    Health plan executive      Social Drivers of Health     Financial Resource Strain: Not on file   Food Insecurity: No Food Insecurity (2025)    Hunger Vital Sign     Worried About Running Out of Food in the Last Year: Never true     Ran Out of Food in the Last Year: Never true   Transportation Needs: No Transportation Needs (2025)    PRAPARE - Transportation     Lack of Transportation (Medical): No     Lack of Transportation (Non-Medical): No   Physical Activity: Not on file   Stress: Not on file   Social Connections: Not on file   Intimate Partner Violence: Not At Risk (2025)    Humiliation, Afraid, Rape, and Kick questionnaire     Fear of Current or Ex-Partner: No     Emotionally Abused: No     Physically Abused: No     Sexually Abused: No   Housing Stability: Low Risk  (2025)    Housing Stability Vital Sign     Unable to Pay for Housing in the Last Year: No     Number of Times Moved in the Last Year: 1     Homeless in the Last Year: No       Family History   Problem Relation Age of Onset    Bladder cancer Father     Breast Cancer Sister        Allergies:  Patient has no known allergies.    Review of Systems:  Constitutional: Negative for fever, chills or weight loss  HENT: Negative for nosebleeds   Eyes: Negative for changes in vision or photophobia  Respiratory: Negative for cough, shortness of breath or wheezing  Cardiovascular: Negative for chest pain or palpitations  Gastrointestinal: Negative for nausea, vomiting, diarrhea, positive for blood in stool and melena.   Genitourinary: Positive for dysuria  negative for urinary incontinence   Musculoskeletal: Negative for back pain and joint pain.   Skin: Negative for itching and rash.  Neurological: Negative for dizziness, lightheadedness or loss of consciousness  Endo/Heme/Allergies: Does not bruise/bleed easily.   Psychiatric/Behavioral: Negative for substance abuse. The patient is not nervous/anxious and does not have insomnia.    Physical Exam:  /73   Pulse 67   Temp 36.3 °C (97.4 °F) (Temporal)   Resp 18   Wt 82.1 kg (181 lb)   SpO2 95%     Constitutional: oriented to person, place, and time.  appears well-developed and well-nourished. No distress.   Head: Normocephalic and atraumatic.   Neck: Normal range of motion. Neck supple. No JVD present. No tracheal deviation present. No thyromegaly present.   Cardiovascular: Normal rate, regular rhythm, normal heart sounds and intact distal pulses.  Pulmonary/Chest: On room air. No stridor. No respiratory distress.   Abdominal: Soft, nontender nondistended  Musculoskeletal: Normal range of motion.  exhibits no edema and no tenderness.   Neurological: he is alert and oriented to person, place, and time. Coordination normal.   Skin: Skin is warm and dry. No rash noted. he is not diaphoretic. No erythema. No pallor.   Psychiatric: normal mood and affect.  Behavior is normal.     Rectal examination deferred for patient comfort  Labs:  Recent Labs     04/05/25 0057 04/06/25  0343 04/07/25  1211   WBC 12.0* 11.9* 11.3*   RBC 3.90* 3.65* 4.28*   HEMOGLOBIN 11.9* 11.3* 13.0*   HEMATOCRIT 36.2* 34.2* 40.2*   MCV 92.8 93.7 93.9   MCH 30.5 31.0 30.4   MCHC 32.9 33.0 32.3   RDW 46.6 47.6 48.7   PLATELETCT 170 161* 213   MPV 9.6 9.4 9.3     Recent Labs     04/05/25 0057 04/06/25  0343 04/07/25  1211   SODIUM 136 137 136   POTASSIUM 4.8 4.4 4.3   CHLORIDE 103 102 99   CO2 24 25 26   GLUCOSE 119* 148* 154*   BUN 29* 23* 21   CREATININE 0.84 0.72 0.91   CALCIUM 9.5 9.4 10.3     Recent Labs     04/06/25  0343   INR 1.03      Recent Labs     04/06/25  0343 04/07/25  1211   ASTSGOT 20 21   ALTSGPT 20 22   TBILIRUBIN 0.3 0.4   ALKPHOSPHAT 90 95   GLOBULIN 2.8 3.2   INR 1.03  --          Radiology:  CT-ABDOMEN-PELVIS WITH   Final Result      1.  Multiple varying sized small hepatic cysts.      2.  Mild atherosclerotic changes of the infrarenal aorta and iliac arteries.      3.  4.2 x 3.6 x 3.6 cm necrotic mass or abscess involving the rectum.          Assessment: This is a 59 y.o.     Active Hospital Problems    Diagnosis     Rectal abscess [K61.1]     Primary hypertension [I10]     Coronary artery disease [I25.10]     Port-A-Cath in place [Z95.828]     Cancer related pain [G89.3]     Rectal cancer (HCC) [C20]        Plan: Patient will be taken to the operating room for examination under anesthesia for incision and drainage of perirectal abscess.  Given that the patient is currently involved with chemoradiation I will also have the patient marked for diverting colostomy.  I have asked wound care to educate and samantha the patient for a left-sided colostomy.  I agree with continuing antibiotics.  Patient has been started on clear liquids and we will bowel prep him tomorrow for surgery tomorrow afternoon or Wednesday.    Hector Tse MD PhD  [facility]  Colon and Rectal Surgeon  [phone]

## 2025-04-07 NOTE — CARE PLAN
The patient is Stable - Low risk of patient condition declining or worsening    Shift Goals  Clinical Goals: Pain less than 6 by end of shift, IV abx, pt safety  Patient Goals: Rest, pain control  Family Goals: n/a    Progress made toward(s) clinical / shift goals:  Patient alert and oriented x4. Able to take medications per MAR, including IV abx and with PCA pump in place. OOB to bathroom and ambulating to hallway. Respirations even and unlabored, on room air. Able to make needs known, bed in lowest position, call light within reach, fall precautions in place.      Problem: Pain - Standard  Goal: Alleviation of pain or a reduction in pain to the patient’s comfort goal  Outcome: Progressing     Problem: Knowledge Deficit - Standard  Goal: Patient and family/care givers will demonstrate understanding of plan of care, disease process/condition, diagnostic tests and medications  Outcome: Progressing       Patient is not progressing towards the following goals:

## 2025-04-08 ENCOUNTER — SURGERY (OUTPATIENT)
Age: 60
End: 2025-04-08
Payer: COMMERCIAL

## 2025-04-08 ENCOUNTER — ANESTHESIA (OUTPATIENT)
Dept: SURGERY | Facility: MEDICAL CENTER | Age: 60
DRG: 330 | End: 2025-04-08
Payer: COMMERCIAL

## 2025-04-08 ENCOUNTER — HOSPITAL ENCOUNTER (OUTPATIENT)
Dept: RADIATION ONCOLOGY | Facility: MEDICAL CENTER | Age: 60
End: 2025-04-08
Payer: COMMERCIAL

## 2025-04-08 ENCOUNTER — ANESTHESIA EVENT (OUTPATIENT)
Dept: SURGERY | Facility: MEDICAL CENTER | Age: 60
DRG: 330 | End: 2025-04-08
Payer: COMMERCIAL

## 2025-04-08 LAB
BACTERIA STL CULT: NORMAL
CHEMOTHERAPY INFUSION START DATE: NORMAL
CHEMOTHERAPY RECORDS: 1.8 GY
CHEMOTHERAPY RECORDS: 4500 CGY
CHEMOTHERAPY RECORDS: NORMAL
CHEMOTHERAPY RX CANCER: NORMAL
DATE 1ST CHEMO CANCER: NORMAL
E COLI SXT1+2 STL IA: NORMAL
RAD ONC ARIA COURSE LAST TREATMENT DATE: NORMAL
RAD ONC ARIA COURSE TREATMENT ELAPSED DAYS: NORMAL
RAD ONC ARIA REFERENCE POINT DOSAGE GIVEN TO DATE: 28.8 GY
RAD ONC ARIA REFERENCE POINT ID: NORMAL
RAD ONC ARIA REFERENCE POINT SESSION DOSAGE GIVEN: 1.8 GY
SIGNIFICANT IND 70042: NORMAL
SITE SITE: NORMAL
SOURCE SOURCE: NORMAL

## 2025-04-08 PROCEDURE — A9270 NON-COVERED ITEM OR SERVICE: HCPCS | Performed by: HOSPITALIST

## 2025-04-08 PROCEDURE — 77386 HCHG IMRT DELIVERY COMPLEX: CPT | Performed by: RADIOLOGY

## 2025-04-08 PROCEDURE — 160029 HCHG SURGERY MINUTES - 1ST 30 MINS LEVEL 4: Performed by: SURGERY

## 2025-04-08 PROCEDURE — 770004 HCHG ROOM/CARE - ONCOLOGY PRIVATE *

## 2025-04-08 PROCEDURE — 99232 SBSQ HOSP IP/OBS MODERATE 35: CPT | Mod: 57 | Performed by: SURGERY

## 2025-04-08 PROCEDURE — 700111 HCHG RX REV CODE 636 W/ 250 OVERRIDE (IP): Performed by: HOSPITALIST

## 2025-04-08 PROCEDURE — 700105 HCHG RX REV CODE 258: Performed by: HOSPITALIST

## 2025-04-08 PROCEDURE — 700105 HCHG RX REV CODE 258: Performed by: STUDENT IN AN ORGANIZED HEALTH CARE EDUCATION/TRAINING PROGRAM

## 2025-04-08 PROCEDURE — 77014 PR CT GUIDANCE PLACEMENT RAD THERAPY FIELDS: CPT | Mod: 26 | Performed by: RADIOLOGY

## 2025-04-08 PROCEDURE — 700111 HCHG RX REV CODE 636 W/ 250 OVERRIDE (IP): Mod: JZ | Performed by: HOSPITALIST

## 2025-04-08 PROCEDURE — 160036 HCHG PACU - EA ADDL 30 MINS PHASE I: Performed by: SURGERY

## 2025-04-08 PROCEDURE — 160041 HCHG SURGERY MINUTES - EA ADDL 1 MIN LEVEL 4: Performed by: SURGERY

## 2025-04-08 PROCEDURE — 700111 HCHG RX REV CODE 636 W/ 250 OVERRIDE (IP): Mod: JZ | Performed by: STUDENT IN AN ORGANIZED HEALTH CARE EDUCATION/TRAINING PROGRAM

## 2025-04-08 PROCEDURE — 160015 HCHG STAT PREOP MINUTES: Performed by: SURGERY

## 2025-04-08 PROCEDURE — 160048 HCHG OR STATISTICAL LEVEL 1-5: Performed by: SURGERY

## 2025-04-08 PROCEDURE — 700102 HCHG RX REV CODE 250 W/ 637 OVERRIDE(OP): Performed by: HOSPITALIST

## 2025-04-08 PROCEDURE — 302106 OSTOMY POWDER: Performed by: HOSPITALIST

## 2025-04-08 PROCEDURE — 700102 HCHG RX REV CODE 250 W/ 637 OVERRIDE(OP): Performed by: SURGERY

## 2025-04-08 PROCEDURE — A9270 NON-COVERED ITEM OR SERVICE: HCPCS | Performed by: SURGERY

## 2025-04-08 PROCEDURE — 0D1M0Z4 BYPASS DESCENDING COLON TO CUTANEOUS, OPEN APPROACH: ICD-10-PCS | Performed by: SURGERY

## 2025-04-08 PROCEDURE — 700101 HCHG RX REV CODE 250: Performed by: STUDENT IN AN ORGANIZED HEALTH CARE EDUCATION/TRAINING PROGRAM

## 2025-04-08 PROCEDURE — 44320 COLOSTOMY: CPT | Performed by: SURGERY

## 2025-04-08 PROCEDURE — 99232 SBSQ HOSP IP/OBS MODERATE 35: CPT | Performed by: HOSPITALIST

## 2025-04-08 PROCEDURE — 160009 HCHG ANES TIME/MIN: Performed by: SURGERY

## 2025-04-08 PROCEDURE — 700101 HCHG RX REV CODE 250: Performed by: SURGERY

## 2025-04-08 PROCEDURE — 46040 I&D ISCHIORCT&/PERIRCT ABSC: CPT | Performed by: SURGERY

## 2025-04-08 PROCEDURE — 93005 ELECTROCARDIOGRAM TRACING: CPT | Mod: TC | Performed by: SURGERY

## 2025-04-08 PROCEDURE — 160035 HCHG PACU - 1ST 60 MINS PHASE I: Performed by: SURGERY

## 2025-04-08 PROCEDURE — 160002 HCHG RECOVERY MINUTES (STAT): Performed by: SURGERY

## 2025-04-08 PROCEDURE — 700111 HCHG RX REV CODE 636 W/ 250 OVERRIDE (IP): Performed by: STUDENT IN AN ORGANIZED HEALTH CARE EDUCATION/TRAINING PROGRAM

## 2025-04-08 PROCEDURE — 0D9P0ZZ DRAINAGE OF RECTUM, OPEN APPROACH: ICD-10-PCS | Performed by: SURGERY

## 2025-04-08 RX ORDER — SODIUM CHLORIDE, SODIUM LACTATE, POTASSIUM CHLORIDE, CALCIUM CHLORIDE 600; 310; 30; 20 MG/100ML; MG/100ML; MG/100ML; MG/100ML
INJECTION, SOLUTION INTRAVENOUS
Status: DISCONTINUED | OUTPATIENT
Start: 2025-04-08 | End: 2025-04-08 | Stop reason: SURG

## 2025-04-08 RX ORDER — NEOMYCIN SULFATE 500 MG/1
1000 TABLET ORAL
Status: DISCONTINUED | OUTPATIENT
Start: 2025-04-08 | End: 2025-04-14 | Stop reason: HOSPADM

## 2025-04-08 RX ORDER — METRONIDAZOLE 500 MG/100ML
INJECTION, SOLUTION INTRAVENOUS PRN
Status: DISCONTINUED | OUTPATIENT
Start: 2025-04-08 | End: 2025-04-08 | Stop reason: SURG

## 2025-04-08 RX ORDER — METHOCARBAMOL 100 MG/ML
1000 INJECTION, SOLUTION INTRAMUSCULAR; INTRAVENOUS
Status: COMPLETED | OUTPATIENT
Start: 2025-04-08 | End: 2025-04-08

## 2025-04-08 RX ORDER — ONDANSETRON 2 MG/ML
4 INJECTION INTRAMUSCULAR; INTRAVENOUS
Status: DISCONTINUED | OUTPATIENT
Start: 2025-04-08 | End: 2025-04-08 | Stop reason: HOSPADM

## 2025-04-08 RX ORDER — BUPIVACAINE HYDROCHLORIDE AND EPINEPHRINE 5; 5 MG/ML; UG/ML
INJECTION, SOLUTION EPIDURAL; INTRACAUDAL; PERINEURAL
Status: DISCONTINUED | OUTPATIENT
Start: 2025-04-08 | End: 2025-04-08 | Stop reason: HOSPADM

## 2025-04-08 RX ORDER — LIDOCAINE HYDROCHLORIDE 20 MG/ML
INJECTION, SOLUTION EPIDURAL; INFILTRATION; INTRACAUDAL; PERINEURAL PRN
Status: DISCONTINUED | OUTPATIENT
Start: 2025-04-08 | End: 2025-04-08 | Stop reason: SURG

## 2025-04-08 RX ORDER — EPHEDRINE SULFATE 50 MG/ML
5 INJECTION, SOLUTION INTRAVENOUS
Status: DISCONTINUED | OUTPATIENT
Start: 2025-04-08 | End: 2025-04-08 | Stop reason: HOSPADM

## 2025-04-08 RX ORDER — MIDAZOLAM HYDROCHLORIDE 1 MG/ML
INJECTION INTRAMUSCULAR; INTRAVENOUS PRN
Status: DISCONTINUED | OUTPATIENT
Start: 2025-04-08 | End: 2025-04-08 | Stop reason: SURG

## 2025-04-08 RX ORDER — HYDROMORPHONE HYDROCHLORIDE 1 MG/ML
0.4 INJECTION, SOLUTION INTRAMUSCULAR; INTRAVENOUS; SUBCUTANEOUS
Status: DISCONTINUED | OUTPATIENT
Start: 2025-04-08 | End: 2025-04-08 | Stop reason: HOSPADM

## 2025-04-08 RX ORDER — DEXAMETHASONE SODIUM PHOSPHATE 4 MG/ML
INJECTION, SOLUTION INTRA-ARTICULAR; INTRALESIONAL; INTRAMUSCULAR; INTRAVENOUS; SOFT TISSUE PRN
Status: DISCONTINUED | OUTPATIENT
Start: 2025-04-08 | End: 2025-04-08 | Stop reason: SURG

## 2025-04-08 RX ORDER — DIPHENHYDRAMINE HYDROCHLORIDE 50 MG/ML
12.5 INJECTION, SOLUTION INTRAMUSCULAR; INTRAVENOUS
Status: DISCONTINUED | OUTPATIENT
Start: 2025-04-08 | End: 2025-04-08 | Stop reason: HOSPADM

## 2025-04-08 RX ORDER — HALOPERIDOL 5 MG/ML
1 INJECTION INTRAMUSCULAR
Status: DISCONTINUED | OUTPATIENT
Start: 2025-04-08 | End: 2025-04-08 | Stop reason: HOSPADM

## 2025-04-08 RX ORDER — ALBUTEROL SULFATE 5 MG/ML
2.5 SOLUTION RESPIRATORY (INHALATION)
Status: DISCONTINUED | OUTPATIENT
Start: 2025-04-08 | End: 2025-04-08 | Stop reason: HOSPADM

## 2025-04-08 RX ORDER — HYDROMORPHONE HYDROCHLORIDE 1 MG/ML
0.2 INJECTION, SOLUTION INTRAMUSCULAR; INTRAVENOUS; SUBCUTANEOUS
Status: DISCONTINUED | OUTPATIENT
Start: 2025-04-08 | End: 2025-04-08 | Stop reason: HOSPADM

## 2025-04-08 RX ORDER — HYDROMORPHONE HYDROCHLORIDE 1 MG/ML
0.1 INJECTION, SOLUTION INTRAMUSCULAR; INTRAVENOUS; SUBCUTANEOUS
Status: DISCONTINUED | OUTPATIENT
Start: 2025-04-08 | End: 2025-04-08 | Stop reason: HOSPADM

## 2025-04-08 RX ORDER — OXYCODONE HCL 5 MG/5 ML
10 SOLUTION, ORAL ORAL
Refills: 0 | Status: DISCONTINUED | OUTPATIENT
Start: 2025-04-08 | End: 2025-04-08 | Stop reason: HOSPADM

## 2025-04-08 RX ORDER — SODIUM CHLORIDE, SODIUM LACTATE, POTASSIUM CHLORIDE, CALCIUM CHLORIDE 600; 310; 30; 20 MG/100ML; MG/100ML; MG/100ML; MG/100ML
INJECTION, SOLUTION INTRAVENOUS CONTINUOUS
Status: DISCONTINUED | OUTPATIENT
Start: 2025-04-08 | End: 2025-04-08 | Stop reason: HOSPADM

## 2025-04-08 RX ORDER — METRONIDAZOLE 500 MG/1
500 TABLET ORAL
Status: DISCONTINUED | OUTPATIENT
Start: 2025-04-08 | End: 2025-04-13

## 2025-04-08 RX ORDER — HYDRALAZINE HYDROCHLORIDE 20 MG/ML
5 INJECTION INTRAMUSCULAR; INTRAVENOUS
Status: DISCONTINUED | OUTPATIENT
Start: 2025-04-08 | End: 2025-04-08 | Stop reason: HOSPADM

## 2025-04-08 RX ORDER — OXYCODONE HCL 5 MG/5 ML
5 SOLUTION, ORAL ORAL
Status: DISCONTINUED | OUTPATIENT
Start: 2025-04-08 | End: 2025-04-08 | Stop reason: HOSPADM

## 2025-04-08 RX ORDER — ONDANSETRON 2 MG/ML
INJECTION INTRAMUSCULAR; INTRAVENOUS PRN
Status: DISCONTINUED | OUTPATIENT
Start: 2025-04-08 | End: 2025-04-08 | Stop reason: SURG

## 2025-04-08 RX ORDER — GLYCOPYRROLATE 0.2 MG/ML
INJECTION INTRAMUSCULAR; INTRAVENOUS PRN
Status: DISCONTINUED | OUTPATIENT
Start: 2025-04-08 | End: 2025-04-08 | Stop reason: SURG

## 2025-04-08 RX ORDER — OXYCODONE HCL 5 MG/5 ML
5 SOLUTION, ORAL ORAL
Refills: 0 | Status: DISCONTINUED | OUTPATIENT
Start: 2025-04-08 | End: 2025-04-08 | Stop reason: HOSPADM

## 2025-04-08 RX ORDER — CEFAZOLIN SODIUM 1 G/3ML
INJECTION, POWDER, FOR SOLUTION INTRAMUSCULAR; INTRAVENOUS PRN
Status: DISCONTINUED | OUTPATIENT
Start: 2025-04-08 | End: 2025-04-08 | Stop reason: SURG

## 2025-04-08 RX ORDER — OXYCODONE HCL 5 MG/5 ML
10 SOLUTION, ORAL ORAL
Status: DISCONTINUED | OUTPATIENT
Start: 2025-04-08 | End: 2025-04-08 | Stop reason: HOSPADM

## 2025-04-08 RX ORDER — MEPERIDINE HYDROCHLORIDE 25 MG/ML
12.5 INJECTION INTRAMUSCULAR; INTRAVENOUS; SUBCUTANEOUS
Status: DISCONTINUED | OUTPATIENT
Start: 2025-04-08 | End: 2025-04-08 | Stop reason: HOSPADM

## 2025-04-08 RX ORDER — ROCURONIUM BROMIDE 10 MG/ML
INJECTION, SOLUTION INTRAVENOUS PRN
Status: DISCONTINUED | OUTPATIENT
Start: 2025-04-08 | End: 2025-04-08 | Stop reason: SURG

## 2025-04-08 RX ORDER — ONDANSETRON 2 MG/ML
4 INJECTION INTRAMUSCULAR; INTRAVENOUS
Status: COMPLETED | OUTPATIENT
Start: 2025-04-08 | End: 2025-04-08

## 2025-04-08 RX ORDER — HYDROMORPHONE HYDROCHLORIDE 1 MG/ML
0.5 INJECTION, SOLUTION INTRAMUSCULAR; INTRAVENOUS; SUBCUTANEOUS ONCE
Status: COMPLETED | OUTPATIENT
Start: 2025-04-08 | End: 2025-04-08

## 2025-04-08 RX ADMIN — MIDAZOLAM HYDROCHLORIDE 2 MG: 1 INJECTION, SOLUTION INTRAMUSCULAR; INTRAVENOUS at 14:27

## 2025-04-08 RX ADMIN — DEXAMETHASONE SODIUM PHOSPHATE 4 MG: 4 INJECTION INTRA-ARTICULAR; INTRALESIONAL; INTRAMUSCULAR; INTRAVENOUS; SOFT TISSUE at 22:18

## 2025-04-08 RX ADMIN — METRONIDAZOLE 500 MG: 500 TABLET ORAL at 13:16

## 2025-04-08 RX ADMIN — METRONIDAZOLE 500 MG: 5 INJECTION, SOLUTION INTRAVENOUS at 14:38

## 2025-04-08 RX ADMIN — DEXAMETHASONE SODIUM PHOSPHATE 4 MG: 4 INJECTION INTRA-ARTICULAR; INTRALESIONAL; INTRAMUSCULAR; INTRAVENOUS; SOFT TISSUE at 08:08

## 2025-04-08 RX ADMIN — HYDROMORPHONE HYDROCHLORIDE 0.5 MG: 1 INJECTION, SOLUTION INTRAMUSCULAR; INTRAVENOUS; SUBCUTANEOUS at 10:08

## 2025-04-08 RX ADMIN — TAMSULOSIN HYDROCHLORIDE 0.4 MG: 0.4 CAPSULE ORAL at 06:16

## 2025-04-08 RX ADMIN — GABAPENTIN 100 MG: 100 CAPSULE ORAL at 17:49

## 2025-04-08 RX ADMIN — FENTANYL CITRATE 50 MCG: 50 INJECTION, SOLUTION INTRAMUSCULAR; INTRAVENOUS at 15:57

## 2025-04-08 RX ADMIN — METRONIDAZOLE 500 MG: 500 TABLET ORAL at 20:59

## 2025-04-08 RX ADMIN — MEPERIDINE HYDROCHLORIDE 12.5 MG: 25 INJECTION INTRAMUSCULAR; INTRAVENOUS; SUBCUTANEOUS at 16:15

## 2025-04-08 RX ADMIN — ONDANSETRON 4 MG: 2 INJECTION INTRAMUSCULAR; INTRAVENOUS at 15:27

## 2025-04-08 RX ADMIN — ONDANSETRON 4 MG: 2 INJECTION INTRAMUSCULAR; INTRAVENOUS at 16:15

## 2025-04-08 RX ADMIN — DEXAMETHASONE SODIUM PHOSPHATE 4 MG: 4 INJECTION INTRA-ARTICULAR; INTRALESIONAL; INTRAMUSCULAR; INTRAVENOUS; SOFT TISSUE at 03:47

## 2025-04-08 RX ADMIN — FENTANYL CITRATE 50 MCG: 50 INJECTION, SOLUTION INTRAMUSCULAR; INTRAVENOUS at 14:34

## 2025-04-08 RX ADMIN — CAPECITABINE 1500 MG: 500 TABLET, FILM COATED ORAL at 09:00

## 2025-04-08 RX ADMIN — FENTANYL CITRATE 50 MCG: 50 INJECTION, SOLUTION INTRAMUSCULAR; INTRAVENOUS at 14:57

## 2025-04-08 RX ADMIN — ATORVASTATIN CALCIUM 40 MG: 40 TABLET, FILM COATED ORAL at 06:16

## 2025-04-08 RX ADMIN — PIPERACILLIN AND TAZOBACTAM 3.38 G: 3; .375 INJECTION, POWDER, FOR SOLUTION INTRAVENOUS at 08:09

## 2025-04-08 RX ADMIN — CAPECITABINE 1500 MG: 500 TABLET, FILM COATED ORAL at 21:02

## 2025-04-08 RX ADMIN — CITALOPRAM HYDROBROMIDE 20 MG: 20 TABLET ORAL at 06:16

## 2025-04-08 RX ADMIN — DEXAMETHASONE SODIUM PHOSPHATE 4 MG: 4 INJECTION INTRA-ARTICULAR; INTRALESIONAL; INTRAMUSCULAR; INTRAVENOUS; SOFT TISSUE at 14:37

## 2025-04-08 RX ADMIN — POLYETHYLENE GLYCOL-3350 AND ELECTROLYTES 4 L: 236; 6.74; 5.86; 2.97; 22.74 POWDER, FOR SOLUTION ORAL at 08:13

## 2025-04-08 RX ADMIN — SODIUM CHLORIDE, POTASSIUM CHLORIDE, SODIUM LACTATE AND CALCIUM CHLORIDE: 600; 310; 30; 20 INJECTION, SOLUTION INTRAVENOUS at 14:29

## 2025-04-08 RX ADMIN — ROCURONIUM BROMIDE 10 MG: 10 INJECTION INTRAVENOUS at 15:09

## 2025-04-08 RX ADMIN — FENTANYL CITRATE 50 MCG: 50 INJECTION, SOLUTION INTRAMUSCULAR; INTRAVENOUS at 15:37

## 2025-04-08 RX ADMIN — HYDROMORPHONE HYDROCHLORIDE: 10 INJECTION, SOLUTION INTRAMUSCULAR; INTRAVENOUS; SUBCUTANEOUS at 12:18

## 2025-04-08 RX ADMIN — NEOMYCIN SULFATE 1000 MG: 500 TABLET ORAL at 21:00

## 2025-04-08 RX ADMIN — CEFAZOLIN 2 G: 1 INJECTION, POWDER, FOR SOLUTION INTRAMUSCULAR; INTRAVENOUS at 14:37

## 2025-04-08 RX ADMIN — GABAPENTIN 100 MG: 100 CAPSULE ORAL at 06:16

## 2025-04-08 RX ADMIN — GLYCOPYRROLATE 0.2 MG: 0.2 INJECTION INTRAMUSCULAR; INTRAVENOUS at 15:43

## 2025-04-08 RX ADMIN — Medication 3 MG: at 20:59

## 2025-04-08 RX ADMIN — BUPIVACAINE HYDROCHLORIDE AND EPINEPHRINE BITARTRATE 30 ML: 5; .0091 INJECTION, SOLUTION EPIDURAL; INTRACAUDAL; PERINEURAL at 15:36

## 2025-04-08 RX ADMIN — CARVEDILOL 12.5 MG: 12.5 TABLET, FILM COATED ORAL at 17:49

## 2025-04-08 RX ADMIN — MORPHINE SULFATE 30 MG: 30 TABLET, FILM COATED, EXTENDED RELEASE ORAL at 06:15

## 2025-04-08 RX ADMIN — LIDOCAINE HYDROCHLORIDE 80 MG: 20 INJECTION, SOLUTION EPIDURAL; INFILTRATION; INTRACAUDAL; PERINEURAL at 14:34

## 2025-04-08 RX ADMIN — FENTANYL CITRATE 50 MCG: 50 INJECTION, SOLUTION INTRAMUSCULAR; INTRAVENOUS at 16:02

## 2025-04-08 RX ADMIN — ROCURONIUM BROMIDE 40 MG: 10 INJECTION INTRAVENOUS at 14:34

## 2025-04-08 RX ADMIN — NEOMYCIN SULFATE 1000 MG: 500 TABLET ORAL at 13:16

## 2025-04-08 RX ADMIN — METHOCARBAMOL 1000 MG: 100 INJECTION, SOLUTION INTRAMUSCULAR; INTRAVENOUS at 16:35

## 2025-04-08 RX ADMIN — PROPOFOL 150 MG: 10 INJECTION, EMULSION INTRAVENOUS at 14:34

## 2025-04-08 RX ADMIN — SUGAMMADEX 200 MG: 100 INJECTION, SOLUTION INTRAVENOUS at 15:37

## 2025-04-08 RX ADMIN — PIPERACILLIN AND TAZOBACTAM 3.38 G: 3; .375 INJECTION, POWDER, FOR SOLUTION INTRAVENOUS at 02:01

## 2025-04-08 RX ADMIN — MORPHINE SULFATE 30 MG: 30 TABLET, FILM COATED, EXTENDED RELEASE ORAL at 17:49

## 2025-04-08 ASSESSMENT — PAIN DESCRIPTION - PAIN TYPE
TYPE: ACUTE PAIN

## 2025-04-08 ASSESSMENT — ENCOUNTER SYMPTOMS
SHORTNESS OF BREATH: 0
ROS GI COMMENTS: PERIRECTAL PAIN
FEVER: 0
DIARRHEA: 1

## 2025-04-08 ASSESSMENT — PAIN SCALES - GENERAL: PAIN_LEVEL: 4

## 2025-04-08 NOTE — PROGRESS NOTES
"Hospital Medicine Daily Progress Note    Date of Service  4/8/2025    Chief Complaint  Booker Saucedo is a 59 y.o. male admitted 4/4/2025 with severe rectal pain.    Hospital Course  Booker Saucedo is a 59 y.o. male who presented 4/4/2025 with rectal pain.  Mr. Saucedo has a past medical history of CAD with stent to LAD 2015 as well as rectal cancer.   His last chemo was Feb 14th by Dr. Kim, completed 8 cycles of Folfirinox chemotherapy.  He started chemoradiation 3/2025.  He started developing rectal pain which has been progressive over the past 3 weeks. He has been taking decadron by Dr. Huerta and getting radiation 5 days a week.   He has been taking MS Contin 30 mg BID and using break-though pain. Today the pain was so intense that he couldn't stand it. \"I can't live like this\".He has had ongoing drainage mixed with his stool with some blood in it.  In the emergency room, his white blood cell count is 14,000 and CT reveals a 4.2 cm x 3.6 cm x 3.6 cm necrotic mass or abscess involving the rectum.  Dr. Trujillo, surgery has consulted and recommends IV antibiotics and conservative medical management.    4/5: Continues to have severe pain of the rectum.  He is requiring increased Dilaudid 1 mg IV every 4 to every 2.  I have changed his oral Decadron to IV Decadron.  4 mg IV every 6 as well as Toradol 50 mg IV every 6 as needed.  Have also reached out to Dr. Tse colorectal surgeon as well as surgical [facility] team to see if there is any surgical indication for incision and drainage to relieve severe pain.  Patient is having diarrheal stools with necrotic appearing material and purulence noted within it.  Ordered PCA dilaudid pump with 2mg IV x1. Continous O2 monitoring.  4/6:  PCA dilaudid helpful, patient thinks he needs a slightly higher basal rate, re-ordered to 0.6mg/hr. patient has radiation treatment scheduled for tomorrow morning.  I will alert nursing staff to get him to his appointment.  4/7: " Patient has been tolerating his PCA Dilaudid pump.  He did go down for radiation treatment this morning which he plans to continue while in hospital.  Of note he has 9 more radiation treatments left of 30.  Appreciate Dr. Tse's evaluation of patient who plans to take him to the OR Tuesday or Wednesday for incision and drainage of rectal abscess and left sided diverting colostomy placement.  Dr. Tse has placed him on a clear liquid diet.  Plan to transfer to oncology unit for ease of getting patient to radiation treatment daily.    Interval Problem Update    Patient is complaining of severe rectal pain  On Dilaudid PCA ordered 1 IV dose for breakthrough  I reviewed hospitalization records  I reviewed chemistry panel electrolytes stable BUN 21 creatinine 0.9  Reviewed CBC WBC 11.3 hemoglobin 13 platelets 213  Patient scheduled for surgery this afternoon  He is currently on Zosyn and Flagyl    I have discussed this patient's plan of care and discharge plan at IDT rounds today with Case Management, Nursing, Nursing leadership, and other members of the IDT team.    Consultants/Specialty  general surgery  Colorectal surgery    Code Status  Full Code    Disposition  The patient is not medically cleared for discharge to home or a post-acute facility.      I have placed the appropriate orders for post-discharge needs.    Review of Systems  Review of Systems   Constitutional:  Negative for fever.   Respiratory:  Negative for shortness of breath.    Gastrointestinal:  Positive for diarrhea.        Perirectal pain        Physical Exam  Temp:  [36.2 °C (97.2 °F)-36.7 °C (98.1 °F)] 36.2 °C (97.2 °F)  Pulse:  [53-69] 68  Resp:  [16-18] 16  BP: ()/(66-86) 177/86  SpO2:  [94 %-98 %] 94 %    Physical Exam  Constitutional:       General: He is in acute distress.      Appearance: He is ill-appearing.   Cardiovascular:      Rate and Rhythm: Normal rate and regular rhythm.   Pulmonary:      Effort: Pulmonary effort is normal.    Abdominal:      Palpations: Abdomen is soft.      Tenderness: There is no abdominal tenderness.   Musculoskeletal:         General: No swelling.   Neurological:      General: No focal deficit present.      Mental Status: He is alert.         Fluids    Intake/Output Summary (Last 24 hours) at 4/8/2025 1548  Last data filed at 4/8/2025 1540  Gross per 24 hour   Intake --   Output 815 ml   Net -815 ml        Laboratory  Recent Labs     04/06/25  0343 04/07/25  1211   WBC 11.9* 11.3*   RBC 3.65* 4.28*   HEMOGLOBIN 11.3* 13.0*   HEMATOCRIT 34.2* 40.2*   MCV 93.7 93.9   MCH 31.0 30.4   MCHC 33.0 32.3   RDW 47.6 48.7   PLATELETCT 161* 213   MPV 9.4 9.3     Recent Labs     04/06/25  0343 04/07/25  1211   SODIUM 137 136   POTASSIUM 4.4 4.3   CHLORIDE 102 99   CO2 25 26   GLUCOSE 148* 154*   BUN 23* 21   CREATININE 0.72 0.91   CALCIUM 9.4 10.3     Recent Labs     04/06/25  0343   INR 1.03               Imaging  CT-ABDOMEN-PELVIS WITH   Final Result      1.  Multiple varying sized small hepatic cysts.      2.  Mild atherosclerotic changes of the infrarenal aorta and iliac arteries.      3.  4.2 x 3.6 x 3.6 cm necrotic mass or abscess involving the rectum.           Assessment/Plan  * Rectal abscess- (present on admission)  Assessment & Plan  CT scan reveals a 4.2 cm x 3.6 cm x 3.6 cm necrotic mass or abscess involving the rectum.  Continue IV Zosyn  Evaluated by colorectal surgery will plan for surgery later today    Coronary artery disease- (present on admission)  Assessment & Plan  History of prior stent to the LAD 2015    Primary hypertension- (present on admission)  Assessment & Plan  Continue outpatient carvedilol 12.5 mg twice daily with holding parameters    Port-A-Cath in place- (present on admission)  Assessment & Plan  Secondary to chemo    Cancer related pain- (present on admission)  Assessment & Plan  He is on MS Contin 30 mg twice a day with oxycodone 10 mg for breakthrough.    Patient started on Dilaudid PCA  for severe intractable pain  Close clinical monitoring and consider tapering off PCA postop    Rectal cancer (HCC)- (present on admission)  Assessment & Plan  Stage IV  Last chemotherapy was February 14 by Dr. Kim and actively undergoing daily radiation by Dr. Huerta  30 total treatments.  Has 10 left.  Has been maintained on Decadron as outpatient currently on IV Decadron will consider tapering postop           VTE prophylaxis:   SCDs/TEDs      I have performed a physical exam and reviewed and updated ROS and Plan today (4/8/2025). In review of yesterday's note (4/7/2025), there are no changes except as documented above.

## 2025-04-08 NOTE — ANESTHESIA PREPROCEDURE EVALUATION
Case: [id number] Date/Time: 04/08/25 1531    Procedures:       CREATION, COLOSTOMY, LAPAROSCOPIC      INCISION AND DRAINAGE, ABSCESS, PERIRECTAL    Location: [facility] OR 11 / SURGERY [facility]    Surgeons: Hector Tse M.D.            Relevant Problems   CARDIAC   (positive) Coronary artery disease   (positive) Primary hypertension       Physical Exam    Airway   Mallampati: II  TM distance: >3 FB  Neck ROM: full       Cardiovascular - normal exam  Rhythm: regular  Rate: normal  (-) murmur     Dental - normal exam           Pulmonary - normal exam  Breath sounds clear to auscultation     Abdominal    Neurological - normal exam                 Anesthesia Plan    ASA 3   ASA physical status 3 criteria: MI or angina - history (> 3 months) and Stents (> 3 months)    Plan - general       Airway plan will be ETT          Induction: intravenous    Postoperative Plan: Postoperative administration of opioids is intended.    Pertinent diagnostic labs and testing reviewed    Informed Consent:    Anesthetic plan and risks discussed with patient.    Use of blood products discussed with: patient whom consented to blood products.

## 2025-04-08 NOTE — ANESTHESIA POSTPROCEDURE EVALUATION
Patient: Booker Saucedo    Procedure Summary       Date: 04/08/25 Room / Location: [facility] 11 / SURGERY [facility]    Anesthesia Start: 1429 Anesthesia Stop: 1555    Procedures:       CREATION, COLOSTOMY, LAPAROSCOPIC (Abdomen)      INCISION AND DRAINAGE, ABSCESS, PERIRECTAL (Anus) Diagnosis: (rectal cancer, perirectal abscess)    Surgeons: Hector Tse M.D. Responsible Provider: Peterson Garcia D.O.    Anesthesia Type: general ASA Status: 3            Final Anesthesia Type: general  Last vitals  BP   Blood Pressure: (!) 177/86    Temp   36.2 °C (97.2 °F)    Pulse   68   Resp   16    SpO2   94 %      Anesthesia Post Evaluation    Patient location during evaluation: PACU  Patient participation: complete - patient participated  Level of consciousness: awake and alert  Pain score: 4    Airway patency: patent  Anesthetic complications: no  Cardiovascular status: hemodynamically stable  Respiratory status: acceptable  Hydration status: euvolemic    PONV: none          No notable events documented.     Nurse Pain Score: 10 (NPRS)

## 2025-04-08 NOTE — ANESTHESIA PROCEDURE NOTES
Airway    Date/Time: 4/8/2025 2:36 PM    Performed by: Peterson Garcia D.O.  Authorized by: Peterson Garcia D.O.    Location:  OR  Urgency:  Elective  Difficult Airway: No    Indications for Airway Management:  Anesthesia      Spontaneous Ventilation: absent    Sedation Level:  Deep  Preoxygenated: Yes    Patient Position:  Sniffing  Mask Difficulty Assessment:  2 - vent by mask + OA or adjuvant +/- NMBA  Final Airway Type:  Endotracheal airway  Final Endotracheal Airway:  ETT  Cuffed: Yes    Technique Used for Successful ETT Placement:  Direct laryngoscopy    Insertion Site:  Oral  Blade Type:  Izabel  Laryngoscope Blade/Videolaryngoscope Blade Size:  3  ETT Size (mm):  7.5  Measured from:  Teeth  ETT to Teeth (cm):  22  Placement Verified by: auscultation and capnometry    Cormack-Lehane Classification:  Grade IIa - partial view of glottis  Number of Attempts at Approach:  1

## 2025-04-08 NOTE — PROGRESS NOTES
S:  59 y.o.male with rectal cancer currently undergoing chemoradiation presents with worsening rectal pain and CT imaging worrisome for necrotic mass versus prior rectal abscess.  Patient was marked preoperatively for colostomy creation.    O:  BP (!) 177/86   Pulse 68   Temp 36.2 °C (97.2 °F) (Oral)   Resp 16   Wt 82.1 kg (181 lb)   SpO2 94%   I/O last 3 completed shifts:  In: 64.2 [I.V.:64.2]  Out: -   Recent Labs     04/06/25  0343 04/07/25  1211   SODIUM 137 136   POTASSIUM 4.4 4.3   CHLORIDE 102 99   CO2 25 26   GLUCOSE 148* 154*   BUN 23* 21   CREATININE 0.72 0.91   CALCIUM 9.4 10.3     Recent Labs     04/06/25  0343 04/07/25  1211   WBC 11.9* 11.3*   RBC 3.65* 4.28*   HEMOGLOBIN 11.3* 13.0*   HEMATOCRIT 34.2* 40.2*   MCV 93.7 93.9   MCH 31.0 30.4   MCHC 33.0 32.3   RDW 47.6 48.7   PLATELETCT 161* 213   MPV 9.4 9.3       Alert and Oriented x3, No Acute Distress  Normal Respiratory Effort  Abdomen soft, appropriately tender  Incisions/Bandages clean/dry/intact  Extremities warm and well perfused    A/P: 59-year-old male with a history of rectal cancer currently undergoing total neoadjuvant therapy with chemoradiation presents for laparoscopic colostomy creation and incision and drainage of perirectal abscess  Given the above presentation, the patient will be taken to the operating room for laparoscopic versus open  colostomy creation with incision and drainage of a perirectal abscess. The surgical plan was discussed the the patient and available family. Potential complications were discussed in detail and include but are not limited to infection, bleeding, damage to adjacent tissues and organs, anesthetic complications . Additional possible complications specifically related to the planned procedure included in the discussion were related to leak,failure to localize lesion, bowel, bladder, or vascular trocar injury, abscess, fistula, hernia or need for permanent or temporary ostomy.    Questions were  elicited and answered to the patient's and available family's satisfaction. The patient understands the rationale for surgery and agrees to proceed.  Operative consent was obtained.  According to the [employer] NSQIP surgical risk report the patient is  average in terms of risk for any or serious complications including respiratory, cardiac, death, return to OR for discharge to a nursing or rehabilitation facility.  Hector Tse MD PhD  [facility]  General Colon and Rectal Surgeon  [phone]

## 2025-04-08 NOTE — ANESTHESIA TIME REPORT
Anesthesia Start and Stop Event Times       Date Time Event    4/8/2025 1338 Ready for Procedure     1429 Anesthesia Start     1555 Anesthesia Stop          Responsible Staff  04/08/25      Name Role Begin End    Peterson Garcia D.O. Anesth 1429 1559          Overtime Reason:  no overtime (within assigned shift)    Comments:

## 2025-04-08 NOTE — CARE PLAN
The patient is Watcher - Medium risk of patient condition declining or worsening    Shift Goals  Clinical Goals: Pain control, anxiety reduction  Patient Goals: pain control  Family Goals: n/a    Progress made toward(s) clinical / shift goals:        Problem: Pain - Standard  Goal: Alleviation of pain or a reduction in pain to the patient’s comfort goal  Outcome: Progressing  Flowsheets (Taken 4/8/2025 0404)  Non Verbal Scale:   Sleeping   Unlabored Breathing  Note: Pain frequently accessed, patient is able to verbalize needs, pain controlled with prn medications. Patient is sleeping, unlabored breathing at this time.     Problem: Knowledge Deficit - Standard  Goal: Patient and family/care givers will demonstrate understanding of plan of care, disease process/condition, diagnostic tests and medications  Outcome: Progressing       Patient is not progressing towards the following goals: N/A

## 2025-04-08 NOTE — WOUND TEAM
Wound consult placed on 4/8/25 regarding new colostomy created by Dr. Tse on 4/8/25. Ostomy RNs aware of pt as he was pre-marked by wound team on 4/7/25. Plan for ostomy education to begin on POD #1 4/9/25. Wound consult completed. Pt is on appropriate follow up.

## 2025-04-08 NOTE — PROGRESS NOTES
4 Eyes Skin Assessment Completed by CHRIS Horn and CHRIS Godwin.    Head WDL  Ears WDL  Nose WDL  Mouth WDL  Neck WDL  Breast/Chest WDL  Shoulder Blades WDL  Spine WDL  (R) Arm/Elbow/Hand Redness and Blanching  (L) Arm/Elbow/Hand Redness and Blanching  Abdomen WDL  Groin WDL  Scrotum/Coccyx/Buttocks Redness and Blanching  (R) Leg WDL  (L) Leg WDL  (R) Heel/Foot/Toe WDL  (L) Heel/Foot/Toe abrasion          Devices In Places N/A      Interventions In Place Pillows    Possible Skin Injury No    Pictures Uploaded Into Epic N/A  Wound Consult Placed N/A  RN Wound Prevention Protocol Ordered No

## 2025-04-08 NOTE — PROGRESS NOTES
Patient received 16 out of 25 radiation therapy treatments today. Patient tolerated the treatment well, and will return tomorrow.

## 2025-04-08 NOTE — CARE PLAN
Problem: Pain - Standard  Goal: Alleviation of pain or a reduction in pain to the patient’s comfort goal  Outcome: Progressing     Problem: Knowledge Deficit - Standard  Goal: Patient and family/care givers will demonstrate understanding of plan of care, disease process/condition, diagnostic tests and medications  Outcome: Progressing   The patient is Watcher - Medium risk of patient condition declining or worsening    Shift Goals  Clinical Goals: pain control, radiation  Patient Goals: pain control, rest  Family Goals: n/a    Progress made toward(s) clinical / shift goals:  pca for pain.  Updated on POC for today.    Patient is not progressing towards the following goals:

## 2025-04-08 NOTE — DISCHARGE PLANNING
Care Transition Team Assessment  Patient is a 59 year-old female admitted for Rectal Abscess. Please see patient's H&P for prior medical history. SW Student met with patient at bedside to complete assessment. Patient is A&Ox4 and able to verify the information on the face sheet. Patient lives with daughter in a 2 story house with 6 steps to enter, Patient requested friend Sunni Love (p) [phone] to be added as emergency contact. Provided patient with Advance Directive booklet and he is currently working on completing AD. Patient has a daughter that is a minor, Ramirez Saucedo , he requested not to contact her. Prior to admission patient is independent with ADL's and IADL’s. Patient does not use any DME at baseline. Patient reported that friend is good support for him. Patient reports, they work at [employer]. The patient's PCP is Dr. Rickie Naranjo. Patient's preferred pharmacy is [facility]. Patient denies a history of SNF/IPR nor HHC use in the past. Patient denies any SA or MH concerns. Patient's confirmed medical coverage via [employer]. Patient has means of transportation when medically cleared and friend Sunni will provide transport once stable for discharge. Patient's primary oncologist is Dr. Kim.    Discussed during IDT rounds, Plan for OR today.     Information Source  Orientation Level: Oriented X4  Information Given By: Patient    Readmission Evaluation  Is this a readmission?: No    Elopement Risk  Legal Hold: No  Ambulatory or Self Mobile in Wheelchair: Yes  Disoriented: No  Psychiatric Symptoms: None  History of Wandering: No  Elopement this Admit: No  Vocalizing Wanting to Leave: No  Displays Behaviors, Body Language Wanting to Leave: No-Not at Risk for Elopement  Elopement Risk: Not at Risk for Elopement    Interdisciplinary Discharge Planning  Lives with - Patient's Self Care Capacity: Alone and Able to Care For Self, Child Less than 18 Years of Age, Spouse  Patient or legal  guardian wants to designate a caregiver: No  Support Systems: Friends / Neighbors  Housing / Facility: 2 Story House    Discharge Preparedness  What is your plan after discharge?: Home with help  What are your discharge supports?: Other (comment) (Friends)  Prior Functional Level: Ambulatory, Independent with Activities of Daily Living, Independent with Medication Management  Difficulity with ADLs: None  Difficulity with IADLs: None    Functional Assesment  Prior Functional Level: Ambulatory, Independent with Activities of Daily Living, Independent with Medication Management    Finances  Financial Barriers to Discharge: No  Prescription Coverage: Yes              Advance Directive  Advance Directive?: None  Advance Directive offered?: AD Booklet refused    Domestic Abuse  Have you ever been the victim of abuse or violence?: No  Possible Abuse/Neglect Reported to:: Not Applicable    Psychological Assessment  History of Substance Abuse: None  History of Psychiatric Problems: No  Non-compliant with Treatment: No  Newly Diagnosed Illness: No    Discharge Risks or Barriers  Discharge risks or barriers?: Complex medical needs  Patient risk factors: Complex medical needs    Anticipated Discharge Information  Discharge Disposition: Discharged to home/self care (01)  Discharge Address: [address] [place] [id number]

## 2025-04-09 ENCOUNTER — HOSPITAL ENCOUNTER (OUTPATIENT)
Dept: RADIATION ONCOLOGY | Facility: MEDICAL CENTER | Age: 60
End: 2025-04-09
Payer: COMMERCIAL

## 2025-04-09 ENCOUNTER — HOSPITAL ENCOUNTER (OUTPATIENT)
Dept: RADIATION ONCOLOGY | Facility: MEDICAL CENTER | Age: 60
End: 2025-04-09
Attending: RADIOLOGY
Payer: COMMERCIAL

## 2025-04-09 VITALS — DIASTOLIC BLOOD PRESSURE: 87 MMHG | RESPIRATION RATE: 18 BRPM | SYSTOLIC BLOOD PRESSURE: 136 MMHG

## 2025-04-09 LAB
ANION GAP SERPL CALC-SCNC: 10 MMOL/L (ref 7–16)
BACTERIA BLD CULT: NORMAL
BACTERIA BLD CULT: NORMAL
BUN SERPL-MCNC: 24 MG/DL (ref 8–22)
CALCIUM SERPL-MCNC: 9.5 MG/DL (ref 8.5–10.5)
CHEMOTHERAPY INFUSION START DATE: NORMAL
CHEMOTHERAPY RECORDS: 1.8 GY
CHEMOTHERAPY RECORDS: 4500 CGY
CHEMOTHERAPY RECORDS: NORMAL
CHEMOTHERAPY RX CANCER: NORMAL
CHLORIDE SERPL-SCNC: 98 MMOL/L (ref 96–112)
CO2 SERPL-SCNC: 28 MMOL/L (ref 20–33)
CREAT SERPL-MCNC: 0.8 MG/DL (ref 0.5–1.4)
DATE 1ST CHEMO CANCER: NORMAL
EKG IMPRESSION: NORMAL
ERYTHROCYTE [DISTWIDTH] IN BLOOD BY AUTOMATED COUNT: 47.6 FL (ref 35.9–50)
GFR SERPLBLD CREATININE-BSD FMLA CKD-EPI: 101 ML/MIN/1.73 M 2
GLUCOSE SERPL-MCNC: 130 MG/DL (ref 65–99)
HCT VFR BLD AUTO: 35.4 % (ref 42–52)
HGB BLD-MCNC: 12.1 G/DL (ref 14–18)
MAGNESIUM SERPL-MCNC: 2.4 MG/DL (ref 1.5–2.5)
MCH RBC QN AUTO: 31.2 PG (ref 27–33)
MCHC RBC AUTO-ENTMCNC: 34.2 G/DL (ref 32.3–36.5)
MCV RBC AUTO: 91.2 FL (ref 81.4–97.8)
PHOSPHATE SERPL-MCNC: 3.5 MG/DL (ref 2.5–4.5)
PLATELET # BLD AUTO: 156 K/UL (ref 164–446)
PMV BLD AUTO: 9.1 FL (ref 9–12.9)
POTASSIUM SERPL-SCNC: 4.4 MMOL/L (ref 3.6–5.5)
RAD ONC ARIA COURSE LAST TREATMENT DATE: NORMAL
RAD ONC ARIA COURSE TREATMENT ELAPSED DAYS: NORMAL
RAD ONC ARIA REFERENCE POINT DOSAGE GIVEN TO DATE: 30.6 GY
RAD ONC ARIA REFERENCE POINT ID: NORMAL
RAD ONC ARIA REFERENCE POINT SESSION DOSAGE GIVEN: 1.8 GY
RBC # BLD AUTO: 3.88 M/UL (ref 4.7–6.1)
SIGNIFICANT IND 70042: NORMAL
SIGNIFICANT IND 70042: NORMAL
SITE SITE: NORMAL
SITE SITE: NORMAL
SODIUM SERPL-SCNC: 136 MMOL/L (ref 135–145)
SOURCE SOURCE: NORMAL
SOURCE SOURCE: NORMAL
WBC # BLD AUTO: 8.4 K/UL (ref 4.8–10.8)

## 2025-04-09 PROCEDURE — 700102 HCHG RX REV CODE 250 W/ 637 OVERRIDE(OP): Performed by: HOSPITALIST

## 2025-04-09 PROCEDURE — 700101 HCHG RX REV CODE 250: Performed by: HOSPITALIST

## 2025-04-09 PROCEDURE — A9270 NON-COVERED ITEM OR SERVICE: HCPCS | Performed by: NURSE PRACTITIONER

## 2025-04-09 PROCEDURE — 700111 HCHG RX REV CODE 636 W/ 250 OVERRIDE (IP): Mod: JZ | Performed by: HOSPITALIST

## 2025-04-09 PROCEDURE — 700102 HCHG RX REV CODE 250 W/ 637 OVERRIDE(OP): Performed by: NURSE PRACTITIONER

## 2025-04-09 PROCEDURE — 700105 HCHG RX REV CODE 258: Performed by: HOSPITALIST

## 2025-04-09 PROCEDURE — 77386 HCHG IMRT DELIVERY COMPLEX: CPT | Performed by: RADIOLOGY

## 2025-04-09 PROCEDURE — A9270 NON-COVERED ITEM OR SERVICE: HCPCS | Performed by: HOSPITALIST

## 2025-04-09 PROCEDURE — 99232 SBSQ HOSP IP/OBS MODERATE 35: CPT | Performed by: HOSPITALIST

## 2025-04-09 PROCEDURE — 770004 HCHG ROOM/CARE - ONCOLOGY PRIVATE *

## 2025-04-09 PROCEDURE — 80048 BASIC METABOLIC PNL TOTAL CA: CPT

## 2025-04-09 PROCEDURE — 93010 ELECTROCARDIOGRAM REPORT: CPT | Performed by: INTERNAL MEDICINE

## 2025-04-09 PROCEDURE — 84100 ASSAY OF PHOSPHORUS: CPT

## 2025-04-09 PROCEDURE — 85027 COMPLETE CBC AUTOMATED: CPT

## 2025-04-09 PROCEDURE — 99222 1ST HOSP IP/OBS MODERATE 55: CPT | Performed by: NURSE PRACTITIONER

## 2025-04-09 PROCEDURE — 97602 WOUND(S) CARE NON-SELECTIVE: CPT

## 2025-04-09 PROCEDURE — 77014 PR CT GUIDANCE PLACEMENT RAD THERAPY FIELDS: CPT | Mod: 26 | Performed by: RADIOLOGY

## 2025-04-09 PROCEDURE — 83735 ASSAY OF MAGNESIUM: CPT

## 2025-04-09 RX ORDER — LIDOCAINE 50 MG/G
OINTMENT TOPICAL 3 TIMES DAILY PRN
Status: DISCONTINUED | OUTPATIENT
Start: 2025-04-09 | End: 2025-04-14 | Stop reason: HOSPADM

## 2025-04-09 RX ORDER — SILVER SULFADIAZINE 10 MG/G
CREAM TOPICAL DAILY
Status: DISCONTINUED | OUTPATIENT
Start: 2025-04-09 | End: 2025-04-14 | Stop reason: HOSPADM

## 2025-04-09 RX ORDER — ETHYL ALCOHOL 62 %
1 SWAB, MEDICATED TOPICAL 2 TIMES DAILY
Status: DISCONTINUED | OUTPATIENT
Start: 2025-04-09 | End: 2025-04-14 | Stop reason: HOSPADM

## 2025-04-09 RX ORDER — DEXAMETHASONE SODIUM PHOSPHATE 4 MG/ML
2 INJECTION, SOLUTION INTRA-ARTICULAR; INTRALESIONAL; INTRAMUSCULAR; INTRAVENOUS; SOFT TISSUE EVERY 6 HOURS
Status: DISCONTINUED | OUTPATIENT
Start: 2025-04-09 | End: 2025-04-09

## 2025-04-09 RX ORDER — DEXAMETHASONE SODIUM PHOSPHATE 4 MG/ML
2 INJECTION, SOLUTION INTRA-ARTICULAR; INTRALESIONAL; INTRAMUSCULAR; INTRAVENOUS; SOFT TISSUE 3 TIMES DAILY
Status: DISCONTINUED | OUTPATIENT
Start: 2025-04-09 | End: 2025-04-12

## 2025-04-09 RX ORDER — ASPIRIN 81 MG/1
81 TABLET ORAL EVERY MORNING
Status: DISCONTINUED | OUTPATIENT
Start: 2025-04-09 | End: 2025-04-14 | Stop reason: HOSPADM

## 2025-04-09 RX ORDER — PREGABALIN 25 MG/1
25 CAPSULE ORAL 2 TIMES DAILY
Status: DISCONTINUED | OUTPATIENT
Start: 2025-04-09 | End: 2025-04-10

## 2025-04-09 RX ORDER — KETOROLAC TROMETHAMINE 15 MG/ML
15 INJECTION, SOLUTION INTRAMUSCULAR; INTRAVENOUS 3 TIMES DAILY
Status: COMPLETED | OUTPATIENT
Start: 2025-04-09 | End: 2025-04-10

## 2025-04-09 RX ADMIN — Medication 1 APPLICATOR: at 05:45

## 2025-04-09 RX ADMIN — DEXAMETHASONE SODIUM PHOSPHATE 4 MG: 4 INJECTION INTRA-ARTICULAR; INTRALESIONAL; INTRAMUSCULAR; INTRAVENOUS; SOFT TISSUE at 03:12

## 2025-04-09 RX ADMIN — DEXAMETHASONE SODIUM PHOSPHATE 4 MG: 4 INJECTION INTRA-ARTICULAR; INTRALESIONAL; INTRAMUSCULAR; INTRAVENOUS; SOFT TISSUE at 09:29

## 2025-04-09 RX ADMIN — TAMSULOSIN HYDROCHLORIDE 0.4 MG: 0.4 CAPSULE ORAL at 05:45

## 2025-04-09 RX ADMIN — CAPECITABINE 1500 MG: 500 TABLET, FILM COATED ORAL at 09:30

## 2025-04-09 RX ADMIN — CITALOPRAM HYDROBROMIDE 20 MG: 20 TABLET ORAL at 05:45

## 2025-04-09 RX ADMIN — ATORVASTATIN CALCIUM 40 MG: 40 TABLET, FILM COATED ORAL at 05:45

## 2025-04-09 RX ADMIN — PREGABALIN 25 MG: 25 CAPSULE ORAL at 16:41

## 2025-04-09 RX ADMIN — SILVER SULFADIAZINE 1 G: 10 CREAM TOPICAL at 09:29

## 2025-04-09 RX ADMIN — DEXAMETHASONE SODIUM PHOSPHATE 2 MG: 4 INJECTION INTRA-ARTICULAR; INTRALESIONAL; INTRAMUSCULAR; INTRAVENOUS; SOFT TISSUE at 17:51

## 2025-04-09 RX ADMIN — PIPERACILLIN AND TAZOBACTAM 3.38 G: 3; .375 INJECTION, POWDER, FOR SOLUTION INTRAVENOUS at 16:49

## 2025-04-09 RX ADMIN — PIPERACILLIN AND TAZOBACTAM 3.38 G: 3; .375 INJECTION, POWDER, FOR SOLUTION INTRAVENOUS at 00:34

## 2025-04-09 RX ADMIN — CAPECITABINE 1500 MG: 500 TABLET, FILM COATED ORAL at 20:11

## 2025-04-09 RX ADMIN — GABAPENTIN 100 MG: 100 CAPSULE ORAL at 05:45

## 2025-04-09 RX ADMIN — PIPERACILLIN AND TAZOBACTAM 3.38 G: 3; .375 INJECTION, POWDER, FOR SOLUTION INTRAVENOUS at 09:39

## 2025-04-09 RX ADMIN — ENOXAPARIN SODIUM 40 MG: 100 INJECTION SUBCUTANEOUS at 17:51

## 2025-04-09 RX ADMIN — LIDOCAINE: 50 OINTMENT TOPICAL at 09:30

## 2025-04-09 RX ADMIN — KETOROLAC TROMETHAMINE 15 MG: 15 INJECTION, SOLUTION INTRAMUSCULAR; INTRAVENOUS at 16:40

## 2025-04-09 RX ADMIN — MORPHINE SULFATE 30 MG: 30 TABLET, FILM COATED, EXTENDED RELEASE ORAL at 05:45

## 2025-04-09 RX ADMIN — GABAPENTIN 100 MG: 100 CAPSULE ORAL at 12:41

## 2025-04-09 RX ADMIN — Medication 1 APPLICATOR: at 17:51

## 2025-04-09 RX ADMIN — ASPIRIN 81 MG: 81 TABLET, COATED ORAL at 16:41

## 2025-04-09 RX ADMIN — Medication 3 MG: at 20:11

## 2025-04-09 RX ADMIN — HYDROMORPHONE HYDROCHLORIDE: 10 INJECTION, SOLUTION INTRAMUSCULAR; INTRAVENOUS; SUBCUTANEOUS at 03:03

## 2025-04-09 ASSESSMENT — ENCOUNTER SYMPTOMS
WEIGHT LOSS: 1
ABDOMINAL PAIN: 1
VOMITING: 0
NERVOUS/ANXIOUS: 1
CHILLS: 0
CONSTIPATION: 1
NAUSEA: 0
FEVER: 0
DIARRHEA: 1
SHORTNESS OF BREATH: 0
ROS GI COMMENTS: RECTAL PAIN
BLOOD IN STOOL: 1

## 2025-04-09 ASSESSMENT — PAIN DESCRIPTION - PAIN TYPE
TYPE: ACUTE PAIN

## 2025-04-09 ASSESSMENT — PATIENT HEALTH QUESTIONNAIRE - PHQ9
1. LITTLE INTEREST OR PLEASURE IN DOING THINGS: NOT AT ALL
SUM OF ALL RESPONSES TO PHQ9 QUESTIONS 1 AND 2: 0

## 2025-04-09 ASSESSMENT — PAIN SCALES - GENERAL: PAINLEVEL_OUTOF10: 7=MODERATE-SEVERE PAIN

## 2025-04-09 NOTE — DISCHARGE PLANNING
1242  Received Choice form at 1241  Agency/Facility Name: [facility]  Referral sent per Choice form @ 1241     1435  DPA spoke with Roscoe ([facility]). Roscoe advised pt has 20% co pay and will follow up with this DPA with amount. DPA confirmed pt will be staying at [facility].     7965  DPA spoke with Roscoe ([facility]) to advise pt will be accepted.

## 2025-04-09 NOTE — PROGRESS NOTES
"Hospital Medicine Daily Progress Note    Date of Service  4/9/2025    Chief Complaint  Booker Saucedo is a 59 y.o. male admitted 4/4/2025 with severe rectal pain.    Hospital Course  Booker Saucedo is a 59 y.o. male who presented 4/4/2025 with rectal pain.  Mr. Saucedo has a past medical history of CAD with stent to LAD 2015 as well as rectal cancer.   His last chemo was Feb 14th by Dr. Kim, completed 8 cycles of Folfirinox chemotherapy.  He started chemoradiation 3/2025.  He started developing rectal pain which has been progressive over the past 3 weeks. He has been taking decadron by Dr. Huerta and getting radiation 5 days a week.   He has been taking MS Contin 30 mg BID and using break-though pain. Today the pain was so intense that he couldn't stand it. \"I can't live like this\".He has had ongoing drainage mixed with his stool with some blood in it.  In the emergency room, his white blood cell count is 14,000 and CT reveals a 4.2 cm x 3.6 cm x 3.6 cm necrotic mass or abscess involving the rectum.  Dr. Trujillo, surgery has consulted and recommends IV antibiotics and conservative medical management.    4/5: Continues to have severe pain of the rectum.  He is requiring increased Dilaudid 1 mg IV every 4 to every 2.  I have changed his oral Decadron to IV Decadron.  4 mg IV every 6 as well as Toradol 50 mg IV every 6 as needed.  Have also reached out to Dr. Tse colorectal surgeon as well as surgical [facility] team to see if there is any surgical indication for incision and drainage to relieve severe pain.  Patient is having diarrheal stools with necrotic appearing material and purulence noted within it.  Ordered PCA dilaudid pump with 2mg IV x1. Continous O2 monitoring.  4/6:  PCA dilaudid helpful, patient thinks he needs a slightly higher basal rate, re-ordered to 0.6mg/hr. patient has radiation treatment scheduled for tomorrow morning.  I will alert nursing staff to get him to his appointment.  4/7: " Patient has been tolerating his PCA Dilaudid pump.  He did go down for radiation treatment this morning which he plans to continue while in hospital.  Of note he has 9 more radiation treatments left of 30.  Appreciate Dr. Tse's evaluation of patient who plans to take him to the OR Tuesday or Wednesday for incision and drainage of rectal abscess and left sided diverting colostomy placement.  Dr. Tse has placed him on a clear liquid diet.  Plan to transfer to oncology unit for ease of getting patient to radiation treatment daily.    Interval Problem Update    Patient underwent surgery yesterday with colostomy creation  He is tolerating his diet and having stool output  Remains on Dilaudid PCA added rectal lidocaine and Silvadene per radiation oncology recommendations  I consulted palliative care to assist with pain management  Will start tapering Dilaudid PCA  I reviewed chemistry panel electrolytes stable BUN 24 creatinine 0.8  I reviewed CBC hemoglobin 12.1    I have discussed this patient's plan of care and discharge plan at IDT rounds today with Case Management, Nursing, Nursing leadership, and other members of the IDT team.    Consultants/Specialty  general surgery  Colorectal surgery    Code Status  Full Code    Disposition  Medically Cleared  I have placed the appropriate orders for post-discharge needs.    Review of Systems  Review of Systems   Constitutional:  Negative for fever.   Respiratory:  Negative for shortness of breath.    Cardiovascular:  Negative for chest pain.   Gastrointestinal:  Positive for diarrhea.        Rectal pain        Physical Exam  Temp:  [36.1 °C (97 °F)-37.1 °C (98.7 °F)] 36.4 °C (97.6 °F)  Pulse:  [44-99] 52  Resp:  [14-24] 20  BP: ()/(58-97) 122/65  SpO2:  [92 %-100 %] 100 %    Physical Exam  Constitutional:       General: He is not in acute distress.  Cardiovascular:      Rate and Rhythm: Regular rhythm. Bradycardia present.   Pulmonary:      Effort: Pulmonary effort  is normal.   Abdominal:      Palpations: Abdomen is soft.      Tenderness: There is abdominal tenderness. There is no guarding.      Comments: Stoma with liquid brown stool      Musculoskeletal:         General: No swelling.   Skin:     General: Skin is warm and dry.   Neurological:      General: No focal deficit present.      Mental Status: He is alert.   Psychiatric:         Mood and Affect: Mood normal.         Fluids    Intake/Output Summary (Last 24 hours) at 4/9/2025 1522  Last data filed at 4/9/2025 1251  Gross per 24 hour   Intake 1766.3 ml   Output 2965 ml   Net -1198.7 ml        Laboratory  Recent Labs     04/07/25  1211 04/09/25  0008   WBC 11.3* 8.4   RBC 4.28* 3.88*   HEMOGLOBIN 13.0* 12.1*   HEMATOCRIT 40.2* 35.4*   MCV 93.9 91.2   MCH 30.4 31.2   MCHC 32.3 34.2   RDW 48.7 47.6   PLATELETCT 213 156*   MPV 9.3 9.1     Recent Labs     04/07/25  1211 04/09/25  0008   SODIUM 136 136   POTASSIUM 4.3 4.4   CHLORIDE 99 98   CO2 26 28   GLUCOSE 154* 130*   BUN 21 24*   CREATININE 0.91 0.80   CALCIUM 10.3 9.5                     Imaging  CT-ABDOMEN-PELVIS WITH   Final Result      1.  Multiple varying sized small hepatic cysts.      2.  Mild atherosclerotic changes of the infrarenal aorta and iliac arteries.      3.  4.2 x 3.6 x 3.6 cm necrotic mass or abscess involving the rectum.           Assessment/Plan  * Rectal abscess- (present on admission)  Assessment & Plan  CT scan reveals a 4.2 cm x 3.6 cm x 3.6 cm necrotic mass or abscess involving the rectum.  Continue IV Zosyn  Evaluated by colorectal surgery underwent surgery with colostomy creation on 4/8/2025    Coronary artery disease- (present on admission)  Assessment & Plan  History of prior stent to the LAD 2015  Continue atorvastatin and carvedilol  Resume home dose of aspirin    Primary hypertension- (present on admission)  Assessment & Plan  Continue outpatient carvedilol 12.5 mg twice daily with holding parameters    Port-A-Cath in place- (present on  admission)  Assessment & Plan  Secondary to chemo    Cancer related pain- (present on admission)  Assessment & Plan  He is on MS Contin 30 mg twice a day with oxycodone 10 mg for breakthrough.    Patient started on Dilaudid PCA for severe intractable pain  Given improvement in symptoms we will start tapering Dilaudid PCA  Consulted palliative care to assist with pain management    Rectal cancer (HCC)- (present on admission)  Assessment & Plan  Stage IV  Last chemotherapy was February 14 by Dr. Kim and actively undergoing daily radiation by Dr. Huerta  30 total treatments.  Has 10 left.  Has been maintained on Decadron as outpatient currently on IV Decadron will start tapering to home dose           VTE prophylaxis:    enoxaparin ppx      I have performed a physical exam and reviewed and updated ROS and Plan today (4/9/2025). In review of yesterday's note (4/8/2025), there are no changes except as documented above.

## 2025-04-09 NOTE — CARE PLAN
The patient is Watcher - Medium risk of patient condition declining or worsening    Shift Goals  Clinical Goals: Pain control  Patient Goals: pain control, rest  Family Goals: n/a    Progress made toward(s) clinical / shift goals:        Problem: Pain - Standard  Goal: Alleviation of pain or a reduction in pain to the patient’s comfort goal  Outcome: Progressing  Flowsheets (Taken 4/9/2025 8295)  Non Verbal Scale:   Calm   Sleeping   Unlabored Breathing  Note: Patient on pca pump. Pain frequently accessed, patient is able to verbalize needs,.     Problem: Knowledge Deficit - Standard  Goal: Patient and family/care givers will demonstrate understanding of plan of care, disease process/condition, diagnostic tests and medications  Outcome: Progressing  Note: AOx4, patient understands plan of care, all questions answered at this time.      Problem: Skin Care - Ostomy  Goal: Skin remains free from irritation  Outcome: Progressing  Note: Ostomy bag saturated with blood, appliance changed and education given during application.     Problem: Knowledge Deficit - Ostomy  Goal: Patient will demonstrate ability to manage and maintain ostomy  Outcome: Progressing     Problem: Discharge Barriers/Self-Care - Ostomy  Goal: Ostomy patient's continuum of care needs will be met  Outcome: Progressing       Patient is not progressing towards the following goals: N/A

## 2025-04-09 NOTE — CONSULTS
SUMMARY: Please refer to A/P section below, PC to continue to follow.      MRN: [id number]  Date of palliative consult: April 9, 2025  Reason for consult: Symptom management  Referring provider: Brandin Renteria  Location of consult: Cancer nursing unit [facility]  Additional consulting services: Colorectal surgery, general surgery, hospital medicine    HPI:   Booker Saucedo is a 59 y.o. male with a past medical history significant for CAD status post stent to LAD in 2015, hypertension, hyperlipidemia, kidney stones with a recent diagnosis of rectal cancer receiving neoadjuvant therapies who presented on 4/4/2025 with increasing progressive rectal pain with associated diarrhea.  Patient is followed by Dr. Kim with medical oncology, Dr. Tse with colorectal surgery, Dr. Styles outpatient palliative care.  In the emergency department patient underwent CT abdomen and pelvis which revealed 4.2 x 3.6 x 3.6 cm necrotic mass versus abscess involving the rectum.  Yesterday patient was taken to the operating room for laparoscopic creation of a colostomy and incision and drainage of abscess.  He was placed on hydromorphone PCA postoperatively.  In addition to above, cigar smoker who quit 15 years ago, past alcohol use.    Significant/pertinent past medical history: In addition to above, past smoking history of cigars, quit 15 years ago, past use of alcohol.    Pain History:  Onset: Early March  Location: Low pelvic and rectal  Duration: Intermittent and varying in severity  Characteristics: Low pelvic pain sharp and stabbing, rectal pain burning  Aggravating factors: Bowel movements  Alleviating factors: Ice, heat, sitz bath's, bowel protocol  Radiation: To sacrum, pelvis, perineum  Treatments: As above  Severity: At its worst 9/10, tolerable pain 4-5/10.    Additional symptoms: Denies additional symptoms aside from recent diarrhea and/or constipation.      Interval History: Colostomy producing soft to liquid stool.  Vital signs  remained stable patient is afebrile.    Medication Allergy/Sensitivities:  No Known Allergies    ROS:    Review of Systems   Constitutional:  Positive for malaise/fatigue and weight loss. Negative for chills and fever.   Respiratory:  Negative for shortness of breath.    Gastrointestinal:  Positive for abdominal pain, blood in stool, constipation and diarrhea. Negative for nausea and vomiting.   Psychiatric/Behavioral:  The patient is nervous/anxious.        PE:   Recent vital signs  BMI: Body mass index is 28.08 kg/m².    Temp (24hrs), Av.6 °C (97.8 °F), Min:36.1 °C (97 °F), Max:37.1 °C (98.7 °F)  Temperature: 37 °C (98.6 °F)  Pulse  Av  Min: 44  Max: 99   Blood Pressure: 97/80       Physical Exam  Vitals and nursing note reviewed.   Constitutional:       General: He is not in acute distress.     Appearance: He is ill-appearing.   Cardiovascular:      Rate and Rhythm: Normal rate.      Heart sounds: Murmur heard.   Pulmonary:      Effort: Pulmonary effort is normal.      Breath sounds: Normal breath sounds.   Abdominal:      General: Bowel sounds are decreased.      Palpations: Abdomen is soft.      Comments: Left lower quadrant colostomy in place.   Musculoskeletal:      Right lower le+ Edema present.      Left lower le+ Edema present.      Comments: Sarcopenia obesity evident.   Skin:     General: Skin is warm and dry.      Capillary Refill: Capillary refill takes 2 to 3 seconds.      Coloration: Skin is pale and sallow.   Neurological:      Mental Status: He is alert and oriented to person, place, and time.   Psychiatric:         Attention and Perception: Attention normal.         Mood and Affect: Mood normal.         Speech: Speech normal.         Behavior: Behavior normal. Behavior is cooperative.         Thought Content: Thought content normal.         Cognition and Memory: Cognition normal.         Judgment: Judgment normal.       Recent Labs     25  1211 25  0008   SODIUM 136  136   POTASSIUM 4.3 4.4   CHLORIDE 99 98   CO2 26 28   GLUCOSE 154* 130*   BUN 21 24*   CREATININE 0.91 0.80   CALCIUM 10.3 9.5     Recent Labs     04/07/25  1211 04/09/25  0008   WBC 11.3* 8.4   RBC 4.28* 3.88*   HEMOGLOBIN 13.0* 12.1*   HEMATOCRIT 40.2* 35.4*   MCV 93.9 91.2   MCH 30.4 31.2   MCHC 32.3 34.2   RDW 48.7 47.6   PLATELETCT 213 156*   MPV 9.3 9.1       ASSESSMENT/PLAN WITH SHARED DECISION MAKING:   Review  Pertinent imaging reviewed.    PHYSICAL ASPECTS OF CARE  Palliative Performance Scale: 60%    # Cancer related pain  -Patient placed on hydromorphone PCA prior to palliative care consult settings are as follows:  Basal rate 0.4 mg every hour; PCA dose 0.5 mg every 8 minutes; 5 mg 4-hour lockout dosing  - Previously on morphine sulfate extended release 30 mg every 12 hours p.o. per Dr. Styles  -Review of PDMP indicates patient also on oxycodone IR 10 mg for breakthrough pain, PDMP 470  -Patient started on gabapentin 100 mg this hospital admission  -Discontinue gabapentin start pregabalin 25 mg every 12 hours increase as tolerated  -Stop morphine sulfate extended release  -Continue hydromorphone PCA at current settings, plan to discontinue tomorrow and transition to oral oxycodone 10 mg p.o. every 3 hours as needed breakthrough pain-will continue to assess appropriateness for long-acting medication and shared decision making approach with patient.  -Collaborated with hospitalist/Dr. Brandin Renteria, he is actively weaning dexamethasone suggest scheduling ketorolac 15 mg every 8 hours for 2 to 3 days.  # Rectal cancer  # Recent colostomy  # Coronary artery disease  # Hypertension    SOCIAL ASPECTS OF CARE  Patient is recently . He has one daughter, Sujatha who is 17. He does have 2 brothers and sisters and would be ok with them making decisions for him if needed.  He was previously independent with ADLs and IADLs and works full-time remotely from home.  He is a former critical care RN.    SPIRITUAL  ASPECTS OF CARE   Not addressed.    GOALS OF CARE/SERIOUS ILLNESS CONVERSATION  Introduced myself to patient. Discussed role of palliative care and reason for consult.  He is agreeable to discuss goals of care.  Discussed recent cancer diagnosis and struggles with pain management including goal to not be on any opioid medication if possible.  His additional goals include to live as long as possible he is hopeful that this most recent surgery in addition to ongoing medical oncology care as well as radiation oncology will provide him with the best quality of life and prognosis.  We discussed plan moving forward and he is agreeable to multimodal pain medication strategy.  Provided palliative care contact information and encouraged patient to reach out with any questions/needs.        Code Status: Full code    ACP Documents: None, will address advance directives tomorrow.       minutes spent discussing advance care planning, this time excludes any other billed services.    Interval diagnostic studies and medical documentation entries pertinent to this case were reviewed independently by me. This patient has at least one acute or chronic illness or injury that poses a threat to life or bodily function. This patient suffers from a high risk of morbidly from additional invasive diagnostic testing or intensive treatment. Discussion of recommendations and coordination of care undertaken with primary provider/treatment team.        Mitali Barth, MSN, APRN, ACNPC-AG.  Palliative Care Nurse Practitioner  [phone] Office  [phone] [facility]

## 2025-04-09 NOTE — WOUND TEAM
[facility]  Inpatient Services  Wound and Skin Care Brief Evaluation    Admission Date: 4/4/2025     Last order of IP CONSULT TO WOUND CARE was found on 4/8/2025 from Hospital Encounter on 4/4/2025     HPI, PMH, SH: Reviewed    Chief Complaint   Patient presents with    Low Back Pain    Other     Diagnosis: Rectal abscess [K61.1]    Unit where seen by Wound Team: R318/02     Wound consult placed regarding Anus. Chart and images reviewed. This discussed with bedside RN Alonzo and Palliative APRN . This clinician in to assess patient. Patient pleasant and agreeable. Non-selectively debrided with Moist warm washcloth.     No pressure injuries or advanced wound care needs identified. Wound consult completed. No further follow up unless indicated and consulted.     Assessed - Anus - intact and blanching        PREVENTATIVE INTERVENTIONS:    Q shift Wilmer - performed per nursing policy  Q shift pressure point assessments - performed per nursing policy    Surface/Positioning  Standard/trauma mattress - Currently in Place    Containment/Moisture Prevention    Barrier paste - Applied this Visit    Mobilization      Ambulate  and Ambulating at Baseline

## 2025-04-09 NOTE — DISCHARGE PLANNING
Case Management Discharge Planning    Admission Date: 4/4/2025  GMLOS: 2.9  ALOS: 5    6-Clicks ADL Score: 24  6-Clicks Mobility Score: 24      Anticipated Discharge Dispo: Discharge Disposition: D/T to home under HHA care in anticipation of covered skilled care (06)  Discharge Address: [facility] ([address])    DME Needed: No    Action(s) Taken: Discussed in IDT rounds, pt will need HH for his new ostomy, obtained HH choice and faxed to [facility]. Pt is [facility] at the [facility] during his radiation treatment and can receive HH at the [facility]. Pt had surgery yesterday with Dr. Tse for ostomy. Pt will complete radiation on 4/28, pt is on day 17 of 25.     Escalations Completed: None    Medically Clear: No    Next Steps: Pending medical clearance.     Barriers to Discharge: Medical clearance    Is the patient up for discharge tomorrow: No

## 2025-04-09 NOTE — WOUND TEAM
" [facility]  Inpatient Services  New Ostomy Initial Evaluation    HPI:  Reviewed  PMH: Reviewed   SH: Reviewed         Past Surgical History:   Procedure Laterality Date    VT LAP, SURG COLOSTOMY  4/8/2025    Procedure: CREATION, COLOSTOMY, LAPAROSCOPIC;  Surgeon: Hector Tse M.D.;  Location: SURGERY [facility];  Service: Gastroenterology    VT I&D PERIRECTAL ABSCESS  4/8/2025    Procedure: INCISION AND DRAINAGE, ABSCESS, PERIRECTAL;  Surgeon: Hector Tse M.D.;  Location: SURGERY [facility];  Service: Gastroenterology    CATH PLACEMENT Right 10/15/2024    Procedure: CENTRAL VENOUS CATHETER PLACEMENT WITH SUBCUTANEOUS PORT;  Surgeon: Hector Tse MD, PhD;  Location: SURGERY [facility];  Service: Gastroenterology    STENT PLACEMENT  2015    BARE METAL STENT- CARDIAC    APPENDECTOMY      KNEE ARTHROSCOPY      MENISCECTOMY    TONSILLECTOMY         Surgery Date: 4/8/25    Surgeon(s):  Hector Tse M.D.    Procedure(s):  CREATION, COLOSTOMY, LAPAROSCOPIC  INCISION AND DRAINAGE, ABSCESS, PERIRECTAL     Permanence: Yes    Pertinent History:    Hospital Course  Booker Saucedo is a 59 y.o. male who presented 4/4/2025 with rectal pain.  Mr. Saucedo has a past medical history of CAD with stent to LAD 2015 as well as rectal cancer.   His last chemo was Feb 14th by Dr. Kim, completed 8 cycles of Folfirinox chemotherapy.  He started chemoradiation 3/2025.  He started developing rectal pain which has been progressive over the past 3 weeks. He has been taking decadron by Dr. Huerta and getting radiation 5 days a week.   He has been taking MS Contin 30 mg BID and using break-though pain. Today the pain was so intense that he couldn't stand it. \"I can't live like this\".He has had ongoing drainage mixed with his stool with some blood in it.  In the emergency room, his white blood cell count is 14,000 and CT reveals a 4.2 cm x 3.6 cm x 3.6 cm necrotic mass or abscess involving the " "rectum.  Dr. Trujillo, surgery has consulted and recommends IV antibiotics and conservative medical management.                       Colostomy 04/08/25 LLQ (Active)   Wound Image   04/09/25 1600   Stomal Appliance Assessment Intact;Changed 04/09/25 1600   Stoma Assessment Beefy red 04/09/25 1600   Stoma Shape Budded Greater Than One Inch 04/09/25 1600   Stoma Size (in) 2 04/09/25 1600   Peristomal Assessment Intact 04/09/25 1600   Mucocutaneous Junction Intact 04/09/25 1600   Treatment Appliance Changed;Bag Change;Cleansed with water/washcloth;Removed appliance with adhesive remover;Site care 04/09/25 1600   Peristomal Protectant No Sting Skin Prep;Paste Ring 04/09/25 1600   Stomal Appliance Paste Ring, 2\";2 3/4\" (70mm) CTF 04/09/25 1600   Output Color Brown 04/09/25 1600   WOUND RN ONLY - Stomal Appliance  2 Piece;Paste Ring, 2\";Flat;2 3/4\" (70mm) CTF;Transparent Pouch Lock & Roll 04/09/25 1600   Appliance (Pouch) # [id number] 04/09/25 1600   Appliance Brand [employer] 04/09/25 1600   Secure Start completed Yes 04/09/25 1600   WOUND NURSE ONLY - Time Spent with Patient (mins) 90 04/09/25 1600                                                       Colostomy Interventions:  Removed appliance (using push pull method) - By Ostomy RN  Cleansed robin-stomal skin with moist warm washcloth - By Ostomy RN  Created/Checked template fit - By Ostomy RN  Traced Shape to back of barrier - By Ostomy RN  Cut barrier to stoma size - By Ostomy RN  Confirmed fit - By Ostomy RN  Removed plastic backing and \"Dog Eared\" paper edges - By Ostomy RN  Stretched paste ring to fit barrier opening - By Ostomy RN  Applied paste ring to back of barrier - By Ostomy RN  Applied barrier to skin and adhered with friction - By Ostomy RN  Attached pouch - By Ostomy RN  Closed Pouch end - By Ostomy RN    Ostomy Nurse Plan of Care - Frequency of Follow-up:   Ostomy nurses to continue to follow for ostomy needs and teaching until patient independent with care or " discharge.  Ostomy Nurse follow-up frequency:  Daily    Patient Education:   All questions answered, procedure explained, and education provided.      Ostomy RN to follow-up daily for education     Date:  04/09/25  Reinforced education provided during last visit  Folder/paperwork delivered  Folder/Paperwork discussed in detail (Follow up, supply ordering and support groups discussed as well as accessories that may be beneficial.)      Needs for next visit: Further education and hands-on participation    Evaluation:      Date:  04/09/25    Stoma is beefy red with intact mucosal junction. Coagulated blood surrounding stoma site. Lateral 3oclock position bled a little with gentle cleansing yet stops is light pressure. Bedside RN Madelin notified and Patient aware. Stool liquid brown. Patient is still having sensation of posterior bowel movement with stool being processed through stoma.         Flatus: N/A  Stool Output: large, brown, and liquid  Urine Output: NA, Fecal Ostomy  Mobility: Up to chair, Ambulate , and Ambulating at Baseline    DIET ORDERS (From admission to next 24h)       Start     Ordered    04/08/25 1750  Diet Order Diet: Regular  ALL MEALS        Question:  Diet:  Answer:  Regular    04/08/25 1749                     Secure Start Signed:  Completed by Wound team Tech  Outpatient Referral Placed:  Yes     5 Sets of appliances in Ostomy bag for discharge:  N/A    INSURANCE OPTIONS:   If patient has [facility] plus patient will need an [employer] book. If patientt has Medicaid be sure patient has care chest paperwork.    Currently Active Insurance       Payor Plan    [employer]            Anticipated Discharge Plans:  Home Health Care and Outpatient Wound Center    Ostomy Supplies for DC:  To be determined in 4 to 6 weeks once stomal edema has fully resolved

## 2025-04-09 NOTE — CARE PLAN
The patient is Watcher - Medium risk of patient condition declining or worsening    Shift Goals  Clinical Goals: pain control  Patient Goals: pain control and rest  Family Goals: NADIR    Progress made toward(s) clinical / shift goals:  A/OX4, patient is able to understand plan of care. All questions answered at this time. Pain is being controlled through PCA pump medications per the MAR. Patient is stating a comfortable pain level. Patient remained free of falls throughout shift, fall precautions in place. Bed in lowest position, belongings and call light in reach.

## 2025-04-09 NOTE — PROGRESS NOTES
Report called to floor. Patient to floor with transport in stable condition. VSS. Surgical dressings clean dry intact. Aox4 and on 1 l O2. No belongings. Family updated. No further needs.

## 2025-04-09 NOTE — ON TREATMENT VISIT
ON TREATMENT  NOTE  RADIATION ONCOLOGY DEPARTMENT    Patient name:  Booker Saucedo    Primary Physician:  Rickie Naranjo M.D. MRN: [id number]  [facility]: [id number]   Referring physician:  Hector Tse M.D.   : 1965, 59 y.o.     ENCOUNTER DATE:  2025      DIAGNOSIS:  Rectal cancer (HCC)  Staging form: Colon and Rectum, AJCC 8th Edition  - Clinical stage from 10/2/2024: Stage PATO (cT4a, cN2a, cM1a) - Signed by Jason Huerta M.D. on 10/16/2024  Histologic grade (G): G2  Histologic grading system: 4 grade system      TREATMENT SUMMARY:  Course First Treatment Date 2025  Course Last Treatment Date 2025  Radiation Treatments       Active   Plans   Rectum   Most recent treatment: Dose planned: 180 cGy (fraction 17 of 25 on 2025)   Total: Dose planned: 4,500 cGy   Elapsed Days:  @ 2025           Historical   No historical radiation treatments to show.                 SUBJECTIVE:  Found to have a rectal abscess I&D by Dr. Tse    VITAL SIGNS:      2025     8:00 AM 2025     7:53 AM 2025     5:56 AM 2025     5:39 AM 2025    12:05 AM 2025    11:55 PM 2025     9:51 PM   Vitals   SYSTOLIC 97 136 117 105 153  118   DIASTOLIC 80 87 58 88 76  62   Pulse 61  44 53 56 75 61   Temperature 37 °C (98.6 °F)   36.5 °C (97.7 °F) 36.7 °C (98.1 °F) 37.1 °C (98.7 °F) 36.9 °C (98.4 °F)   Respiration 20 18  14 18 16 16   Pulse Oximetry 92 %   92 % 93 %  93 %     KPS: 100, Fully active, able to carry on all pre-disease performed without restriction (ECOG equivalent 0)  Encounter Vitals  Blood Pressure: 136/87  Respiration: 18  Pain Score: 7=Moderate-Severe Pain      2025     7:53 AM 2025     7:50 AM 3/31/2025     7:59 AM 3/26/2025    12:15 PM 3/19/2025     7:52 AM 2025     2:03 PM   Pain Assessment   Pain Score 7=MODERATE-S 5=MODERATE P 3=SLIGHT JESS 2=MINIMAL PA 3=SLIGHT JESS 5=MODERATE P   Pain Loc PELVIC ABDOMEN  -- ABDOMEN RECTUM          PHYSICAL  EXAM:  Physical Exam  Constitutional:       Appearance: Normal appearance.   Abdominal:      General: There is no distension.      Palpations: Abdomen is soft.   Skin:     Findings: No erythema.   Neurological:      Mental Status: He is alert.              4/9/2025     7:54 AM 4/2/2025     7:53 AM 3/26/2025    12:16 PM 3/19/2025     7:53 AM   Toxicity Assessment   Toxicity Assessment Male Pelvis Male Pelvis Male Pelvis Male Pelvis   Fatigue (lethargy, malaise, asthenia) Increased fatigue over baseline, but not altering normal activities Increased fatigue over baseline, but not altering normal activities Increased fatigue over baseline, but not altering normal activities None   Radiation Dermatitis None None None None   Anorexia Loss of appetite None None Loss of appetite   Colitis Abdominal pain with mucus and/or blood in stool None None Abdominal pain with mucus and/or blood in stool   Constipation None Requiring stool softener or dietary modification None Requiring stool softener or dietary modification   Dehydration None None None None   Diarrhea w/o Colostomy None None None None   Flatulence None None None None   Nausea None None None None   Proctitis Increased stool frequency, occasional blood-streaked stools or rectal discomfort (including hemorrhoids) not requiring medication Increased stool frequency, occasional blood-streaked stools or rectal discomfort (including hemorrhoids) not requiring medication None None   Vomiting None None None None   RT - Pain due to RT None None None None   Tumor Pain (onset or exacerbation of tumor pain due to treatment) Moderate pain, pain or analgesics interfering with function, but not interfering with activities of daily living Moderate pain, pain or analgesics interfering with function, but not interfering with activities of daily living Mild pain not interfering with function Moderate pain, pain or analgesics interfering with function, but not interfering with activities  of daily living   Dysuria (painful urination) None None None None   Urinary Frequency Normal Normal Normal Normal   Urinary Urgency None None None None   Bladder Spasms Absent Absent Absent Absent   Incontinence None None None None   Urinary Retention Hesitency or dribbling, but no significant residual urine and/or retention occuring during the immediate postoperative period Hesitency or dribbling, but no significant residual urine and/or retention occuring during the immediate postoperative period Hesitency or dribbling, but no significant residual urine and/or retention occuring during the immediate postoperative period Hesitency or dribbling, but no significant residual urine and/or retention occuring during the immediate postoperative period       CURRENT MEDICATIONS:  No current facility-administered medications for this encounter.  No current outpatient medications on file.    Facility-Administered Medications Ordered in Other Encounters:     Nozin nasal  swab, 1 Applicator, Each Nostril, BID, Elvin Renteria M.D., 1 Applicator at 04/09/25 0545    lidocaine (Xylocaine) 5 % ointment, , Topical, TID PRN, Elvin Renteria M.D.    silver sulfADIAZINE (Silvadene) 1 % cream, , Topical, DAILY, Elvin Renteria M.D.    metroNIDAZOLE (Flagyl) tablet 500 mg, 500 mg, Oral, 3 times per day on Tuesday, Hector Tse M.D., 500 mg at 04/08/25 2059    neomycin (Mycifradin) tablet 1,000 mg, 1,000 mg, Oral, 3 times per day on Tuesday, Hector Tse M.D., 1,000 mg at 04/08/25 2100    capecitabine (Xeloda) 1,500 mg, 1,500 mg, Oral, BID, Anna Marie Chicas M.D., 1,500 mg at 04/08/25 2102    tamsulosin (Flomax) capsule 0.4 mg, 0.4 mg, Oral, DAILY, Anna Marie Chicas M.D., 0.4 mg at 04/09/25 0545    HYDROmorphone (Dilaudid) 0.2 mg/mL in 50 mL NS (PCA), , Intravenous, Continuous, Rate Verify at 04/09/25 0715 **AND** Pharmacy Consult Request ...Pain Management Review 1 Each, 1 Each, Other, PHARMACY TO DOSE, Anna Marie  RASTA Chicas    NS infusion, , Intravenous, Continuous, Steven Beltre M.D., Last Rate: 30 mL/hr at 04/05/25 2121, New Bag at 04/05/25 2121    ketorolac (Toradol) 15 MG/ML injection 15 mg, 15 mg, Intravenous, Q6HRS PRN, Anna Marie Chicas M.D., 15 mg at 04/05/25 2110    dexamethasone (Decadron) injection 4 mg, 4 mg, Intravenous, Q6HRS, Anna Marie Chicas M.D., 4 mg at 04/09/25 0312    atorvastatin (Lipitor) tablet 40 mg, 40 mg, Oral, DAILY, Steven Beltre M.D., 40 mg at 04/09/25 0545    carvedilol (Coreg) tablet 12.5 mg, 12.5 mg, Oral, BID, Steven Beltre M.D., 12.5 mg at 04/08/25 1749    citalopram (CeleXA) tablet 20 mg, 20 mg, Oral, DAILY, Steven Beltre M.D., 20 mg at 04/09/25 0545    gabapentin (Neurontin) capsule 100 mg, 100 mg, Oral, TID, Steven Beltre M.D., 100 mg at 04/09/25 0545    hydrOXYzine HCl (Atarax) tablet 25 mg, 25 mg, Oral, Q8HRS PRN, Steven Beltre M.D., 25 mg at 04/07/25 2116    melatonin tablet 3 mg, 3 mg, Oral, QHS, Steven Beltre M.D., 3 mg at 04/08/25 2059    morphine ER (Ms Contin) tablet 30 mg, 30 mg, Oral, Q12HRS, Steven Beltre M.D., 30 mg at 04/09/25 0545    enoxaparin (Lovenox) inj 40 mg, 40 mg, Subcutaneous, DAILY AT 1800, Elvin Renteria M.D.    acetaminophen (Tylenol) tablet 650 mg, 650 mg, Oral, Q6HRS PRN, Steven Beltre M.D.    [COMPLETED] piperacillin-tazobactam (Zosyn) 3.375 g in  mL IVPB, 3.375 g, Intravenous, Once, Stopped at 04/04/25 0855 **AND** piperacillin-tazobactam (Zosyn) 3.375 g in  mL IVPB, 3.375 g, Intravenous, Q8HRS, Anna Marie Chicas M.D., Stopped at 04/09/25 2118    LABORATORY DATA:   Lab Results   Component Value Date/Time    SODIUM 136 04/09/2025 12:08 AM    POTASSIUM 4.4 04/09/2025 12:08 AM    CHLORIDE 98 04/09/2025 12:08 AM    CO2 28 04/09/2025 12:08 AM    GLUCOSE 130 (H) 04/09/2025 12:08 AM    BUN 24 (H) 04/09/2025 12:08 AM    CREATININE 0.80 04/09/2025 12:08 AM       Lab Results   Component Value Date/Time    WBC 8.4 04/09/2025 12:08 AM    RBC 3.88 (L)  04/09/2025 12:08 AM    HEMOGLOBIN 12.1 (L) 04/09/2025 12:08 AM    HEMATOCRIT 35.4 (L) 04/09/2025 12:08 AM    MCV 91.2 04/09/2025 12:08 AM    MCH 31.2 04/09/2025 12:08 AM    MCHC 34.2 04/09/2025 12:08 AM    PLATELETCT 156 (L) 04/09/2025 12:08 AM         RADIOLOGY DATA:  CT-ABDOMEN-PELVIS WITH  Result Date: 4/4/2025  1.  Multiple varying sized small hepatic cysts. 2.  Mild atherosclerotic changes of the infrarenal aorta and iliac arteries. 3.  4.2 x 3.6 x 3.6 cm necrotic mass or abscess involving the rectum.      IMPRESSION:  Cancer Staging   Rectal cancer (HCC)  Staging form: Colon and Rectum, AJCC 8th Edition  - Clinical stage from 10/2/2024: Stage PATO (cT4a, cN2a, cM1a) - Signed by Jason Huerta M.D. on 10/16/2024      PLAN:  No change in treatment plan    Disposition:  Treatment plan and imaging reviewed. Questions answered. Continue therapy outlined. Rec silvadene and lidocaine topically    Jason Huerta M.D.    No orders of the defined types were placed in this encounter.

## 2025-04-10 ENCOUNTER — HOSPITAL ENCOUNTER (OUTPATIENT)
Dept: RADIATION ONCOLOGY | Facility: MEDICAL CENTER | Age: 60
End: 2025-04-10
Payer: COMMERCIAL

## 2025-04-10 LAB
ANION GAP SERPL CALC-SCNC: 7 MMOL/L (ref 7–16)
BUN SERPL-MCNC: 31 MG/DL (ref 8–22)
CALCIUM SERPL-MCNC: 9 MG/DL (ref 8.5–10.5)
CHEMOTHERAPY INFUSION START DATE: NORMAL
CHEMOTHERAPY RECORDS: 1.8 GY
CHEMOTHERAPY RECORDS: 4500 CGY
CHEMOTHERAPY RECORDS: NORMAL
CHEMOTHERAPY RX CANCER: NORMAL
CHLORIDE SERPL-SCNC: 101 MMOL/L (ref 96–112)
CO2 SERPL-SCNC: 29 MMOL/L (ref 20–33)
CREAT SERPL-MCNC: 0.77 MG/DL (ref 0.5–1.4)
DATE 1ST CHEMO CANCER: NORMAL
ERYTHROCYTE [DISTWIDTH] IN BLOOD BY AUTOMATED COUNT: 50.4 FL (ref 35.9–50)
GFR SERPLBLD CREATININE-BSD FMLA CKD-EPI: 103 ML/MIN/1.73 M 2
GLUCOSE SERPL-MCNC: 137 MG/DL (ref 65–99)
HCT VFR BLD AUTO: 31.8 % (ref 42–52)
HGB BLD-MCNC: 10.4 G/DL (ref 14–18)
MCH RBC QN AUTO: 30.6 PG (ref 27–33)
MCHC RBC AUTO-ENTMCNC: 32.7 G/DL (ref 32.3–36.5)
MCV RBC AUTO: 93.5 FL (ref 81.4–97.8)
PLATELET # BLD AUTO: 129 K/UL (ref 164–446)
PMV BLD AUTO: 9.3 FL (ref 9–12.9)
POTASSIUM SERPL-SCNC: 4.3 MMOL/L (ref 3.6–5.5)
RAD ONC ARIA COURSE LAST TREATMENT DATE: NORMAL
RAD ONC ARIA COURSE TREATMENT ELAPSED DAYS: NORMAL
RAD ONC ARIA REFERENCE POINT DOSAGE GIVEN TO DATE: 32.4 GY
RAD ONC ARIA REFERENCE POINT ID: NORMAL
RAD ONC ARIA REFERENCE POINT SESSION DOSAGE GIVEN: 1.8 GY
RBC # BLD AUTO: 3.4 M/UL (ref 4.7–6.1)
SODIUM SERPL-SCNC: 137 MMOL/L (ref 135–145)
WBC # BLD AUTO: 6 K/UL (ref 4.8–10.8)

## 2025-04-10 PROCEDURE — A9270 NON-COVERED ITEM OR SERVICE: HCPCS | Performed by: NURSE PRACTITIONER

## 2025-04-10 PROCEDURE — 770004 HCHG ROOM/CARE - ONCOLOGY PRIVATE *

## 2025-04-10 PROCEDURE — 700111 HCHG RX REV CODE 636 W/ 250 OVERRIDE (IP): Performed by: HOSPITALIST

## 2025-04-10 PROCEDURE — 85027 COMPLETE CBC AUTOMATED: CPT

## 2025-04-10 PROCEDURE — 700102 HCHG RX REV CODE 250 W/ 637 OVERRIDE(OP): Performed by: NURSE PRACTITIONER

## 2025-04-10 PROCEDURE — 700111 HCHG RX REV CODE 636 W/ 250 OVERRIDE (IP): Mod: JZ | Performed by: HOSPITALIST

## 2025-04-10 PROCEDURE — A9270 NON-COVERED ITEM OR SERVICE: HCPCS | Performed by: HOSPITALIST

## 2025-04-10 PROCEDURE — 700102 HCHG RX REV CODE 250 W/ 637 OVERRIDE(OP): Performed by: HOSPITALIST

## 2025-04-10 PROCEDURE — 77014 PR CT GUIDANCE PLACEMENT RAD THERAPY FIELDS: CPT | Mod: 26 | Performed by: RADIOLOGY

## 2025-04-10 PROCEDURE — 700105 HCHG RX REV CODE 258: Performed by: HOSPITALIST

## 2025-04-10 PROCEDURE — 99232 SBSQ HOSP IP/OBS MODERATE 35: CPT | Performed by: HOSPITALIST

## 2025-04-10 PROCEDURE — 700111 HCHG RX REV CODE 636 W/ 250 OVERRIDE (IP): Mod: JZ | Performed by: NURSE PRACTITIONER

## 2025-04-10 PROCEDURE — 77386 HCHG IMRT DELIVERY COMPLEX: CPT | Performed by: RADIOLOGY

## 2025-04-10 PROCEDURE — 77336 RADIATION PHYSICS CONSULT: CPT | Performed by: RADIOLOGY

## 2025-04-10 PROCEDURE — 99232 SBSQ HOSP IP/OBS MODERATE 35: CPT | Performed by: NURSE PRACTITIONER

## 2025-04-10 PROCEDURE — 80048 BASIC METABOLIC PNL TOTAL CA: CPT

## 2025-04-10 RX ORDER — OXYCODONE HYDROCHLORIDE 5 MG/1
5 TABLET ORAL EVERY 4 HOURS PRN
Refills: 0 | Status: DISCONTINUED | OUTPATIENT
Start: 2025-04-10 | End: 2025-04-10

## 2025-04-10 RX ORDER — PREGABALIN 25 MG/1
25 CAPSULE ORAL 3 TIMES DAILY
Status: DISCONTINUED | OUTPATIENT
Start: 2025-04-10 | End: 2025-04-14 | Stop reason: HOSPADM

## 2025-04-10 RX ORDER — HYDROMORPHONE HYDROCHLORIDE 1 MG/ML
0.5 INJECTION, SOLUTION INTRAMUSCULAR; INTRAVENOUS; SUBCUTANEOUS
Status: DISCONTINUED | OUTPATIENT
Start: 2025-04-10 | End: 2025-04-10

## 2025-04-10 RX ORDER — HYDROMORPHONE HYDROCHLORIDE 1 MG/ML
0.5 INJECTION, SOLUTION INTRAMUSCULAR; INTRAVENOUS; SUBCUTANEOUS ONCE
Status: COMPLETED | OUTPATIENT
Start: 2025-04-10 | End: 2025-04-10

## 2025-04-10 RX ORDER — OXYCODONE HYDROCHLORIDE 10 MG/1
10 TABLET ORAL EVERY 4 HOURS
Refills: 0 | Status: DISCONTINUED | OUTPATIENT
Start: 2025-04-10 | End: 2025-04-11

## 2025-04-10 RX ORDER — HYDROMORPHONE HYDROCHLORIDE 1 MG/ML
0.5 INJECTION, SOLUTION INTRAMUSCULAR; INTRAVENOUS; SUBCUTANEOUS EVERY 4 HOURS PRN
Status: DISCONTINUED | OUTPATIENT
Start: 2025-04-10 | End: 2025-04-10

## 2025-04-10 RX ADMIN — CAPECITABINE 1500 MG: 500 TABLET, FILM COATED ORAL at 20:21

## 2025-04-10 RX ADMIN — HYDROMORPHONE HYDROCHLORIDE 0.5 MG: 1 INJECTION, SOLUTION INTRAMUSCULAR; INTRAVENOUS; SUBCUTANEOUS at 10:30

## 2025-04-10 RX ADMIN — HYDROMORPHONE HYDROCHLORIDE 0.5 MG: 1 INJECTION, SOLUTION INTRAMUSCULAR; INTRAVENOUS; SUBCUTANEOUS at 16:09

## 2025-04-10 RX ADMIN — PIPERACILLIN AND TAZOBACTAM 3.38 G: 3; .375 INJECTION, POWDER, FOR SOLUTION INTRAVENOUS at 00:22

## 2025-04-10 RX ADMIN — HYDROMORPHONE HYDROCHLORIDE 0.5 MG: 1 INJECTION, SOLUTION INTRAMUSCULAR; INTRAVENOUS; SUBCUTANEOUS at 16:57

## 2025-04-10 RX ADMIN — PREGABALIN 25 MG: 25 CAPSULE ORAL at 05:33

## 2025-04-10 RX ADMIN — HYDROMORPHONE HYDROCHLORIDE: 10 INJECTION, SOLUTION INTRAMUSCULAR; INTRAVENOUS; SUBCUTANEOUS at 18:37

## 2025-04-10 RX ADMIN — HYDROMORPHONE HYDROCHLORIDE 0.5 MG: 1 INJECTION, SOLUTION INTRAMUSCULAR; INTRAVENOUS; SUBCUTANEOUS at 09:05

## 2025-04-10 RX ADMIN — OXYCODONE 5 MG: 5 TABLET ORAL at 15:59

## 2025-04-10 RX ADMIN — DEXAMETHASONE SODIUM PHOSPHATE 2 MG: 4 INJECTION INTRA-ARTICULAR; INTRALESIONAL; INTRAMUSCULAR; INTRAVENOUS; SOFT TISSUE at 17:14

## 2025-04-10 RX ADMIN — TAMSULOSIN HYDROCHLORIDE 0.4 MG: 0.4 CAPSULE ORAL at 05:33

## 2025-04-10 RX ADMIN — LIDOCAINE: 50 OINTMENT TOPICAL at 08:25

## 2025-04-10 RX ADMIN — Medication 3 MG: at 20:21

## 2025-04-10 RX ADMIN — ASPIRIN 81 MG: 81 TABLET, COATED ORAL at 05:33

## 2025-04-10 RX ADMIN — HYDROMORPHONE HYDROCHLORIDE: 10 INJECTION, SOLUTION INTRAMUSCULAR; INTRAVENOUS; SUBCUTANEOUS at 02:22

## 2025-04-10 RX ADMIN — OXYCODONE HYDROCHLORIDE 10 MG: 10 TABLET ORAL at 14:09

## 2025-04-10 RX ADMIN — DEXAMETHASONE SODIUM PHOSPHATE 2 MG: 4 INJECTION INTRA-ARTICULAR; INTRALESIONAL; INTRAMUSCULAR; INTRAVENOUS; SOFT TISSUE at 11:36

## 2025-04-10 RX ADMIN — PIPERACILLIN AND TAZOBACTAM 3.38 G: 3; .375 INJECTION, POWDER, FOR SOLUTION INTRAVENOUS at 16:21

## 2025-04-10 RX ADMIN — ENOXAPARIN SODIUM 40 MG: 100 INJECTION SUBCUTANEOUS at 17:14

## 2025-04-10 RX ADMIN — Medication 1 APPLICATOR: at 17:17

## 2025-04-10 RX ADMIN — SILVER SULFADIAZINE 2 G: 10 CREAM TOPICAL at 05:32

## 2025-04-10 RX ADMIN — Medication 1 APPLICATOR: at 05:32

## 2025-04-10 RX ADMIN — ATORVASTATIN CALCIUM 40 MG: 40 TABLET, FILM COATED ORAL at 05:33

## 2025-04-10 RX ADMIN — PREGABALIN 25 MG: 25 CAPSULE ORAL at 17:14

## 2025-04-10 RX ADMIN — PIPERACILLIN AND TAZOBACTAM 3.38 G: 3; .375 INJECTION, POWDER, FOR SOLUTION INTRAVENOUS at 08:23

## 2025-04-10 RX ADMIN — KETOROLAC TROMETHAMINE 15 MG: 15 INJECTION, SOLUTION INTRAMUSCULAR; INTRAVENOUS at 00:20

## 2025-04-10 RX ADMIN — CITALOPRAM HYDROBROMIDE 20 MG: 20 TABLET ORAL at 05:33

## 2025-04-10 RX ADMIN — CAPECITABINE 1500 MG: 500 TABLET, FILM COATED ORAL at 08:19

## 2025-04-10 RX ADMIN — LIDOCAINE: 50 OINTMENT TOPICAL at 05:34

## 2025-04-10 RX ADMIN — DEXAMETHASONE SODIUM PHOSPHATE 2 MG: 4 INJECTION INTRA-ARTICULAR; INTRALESIONAL; INTRAMUSCULAR; INTRAVENOUS; SOFT TISSUE at 05:33

## 2025-04-10 ASSESSMENT — COGNITIVE AND FUNCTIONAL STATUS - GENERAL
MOBILITY SCORE: 23
CLIMB 3 TO 5 STEPS WITH RAILING: A LITTLE
SUGGESTED CMS G CODE MODIFIER MOBILITY: CI
DAILY ACTIVITIY SCORE: 24
SUGGESTED CMS G CODE MODIFIER DAILY ACTIVITY: CH

## 2025-04-10 ASSESSMENT — ENCOUNTER SYMPTOMS
ROS GI COMMENTS: RECTAL PAIN
FEVER: 0
ABDOMINAL PAIN: 1
FOCAL WEAKNESS: 0
SHORTNESS OF BREATH: 0

## 2025-04-10 ASSESSMENT — PAIN DESCRIPTION - PAIN TYPE
TYPE: ACUTE PAIN

## 2025-04-10 NOTE — CARE PLAN
The patient is Watcher - Medium risk of patient condition declining or worsening    Shift Goals  Clinical Goals: Pain control, monitor ostomy  Patient Goals: Pain control, rest  Family Goals: Not present    Progress made toward(s) clinical / shift goals:       Problem: Pain - Standard  Goal: Alleviation of pain or a reduction in pain to the patient’s comfort goal  Outcome: Progressing  Note: Medicated with PRN and scheduled pain medications. Pt states medications are effective in decreasing pain. Breathing WDL, resting comfortably.     Problem: Knowledge Deficit - Standard  Goal: Patient and family/care givers will demonstrate understanding of plan of care, disease process/condition, diagnostic tests and medications  Outcome: Progressing  Note: A/Ox4, patient able to understand plan of care, all questions answered at this time.      Problem: Skin Care - Ostomy  Goal: Skin remains free from irritation  Outcome: Progressing       Patient is not progressing towards the following goals:

## 2025-04-10 NOTE — CARE PLAN
The patient is Watcher - Medium risk of patient condition declining or worsening    Shift Goals  Clinical Goals: pain control, monitor ostomy output  Patient Goals: pain control, rest  Family Goals: NADIR    Progress made toward(s) clinical / shift goals:  A/OX4, patient is able to understand plan of care. All questions answered at this time. Pain is being controlled through PRN medications per the MAR. Patient tolerating d/c of PCA pump and is stating a comfortable pain level. Patient remained free of falls throughout shift, fall precautions in place. Bed in lowest position, belongings and call light in reach.

## 2025-04-10 NOTE — DISCHARGE PLANNING
Case Management Discharge Planning    Admission Date: 4/4/2025  GMLOS: 2.9  ALOS: 6    6-Clicks ADL Score: 24  6-Clicks Mobility Score: 23      Anticipated Discharge Dispo: Discharge Disposition: D/T to home under [facility] care in anticipation of covered skilled care (06)  Discharge Address: [facility] ([address])    DME Needed: No    Action(s) Taken:  Discussed during IDT rounds Patient is on day 18 of 25 of radiation. Palliative care to adjust pain meds for pain control, PCA pump had to be increased. Once patient can tolerate oral pain medication plan to discharge. Patient will discharge to [facility], [place]  is accepting and will see patient at [facility].     Escalations Completed: None    Medically Clear: No    Next Steps: Pending Medical Clearance     Barriers to Discharge: Medical clearance    Is the patient up for discharge tomorrow: No

## 2025-04-10 NOTE — PROGRESS NOTES
DEPARTMENT OF SURGERY  COLORECTAL SURGERY    PROGRESS NOTE      POD #2, s/p laparoscopic colostomy creation with I&D of perirectal abscess with Dr. Tse in the setting of rectal cancer     Subjective:     Booker reports ongoing pain this morning, most notably external anal pain and some incisional pain from ostomy creation. Notes that he continues to have some anal leakage since I&D, reports it is thin and blood-tinged. Eating well without nausea or vomiting, ostomy is functioning well, he has been seen by wound care team and education started.     In addition, he is also ambulating in the hallways and is voiding spontaneously.    Otherwise, no recent history of fevers/chills, vomiting/hematemesis, CP/SOB, bloating, dysuria/hematuria, BRBPR/melena, or diarrhea.     Objective:    /65   Pulse (!) 50   Temp 36.6 °C (97.9 °F) (Temporal)   Resp 18   Wt 82.1 kg (181 lb)   SpO2 90%     I/O last 3 completed shifts:  In: 1602.6 [P.O.:1320; I.V.:282.6]  Out: 2150 [Urine:2150]    Current Facility-Administered Medications   Medication Dose    Pharmacy Consult Request ...Pain Management Review 1 Each  1 Each    oxyCODONE immediate release (Roxicodone) tablet 10 mg  10 mg    HYDROmorphone (Dilaudid) injection 0.5 mg  0.5 mg    oxyCODONE immediate-release (Roxicodone) tablet 5 mg  5 mg    Nozin nasal  swab  1 Applicator    lidocaine (Xylocaine) 5 % ointment      silver sulfADIAZINE (Silvadene) 1 % cream      aspirin EC tablet 81 mg  81 mg    dexamethasone (Decadron) injection 2 mg  2 mg    pregabalin (Lyrica) capsule 25 mg  25 mg    metroNIDAZOLE (Flagyl) tablet 500 mg  500 mg    neomycin (Mycifradin) tablet 1,000 mg  1,000 mg    capecitabine (Xeloda) 1,500 mg  1,500 mg    tamsulosin (Flomax) capsule 0.4 mg  0.4 mg    NS infusion      atorvastatin (Lipitor) tablet 40 mg  40 mg    carvedilol (Coreg) tablet 12.5 mg  12.5 mg    citalopram (CeleXA) tablet 20 mg  20 mg    hydrOXYzine HCl (Atarax) tablet 25 mg   25 mg    melatonin tablet 3 mg  3 mg    enoxaparin (Lovenox) inj 40 mg  40 mg    acetaminophen (Tylenol) tablet 650 mg  650 mg    piperacillin-tazobactam (Zosyn) 3.375 g in  mL IVPB  3.375 g       Physical Exam:     Gen -  NAD, A&O x 3  HEENT - anicteric, PERRLA  CV - regular rate and rhythm  Abd - soft, + min TTP diffusely, no rebound/guarding, no distention, no palp masses or bulges  Inc - all lap incisions are C/D/I - no evidence of infection or bulges; + min bruising at all incisions, beefy, moist stoma with soft, brown output, surrounding skin intact  Ext - no clubbing, cyanosis or edema bilaterally  Skin - no rashes/lesions, no open wounds, no jaundice    Labs:    Recent Labs     04/09/25  0008 04/10/25  0030   WBC 8.4 6.0   RBC 3.88* 3.40*   HEMOGLOBIN 12.1* 10.4*   HEMATOCRIT 35.4* 31.8*   MCV 91.2 93.5   MCH 31.2 30.6   MCHC 34.2 32.7   RDW 47.6 50.4*   PLATELETCT 156* 129*   MPV 9.1 9.3     Recent Labs     04/09/25  0008 04/10/25  0030   SODIUM 136 137   POTASSIUM 4.4 4.3   CHLORIDE 98 101   CO2 28 29   GLUCOSE 130* 137*   BUN 24* 31*     Imaging:    No results found.    Assessment/Plan:    POD #2, s/p laparoscopic colostomy creation with I&D of perirectal abscess with Dr. Tse in the setting of rectal cancer    Mr. Booker Saucedo's postoperative recovery is progressing as expected. Continue analgesic regimen per palliative and hospital medicine's recommendations. New ostomy is functioning well, continue to monitor output volume, continue regular diet. Continue with IP wound care for ongoing education. Recommend continuing abx for 10-14 days for perirectal abscess.     Encouraged ambulation TID+ and usage of ICS throughout the day.    Patient also seen and examined by Dr. Tse, reviewed plan of care.     4/10/2025

## 2025-04-10 NOTE — PROGRESS NOTES
"Hospital Medicine Daily Progress Note    Date of Service  4/10/2025    Chief Complaint  Booker Saucedo is a 59 y.o. male admitted 4/4/2025 with severe rectal pain.    Hospital Course  Booker Saucedo is a 59 y.o. male who presented 4/4/2025 with rectal pain.  Mr. Saucedo has a past medical history of CAD with stent to LAD 2015 as well as rectal cancer.   His last chemo was Feb 14th by Dr. Kim, completed 8 cycles of Folfirinox chemotherapy.  He started chemoradiation 3/2025.  He started developing rectal pain which has been progressive over the past 3 weeks. He has been taking decadron by Dr. Huerta and getting radiation 5 days a week.   He has been taking MS Contin 30 mg BID and using break-though pain. Today the pain was so intense that he couldn't stand it. \"I can't live like this\".He has had ongoing drainage mixed with his stool with some blood in it.  In the emergency room, his white blood cell count is 14,000 and CT reveals a 4.2 cm x 3.6 cm x 3.6 cm necrotic mass or abscess involving the rectum.  Dr. Trujillo, surgery has consulted and recommends IV antibiotics and conservative medical management.    4/5: Continues to have severe pain of the rectum.  He is requiring increased Dilaudid 1 mg IV every 4 to every 2.  I have changed his oral Decadron to IV Decadron.  4 mg IV every 6 as well as Toradol 50 mg IV every 6 as needed.  Have also reached out to Dr. Tse colorectal surgeon as well as surgical [facility] team to see if there is any surgical indication for incision and drainage to relieve severe pain.  Patient is having diarrheal stools with necrotic appearing material and purulence noted within it.  Ordered PCA dilaudid pump with 2mg IV x1. Continous O2 monitoring.  4/6:  PCA dilaudid helpful, patient thinks he needs a slightly higher basal rate, re-ordered to 0.6mg/hr. patient has radiation treatment scheduled for tomorrow morning.  I will alert nursing staff to get him to his appointment.  4/7: " Patient has been tolerating his PCA Dilaudid pump.  He did go down for radiation treatment this morning which he plans to continue while in hospital.  Of note he has 9 more radiation treatments left of 30.  Appreciate Dr. Tse's evaluation of patient who plans to take him to the OR Tuesday or Wednesday for incision and drainage of rectal abscess and left sided diverting colostomy placement.  Dr. Tse has placed him on a clear liquid diet.  Plan to transfer to oncology unit for ease of getting patient to radiation treatment daily.    Interval Problem Update    Patient is complaining of severe rectal pain received IV Dilaudid for breakthrough  I discussed with Dr. Tse he is recommending 10 to 14 days of antibiotics  I reviewed CBC WBC 6.0 hemoglobin 10.4 platelets 129  I reviewed chemistry panel electrolytes stable BUN 31 creatinine 0.77  Blood cultures reviewed negative at 5 days  I discussed with palliative care patient will be transition to oxycodone    I have discussed this patient's plan of care and discharge plan at IDT rounds today with Case Management, Nursing, Nursing leadership, and other members of the IDT team.    Consultants/Specialty  general surgery  Colorectal surgery    Code Status  Full Code    Disposition  The patient is not medically cleared for discharge to home or a post-acute facility.      I have placed the appropriate orders for post-discharge needs.    Review of Systems  Review of Systems   Constitutional:  Negative for fever.   Respiratory:  Negative for shortness of breath.    Cardiovascular:  Negative for chest pain.   Gastrointestinal:  Positive for abdominal pain.        Rectal pain   Neurological:  Negative for focal weakness.        Physical Exam  Temp:  [36.6 °C (97.9 °F)-37.2 °C (98.9 °F)] 36.6 °C (97.9 °F)  Pulse:  [50-74] 50  Resp:  [15-20] 18  BP: (100-133)/(57-74) 118/65  SpO2:  [90 %-97 %] 90 %    Physical Exam  HENT:      Head: Normocephalic and atraumatic.   Eyes:       General:         Right eye: No discharge.         Left eye: No discharge.   Cardiovascular:      Rate and Rhythm: Bradycardia present.   Pulmonary:      Effort: Pulmonary effort is normal.   Abdominal:      Tenderness: There is abdominal tenderness. There is no guarding.      Comments: Stoma with formed stool   Neurological:      General: No focal deficit present.      Mental Status: He is alert.   Psychiatric:         Mood and Affect: Mood normal.         Behavior: Behavior normal.         Fluids    Intake/Output Summary (Last 24 hours) at 4/10/2025 1544  Last data filed at 4/10/2025 0905  Gross per 24 hour   Intake 1016.25 ml   Output 120 ml   Net 896.25 ml        Laboratory  Recent Labs     04/09/25  0008 04/10/25  0030   WBC 8.4 6.0   RBC 3.88* 3.40*   HEMOGLOBIN 12.1* 10.4*   HEMATOCRIT 35.4* 31.8*   MCV 91.2 93.5   MCH 31.2 30.6   MCHC 34.2 32.7   RDW 47.6 50.4*   PLATELETCT 156* 129*   MPV 9.1 9.3     Recent Labs     04/09/25  0008 04/10/25  0030   SODIUM 136 137   POTASSIUM 4.4 4.3   CHLORIDE 98 101   CO2 28 29   GLUCOSE 130* 137*   BUN 24* 31*   CREATININE 0.80 0.77   CALCIUM 9.5 9.0                     Imaging  CT-ABDOMEN-PELVIS WITH   Final Result      1.  Multiple varying sized small hepatic cysts.      2.  Mild atherosclerotic changes of the infrarenal aorta and iliac arteries.      3.  4.2 x 3.6 x 3.6 cm necrotic mass or abscess involving the rectum.           Assessment/Plan  * Rectal abscess- (present on admission)  Assessment & Plan  CT scan reveals a 4.2 cm x 3.6 cm x 3.6 cm necrotic mass or abscess involving the rectum.    Evaluated by colorectal surgery underwent surgery with colostomy creation on 4/8/2025 and I&D of perirectal abscess  Continue IV Zosyn will transition to p.o. antibiotics on discharge to complete 10 to 14-day course    Coronary artery disease- (present on admission)  Assessment & Plan  History of prior stent to the LAD 2015  Continue medical therapy with atorvastatin carvedilol  and low-dose aspirin    Primary hypertension- (present on admission)  Assessment & Plan  Continue outpatient carvedilol 12.5 mg twice daily with holding parameters    Port-A-Cath in place- (present on admission)  Assessment & Plan  Secondary to chemo    Cancer related pain- (present on admission)  Assessment & Plan    Patient started on Dilaudid PCA for severe intractable pain  Appreciate palliative care assistance plans to transition to p.o. oxycodone    Rectal cancer (HCC)- (present on admission)  Assessment & Plan  Stage IV  Last chemotherapy was February 14 by Dr. Kim and actively undergoing daily radiation by Dr. Huerta  30 total treatments.    Has been maintained on Decadron as outpatient currently on IV Decadron            VTE prophylaxis:    enoxaparin ppx      I have performed a physical exam and reviewed and updated ROS and Plan today (4/10/2025). In review of yesterday's note (4/9/2025), there are no changes except as documented above.

## 2025-04-10 NOTE — PROGRESS NOTES
SUMMARY: Refer to plan section below for details, palliative care to continue to follow.    INPATIENT PALLIATIVE CARE     LOCATION: [facility] cancer nursing unit 318     HPI:   Booker Saucedo is a 59 y.o. male with a past medical history significant for CAD status post stent to LAD in 2015, hypertension, hyperlipidemia, kidney stones with a recent diagnosis of rectal cancer receiving neoadjuvant therapies who presented on 4/4/2025 with increasing progressive rectal pain with associated diarrhea.  Patient is followed by Dr. Kim with medical oncology, Dr. Tse with colorectal surgery, Dr. Styles outpatient palliative care.  In the emergency department patient underwent CT abdomen and pelvis which revealed 4.2 x 3.6 x 3.6 cm necrotic mass versus abscess involving the rectum.  Yesterday patient was taken to the operating room for laparoscopic creation of a colostomy and incision and drainage of abscess.  He was placed on hydromorphone PCA postoperatively.  In addition to above, cigar smoker who quit 15 years ago, past alcohol use.     Significant/pertinent past medical history: In addition to above, past smoking history of cigars, quit 15 years ago, past use of alcohol.      .  INTERVAL:  Overnight pain scores mostly 4-5/10, did have pain crisis this a.m. and had extra dosage of hydromorphone in addition to hydromorphone PCA dosing.  Appetite is good, p.o. intake 75 to 100% of meals.  Colostomy functioning with liquid stool noted.     Active listening, reflection, reminiscing, validation & normalization, and empathic support utilized throughout this encounter.  All questions answered and contact information provided, encouraged to call with any questions or concerns.      Plan:  # Cancer related pain  -Patient placed on hydromorphone PCA prior to palliative care consult settings are as follows:  Basal rate 0.4 mg every hour; PCA dose 0.5 mg every 8 minutes; 5 mg 4-hour lockout dosing  - Previously on  morphine sulfate extended release 30 mg every 12 hours p.o. per Dr. Styles  -Review of PDMP indicates patient also on oxycodone IR 10 mg for breakthrough pain, PDMP 470  -Patient started on gabapentin 100 mg this hospital admission  -Discontinue gabapentin start pregabalin 25 mg every 12 hours increase as tolerated  -Stop morphine sulfate extended release  -Continue hydromorphone PCA at current settings, plan to discontinue tomorrow and transition to oral oxycodone 10 mg p.o. every 3 hours as needed breakthrough pain-will continue to assess appropriateness for long-acting medication and shared decision making approach with patient.  -Collaborated with hospitalist/Dr. Brandin Renteria, he is actively weaning dexamethasone suggest scheduling ketorolac 15 mg every 8 hours for 2 to 3 days.    4/10/2025:  -Discontinue hydromorphone PCA, schedule oxycodone every 4 hours 10 mg with as needed dosing every 4 hours for moderate breakthrough pain  -Hydromorphone 0.5 mg IV every 4 hours as needed severe breakthrough pain (orders indicate please utilize oral medication prior to offering IV medication)  -Increase Lyrica to 3 times daily dosing (25 mg)    # Recent colostomy  # Coronary artery disease  # Hypertension    *Continue to monitor for signs and symptoms constipation*    Thank you for allowing me the opportunity to participate in the care of  Booker Saucedo.     Interval diagnostic studies and medical documentation entries pertinent to this case were reviewed independently by me. This patient has at least one acute or chronic illness or injury that poses a threat to life or bodily function. This patient suffers from a high risk of morbidly from additional invasive diagnostic testing or intensive treatment. Discussion of recommendations and coordination of care undertaken with primary provider/treatment team.        Mitali Barth, MSN, APRN, ACNPC-AG.  Palliative Care Nurse Practitioner  [phone]  Office  [phone] [employer]  M-F

## 2025-04-10 NOTE — WOUND TEAM
Attempted to see patient. Patients states he is in a lot of pain and is unable to focus at this time. Will attempt to see patient at another time/date for ostomy education/change.

## 2025-04-10 NOTE — Clinical Note
REFERRAL APPROVAL NOTICE         Sent on April 10, 2025                   Booker Saucedo  [address]  [place] [address]                   Dear Mr. Saucedo,    After a careful review of the medical information and benefit coverage, [facility] has processed your referral. See below for additional details.    If applicable, you must be actively enrolled with your insurance for coverage of the authorized service. If you have any questions regarding your coverage, please contact your insurance directly.    REFERRAL INFORMATION   Referral #:  [id number]  Referred-To Department    Referred-By Provider:  Wound Clinic    Anna Marie Chicas M.D.   [facility]      [address]   [place] [address]  [phone] [address]  [place] [facility] [address]  [phone]    Referral Start Date:  04/07/2025  Referral End Date:   04/07/2026             SCHEDULING  If you do not already have an appointment, please call [phone] to make an appointment.     MORE INFORMATION  If you do not already have a [employer] account, sign up at: [email or web address]  You can access your medical information, make appointments, see lab results, billing information, and more.  If you have questions regarding this referral, please contact  the [facility] Referrals department at:             [phone]. Monday - Friday 8:00AM - 5:00PM.     Sincerely,    [facility]

## 2025-04-10 NOTE — WOUND TEAM
Returned to see patient and he states that he is exhausted; declining appliance change at this time. Provided education for lifestyle with ostomy and answered all questions/concerns.     Wound Team following and to return another day for continued hands-on education.

## 2025-04-11 ENCOUNTER — HOSPITAL ENCOUNTER (OUTPATIENT)
Dept: RADIATION ONCOLOGY | Facility: MEDICAL CENTER | Age: 60
End: 2025-04-11
Payer: COMMERCIAL

## 2025-04-11 PROBLEM — E87.6 HYPOKALEMIA: Status: ACTIVE | Noted: 2025-04-11

## 2025-04-11 LAB
ALBUMIN SERPL BCP-MCNC: 3 G/DL (ref 3.2–4.9)
ALBUMIN/GLOB SERPL: 1.5 G/DL
ALP SERPL-CCNC: 71 U/L (ref 30–99)
ALT SERPL-CCNC: 26 U/L (ref 2–50)
ANION GAP SERPL CALC-SCNC: 6 MMOL/L (ref 7–16)
AST SERPL-CCNC: 24 U/L (ref 12–45)
BILIRUB SERPL-MCNC: 0.3 MG/DL (ref 0.1–1.5)
BUN SERPL-MCNC: 19 MG/DL (ref 8–22)
CALCIUM ALBUM COR SERPL-MCNC: 9 MG/DL (ref 8.5–10.5)
CALCIUM SERPL-MCNC: 8.2 MG/DL (ref 8.5–10.5)
CHEMOTHERAPY INFUSION START DATE: NORMAL
CHEMOTHERAPY RECORDS: 1.8 GY
CHEMOTHERAPY RECORDS: 4500 CGY
CHEMOTHERAPY RECORDS: NORMAL
CHEMOTHERAPY RX CANCER: NORMAL
CHLORIDE SERPL-SCNC: 104 MMOL/L (ref 96–112)
CO2 SERPL-SCNC: 26 MMOL/L (ref 20–33)
CREAT SERPL-MCNC: 0.76 MG/DL (ref 0.5–1.4)
DATE 1ST CHEMO CANCER: NORMAL
ERYTHROCYTE [DISTWIDTH] IN BLOOD BY AUTOMATED COUNT: 50.2 FL (ref 35.9–50)
GFR SERPLBLD CREATININE-BSD FMLA CKD-EPI: 103 ML/MIN/1.73 M 2
GLOBULIN SER CALC-MCNC: 2 G/DL (ref 1.9–3.5)
GLUCOSE SERPL-MCNC: 135 MG/DL (ref 65–99)
HCT VFR BLD AUTO: 31.1 % (ref 42–52)
HGB BLD-MCNC: 10.4 G/DL (ref 14–18)
MAGNESIUM SERPL-MCNC: 1.9 MG/DL (ref 1.5–2.5)
MCH RBC QN AUTO: 31.5 PG (ref 27–33)
MCHC RBC AUTO-ENTMCNC: 33.4 G/DL (ref 32.3–36.5)
MCV RBC AUTO: 94.2 FL (ref 81.4–97.8)
PHOSPHATE SERPL-MCNC: 2.1 MG/DL (ref 2.5–4.5)
PLATELET # BLD AUTO: 106 K/UL (ref 164–446)
PMV BLD AUTO: 9.8 FL (ref 9–12.9)
POTASSIUM SERPL-SCNC: 3.5 MMOL/L (ref 3.6–5.5)
PROT SERPL-MCNC: 5 G/DL (ref 6–8.2)
RAD ONC ARIA COURSE LAST TREATMENT DATE: NORMAL
RAD ONC ARIA COURSE TREATMENT ELAPSED DAYS: NORMAL
RAD ONC ARIA REFERENCE POINT DOSAGE GIVEN TO DATE: 34.2 GY
RAD ONC ARIA REFERENCE POINT ID: NORMAL
RAD ONC ARIA REFERENCE POINT SESSION DOSAGE GIVEN: 1.8 GY
RBC # BLD AUTO: 3.3 M/UL (ref 4.7–6.1)
SODIUM SERPL-SCNC: 136 MMOL/L (ref 135–145)
WBC # BLD AUTO: 6.3 K/UL (ref 4.8–10.8)

## 2025-04-11 PROCEDURE — A9270 NON-COVERED ITEM OR SERVICE: HCPCS | Performed by: HOSPITALIST

## 2025-04-11 PROCEDURE — 80053 COMPREHEN METABOLIC PANEL: CPT

## 2025-04-11 PROCEDURE — A9270 NON-COVERED ITEM OR SERVICE: HCPCS | Performed by: NURSE PRACTITIONER

## 2025-04-11 PROCEDURE — 700102 HCHG RX REV CODE 250 W/ 637 OVERRIDE(OP): Performed by: HOSPITALIST

## 2025-04-11 PROCEDURE — 85027 COMPLETE CBC AUTOMATED: CPT

## 2025-04-11 PROCEDURE — 700111 HCHG RX REV CODE 636 W/ 250 OVERRIDE (IP): Performed by: HOSPITALIST

## 2025-04-11 PROCEDURE — 99233 SBSQ HOSP IP/OBS HIGH 50: CPT | Performed by: NURSE PRACTITIONER

## 2025-04-11 PROCEDURE — 700111 HCHG RX REV CODE 636 W/ 250 OVERRIDE (IP): Mod: JZ | Performed by: NURSE PRACTITIONER

## 2025-04-11 PROCEDURE — 77427 RADIATION TX MANAGEMENT X5: CPT | Performed by: RADIOLOGY

## 2025-04-11 PROCEDURE — 770004 HCHG ROOM/CARE - ONCOLOGY PRIVATE *

## 2025-04-11 PROCEDURE — 77014 PR CT GUIDANCE PLACEMENT RAD THERAPY FIELDS: CPT | Mod: 26 | Performed by: RADIOLOGY

## 2025-04-11 PROCEDURE — 77386 HCHG IMRT DELIVERY COMPLEX: CPT | Performed by: RADIOLOGY

## 2025-04-11 PROCEDURE — 700102 HCHG RX REV CODE 250 W/ 637 OVERRIDE(OP): Performed by: NURSE PRACTITIONER

## 2025-04-11 PROCEDURE — 83735 ASSAY OF MAGNESIUM: CPT

## 2025-04-11 PROCEDURE — 700111 HCHG RX REV CODE 636 W/ 250 OVERRIDE (IP): Mod: JZ | Performed by: HOSPITALIST

## 2025-04-11 PROCEDURE — 84100 ASSAY OF PHOSPHORUS: CPT

## 2025-04-11 PROCEDURE — 700105 HCHG RX REV CODE 258: Performed by: NURSE PRACTITIONER

## 2025-04-11 PROCEDURE — 97602 WOUND(S) CARE NON-SELECTIVE: CPT

## 2025-04-11 PROCEDURE — 700105 HCHG RX REV CODE 258: Performed by: HOSPITALIST

## 2025-04-11 PROCEDURE — 99232 SBSQ HOSP IP/OBS MODERATE 35: CPT | Performed by: HOSPITALIST

## 2025-04-11 RX ORDER — MAGNESIUM SULFATE HEPTAHYDRATE 40 MG/ML
2 INJECTION, SOLUTION INTRAVENOUS ONCE
Status: COMPLETED | OUTPATIENT
Start: 2025-04-11 | End: 2025-04-11

## 2025-04-11 RX ORDER — POTASSIUM CHLORIDE 1500 MG/1
20 TABLET, EXTENDED RELEASE ORAL ONCE
Status: COMPLETED | OUTPATIENT
Start: 2025-04-11 | End: 2025-04-11

## 2025-04-11 RX ORDER — MORPHINE SULFATE 60 MG/1
60 TABLET, FILM COATED, EXTENDED RELEASE ORAL ONCE
Refills: 0 | Status: COMPLETED | OUTPATIENT
Start: 2025-04-11 | End: 2025-04-11

## 2025-04-11 RX ORDER — MORPHINE SULFATE 60 MG/1
60 TABLET, FILM COATED, EXTENDED RELEASE ORAL EVERY 12 HOURS
Refills: 0 | Status: DISCONTINUED | OUTPATIENT
Start: 2025-04-11 | End: 2025-04-12

## 2025-04-11 RX ORDER — DULOXETIN HYDROCHLORIDE 30 MG/1
30 CAPSULE, DELAYED RELEASE ORAL DAILY
Status: DISCONTINUED | OUTPATIENT
Start: 2025-04-12 | End: 2025-04-14 | Stop reason: HOSPADM

## 2025-04-11 RX ADMIN — CITALOPRAM HYDROBROMIDE 20 MG: 20 TABLET ORAL at 04:58

## 2025-04-11 RX ADMIN — DEXAMETHASONE SODIUM PHOSPHATE 2 MG: 4 INJECTION INTRA-ARTICULAR; INTRALESIONAL; INTRAMUSCULAR; INTRAVENOUS; SOFT TISSUE at 17:42

## 2025-04-11 RX ADMIN — Medication 5 MG: at 20:35

## 2025-04-11 RX ADMIN — DEXAMETHASONE SODIUM PHOSPHATE 2 MG: 4 INJECTION INTRA-ARTICULAR; INTRALESIONAL; INTRAMUSCULAR; INTRAVENOUS; SOFT TISSUE at 04:58

## 2025-04-11 RX ADMIN — PIPERACILLIN AND TAZOBACTAM 3.38 G: 3; .375 INJECTION, POWDER, FOR SOLUTION INTRAVENOUS at 17:53

## 2025-04-11 RX ADMIN — TAMSULOSIN HYDROCHLORIDE 0.4 MG: 0.4 CAPSULE ORAL at 04:58

## 2025-04-11 RX ADMIN — PREGABALIN 25 MG: 25 CAPSULE ORAL at 12:34

## 2025-04-11 RX ADMIN — PIPERACILLIN AND TAZOBACTAM 3.38 G: 3; .375 INJECTION, POWDER, FOR SOLUTION INTRAVENOUS at 09:15

## 2025-04-11 RX ADMIN — MAGNESIUM SULFATE HEPTAHYDRATE 2 G: 2 INJECTION, SOLUTION INTRAVENOUS at 09:29

## 2025-04-11 RX ADMIN — ATORVASTATIN CALCIUM 40 MG: 40 TABLET, FILM COATED ORAL at 04:58

## 2025-04-11 RX ADMIN — POTASSIUM CHLORIDE 20 MEQ: 1500 TABLET, EXTENDED RELEASE ORAL at 09:15

## 2025-04-11 RX ADMIN — PREGABALIN 25 MG: 25 CAPSULE ORAL at 17:41

## 2025-04-11 RX ADMIN — LIDOCAINE: 50 OINTMENT TOPICAL at 05:00

## 2025-04-11 RX ADMIN — ENOXAPARIN SODIUM 40 MG: 100 INJECTION SUBCUTANEOUS at 17:42

## 2025-04-11 RX ADMIN — SILVER SULFADIAZINE 2 G: 10 CREAM TOPICAL at 04:58

## 2025-04-11 RX ADMIN — DIBASIC SODIUM PHOSPHATE, MONOBASIC POTASSIUM PHOSPHATE AND MONOBASIC SODIUM PHOSPHATE 500 MG: 852; 155; 130 TABLET ORAL at 17:41

## 2025-04-11 RX ADMIN — DIBASIC SODIUM PHOSPHATE, MONOBASIC POTASSIUM PHOSPHATE AND MONOBASIC SODIUM PHOSPHATE 500 MG: 852; 155; 130 TABLET ORAL at 09:15

## 2025-04-11 RX ADMIN — HYDROMORPHONE HYDROCHLORIDE: 10 INJECTION, SOLUTION INTRAMUSCULAR; INTRAVENOUS; SUBCUTANEOUS at 02:22

## 2025-04-11 RX ADMIN — MORPHINE SULFATE 60 MG: 60 TABLET, FILM COATED, EXTENDED RELEASE ORAL at 20:35

## 2025-04-11 RX ADMIN — Medication 1 APPLICATOR: at 04:58

## 2025-04-11 RX ADMIN — DEXAMETHASONE SODIUM PHOSPHATE 2 MG: 4 INJECTION INTRA-ARTICULAR; INTRALESIONAL; INTRAMUSCULAR; INTRAVENOUS; SOFT TISSUE at 12:34

## 2025-04-11 RX ADMIN — ASPIRIN 81 MG: 81 TABLET, COATED ORAL at 04:58

## 2025-04-11 RX ADMIN — DIBASIC SODIUM PHOSPHATE, MONOBASIC POTASSIUM PHOSPHATE AND MONOBASIC SODIUM PHOSPHATE 500 MG: 852; 155; 130 TABLET ORAL at 12:34

## 2025-04-11 RX ADMIN — CAPECITABINE 1500 MG: 500 TABLET, FILM COATED ORAL at 09:17

## 2025-04-11 RX ADMIN — PREGABALIN 25 MG: 25 CAPSULE ORAL at 04:58

## 2025-04-11 RX ADMIN — HYDROMORPHONE HYDROCHLORIDE: 10 INJECTION, SOLUTION INTRAMUSCULAR; INTRAVENOUS; SUBCUTANEOUS at 13:39

## 2025-04-11 RX ADMIN — Medication 1 APPLICATOR: at 17:44

## 2025-04-11 RX ADMIN — MORPHINE SULFATE 60 MG: 60 TABLET, FILM COATED, EXTENDED RELEASE ORAL at 13:26

## 2025-04-11 RX ADMIN — PIPERACILLIN AND TAZOBACTAM 3.38 G: 3; .375 INJECTION, POWDER, FOR SOLUTION INTRAVENOUS at 00:19

## 2025-04-11 RX ADMIN — CAPECITABINE 1500 MG: 500 TABLET, FILM COATED ORAL at 20:35

## 2025-04-11 RX ADMIN — CARVEDILOL 12.5 MG: 12.5 TABLET, FILM COATED ORAL at 04:58

## 2025-04-11 ASSESSMENT — PAIN DESCRIPTION - PAIN TYPE
TYPE: ACUTE PAIN

## 2025-04-11 ASSESSMENT — ENCOUNTER SYMPTOMS
NERVOUS/ANXIOUS: 1
FEVER: 0
ABDOMINAL PAIN: 1
VOMITING: 0
NAUSEA: 0
ROS GI COMMENTS: PERIRECTAL PAIN
DIARRHEA: 1
SHORTNESS OF BREATH: 0
ROS GI COMMENTS: RECTAL PAIN

## 2025-04-11 NOTE — PROGRESS NOTES
SUMMARY: Please refer to A/P section below, PC to continue to follow.      MRN: [id number]  Date of palliative consult: April 9, 2025  Reason for consult: Symptom management  Referring provider: Brandin Renteria  Location of consult: Cancer nursing unit R318  Additional consulting services: Colorectal surgery, general surgery, hospital medicine    HPI:   Booker Saucedo is a 59 y.o. male with a past medical history significant for CAD status post stent to LAD in 2015, hypertension, hyperlipidemia, kidney stones with a recent diagnosis of rectal cancer receiving neoadjuvant therapies who presented on 4/4/2025 with increasing progressive rectal pain with associated diarrhea.  Patient is followed by Dr. Kim with medical oncology, Dr. Tse with colorectal surgery, Dr. Styles outpatient palliative care.  In the emergency department patient underwent CT abdomen and pelvis which revealed 4.2 x 3.6 x 3.6 cm necrotic mass versus abscess involving the rectum.  Yesterday patient was taken to the operating room for laparoscopic creation of a colostomy and incision and drainage of abscess.  He was placed on hydromorphone PCA postoperatively.  In addition to above, cigar smoker who quit 15 years ago, past alcohol use.    Significant/pertinent past medical history: In addition to above, past smoking history of cigars, quit 15 years ago, past use of alcohol.    Pain History:  Onset: Early March  Location: Low pelvic and rectal  Duration: Intermittent and varying in severity  Characteristics: Low pelvic pain sharp and stabbing, rectal pain burning  Aggravating factors: Bowel movements  Alleviating factors: Ice, heat, sitz bath's, bowel protocol  Radiation: To sacrum, pelvis, perineum  Treatments: As above  Severity: At its worst 9/10, tolerable pain 4-5/10.    Additional symptoms: Denies additional symptoms aside from recent diarrhea and/or constipation.      Interval History: Colostomy producing soft to liquid stool.  Vital signs  "remained stable patient is afebrile.    Interval History:  4/10 Overnight pain scores mostly 4-5/10, did have pain crisis this a.m. and had extra dosage of hydromorphone in addition to hydromorphone PCA dosing.  Appetite is good, p.o. intake 75 to 100% of meals.  Colostomy functioning with liquid stool noted. Active listening, reflection, reminiscing, validation & normalization, and empathic support utilized throughout this encounter.  All questions answered and contact information provided, encouraged to call with any questions or concerns.     patient required restarting of PCA yesterday due to severe pain.  He has significant anxiety about health and if he will continue to decline physically and eventually \"die from this.\"  He reports when the pain flares it is essentially unbearable.  Given his healthcare background provided extensive education regarding pain and symptom management strategies.    ROS:    Review of Systems   Respiratory:  Negative for shortness of breath.    Cardiovascular:  Negative for leg swelling.   Gastrointestinal:  Positive for abdominal pain and diarrhea (liquid stool output from ostomy; patient planning on changing). Negative for nausea and vomiting.        Rectal pain   Psychiatric/Behavioral:  The patient is nervous/anxious.        PE:   Recent vital signs  BMI: Body mass index is 28.08 kg/m².    Temp (24hrs), Av.9 °C (98.4 °F), Min:36.6 °C (97.9 °F), Max:37.5 °C (99.5 °F)  Temperature: 36.6 °C (97.9 °F)  Pulse  Av.8  Min: 44  Max: 99   Blood Pressure: 132/68       Physical Exam  Constitutional:       General: He is not in acute distress.     Appearance: He is ill-appearing.   HENT:      Mouth/Throat:      Mouth: Mucous membranes are moist.   Cardiovascular:      Rate and Rhythm: Normal rate.      Heart sounds: Murmur heard.   Pulmonary:      Effort: Pulmonary effort is normal.      Breath sounds: Normal breath sounds.   Abdominal:      General: Bowel sounds are normal.    "   Palpations: Abdomen is soft.      Comments: LLQ colostomy   Musculoskeletal:      Right lower leg: No edema.      Left lower leg: No edema.   Skin:     General: Skin is warm and dry.      Coloration: Skin is pale and sallow.   Neurological:      Mental Status: He is alert and oriented to person, place, and time.      Comments: Oriented to event   Psychiatric:         Attention and Perception: Attention normal.         Mood and Affect: Mood normal.         Speech: Speech normal.         Behavior: Behavior normal. Behavior is cooperative.         Cognition and Memory: Cognition normal.         Judgment: Judgment normal.       Recent Labs     04/09/25  0008 04/10/25  0030 04/11/25  0027   SODIUM 136 137 136   POTASSIUM 4.4 4.3 3.5*   CHLORIDE 98 101 104   CO2 28 29 26   GLUCOSE 130* 137* 135*   BUN 24* 31* 19   CREATININE 0.80 0.77 0.76   CALCIUM 9.5 9.0 8.2*     Recent Labs     04/09/25  0008 04/10/25  0030 04/11/25  0027   WBC 8.4 6.0 6.3   RBC 3.88* 3.40* 3.30*   HEMOGLOBIN 12.1* 10.4* 10.4*   HEMATOCRIT 35.4* 31.8* 31.1*   MCV 91.2 93.5 94.2   MCH 31.2 30.6 31.5   MCHC 34.2 32.7 33.4   RDW 47.6 50.4* 50.2*   PLATELETCT 156* 129* 106*   MPV 9.1 9.3 9.8       ASSESSMENT/PLAN WITH SHARED DECISION MAKING:   Review  Pertinent imaging reviewed.    PHYSICAL ASPECTS OF CARE  Palliative Performance Scale: 60%    # Cancer related pain  4/9/2025:  -Patient placed on hydromorphone PCA prior to palliative care consult settings are as follows:  Basal rate 0.4 mg every hour; PCA dose 0.5 mg every 8 minutes; 5 mg 4-hour lockout dosing  - Previously on morphine sulfate extended release 30 mg every 12 hours p.o. per Dr. Styles  -Review of PDMP indicates patient also on oxycodone IR 10 mg for breakthrough pain, PDMP 470  -Patient started on gabapentin 100 mg this hospital admission  -Discontinue gabapentin start pregabalin 25 mg every 12 hours increase as tolerated  -Stop morphine sulfate extended release  -Continue  hydromorphone PCA at current settings, plan to discontinue tomorrow and transition to oral oxycodone 10 mg p.o. every 3 hours as needed breakthrough pain-will continue to assess appropriateness for long-acting medication and shared decision making approach with patient.  -Collaborated with hospitalist/Dr. Brandin Renteria, he is actively weaning dexamethasone suggest scheduling ketorolac 15 mg every 8 hours for 2 to 3 days.  4/10/2025:  -Discontinue hydromorphone PCA, schedule oxycodone every 4 hours 10 mg with as needed dosing every 4 hours for moderate breakthrough pain  -Hydromorphone 0.5 mg IV every 4 hours as needed severe breakthrough pain (orders indicate please utilize oral medication prior to offering IV medication)  -Increase Lyrica to 3 times daily dosing (25 mg)  4/11/2025:  MEDD ~ 231.6-270.2 mg  Discontinue basal rate of hydromorphone PCA; continue patient controlled analgesia for breakthrough pain 0.5 mg Q 8 minutes, 5 mg Q 4 hour lock out  Start morphine ER (MS Contin) 60 mg PO Q 12 hours (approximately 50% of MEDD)  - can adjust accordingly over the weekend for pain management  - if pain well controlled can consider changing PCA dosing to short acting immediate release opioid for breakthrough pain  - Patient previous on oxycodone 10 mg but may need higher dose if MEDD remains high (should be 10% of MEDD so may need closer to 15-20 mg)  Patient apprehensive about starting long-acting opioid therapies due to their treatment of chronic pain and he feels his pain is acute  Provided extensive education in regards to use of medications with proper weaning over time if pain improves  We also discussed the role of interventional pain management for possible superior hyperplastic plexus nerve block in the future for rectal pain  Patient wishes to be off of medications so he can continue to work and be active in his life up in [place]  Follows with Dr. Goel in [facility]  # Depression and  anxiety about health  Discontinue escitalopram 20 mg PO nightly  Start duloxetine 30 mg PO daily tomorrow 4/12  # Insomnia  Increase melatonin to 5 mg PO nightly  Cluster care at night (order placed in EPIC and sign placed on door)  # Rectal cancer  # Recent colostomy  # Coronary artery disease  # Hypertension    SOCIAL ASPECTS OF CARE  Patient is recently . He has one daughter, Sujatha who is 17. He does have 2 brothers and sisters and would be ok with them making decisions for him if needed.  He was previously independent with ADLs and IADLs and works full-time remotely from home.  He is a former critical care RN.    SPIRITUAL ASPECTS OF CARE   Not addressed.    GOALS OF CARE/SERIOUS ILLNESS CONVERSATION  Introduced myself to patient. Discussed role of palliative care and reason for consult.  He is agreeable to discuss goals of care.  Discussed recent cancer diagnosis and struggles with pain management including goal to not be on any opioid medication if possible.  His additional goals include to live as long as possible he is hopeful that this most recent surgery in addition to ongoing medical oncology care as well as radiation oncology will provide him with the best quality of life and prognosis.  We discussed plan moving forward and he is agreeable to multimodal pain medication strategy.  Provided palliative care contact information and encouraged patient to reach out with any questions/needs.      Code Status: Full code    ACP Documents: None on file.  Patient reports he is working on an advance directive and is hoping to get it done this admission.  Confirmed he can reach out to our team and we can assist at any time.    Interval diagnostic studies and medical documentation entries pertinent to this case were reviewed independently by me. This patient has at least one acute or chronic illness or injury that poses a threat to life or bodily function. This patient suffers from a high risk of morbidly  "from additional invasive diagnostic testing or intensive treatment. Discussion of recommendations and coordination of care undertaken with primary provider/treatment team.      Taryn \"Johanna\" ABBE Brennan, [facility]  Inpatient Palliative Care (service hours Mon-Fri 8AM - 5PM)  [phone]     "

## 2025-04-11 NOTE — DISCHARGE PLANNING
Case Management Discharge Planning    Admission Date: 4/4/2025  GMLOS: 2.9  ALOS: 7    6-Clicks ADL Score: 24  6-Clicks Mobility Score: 23      Anticipated Discharge Dispo: Discharge Disposition: D/T to home under [facility] care in anticipation of covered skilled care (06)  Discharge Address: [facility] ([address])    DME Needed: No    Action(s) Taken: Discussed in IDT rounds, pt on day 19/25 radiation and has a PCA pump. Pt currently working on pain management. [place]  has accepted. Pt will return to [facility] on Monday.     Escalations Completed: None    Medically Clear: No    Next Steps: Pending medical clearance    Barriers to Discharge: Medical clearance    Is the patient up for discharge tomorrow: No

## 2025-04-11 NOTE — PROGRESS NOTES
"Hospital Medicine Daily Progress Note    Date of Service  4/11/2025    Chief Complaint  Booker Saucedo is a 59 y.o. male admitted 4/4/2025 with severe rectal pain.    Hospital Course  Booker Saucedo is a 59 y.o. male who presented 4/4/2025 with rectal pain.  Mr. Saucedo has a past medical history of CAD with stent to LAD 2015 as well as rectal cancer.   His last chemo was Feb 14th by Dr. Kim, completed 8 cycles of Folfirinox chemotherapy.  He started chemoradiation 3/2025.  He started developing rectal pain which has been progressive over the past 3 weeks. He has been taking decadron by Dr. Huerta and getting radiation 5 days a week.   He has been taking MS Contin 30 mg BID and using break-though pain. Today the pain was so intense that he couldn't stand it. \"I can't live like this\".He has had ongoing drainage mixed with his stool with some blood in it.  In the emergency room, his white blood cell count is 14,000 and CT reveals a 4.2 cm x 3.6 cm x 3.6 cm necrotic mass or abscess involving the rectum.  Dr. Trujillo, surgery has consulted and recommends IV antibiotics and conservative medical management.    4/5: Continues to have severe pain of the rectum.  He is requiring increased Dilaudid 1 mg IV every 4 to every 2.  I have changed his oral Decadron to IV Decadron.  4 mg IV every 6 as well as Toradol 50 mg IV every 6 as needed.  Have also reached out to Dr. Tse colorectal surgeon as well as surgical [facility] team to see if there is any surgical indication for incision and drainage to relieve severe pain.  Patient is having diarrheal stools with necrotic appearing material and purulence noted within it.  Ordered PCA dilaudid pump with 2mg IV x1. Continous O2 monitoring.  4/6:  PCA dilaudid helpful, patient thinks he needs a slightly higher basal rate, re-ordered to 0.6mg/hr. patient has radiation treatment scheduled for tomorrow morning.  I will alert nursing staff to get him to his appointment.  4/7: " Patient has been tolerating his PCA Dilaudid pump.  He did go down for radiation treatment this morning which he plans to continue while in hospital.  Of note he has 9 more radiation treatments left of 30.  Appreciate Dr. Tse's evaluation of patient who plans to take him to the OR Tuesday or Wednesday for incision and drainage of rectal abscess and left sided diverting colostomy placement.  Dr. Tse has placed him on a clear liquid diet.  Plan to transfer to oncology unit for ease of getting patient to radiation treatment daily.    Interval Problem Update    Patient's pain was uncontrolled so I restarted PCA last evening  Pain is better controlled today  I discussed with palliative care and patient will be started on MS Contin and will DC basal rate on PCA  I reviewed chemistry panel potassium 3.5 phosphorus 2.1 magnesium 1.9 I ordered repletion and repeat levels  He remains on IV Zosyn cultures so far negative    I have discussed this patient's plan of care and discharge plan at IDT rounds today with Case Management, Nursing, Nursing leadership, and other members of the IDT team.    Consultants/Specialty  general surgery  Colorectal surgery    Code Status  Full Code    Disposition  The patient is not medically cleared for discharge to home or a post-acute facility.      I have placed the appropriate orders for post-discharge needs.    Review of Systems  Review of Systems   Constitutional:  Negative for fever.   Respiratory:  Negative for shortness of breath.    Cardiovascular:  Negative for chest pain.   Gastrointestinal:  Positive for abdominal pain.        Perirectal pain        Physical Exam  Temp:  [36.6 °C (97.9 °F)-37.5 °C (99.5 °F)] 37.2 °C (98.9 °F)  Pulse:  [49-80] 80  Resp:  [16-20] 18  BP: (108-139)/(61-82) 112/72  SpO2:  [91 %-96 %] 95 %    Physical Exam  Cardiovascular:      Rate and Rhythm: Normal rate and regular rhythm.      Heart sounds: No murmur heard.  Pulmonary:      Effort: Pulmonary  effort is normal.   Chest:      Chest wall: No tenderness.   Abdominal:      Tenderness: There is abdominal tenderness. There is no guarding.      Comments: Stoma with liquid brown stool   Skin:     General: Skin is warm and dry.   Neurological:      General: No focal deficit present.      Mental Status: He is alert.   Psychiatric:         Mood and Affect: Mood normal.         Fluids    Intake/Output Summary (Last 24 hours) at 4/11/2025 1512  Last data filed at 4/11/2025 0900  Gross per 24 hour   Intake 1167.2 ml   Output 300 ml   Net 867.2 ml        Laboratory  Recent Labs     04/09/25  0008 04/10/25  0030 04/11/25  0027   WBC 8.4 6.0 6.3   RBC 3.88* 3.40* 3.30*   HEMOGLOBIN 12.1* 10.4* 10.4*   HEMATOCRIT 35.4* 31.8* 31.1*   MCV 91.2 93.5 94.2   MCH 31.2 30.6 31.5   MCHC 34.2 32.7 33.4   RDW 47.6 50.4* 50.2*   PLATELETCT 156* 129* 106*   MPV 9.1 9.3 9.8     Recent Labs     04/09/25  0008 04/10/25  0030 04/11/25  0027   SODIUM 136 137 136   POTASSIUM 4.4 4.3 3.5*   CHLORIDE 98 101 104   CO2 28 29 26   GLUCOSE 130* 137* 135*   BUN 24* 31* 19   CREATININE 0.80 0.77 0.76   CALCIUM 9.5 9.0 8.2*                     Imaging  CT-ABDOMEN-PELVIS WITH   Final Result      1.  Multiple varying sized small hepatic cysts.      2.  Mild atherosclerotic changes of the infrarenal aorta and iliac arteries.      3.  4.2 x 3.6 x 3.6 cm necrotic mass or abscess involving the rectum.           Assessment/Plan  * Rectal abscess- (present on admission)  Assessment & Plan  CT scan reveals a 4.2 cm x 3.6 cm x 3.6 cm necrotic mass or abscess involving the rectum.    Evaluated by colorectal surgery underwent surgery with colostomy creation on 4/8/2025 and I&D of perirectal abscess  Continue IV Zosyn will transition to p.o. antibiotics on discharge to complete 10 to 14-day course    Hypokalemia  Assessment & Plan  Hypophosphatemia  Hypomagnesemia    I ordered Neutra-Phos and IV magnesium sulfate  I ordered repeat levels    Coronary artery  disease- (present on admission)  Assessment & Plan  History of prior stent to the LAD 2015  Continue medical therapy with atorvastatin carvedilol and low-dose aspirin    Primary hypertension- (present on admission)  Assessment & Plan  Continue outpatient carvedilol 12.5 mg twice daily with holding parameters    Port-A-Cath in place- (present on admission)  Assessment & Plan  Secondary to chemo    Cancer related pain- (present on admission)  Assessment & Plan    Patient started on Dilaudid PCA for severe intractable pain  Appreciate palliative care assistance plans to transition to [facility] and continue PCA for breakthrough pain    Rectal cancer (HCC)- (present on admission)  Assessment & Plan  Stage IV  Last chemotherapy was February 14 by Dr. Kim and actively undergoing daily radiation by Dr. Huerta  30 total treatments.    Has been maintained on Decadron as outpatient currently on IV Decadron            VTE prophylaxis:    enoxaparin ppx      I have performed a physical exam and reviewed and updated ROS and Plan today (4/11/2025). In review of yesterday's note (4/10/2025), there are no changes except as documented above.

## 2025-04-11 NOTE — CARE PLAN
The patient is Watcher - Medium risk of patient condition declining or worsening    Shift Goals  Clinical Goals: Pain control, IV abx, monitor ostomy  Patient Goals: Pain control, rest  Family Goals: Not present    Progress made toward(s) clinical / shift goals:       Problem: Pain - Standard  Goal: Alleviation of pain or a reduction in pain to the patient’s comfort goal  Outcome: Progressing  Note: Medicated with medication via PCA pump. Pt states medications are effective in decreasing pain. Breathing WDL, resting comfortably.     Problem: Knowledge Deficit - Standard  Goal: Patient and family/care givers will demonstrate understanding of plan of care, disease process/condition, diagnostic tests and medications  Outcome: Progressing  Note: A/Ox4, patient able to understand plan of care, all questions answered at this time.      Problem: Respiratory  Goal: Patient will achieve/maintain optimum respiratory ventilation and gas exchange  Outcome: Progressing     Problem: Knowledge Deficit - Ostomy  Goal: Patient will demonstrate ability to manage and maintain ostomy  Outcome: Progressing  Note: Education provided on how to clean and empty ostomy pouch.        Patient is not progressing towards the following goals:

## 2025-04-11 NOTE — CARE PLAN
The patient is Watcher - Medium risk of patient condition declining or worsening    Shift Goals  Clinical Goals: pain control, IV abx  Patient Goals: pain control and rest  Family Goals: NADIR    Progress made toward(s) clinical / shift goals:  A/OX4, patient is able to understand plan of care. All questions answered at this time. Pain is being controlled through PRN medications per the MAR. Patient is stating a comfortable pain level. Patient continued ostomy education and was able to participate in his care with the ostomy. Patient remained free of falls throughout shift, fall precautions in place. Bed in lowest position, belongings and call light in reach.

## 2025-04-12 LAB
ALBUMIN SERPL BCP-MCNC: 3.1 G/DL (ref 3.2–4.9)
ALBUMIN/GLOB SERPL: 1.3 G/DL
ALP SERPL-CCNC: 79 U/L (ref 30–99)
ALT SERPL-CCNC: 28 U/L (ref 2–50)
ANION GAP SERPL CALC-SCNC: 7 MMOL/L (ref 7–16)
AST SERPL-CCNC: 18 U/L (ref 12–45)
BILIRUB SERPL-MCNC: 0.3 MG/DL (ref 0.1–1.5)
BUN SERPL-MCNC: 16 MG/DL (ref 8–22)
CALCIUM ALBUM COR SERPL-MCNC: 9.5 MG/DL (ref 8.5–10.5)
CALCIUM SERPL-MCNC: 8.8 MG/DL (ref 8.5–10.5)
CHLORIDE SERPL-SCNC: 104 MMOL/L (ref 96–112)
CO2 SERPL-SCNC: 28 MMOL/L (ref 20–33)
CREAT SERPL-MCNC: 0.74 MG/DL (ref 0.5–1.4)
ERYTHROCYTE [DISTWIDTH] IN BLOOD BY AUTOMATED COUNT: 49.7 FL (ref 35.9–50)
GFR SERPLBLD CREATININE-BSD FMLA CKD-EPI: 104 ML/MIN/1.73 M 2
GLOBULIN SER CALC-MCNC: 2.4 G/DL (ref 1.9–3.5)
GLUCOSE SERPL-MCNC: 156 MG/DL (ref 65–99)
HCT VFR BLD AUTO: 32.3 % (ref 42–52)
HGB BLD-MCNC: 11 G/DL (ref 14–18)
MAGNESIUM SERPL-MCNC: 2.4 MG/DL (ref 1.5–2.5)
MCH RBC QN AUTO: 31.3 PG (ref 27–33)
MCHC RBC AUTO-ENTMCNC: 34.1 G/DL (ref 32.3–36.5)
MCV RBC AUTO: 92 FL (ref 81.4–97.8)
PHOSPHATE SERPL-MCNC: 2.9 MG/DL (ref 2.5–4.5)
PLATELET # BLD AUTO: 102 K/UL (ref 164–446)
PMV BLD AUTO: 9.6 FL (ref 9–12.9)
POTASSIUM SERPL-SCNC: 3.8 MMOL/L (ref 3.6–5.5)
PROT SERPL-MCNC: 5.5 G/DL (ref 6–8.2)
RBC # BLD AUTO: 3.51 M/UL (ref 4.7–6.1)
SODIUM SERPL-SCNC: 139 MMOL/L (ref 135–145)
WBC # BLD AUTO: 6.4 K/UL (ref 4.8–10.8)

## 2025-04-12 PROCEDURE — A9270 NON-COVERED ITEM OR SERVICE: HCPCS | Performed by: NURSE PRACTITIONER

## 2025-04-12 PROCEDURE — 700105 HCHG RX REV CODE 258: Performed by: NURSE PRACTITIONER

## 2025-04-12 PROCEDURE — 700102 HCHG RX REV CODE 250 W/ 637 OVERRIDE(OP): Performed by: HOSPITALIST

## 2025-04-12 PROCEDURE — 700105 HCHG RX REV CODE 258: Performed by: HOSPITALIST

## 2025-04-12 PROCEDURE — 700111 HCHG RX REV CODE 636 W/ 250 OVERRIDE (IP): Mod: JZ | Performed by: NURSE PRACTITIONER

## 2025-04-12 PROCEDURE — 36415 COLL VENOUS BLD VENIPUNCTURE: CPT

## 2025-04-12 PROCEDURE — A9270 NON-COVERED ITEM OR SERVICE: HCPCS | Performed by: HOSPITALIST

## 2025-04-12 PROCEDURE — 770004 HCHG ROOM/CARE - ONCOLOGY PRIVATE *

## 2025-04-12 PROCEDURE — 85027 COMPLETE CBC AUTOMATED: CPT

## 2025-04-12 PROCEDURE — 700102 HCHG RX REV CODE 250 W/ 637 OVERRIDE(OP): Performed by: NURSE PRACTITIONER

## 2025-04-12 PROCEDURE — 700111 HCHG RX REV CODE 636 W/ 250 OVERRIDE (IP): Mod: JZ | Performed by: HOSPITALIST

## 2025-04-12 PROCEDURE — 84100 ASSAY OF PHOSPHORUS: CPT

## 2025-04-12 PROCEDURE — 80053 COMPREHEN METABOLIC PANEL: CPT

## 2025-04-12 PROCEDURE — 99233 SBSQ HOSP IP/OBS HIGH 50: CPT | Performed by: HOSPITALIST

## 2025-04-12 PROCEDURE — 83735 ASSAY OF MAGNESIUM: CPT

## 2025-04-12 RX ORDER — DEXAMETHASONE 4 MG/1
2 TABLET ORAL EVERY 8 HOURS
Status: DISCONTINUED | OUTPATIENT
Start: 2025-04-12 | End: 2025-04-14 | Stop reason: HOSPADM

## 2025-04-12 RX ORDER — MORPHINE SULFATE 60 MG/1
60 TABLET, FILM COATED, EXTENDED RELEASE ORAL ONCE
Refills: 0 | Status: COMPLETED | OUTPATIENT
Start: 2025-04-12 | End: 2025-04-12

## 2025-04-12 RX ORDER — MORPHINE SULFATE 100 MG/1
100 TABLET ORAL EVERY 12 HOURS
Refills: 0 | Status: DISCONTINUED | OUTPATIENT
Start: 2025-04-12 | End: 2025-04-14 | Stop reason: HOSPADM

## 2025-04-12 RX ORDER — CAPECITABINE 500 MG/1
1500 TABLET, FILM COATED ORAL
Status: DISCONTINUED | OUTPATIENT
Start: 2025-04-14 | End: 2025-04-14 | Stop reason: HOSPADM

## 2025-04-12 RX ADMIN — Medication 1 APPLICATOR: at 16:21

## 2025-04-12 RX ADMIN — MORPHINE SULFATE 60 MG: 60 TABLET, FILM COATED, EXTENDED RELEASE ORAL at 09:19

## 2025-04-12 RX ADMIN — PIPERACILLIN AND TAZOBACTAM 3.38 G: 3; .375 INJECTION, POWDER, FOR SOLUTION INTRAVENOUS at 09:30

## 2025-04-12 RX ADMIN — DEXAMETHASONE 2 MG: 4 TABLET ORAL at 13:14

## 2025-04-12 RX ADMIN — ENOXAPARIN SODIUM 40 MG: 100 INJECTION SUBCUTANEOUS at 16:20

## 2025-04-12 RX ADMIN — ASPIRIN 81 MG: 81 TABLET, COATED ORAL at 05:11

## 2025-04-12 RX ADMIN — DULOXETINE 30 MG: 30 CAPSULE, DELAYED RELEASE ORAL at 05:11

## 2025-04-12 RX ADMIN — PREGABALIN 25 MG: 25 CAPSULE ORAL at 05:11

## 2025-04-12 RX ADMIN — ATORVASTATIN CALCIUM 40 MG: 40 TABLET, FILM COATED ORAL at 05:11

## 2025-04-12 RX ADMIN — Medication: at 13:52

## 2025-04-12 RX ADMIN — SILVER SULFADIAZINE 2 G: 10 CREAM TOPICAL at 05:11

## 2025-04-12 RX ADMIN — DEXAMETHASONE SODIUM PHOSPHATE 2 MG: 4 INJECTION INTRA-ARTICULAR; INTRALESIONAL; INTRAMUSCULAR; INTRAVENOUS; SOFT TISSUE at 05:11

## 2025-04-12 RX ADMIN — PIPERACILLIN AND TAZOBACTAM 3.38 G: 3; .375 INJECTION, POWDER, FOR SOLUTION INTRAVENOUS at 00:30

## 2025-04-12 RX ADMIN — MORPHINE SULFATE 100 MG: 100 TABLET, FILM COATED, EXTENDED RELEASE ORAL at 22:27

## 2025-04-12 RX ADMIN — Medication 1 APPLICATOR: at 05:11

## 2025-04-12 RX ADMIN — MORPHINE SULFATE 60 MG: 60 TABLET, FILM COATED, EXTENDED RELEASE ORAL at 13:55

## 2025-04-12 RX ADMIN — HYDROMORPHONE HYDROCHLORIDE: 10 INJECTION, SOLUTION INTRAMUSCULAR; INTRAVENOUS; SUBCUTANEOUS at 02:57

## 2025-04-12 RX ADMIN — TAMSULOSIN HYDROCHLORIDE 0.4 MG: 0.4 CAPSULE ORAL at 05:11

## 2025-04-12 RX ADMIN — PIPERACILLIN AND TAZOBACTAM 3.38 G: 3; .375 INJECTION, POWDER, FOR SOLUTION INTRAVENOUS at 16:27

## 2025-04-12 RX ADMIN — Medication 5 MG: at 21:39

## 2025-04-12 RX ADMIN — PREGABALIN 25 MG: 25 CAPSULE ORAL at 16:20

## 2025-04-12 RX ADMIN — PREGABALIN 25 MG: 25 CAPSULE ORAL at 13:14

## 2025-04-12 RX ADMIN — DEXAMETHASONE 2 MG: 4 TABLET ORAL at 22:00

## 2025-04-12 RX ADMIN — LIDOCAINE: 50 OINTMENT TOPICAL at 05:11

## 2025-04-12 ASSESSMENT — ENCOUNTER SYMPTOMS
ABDOMINAL PAIN: 1
SHORTNESS OF BREATH: 0
BLOOD IN STOOL: 0
FEVER: 0

## 2025-04-12 ASSESSMENT — PAIN DESCRIPTION - PAIN TYPE
TYPE: ACUTE PAIN

## 2025-04-12 NOTE — PROGRESS NOTES
"Hospital Medicine Daily Progress Note    Date of Service  4/12/2025    Chief Complaint  Booker Saucedo is a 59 y.o. male admitted 4/4/2025 with severe rectal pain.    Hospital Course  Booker Saucedo is a 59 y.o. male who presented 4/4/2025 with rectal pain.  Mr. Saucedo has a past medical history of CAD with stent to LAD 2015 as well as rectal cancer.   His last chemo was Feb 14th by Dr. Kim, completed 8 cycles of Folfirinox chemotherapy.  He started chemoradiation 3/2025.  He started developing rectal pain which has been progressive over the past 3 weeks. He has been taking decadron by Dr. Huerta and getting radiation 5 days a week.   He has been taking MS Contin 30 mg BID and using break-though pain. Today the pain was so intense that he couldn't stand it. \"I can't live like this\".He has had ongoing drainage mixed with his stool with some blood in it.  In the emergency room, his white blood cell count is 14,000 and CT reveals a 4.2 cm x 3.6 cm x 3.6 cm necrotic mass or abscess involving the rectum.  Dr. Trujillo, surgery has consulted and recommends IV antibiotics and conservative medical management.    4/5: Continues to have severe pain of the rectum.  He is requiring increased Dilaudid 1 mg IV every 4 to every 2.  I have changed his oral Decadron to IV Decadron.  4 mg IV every 6 as well as Toradol 50 mg IV every 6 as needed.  Have also reached out to Dr. Tse colorectal surgeon as well as surgical [facility] team to see if there is any surgical indication for incision and drainage to relieve severe pain.  Patient is having diarrheal stools with necrotic appearing material and purulence noted within it.  Ordered PCA dilaudid pump with 2mg IV x1. Continous O2 monitoring.  4/6:  PCA dilaudid helpful, patient thinks he needs a slightly higher basal rate, re-ordered to 0.6mg/hr. patient has radiation treatment scheduled for tomorrow morning.  I will alert nursing staff to get him to his appointment.  4/7: " Patient has been tolerating his PCA Dilaudid pump.  He did go down for radiation treatment this morning which he plans to continue while in hospital.  Of note he has 9 more radiation treatments left of 30.  Appreciate Dr. Tse's evaluation of patient who plans to take him to the OR Tuesday or Wednesday for incision and drainage of rectal abscess and left sided diverting colostomy placement.  Dr. Tse has placed him on a clear liquid diet.  Plan to transfer to oncology unit for ease of getting patient to radiation treatment daily.    Interval Problem Update    Patient reports that his pain is better controlled  He is currently on MS Contin 60 twice daily and Dilaudid PCA requiring 11 to 12 mg per 12-hour shift  I discussed with pharmacist we will decrease PCA and increase MS Contin to 100 mg twice daily  I reviewed chemistry panel electrolytes stable  I reviewed CBC WBC 6.4 hemoglobin 11 platelet count 102  He remains on IV Zosyn    Total time spent reviewing lab results examining patient discussing with pharmacist nursing staff and updating patient on plan of care 52 minutes    I have discussed this patient's plan of care and discharge plan at IDT rounds today with Case Management, Nursing, Nursing leadership, and other members of the IDT team.    Consultants/Specialty  general surgery  Colorectal surgery    Code Status  Full Code    Disposition  Medically Cleared  I have placed the appropriate orders for post-discharge needs.    Review of Systems  Review of Systems   Constitutional:  Negative for fever.   Respiratory:  Negative for shortness of breath.    Cardiovascular:  Negative for chest pain.   Gastrointestinal:  Positive for abdominal pain. Negative for blood in stool.        Physical Exam  Temp:  [36.4 °C (97.6 °F)-37.2 °C (98.9 °F)] 36.6 °C (97.9 °F)  Pulse:  [55-75] 55  Resp:  [17-18] 18  BP: (119-146)/(55-89) 146/79  SpO2:  [94 %-98 %] 96 %    Physical Exam  Vitals reviewed.   Constitutional:        General: He is not in acute distress.  HENT:      Head: Atraumatic.   Cardiovascular:      Rate and Rhythm: Normal rate and regular rhythm.   Pulmonary:      Effort: Pulmonary effort is normal.   Abdominal:      Palpations: Abdomen is soft.      Tenderness: There is abdominal tenderness. There is no guarding.      Comments: Stoma with liquid brown stool   Musculoskeletal:         General: No swelling or tenderness.   Skin:     General: Skin is warm and dry.   Neurological:      General: No focal deficit present.      Mental Status: He is alert.   Psychiatric:         Mood and Affect: Mood normal.         Behavior: Behavior normal.         Fluids    Intake/Output Summary (Last 24 hours) at 4/12/2025 1435  Last data filed at 4/12/2025 0700  Gross per 24 hour   Intake 347.65 ml   Output --   Net 347.65 ml        Laboratory  Recent Labs     04/10/25  0030 04/11/25  0027 04/12/25  0035   WBC 6.0 6.3 6.4   RBC 3.40* 3.30* 3.51*   HEMOGLOBIN 10.4* 10.4* 11.0*   HEMATOCRIT 31.8* 31.1* 32.3*   MCV 93.5 94.2 92.0   MCH 30.6 31.5 31.3   MCHC 32.7 33.4 34.1   RDW 50.4* 50.2* 49.7   PLATELETCT 129* 106* 102*   MPV 9.3 9.8 9.6     Recent Labs     04/10/25  0030 04/11/25  0027 04/12/25  0035   SODIUM 137 136 139   POTASSIUM 4.3 3.5* 3.8   CHLORIDE 101 104 104   CO2 29 26 28   GLUCOSE 137* 135* 156*   BUN 31* 19 16   CREATININE 0.77 0.76 0.74   CALCIUM 9.0 8.2* 8.8                     Imaging  CT-ABDOMEN-PELVIS WITH   Final Result      1.  Multiple varying sized small hepatic cysts.      2.  Mild atherosclerotic changes of the infrarenal aorta and iliac arteries.      3.  4.2 x 3.6 x 3.6 cm necrotic mass or abscess involving the rectum.           Assessment/Plan  * Rectal abscess- (present on admission)  Assessment & Plan  CT scan reveals a 4.2 cm x 3.6 cm x 3.6 cm necrotic mass or abscess involving the rectum.    Evaluated by colorectal surgery underwent surgery with colostomy creation on 4/8/2025 and I&D of perirectal  abscess  Continue IV Zosyn will transition to p.o. antibiotics on discharge to complete 10 to 14-day course    Hypokalemia  Assessment & Plan  Hypophosphatemia  Hypomagnesemia    Repleted continue to monitor I ordered repeat levels    Coronary artery disease- (present on admission)  Assessment & Plan  History of prior stent to the LAD 2015  Continue medical therapy with atorvastatin carvedilol and low-dose aspirin    Primary hypertension- (present on admission)  Assessment & Plan  Continue outpatient carvedilol 12.5 mg twice daily with holding parameters    Port-A-Cath in place- (present on admission)  Assessment & Plan  Secondary to chemo    Cancer related pain- (present on admission)  Assessment & Plan    Patient started on Dilaudid PCA for severe intractable pain    Will titrate MS Contin 200 twice daily  Will decrease PCA  Continue gabapentin and Cymbalta    Rectal cancer (HCC)- (present on admission)  Assessment & Plan  Stage IV  Last chemotherapy was February 14 by Dr. Kim and actively undergoing daily radiation by Dr. Huerta  30 total treatments.    Continue Decadron           VTE prophylaxis:    enoxaparin ppx      I have performed a physical exam and reviewed and updated ROS and Plan today (4/12/2025). In review of yesterday's note (4/11/2025), there are no changes except as documented above.

## 2025-04-12 NOTE — CARE PLAN
The patient is Watcher - Medium risk of patient condition declining or worsening    Shift Goals  Clinical Goals: Pain control, monitor ostomy  Patient Goals: Rest, pain control  Family Goals: Not present    Progress made toward(s) clinical / shift goals:       Problem: Pain - Standard  Goal: Alleviation of pain or a reduction in pain to the patient’s comfort goal  Outcome: Progressing  Note: Medicated with scheduled pain medications and bolus button on PCA pump. Pt states medications are effective in decreasing pain. Breathing WDL, resting comfortably.     Problem: Knowledge Deficit - Standard  Goal: Patient and family/care givers will demonstrate understanding of plan of care, disease process/condition, diagnostic tests and medications  Outcome: Progressing  Note: A/Ox4, patient able to understand plan of care, all questions answered at this time.      Problem: Respiratory  Goal: Patient will achieve/maintain optimum respiratory ventilation and gas exchange  Outcome: Progressing     Problem: Skin Care - Ostomy  Goal: Skin remains free from irritation  Outcome: Progressing       Patient is not progressing towards the following goals:

## 2025-04-12 NOTE — CARE PLAN
The patient is Watcher - Medium risk of patient condition declining or worsening    Shift Goals  Clinical Goals: Control pain and monitor ostomy  Patient Goals: Control pain through the shift  Family Goals: Not present    Progress made toward(s) clinical / shift goals:  Patient has reported pain as controlled using the PCA pump. Ostomy is having output.

## 2025-04-12 NOTE — WOUND TEAM
" [facility]  Inpatient Services  New Ostomy Follow-up Management & Teaching      Ostomy Nurse Plan of Care - Frequency of Follow-up:   Ostomy nurses to continue to follow for ostomy needs and teaching until patient independent with care or discharge.  Ostomy Nurse follow-up frequency:  Every other day         Past Surgical History:   Procedure Laterality Date    NY LAP, SURG COLOSTOMY  4/8/2025    Procedure: CREATION, COLOSTOMY, LAPAROSCOPIC;  Surgeon: Hector Tse M.D.;  Location: SURGERY [facility];  Service: Gastroenterology    NY I&D PERIRECTAL ABSCESS  4/8/2025    Procedure: INCISION AND DRAINAGE, ABSCESS, PERIRECTAL;  Surgeon: Hector Tse M.D.;  Location: SURGERY [facility];  Service: Gastroenterology    CATH PLACEMENT Right 10/15/2024    Procedure: CENTRAL VENOUS CATHETER PLACEMENT WITH SUBCUTANEOUS PORT;  Surgeon: Hector Tse MD, PhD;  Location: SURGERY [facility];  Service: Gastroenterology    STENT PLACEMENT  2015    BARE METAL STENT- CARDIAC    APPENDECTOMY      KNEE ARTHROSCOPY      MENISCECTOMY    TONSILLECTOMY         Surgery Date: 4/8/25    Surgeon(s):  Hector Tse M.D.    Procedure(s):  CREATION, COLOSTOMY, LAPAROSCOPIC  INCISION AND DRAINAGE, ABSCESS, PERIRECTAL     Permanence: Yes    Pertinent History:    Hospital Course  Booker Saucedo is a 59 y.o. male who presented 4/4/2025 with rectal pain.  Mr. Saucedo has a past medical history of CAD with stent to LAD 2015 as well as rectal cancer.   His last chemo was Feb 14th by Dr. Kim, completed 8 cycles of Folfirinox chemotherapy.  He started chemoradiation 3/2025.  He started developing rectal pain which has been progressive over the past 3 weeks. He has been taking decadron by Dr. Huerta and getting radiation 5 days a week.   He has been taking MS Contin 30 mg BID and using break-though pain. Today the pain was so intense that he couldn't stand it. \"I can't live like this\".He has had ongoing " "drainage mixed with his stool with some blood in it.  In the emergency room, his white blood cell count is 14,000 and CT reveals a 4.2 cm x 3.6 cm x 3.6 cm necrotic mass or abscess involving the rectum.  Dr. Trujillo, surgery has consulted and recommends IV antibiotics and conservative medical management.                         Colostomy 04/08/25 LLQ (Active)   Wound Image   04/11/25 1700   Stomal Appliance Assessment Intact 04/11/25 1700   Stoma Assessment Beefy red 04/11/25 1700   Stoma Shape Budded Greater Than One Inch 04/11/25 1700   Stoma Size (in) 2 04/11/25 1700   Peristomal Assessment Intact 04/11/25 1700   Mucocutaneous Junction Intact 04/11/25 1700   Treatment Appliance Changed;Bag Change;Cleansed with water/washcloth;Site care 04/11/25 1700   Peristomal Protectant No Sting Skin Prep;Paste Ring 04/11/25 1700   Stomal Appliance Paste Ring, 2\";2 3/4\" (70mm) CTF 04/11/25 1700   Output (mL) 300 mL 04/10/25 1630   Output Color Brown 04/11/25 1700   WOUND RN ONLY - Stomal Appliance  2 Piece;Paste Ring, 2\";Flat;2 3/4\" (70mm) CTF;Transparent Pouch Lock & Roll 04/11/25 1700   Appliance (Pouch) # [id number] 04/09/25 1600   Appliance Brand Delmy 04/09/25 1600   Secure Start completed Yes 04/09/25 1600   WOUND NURSE ONLY - Time Spent with Patient (mins) 60 04/11/25 1700                                                       Colostomy Interventions:  Removed appliance (using push pull method) - By Ostomy RN  Cleansed robin-stomal skin with moist warm washcloth - By Ostomy RN  Created/Checked template fit - By Ostomy RN  Traced Shape to back of barrier - By Ostomy RN  Cut barrier to stoma size - By Ostomy RN with patient assistance    Confirmed fit - By Ostomy RN  Removed plastic backing and \"Dog Eared\" paper edges - By Ostomy RN  Stretched paste ring to fit barrier opening - By Ostomy RN  Applied paste ring to back of barrier - By Ostomy RN  Applied barrier to skin and adhered with friction - By Ostomy RN  Attached pouch " - By Ostomy RN with patient assistance    Closed Pouch end - By patient     Patient Education:   All questions answered, procedure explained, previous education reinforced    Ostomy RN to follow-up daily for education     Date:  04/11/25    Reinforced education provided during last visit  Folder/Paperwork discussed in detail (Follow up, supply ordering and support groups discussed as well as accessories that may be beneficial.)  Educated regarding the following things: stoma size/shape. Stoma will likely change sizes in the first 4 to 6 weeks. Currently using the most basic supplies while in the hospital, there are many brands and options in regards to supplies.  Educated on when to empty and how to empty, burping appliance, Wear time, Diet (chewing food well) and hydration, Medications, activity including showering and swimming. Pt aware that they should keep an extra set of appliances with them at all times (do not leave in warm cars).  Patient practiced emptying pouch with bedside RN/CNA  Date:  04/09/25  Reinforced education provided during last visit  Folder/paperwork delivered  Folder/Paperwork discussed in detail (Follow up, supply ordering and support groups discussed as well as accessories that may be beneficial.)        Needs for next visit: Further hands-on participation for change    Evaluation:      Date:  04/11/25    Stoma same in size since last visit and is beefy red. MJ intact. Patient producing large amount of stool; liquid with consistent solid small fluffy stool throughout. Education provided for when to empty bag and discussed options for continuing with sports post hospital stay with ostomy.     Date:  04/09/25    Stoma is beefy red with intact mucosal junction. Coagulated blood surrounding stoma site. Lateral 3oclock position bled a little with gentle cleansing yet stops is light pressure. Bedside RN Madelin notified and Patient aware. Stool liquid brown. Patient is still having sensation of  posterior bowel movement with stool being processed through stoma.           Flatus: Present  Stool Output: large, brown, liquid, and soft  Urine Output: NA, Fecal Ostomy  Mobility: Ambulate  and Ambulating at Baseline    DIET ORDERS (From admission to next 24h)       Start     Ordered    04/08/25 1750  Diet Order Diet: Regular  ALL MEALS        Question:  Diet:  Answer:  Regular    04/08/25 1749                     Secure Start Signed:  Completed by Wound team Tech  Outpatient Referral Placed:  Yes     5 Sets of appliances in Ostomy bag for discharge:  Ordered    INSURANCE OPTIONS:  If patient has [facility] patient will need an [employer] book. If patientt has Medicaid be sure patient has care chest paperwork.    Currently Active Insurance       Payor Plan    [employer]ALLBS            Anticipated Discharge Plans:  Home Health Care    Ostomy Supplies for DC:  To be determined in 4 to 6 weeks once stomal edema has fully resolved

## 2025-04-13 LAB
ALBUMIN SERPL BCP-MCNC: 3.3 G/DL (ref 3.2–4.9)
ALBUMIN/GLOB SERPL: 1.4 G/DL
ALP SERPL-CCNC: 79 U/L (ref 30–99)
ALT SERPL-CCNC: 25 U/L (ref 2–50)
ANION GAP SERPL CALC-SCNC: 9 MMOL/L (ref 7–16)
AST SERPL-CCNC: 16 U/L (ref 12–45)
BILIRUB SERPL-MCNC: 0.3 MG/DL (ref 0.1–1.5)
BUN SERPL-MCNC: 13 MG/DL (ref 8–22)
CALCIUM ALBUM COR SERPL-MCNC: 9.9 MG/DL (ref 8.5–10.5)
CALCIUM SERPL-MCNC: 9.3 MG/DL (ref 8.5–10.5)
CHLORIDE SERPL-SCNC: 104 MMOL/L (ref 96–112)
CO2 SERPL-SCNC: 26 MMOL/L (ref 20–33)
CREAT SERPL-MCNC: 0.67 MG/DL (ref 0.5–1.4)
ERYTHROCYTE [DISTWIDTH] IN BLOOD BY AUTOMATED COUNT: 50.9 FL (ref 35.9–50)
GFR SERPLBLD CREATININE-BSD FMLA CKD-EPI: 107 ML/MIN/1.73 M 2
GLOBULIN SER CALC-MCNC: 2.3 G/DL (ref 1.9–3.5)
GLUCOSE SERPL-MCNC: 163 MG/DL (ref 65–99)
HCT VFR BLD AUTO: 34.1 % (ref 42–52)
HGB BLD-MCNC: 11.5 G/DL (ref 14–18)
MAGNESIUM SERPL-MCNC: 2.2 MG/DL (ref 1.5–2.5)
MCH RBC QN AUTO: 31.3 PG (ref 27–33)
MCHC RBC AUTO-ENTMCNC: 33.7 G/DL (ref 32.3–36.5)
MCV RBC AUTO: 92.9 FL (ref 81.4–97.8)
PHOSPHATE SERPL-MCNC: 2.5 MG/DL (ref 2.5–4.5)
PLATELET # BLD AUTO: 111 K/UL (ref 164–446)
PMV BLD AUTO: 9.4 FL (ref 9–12.9)
POTASSIUM SERPL-SCNC: 4 MMOL/L (ref 3.6–5.5)
PROT SERPL-MCNC: 5.6 G/DL (ref 6–8.2)
RBC # BLD AUTO: 3.67 M/UL (ref 4.7–6.1)
SODIUM SERPL-SCNC: 139 MMOL/L (ref 135–145)
WBC # BLD AUTO: 5.6 K/UL (ref 4.8–10.8)

## 2025-04-13 PROCEDURE — 700102 HCHG RX REV CODE 250 W/ 637 OVERRIDE(OP): Performed by: HOSPITALIST

## 2025-04-13 PROCEDURE — A9270 NON-COVERED ITEM OR SERVICE: HCPCS | Performed by: NURSE PRACTITIONER

## 2025-04-13 PROCEDURE — 85027 COMPLETE CBC AUTOMATED: CPT

## 2025-04-13 PROCEDURE — 700111 HCHG RX REV CODE 636 W/ 250 OVERRIDE (IP): Mod: JZ | Performed by: HOSPITALIST

## 2025-04-13 PROCEDURE — 84100 ASSAY OF PHOSPHORUS: CPT

## 2025-04-13 PROCEDURE — A9270 NON-COVERED ITEM OR SERVICE: HCPCS | Performed by: HOSPITALIST

## 2025-04-13 PROCEDURE — 97602 WOUND(S) CARE NON-SELECTIVE: CPT

## 2025-04-13 PROCEDURE — 700102 HCHG RX REV CODE 250 W/ 637 OVERRIDE(OP): Performed by: NURSE PRACTITIONER

## 2025-04-13 PROCEDURE — 700105 HCHG RX REV CODE 258: Performed by: HOSPITALIST

## 2025-04-13 PROCEDURE — 770004 HCHG ROOM/CARE - ONCOLOGY PRIVATE *

## 2025-04-13 PROCEDURE — 80053 COMPREHEN METABOLIC PANEL: CPT

## 2025-04-13 PROCEDURE — 83735 ASSAY OF MAGNESIUM: CPT

## 2025-04-13 PROCEDURE — 99232 SBSQ HOSP IP/OBS MODERATE 35: CPT | Performed by: HOSPITALIST

## 2025-04-13 RX ORDER — PANTOPRAZOLE SODIUM 40 MG/10ML
40 INJECTION, POWDER, LYOPHILIZED, FOR SOLUTION INTRAVENOUS DAILY
Status: DISCONTINUED | OUTPATIENT
Start: 2025-04-13 | End: 2025-04-14

## 2025-04-13 RX ORDER — ONDANSETRON 2 MG/ML
4 INJECTION INTRAMUSCULAR; INTRAVENOUS EVERY 4 HOURS PRN
Status: DISCONTINUED | OUTPATIENT
Start: 2025-04-13 | End: 2025-04-14 | Stop reason: HOSPADM

## 2025-04-13 RX ADMIN — AMOXICILLIN AND CLAVULANATE POTASSIUM 1 TABLET: 875; 125 TABLET, FILM COATED ORAL at 17:22

## 2025-04-13 RX ADMIN — PIPERACILLIN AND TAZOBACTAM 3.38 G: 3; .375 INJECTION, POWDER, FOR SOLUTION INTRAVENOUS at 00:43

## 2025-04-13 RX ADMIN — PREGABALIN 25 MG: 25 CAPSULE ORAL at 06:24

## 2025-04-13 RX ADMIN — SILVER SULFADIAZINE 1 G: 10 CREAM TOPICAL at 06:27

## 2025-04-13 RX ADMIN — MORPHINE SULFATE 100 MG: 100 TABLET, FILM COATED, EXTENDED RELEASE ORAL at 08:46

## 2025-04-13 RX ADMIN — Medication 5 MG: at 20:37

## 2025-04-13 RX ADMIN — LIDOCAINE: 50 OINTMENT TOPICAL at 06:28

## 2025-04-13 RX ADMIN — PANTOPRAZOLE SODIUM 40 MG: 40 INJECTION, POWDER, FOR SOLUTION INTRAVENOUS at 12:27

## 2025-04-13 RX ADMIN — DEXAMETHASONE 2 MG: 4 TABLET ORAL at 13:33

## 2025-04-13 RX ADMIN — DEXAMETHASONE 2 MG: 4 TABLET ORAL at 20:37

## 2025-04-13 RX ADMIN — ONDANSETRON 4 MG: 2 INJECTION INTRAMUSCULAR; INTRAVENOUS at 12:27

## 2025-04-13 RX ADMIN — MORPHINE SULFATE 100 MG: 100 TABLET, FILM COATED, EXTENDED RELEASE ORAL at 20:37

## 2025-04-13 RX ADMIN — Medication: at 06:58

## 2025-04-13 RX ADMIN — DULOXETINE 30 MG: 30 CAPSULE, DELAYED RELEASE ORAL at 08:47

## 2025-04-13 RX ADMIN — Medication 1 APPLICATOR: at 06:26

## 2025-04-13 RX ADMIN — ATORVASTATIN CALCIUM 40 MG: 40 TABLET, FILM COATED ORAL at 06:24

## 2025-04-13 RX ADMIN — TAMSULOSIN HYDROCHLORIDE 0.4 MG: 0.4 CAPSULE ORAL at 06:24

## 2025-04-13 RX ADMIN — PREGABALIN 25 MG: 25 CAPSULE ORAL at 13:34

## 2025-04-13 RX ADMIN — PREGABALIN 25 MG: 25 CAPSULE ORAL at 17:02

## 2025-04-13 RX ADMIN — ENOXAPARIN SODIUM 40 MG: 100 INJECTION SUBCUTANEOUS at 17:02

## 2025-04-13 RX ADMIN — ASPIRIN 81 MG: 81 TABLET, COATED ORAL at 06:24

## 2025-04-13 RX ADMIN — DEXAMETHASONE 2 MG: 4 TABLET ORAL at 06:24

## 2025-04-13 RX ADMIN — HYDROMORPHONE HYDROCHLORIDE: 10 INJECTION, SOLUTION INTRAMUSCULAR; INTRAVENOUS; SUBCUTANEOUS at 17:30

## 2025-04-13 RX ADMIN — PIPERACILLIN AND TAZOBACTAM 3.38 G: 3; .375 INJECTION, POWDER, FOR SOLUTION INTRAVENOUS at 08:54

## 2025-04-13 RX ADMIN — CARVEDILOL 12.5 MG: 12.5 TABLET, FILM COATED ORAL at 06:34

## 2025-04-13 ASSESSMENT — ENCOUNTER SYMPTOMS
HEARTBURN: 1
ABDOMINAL PAIN: 1

## 2025-04-13 ASSESSMENT — PAIN DESCRIPTION - PAIN TYPE
TYPE: ACUTE PAIN
TYPE: ACUTE PAIN
TYPE: CHRONIC PAIN
TYPE: ACUTE PAIN

## 2025-04-13 NOTE — OP REPORT
DATE OF OPERATION: 4/8/2025    PREOPERATIVE DIAGNOSIS:    Rectal cancer  Rectal pain  Rectal abscess  Leukocytosis  Bleeding per rectum  Unintentional weight loss  Chronic pain  POSTOPERATIVE DIAGNOSIS:    Rectal cancer  Rectal pain  Rectal abscess  Leukocytosis  Bleeding per rectum  Unintentional weight loss  Colostomy in place  Chronic pain  PROCEDURE PERFORMED:  1.  Laparoscopic creation of end colostomy  2.  Incision and drainage of perirectal abscess    SURGEON: Hector Tse M.D.    ASSISTANT: Marie MCADAMS  The indications for a surgical assistant in this surgery were indicated due to complexity of the procedure. Their role included aiding in incision, retraction, holding devices including camera for laparoscopic procedure, and closure of the wound.    ANESTHESIOLOGIST: Dr. Garcia    ANESTHESIA:  General endotracheal anesthesia.    ASA CLASSIFICATION: III.    INDICATION:  The patient is a 59 y.o. male with history of rectal cancer undergoing local regional therapy with chemoradiation developed intractable rectal pain and imaging worrisome for necrosis versus perirectal abscess. He is taken to the operating room for fecal diversion and drainage of perirectal abscess.    FINDINGS: Descending colostomy created with sigmoid colon.  Transrectal drainage of a ischial rectal perirectal abscess with a large amount of purulence however unable to obtain sterile sample for microbiologic analysis.    WOUND CLASSIFICATION: Class IV, Dirty, Infected.    SPECIMEN: None.    ESTIMATED BLOOD LOSS: 10 mL.    PROCEDURE:    After informed witnessed consent was obtained, the patient was taken to the operating room and underwent general endotracheal anesthesia without incident.  Preoperative antibiotics were administered.  Bilateral lower sequential compression devices were placed.  Patient was placed in a modified lithotomy position with yellowfin stirrups with all skin and joint surfaces padded and protected appropriately.   The abdomen was prepped and draped in the usual standard sterile fashion.  A timeout was performed verifying the correct patient, procedure, site, positioning in availability of equipment prior to starting surgery.    I began by making a 5 mm incision lateral to the umbilicus and using a 0 degree 5 mm laparoscope into the abdominal cavity without injury to the underlying bowel and patient tolerated insufflation well.  Gross survey revealed no evidence of metastatic disease.  There was a large amount of his sigmoid colon adhesed to the pelvis and this was freed by placing 5 mm ports at the preoperatively marked colostomy site on the patient's left abdomen as well as in the suprapubic midline.  Using judicious electrocautery and LigaSure as well as sharp dissection I mobilized the entire left colon.  After it was determined that the left colon would easily reach the abdominal wall without tension or kinking I cut a trephine of skin at the preoperatively marked colostomy site and carried my incision down to the anterior rectus sheath.  I incised the anterior rectus sheath and split the rectus muscles atraumatically and similarly incised the posterior rectus sheath.  The bowel was brought through this defect and divided with a GANGA linear cutting stapler.  Care was taken to avoid any injury to the mesentery.  Laparoscopically I visualized the descending limb and found it to be out without kinking or twisting.  I closed my 5 mm port sites after removing the trocars with 4-0 Monocryl in a subcuticular fashion and placed Dermabond as a dressing.  I then matured the colostomy in the manner of Hailey with interrupted 3-0 Vicryl sutures.  A colostomy is appliance was placed.  I then prepped and draped the patient's perineum and rectum in the usual fashion.  Gross survey revealed extensive erythema in the buttocks but this appeared to me to be a reaction to his radiation therapy and not indicative of active infection or  cellulitis.  Gentle palpation of the rectum revealed a very firm rigid fixed mass consistent with his diagnosis of rectal cancer and just distal to this in the posterior left rectum I felt fluctuant mass and using blunt dissection was able to open the mass blindly.  A very large amount of purulent material was then irrigated from the rectum.  Given the contaminated nature of the abscess I felt that cultures would not be indicative of the infective organism and rather represent contamination.  Given that the patient was diverted I felt that this initial drainage would be efficacious and did not place a transanal drain.  I did not palpate any fluctuance in the buttocks for a counterincision.  Hemostasis was observed.  All sponge and instrument counts were correct x 2.  The patient was extubated and taken to the postanesthesia care unit in stable condition.  Hector Tse MD PhD  [facility]  General Colon and Rectal Surgeon  [phone]

## 2025-04-13 NOTE — CARE PLAN
The patient is Watcher - Medium risk of patient condition declining or worsening    Shift Goals  Clinical Goals: pain control, monitor colostomy  Patient Goals: pain control, rest  Family Goals: Not present    Progress made toward(s) clinical / shift goals:  Pt is A&Ox4 and agrees with POC, Pt0-10 pain scale used to evaluate pain level. Pt has reported pain during this shift. Pt has PCA pump.     Patient is not progressing towards the following goals: NA

## 2025-04-13 NOTE — PROGRESS NOTES
"Hospital Medicine Daily Progress Note    Date of Service  4/13/2025    Chief Complaint  Booker Saucedo is a 59 y.o. male admitted 4/4/2025 with severe rectal pain.    Hospital Course  Booker Saucedo is a 59 y.o. male who presented 4/4/2025 with rectal pain.  Mr. Saucedo has a past medical history of CAD with stent to LAD 2015 as well as rectal cancer.   His last chemo was Feb 14th by Dr. Kim, completed 8 cycles of Folfirinox chemotherapy.  He started chemoradiation 3/2025.  He started developing rectal pain which has been progressive over the past 3 weeks. He has been taking decadron by Dr. Huerta and getting radiation 5 days a week.   He has been taking MS Contin 30 mg BID and using break-though pain. Today the pain was so intense that he couldn't stand it. \"I can't live like this\".He has had ongoing drainage mixed with his stool with some blood in it.  In the emergency room, his white blood cell count is 14,000 and CT reveals a 4.2 cm x 3.6 cm x 3.6 cm necrotic mass or abscess involving the rectum.  Dr. Trujillo, surgery has consulted and recommends IV antibiotics and conservative medical management.    4/5: Continues to have severe pain of the rectum.  He is requiring increased Dilaudid 1 mg IV every 4 to every 2.  I have changed his oral Decadron to IV Decadron.  4 mg IV every 6 as well as Toradol 50 mg IV every 6 as needed.  Have also reached out to Dr. Tse colorectal surgeon as well as surgical [facility] team to see if there is any surgical indication for incision and drainage to relieve severe pain.  Patient is having diarrheal stools with necrotic appearing material and purulence noted within it.  Ordered PCA dilaudid pump with 2mg IV x1. Continous O2 monitoring.  4/6:  PCA dilaudid helpful, patient thinks he needs a slightly higher basal rate, re-ordered to 0.6mg/hr. patient has radiation treatment scheduled for tomorrow morning.  I will alert nursing staff to get him to his appointment.  4/7: " Patient has been tolerating his PCA Dilaudid pump.  He did go down for radiation treatment this morning which he plans to continue while in hospital.  Of note he has 9 more radiation treatments left of 30.  Appreciate Dr. Tse's evaluation of patient who plans to take him to the OR Tuesday or Wednesday for incision and drainage of rectal abscess and left sided diverting colostomy placement.  Dr. Tse has placed him on a clear liquid diet.  Plan to transfer to oncology unit for ease of getting patient to radiation treatment daily.    Interval Problem Update    Patient is complaining of heartburn I ordered Prilosec  He reports that his pain is controlled  He had small amount of serosanguineous output from rectum  He is tolerating his diet I reviewed CBC hemoglobin 11.5 I reviewed chemistry panel electrolytes stable  Remains on morphine sulfate 100 twice daily and Dilaudid PCA will start tapering PCA discussed with nursing staff     I have discussed this patient's plan of care and discharge plan at IDT rounds today with Case Management, Nursing, Nursing leadership, and other members of the IDT team.    Consultants/Specialty  general surgery  Colorectal surgery    Code Status  Full Code    Disposition  The patient is not medically cleared for discharge to home or a post-acute facility.      I have placed the appropriate orders for post-discharge needs.    Review of Systems  Review of Systems   Gastrointestinal:  Positive for abdominal pain and heartburn.        Physical Exam  Temp:  [36.3 °C (97.3 °F)-37 °C (98.6 °F)] 36.4 °C (97.5 °F)  Pulse:  [55-77] 77  Resp:  [17-18] 18  BP: (125-139)/() 129/103  SpO2:  [94 %-99 %] 99 %    Physical Exam  Constitutional:       General: He is not in acute distress.  Cardiovascular:      Rate and Rhythm: Normal rate and regular rhythm.   Pulmonary:      Effort: Pulmonary effort is normal.   Abdominal:      Palpations: Abdomen is soft.      Tenderness: There is abdominal  tenderness. There is no guarding.      Comments: Stoma with liquid brown stool   Musculoskeletal:         General: No swelling.   Skin:     General: Skin is warm and dry.   Neurological:      General: No focal deficit present.      Mental Status: He is alert.   Psychiatric:         Mood and Affect: Mood normal.         Fluids    Intake/Output Summary (Last 24 hours) at 4/13/2025 1601  Last data filed at 4/13/2025 1457  Gross per 24 hour   Intake 103.9 ml   Output --   Net 103.9 ml        Laboratory  Recent Labs     04/11/25  0027 04/12/25  0035 04/13/25  0035   WBC 6.3 6.4 5.6   RBC 3.30* 3.51* 3.67*   HEMOGLOBIN 10.4* 11.0* 11.5*   HEMATOCRIT 31.1* 32.3* 34.1*   MCV 94.2 92.0 92.9   MCH 31.5 31.3 31.3   MCHC 33.4 34.1 33.7   RDW 50.2* 49.7 50.9*   PLATELETCT 106* 102* 111*   MPV 9.8 9.6 9.4     Recent Labs     04/11/25  0027 04/12/25 0035 04/13/25  0035   SODIUM 136 139 139   POTASSIUM 3.5* 3.8 4.0   CHLORIDE 104 104 104   CO2 26 28 26   GLUCOSE 135* 156* 163*   BUN 19 16 13   CREATININE 0.76 0.74 0.67   CALCIUM 8.2* 8.8 9.3                     Imaging  CT-ABDOMEN-PELVIS WITH   Final Result      1.  Multiple varying sized small hepatic cysts.      2.  Mild atherosclerotic changes of the infrarenal aorta and iliac arteries.      3.  4.2 x 3.6 x 3.6 cm necrotic mass or abscess involving the rectum.           Assessment/Plan  * Rectal abscess- (present on admission)  Assessment & Plan  CT scan reveals a 4.2 cm x 3.6 cm x 3.6 cm necrotic mass or abscess involving the rectum.    Evaluated by colorectal surgery underwent surgery with colostomy creation on 4/8/2025 and I&D of perirectal abscess  Completed 10-day course of IV Zosyn will transition to p.o. Augmentin for 4 more days to complete 2 weeks course per surgery recommendations    Hypokalemia  Assessment & Plan    Repleted continue to monitor electrolytes    Coronary artery disease- (present on admission)  Assessment & Plan  History of prior stent to the LAD  2015  Continue medical therapy with atorvastatin carvedilol and low-dose aspirin    Primary hypertension- (present on admission)  Assessment & Plan  Continue outpatient carvedilol 12.5 mg twice daily with holding parameters    Port-A-Cath in place- (present on admission)  Assessment & Plan  Secondary to chemo    Cancer related pain- (present on admission)  Assessment & Plan    Patient started on Dilaudid PCA for severe intractable pain    Continue MS Contin 100 twice daily  Will decrease PCA to max 2 mg every 4 hours  Continue gabapentin and Cymbalta    Rectal cancer (HCC)- (present on admission)  Assessment & Plan  Stage IV  Last chemotherapy was February 14 by Dr. Kim and actively undergoing daily radiation by Dr. Huerta  30 total treatments.    Continue Decadron           VTE prophylaxis:    enoxaparin ppx      I have performed a physical exam and reviewed and updated ROS and Plan today (4/13/2025). In review of yesterday's note (4/12/2025), there are no changes except as documented above.

## 2025-04-13 NOTE — WOUND TEAM
" [facility]  Inpatient Services  New Ostomy Follow-up Management & Teaching      Ostomy Nurse Plan of Care - Frequency of Follow-up:   Ostomy nurses to continue to follow for ostomy needs and teaching until patient independent with care or discharge.  Ostomy Nurse follow-up frequency:  Daily         Past Surgical History:   Procedure Laterality Date    NY LAP, SURG COLOSTOMY  4/8/2025    Procedure: CREATION, COLOSTOMY, LAPAROSCOPIC;  Surgeon: Hector Tse M.D.;  Location: SURGERY [facility];  Service: Gastroenterology    NY I&D PERIRECTAL ABSCESS  4/8/2025    Procedure: INCISION AND DRAINAGE, ABSCESS, PERIRECTAL;  Surgeon: Hector Tse M.D.;  Location: SURGERY [facility];  Service: Gastroenterology    CATH PLACEMENT Right 10/15/2024    Procedure: CENTRAL VENOUS CATHETER PLACEMENT WITH SUBCUTANEOUS PORT;  Surgeon: Hector Tse MD, PhD;  Location: SURGERY [facility];  Service: Gastroenterology    STENT PLACEMENT  2015    BARE METAL STENT- CARDIAC    APPENDECTOMY      KNEE ARTHROSCOPY      MENISCECTOMY    TONSILLECTOMY         Surgery Date: 4/8/25    Surgeon(s):  Hector Tse M.D.    Procedure(s):  CREATION, COLOSTOMY, LAPAROSCOPIC  INCISION AND DRAINAGE, ABSCESS, PERIRECTAL     Permanence: Yes    Pertinent History:    Hospital Course  Booker Saucedo is a 59 y.o. male who presented 4/4/2025 with rectal pain.  Mr. Saucedo has a past medical history of CAD with stent to LAD 2015 as well as rectal cancer.   His last chemo was Feb 14th by Dr. Kim, completed 8 cycles of Folfirinox chemotherapy.  He started chemoradiation 3/2025.  He started developing rectal pain which has been progressive over the past 3 weeks. He has been taking decadron by Dr. Huerta and getting radiation 5 days a week.   He has been taking MS Contin 30 mg BID and using break-though pain. Today the pain was so intense that he couldn't stand it. \"I can't live like this\".He has had ongoing drainage mixed " "with his stool with some blood in it.  In the emergency room, his white blood cell count is 14,000 and CT reveals a 4.2 cm x 3.6 cm x 3.6 cm necrotic mass or abscess involving the rectum.  Dr. Trujillo, surgery has consulted and recommends IV antibiotics and conservative medical management.                         Colostomy 04/08/25 LLQ (Active)   Wound Image   04/11/25 1700   Stomal Appliance Assessment Changed 04/13/25 1100   Stoma Assessment Beefy red 04/13/25 1100   Stoma Shape Budded Greater Than One Inch 04/13/25 1100   Stoma Size (in) 1.75 04/13/25 1100   Peristomal Assessment Clean;Dry;Intact 04/13/25 1100   Mucocutaneous Junction Intact 04/13/25 1100   Treatment Appliance Changed;Bag Change;Cleansed with water/washcloth;Site care 04/13/25 1100   Peristomal Protectant Paste Ring 04/13/25 1100   Stomal Appliance Paste Ring, 2\";2 3/4\" (70mm) CTF 04/13/25 1100   Output (mL) 300 mL 04/10/25 1630   Output Color Brown 04/13/25 1100   WOUND RN ONLY - Stomal Appliance  2 Piece;Paste Ring, 2\";2 3/4\" (70mm) CTF;Transparent Pouch Lock & Roll 04/13/25 1100   Appliance (Pouch) # barrier #87192, pouch #95261 OR #30923 (charcoal filter), paste ring #8805 04/13/25 1100   Appliance Brand Lititz 04/13/25 1100   Secure Start completed Yes 04/09/25 1600   WOUND NURSE ONLY - Time Spent with Patient (mins) 75 04/13/25 1100                                                       Colostomy Interventions:  Removed appliance (using push pull method) - By Ostomy RN with patient assistance    Cleansed robin-stomal skin with moist warm washcloth - By Ostomy RN with patient assistance    Created/Checked template fit - By Ostomy RN with patient assistance    Traced Shape to back of barrier - By Ostomy RN with patient assistance    Cut barrier to stoma size - By Ostomy RN with patient assistance    Confirmed fit - By patient   Removed plastic backing and \"Dog Eared\" paper edges - By patient   Stretched paste ring to fit barrier opening - By " patient   Applied paste ring to back of barrier - By patient   Applied barrier to skin and adhered with friction - By Ostomy RN with patient assistance    Attached pouch - By Ostomy RN with patient assistance    Closed Pouch end - By patient     Patient Education:   All questions answered, procedure explained, previous education reinforced    Ostomy RN to follow-up daily for education     Date:  04/13/25    Reinforced education provided during last visit  Educated regarding the following things: stoma size/shape. Stoma will likely change sizes in the first 4 to 6 weeks. Currently using the most basic supplies while in the hospital, there are many brands and options in regards to supplies.  Educated on when to empty and how to empty, burping appliance, Wear time, Diet (chewing food well) and hydration, Medications, activity including showering and swimming. Pt aware that they should keep an extra set of appliances with them at all times (do not leave in warm cars).  Patient practiced emptying pouch with bedside RN/CNA  Pt completed all paper education and just needs continued hands on  Date:  04/11/25    Reinforced education provided during last visit  Folder/Paperwork discussed in detail (Follow up, supply ordering and support groups discussed as well as accessories that may be beneficial.)  Educated regarding the following things: stoma size/shape. Stoma will likely change sizes in the first 4 to 6 weeks. Currently using the most basic supplies while in the hospital, there are many brands and options in regards to supplies.  Educated on when to empty and how to empty, burping appliance, Wear time, Diet (chewing food well) and hydration, Medications, activity including showering and swimming. Pt aware that they should keep an extra set of appliances with them at all times (do not leave in warm cars).  Patient practiced emptying pouch with bedside RN/CNA  Date:  04/09/25  Reinforced education provided during last  visit  Folder/paperwork delivered  Folder/Paperwork discussed in detail (Follow up, supply ordering and support groups discussed as well as accessories that may be beneficial.)        Needs for next visit: hands-on    Evaluation:      Date:  04/13/25    Stoma is moist red and viable, MJ intact along with some adherent slough at the edges.  Patient having good output and consistency of stool is soft.  Additional education provided and questions answered regarding nutrition and and activities.  Went over accessories and ostomy support group.  Discussed permanence of the colostomy depending on anticipation and expectation.   Patient will be discharging to [facility] tomorrow afternoon and will continue with chemo and radiation for a duration prior to discharging home to [place], [place].      Date:  04/11/25    Stoma same in size since last visit and is beefy red. MJ intact. Patient producing large amount of stool; liquid with consistent solid small fluffy stool throughout. Education provided for when to empty bag and discussed options for continuing with sports post hospital stay with ostomy.     Date:  04/09/25    Stoma is beefy red with intact mucosal junction. Coagulated blood surrounding stoma site. Lateral 3oclock position bled a little with gentle cleansing yet stops is light pressure. Bedside RN Madelin notified and Patient aware. Stool liquid brown. Patient is still having sensation of posterior bowel movement with stool being processed through stoma.           Flatus: Present  Stool Output: small and brown  Urine Output: NA, Fecal Ostomy  Mobility: Up to chair, Ambulate , and Ambulating at Baseline    DIET ORDERS (From admission to next 24h)       Start     Ordered    04/08/25 1750  Diet Order Diet: Regular  ALL MEALS        Question:  Diet:  Answer:  Regular    04/08/25 1749                     Secure Start Signed:  Completed by Wound team Tech  Outpatient Referral Placed:  Yes     5 Sets of appliances in Ostomy  "bag for discharge:  Yes    INSURANCE OPTIONS:  If patient has [facility] plus patient will need an [employer] book. If patientt has Medicaid be sure patient has care chest paperwork.    Currently Active Insurance       Payor Plan    SALENA MARTINO [facility]            Anticipated Discharge Plans:  [facility] for chemo and radiation, patient lives in [facility]     Ostomy Supplies for DC:  BARRIER (15 per month):  New Image Flextend Extended-Wear FLAT Skin Barrier 2 3/4\" ([place] [address])  FECAL 2 PIECE POUCH (20 per month):  12in New Image Lock n' Roll with Filter 2 3/4\" ([place] [address])  ACCESSORIES:  Skin Prep Wipes (3M [place] 2864) - 2 box per month and Adapt Flat paste ring 2\" ([place] 6768) - 15 per month  To be determined in 4 to 6 weeks once stomal edema has fully resolved  "

## 2025-04-13 NOTE — CARE PLAN
The patient is Watcher - Medium risk of patient condition declining or worsening    Shift Goals  Clinical Goals: Control pain through the shift  Patient Goals: Ambulate the hallway, control pain  Family Goals: Not present    Progress made toward(s) clinical / shift goals:  Patient has reported pain as controlled using the PCA pump. Patient stated the higher dose of extended release pain is working better than previous dosing.    Patient is not progressing towards the following goals:Patient did not ambulate the hallway due to fatigue and pain.

## 2025-04-14 ENCOUNTER — PHARMACY VISIT (OUTPATIENT)
Dept: PHARMACY | Facility: MEDICAL CENTER | Age: 60
End: 2025-04-14
Payer: MEDICARE

## 2025-04-14 ENCOUNTER — HOSPITAL ENCOUNTER (OUTPATIENT)
Dept: RADIATION ONCOLOGY | Facility: MEDICAL CENTER | Age: 60
End: 2025-04-14
Payer: COMMERCIAL

## 2025-04-14 VITALS
DIASTOLIC BLOOD PRESSURE: 90 MMHG | OXYGEN SATURATION: 94 % | WEIGHT: 181 LBS | RESPIRATION RATE: 20 BRPM | SYSTOLIC BLOOD PRESSURE: 139 MMHG | HEART RATE: 75 BPM | TEMPERATURE: 97.9 F | BODY MASS INDEX: 28.08 KG/M2

## 2025-04-14 LAB
ANION GAP SERPL CALC-SCNC: 7 MMOL/L (ref 7–16)
BUN SERPL-MCNC: 14 MG/DL (ref 8–22)
CALCIUM SERPL-MCNC: 9 MG/DL (ref 8.5–10.5)
CHEMOTHERAPY INFUSION START DATE: NORMAL
CHEMOTHERAPY RECORDS: 1.8 GY
CHEMOTHERAPY RECORDS: 4500 CGY
CHEMOTHERAPY RECORDS: NORMAL
CHEMOTHERAPY RX CANCER: NORMAL
CHLORIDE SERPL-SCNC: 104 MMOL/L (ref 96–112)
CO2 SERPL-SCNC: 27 MMOL/L (ref 20–33)
CREAT SERPL-MCNC: 0.76 MG/DL (ref 0.5–1.4)
DATE 1ST CHEMO CANCER: NORMAL
ERYTHROCYTE [DISTWIDTH] IN BLOOD BY AUTOMATED COUNT: 52.1 FL (ref 35.9–50)
GFR SERPLBLD CREATININE-BSD FMLA CKD-EPI: 103 ML/MIN/1.73 M 2
GLUCOSE SERPL-MCNC: 138 MG/DL (ref 65–99)
HCT VFR BLD AUTO: 33 % (ref 42–52)
HGB BLD-MCNC: 11 G/DL (ref 14–18)
MAGNESIUM SERPL-MCNC: 2.1 MG/DL (ref 1.5–2.5)
MCH RBC QN AUTO: 31.3 PG (ref 27–33)
MCHC RBC AUTO-ENTMCNC: 33.3 G/DL (ref 32.3–36.5)
MCV RBC AUTO: 94 FL (ref 81.4–97.8)
PHOSPHATE SERPL-MCNC: 2.6 MG/DL (ref 2.5–4.5)
PLATELET # BLD AUTO: 106 K/UL (ref 164–446)
PMV BLD AUTO: 10.2 FL (ref 9–12.9)
POTASSIUM SERPL-SCNC: 4.1 MMOL/L (ref 3.6–5.5)
RAD ONC ARIA COURSE LAST TREATMENT DATE: NORMAL
RAD ONC ARIA COURSE TREATMENT ELAPSED DAYS: NORMAL
RAD ONC ARIA REFERENCE POINT DOSAGE GIVEN TO DATE: 36 GY
RAD ONC ARIA REFERENCE POINT ID: NORMAL
RAD ONC ARIA REFERENCE POINT SESSION DOSAGE GIVEN: 1.8 GY
RBC # BLD AUTO: 3.51 M/UL (ref 4.7–6.1)
SODIUM SERPL-SCNC: 138 MMOL/L (ref 135–145)
WBC # BLD AUTO: 4.3 K/UL (ref 4.8–10.8)

## 2025-04-14 PROCEDURE — 77014 PR CT GUIDANCE PLACEMENT RAD THERAPY FIELDS: CPT | Mod: 26 | Performed by: RADIOLOGY

## 2025-04-14 PROCEDURE — 99239 HOSP IP/OBS DSCHRG MGMT >30: CPT | Performed by: HOSPITALIST

## 2025-04-14 PROCEDURE — 700102 HCHG RX REV CODE 250 W/ 637 OVERRIDE(OP): Performed by: NURSE PRACTITIONER

## 2025-04-14 PROCEDURE — 99232 SBSQ HOSP IP/OBS MODERATE 35: CPT | Performed by: NURSE PRACTITIONER

## 2025-04-14 PROCEDURE — 83735 ASSAY OF MAGNESIUM: CPT

## 2025-04-14 PROCEDURE — 700102 HCHG RX REV CODE 250 W/ 637 OVERRIDE(OP): Performed by: HOSPITALIST

## 2025-04-14 PROCEDURE — 77386 HCHG IMRT DELIVERY COMPLEX: CPT | Performed by: RADIOLOGY

## 2025-04-14 PROCEDURE — 97602 WOUND(S) CARE NON-SELECTIVE: CPT

## 2025-04-14 PROCEDURE — A9270 NON-COVERED ITEM OR SERVICE: HCPCS | Performed by: NURSE PRACTITIONER

## 2025-04-14 PROCEDURE — 700111 HCHG RX REV CODE 636 W/ 250 OVERRIDE (IP): Performed by: HOSPITALIST

## 2025-04-14 PROCEDURE — 80048 BASIC METABOLIC PNL TOTAL CA: CPT

## 2025-04-14 PROCEDURE — 84100 ASSAY OF PHOSPHORUS: CPT

## 2025-04-14 PROCEDURE — 85027 COMPLETE CBC AUTOMATED: CPT

## 2025-04-14 PROCEDURE — A9270 NON-COVERED ITEM OR SERVICE: HCPCS | Performed by: HOSPITALIST

## 2025-04-14 PROCEDURE — RXMED WILLOW AMBULATORY MEDICATION CHARGE: Performed by: HOSPITALIST

## 2025-04-14 RX ORDER — SILVER SULFADIAZINE 10 MG/G
CREAM TOPICAL
Qty: 25 G | Refills: 0 | Status: SHIPPED | OUTPATIENT
Start: 2025-04-15

## 2025-04-14 RX ORDER — OMEPRAZOLE 20 MG/1
20 CAPSULE, DELAYED RELEASE ORAL DAILY
Qty: 30 CAPSULE | Refills: 0 | Status: SHIPPED | OUTPATIENT
Start: 2025-04-15

## 2025-04-14 RX ORDER — DEXAMETHASONE 2 MG/1
2 TABLET ORAL
Qty: 30 TABLET | Refills: 0 | Status: SHIPPED | OUTPATIENT
Start: 2025-04-14 | End: 2025-04-24

## 2025-04-14 RX ORDER — OXYCODONE HYDROCHLORIDE 10 MG/1
10 TABLET ORAL EVERY 6 HOURS PRN
Qty: 28 TABLET | Refills: 0 | Status: ON HOLD | OUTPATIENT
Start: 2025-04-14 | End: 2025-04-19

## 2025-04-14 RX ORDER — OMEPRAZOLE 20 MG/1
20 CAPSULE, DELAYED RELEASE ORAL DAILY
Status: DISCONTINUED | OUTPATIENT
Start: 2025-04-15 | End: 2025-04-14 | Stop reason: HOSPADM

## 2025-04-14 RX ORDER — MORPHINE SULFATE 100 MG/1
100 TABLET ORAL EVERY 12 HOURS
Qty: 14 TABLET | Refills: 0 | Status: ON HOLD | OUTPATIENT
Start: 2025-04-14 | End: 2025-04-19

## 2025-04-14 RX ORDER — DULOXETIN HYDROCHLORIDE 30 MG/1
30 CAPSULE, DELAYED RELEASE ORAL DAILY
Qty: 30 CAPSULE | Refills: 0 | Status: SHIPPED | OUTPATIENT
Start: 2025-04-15

## 2025-04-14 RX ORDER — OXYCODONE HYDROCHLORIDE 10 MG/1
10 TABLET ORAL EVERY 4 HOURS PRN
Refills: 0 | Status: DISCONTINUED | OUTPATIENT
Start: 2025-04-14 | End: 2025-04-14 | Stop reason: HOSPADM

## 2025-04-14 RX ORDER — ACETAMINOPHEN 500 MG
500 TABLET ORAL EVERY 6 HOURS PRN
COMMUNITY
Start: 2025-04-14 | End: 2025-04-16

## 2025-04-14 RX ORDER — LIDOCAINE 50 MG/G
OINTMENT TOPICAL
Qty: 35.44 G | Refills: 1 | Status: SHIPPED | OUTPATIENT
Start: 2025-04-14

## 2025-04-14 RX ADMIN — CAPECITABINE 1500 MG: 500 TABLET, FILM COATED ORAL at 06:00

## 2025-04-14 RX ADMIN — OXYCODONE HYDROCHLORIDE 10 MG: 10 TABLET ORAL at 13:30

## 2025-04-14 RX ADMIN — PREGABALIN 25 MG: 25 CAPSULE ORAL at 05:26

## 2025-04-14 RX ADMIN — ATORVASTATIN CALCIUM 40 MG: 40 TABLET, FILM COATED ORAL at 05:26

## 2025-04-14 RX ADMIN — ASPIRIN 81 MG: 81 TABLET, COATED ORAL at 05:26

## 2025-04-14 RX ADMIN — SILVER SULFADIAZINE 1 G: 10 CREAM TOPICAL at 05:27

## 2025-04-14 RX ADMIN — DEXAMETHASONE 2 MG: 4 TABLET ORAL at 05:26

## 2025-04-14 RX ADMIN — AMOXICILLIN AND CLAVULANATE POTASSIUM 1 TABLET: 875; 125 TABLET, FILM COATED ORAL at 05:26

## 2025-04-14 RX ADMIN — DEXAMETHASONE 2 MG: 4 TABLET ORAL at 13:30

## 2025-04-14 RX ADMIN — DULOXETINE 30 MG: 30 CAPSULE, DELAYED RELEASE ORAL at 05:27

## 2025-04-14 RX ADMIN — TAMSULOSIN HYDROCHLORIDE 0.4 MG: 0.4 CAPSULE ORAL at 05:26

## 2025-04-14 RX ADMIN — MORPHINE SULFATE 100 MG: 100 TABLET, FILM COATED, EXTENDED RELEASE ORAL at 08:13

## 2025-04-14 RX ADMIN — PANTOPRAZOLE SODIUM 40 MG: 40 INJECTION, POWDER, FOR SOLUTION INTRAVENOUS at 05:26

## 2025-04-14 RX ADMIN — CARVEDILOL 12.5 MG: 12.5 TABLET, FILM COATED ORAL at 05:26

## 2025-04-14 RX ADMIN — PREGABALIN 25 MG: 25 CAPSULE ORAL at 11:47

## 2025-04-14 ASSESSMENT — PAIN DESCRIPTION - PAIN TYPE
TYPE: ACUTE PAIN

## 2025-04-14 NOTE — PROGRESS NOTES
Naloxone 4 mg/0.1 mL nasal spray (2 pack) was dispensed to the patient for prevention of potential opioid related overdose per protocol.     Counseling was provided regarding:  - Information relating to the recognition, prevention and responses to opioid-related drug overdoses  - How to safely administer the naloxone nasal spray  - Potential side effects and adverse events related to the naloxone nasal spray  - The importance of seeking immediate emergency medical assistance for a person experiencing an opioid-related drug overdose, even after the administration of naloxone  - The immunity from certain civil or criminal liabilities for seeking medical assistance for a person experiencing an opioid-related overdose    How many naloxone kits (2 sprays/kit) were dispensed? 1         Lesia Cifuentes, PharmD, BCPS

## 2025-04-14 NOTE — PROGRESS NOTES
SUMMARY: Primary team to discharge today to [facility], will arrange for palliative care follow-up with Dr. Goel.    MRN: [id number]  Date of palliative consult: April 9, 2025  Reason for consult: Symptom management  Referring provider: Brandin Renteria  Location of consult: Cancer nursing unit R[facility]  Additional consulting services: Colorectal surgery, general surgery, hospital medicine    HPI:   Booker Saucedo is a 59 y.o. male with a past medical history significant for CAD status post stent to LAD in 2015, hypertension, hyperlipidemia, kidney stones with a recent diagnosis of rectal cancer receiving neoadjuvant therapies who presented on 4/4/2025 with increasing progressive rectal pain with associated diarrhea.  Patient is followed by Dr. Kim with medical oncology, Dr. Tse with colorectal surgery, Dr. Styles outpatient palliative care.  In the emergency department patient underwent CT abdomen and pelvis which revealed 4.2 x 3.6 x 3.6 cm necrotic mass versus abscess involving the rectum.  Yesterday patient was taken to the operating room for laparoscopic creation of a colostomy and incision and drainage of abscess.  He was placed on hydromorphone PCA postoperatively.  In addition to above, cigar smoker who quit 15 years ago, past alcohol use.    Significant/pertinent past medical history: In addition to above, past smoking history of cigars, quit 15 years ago, past use of alcohol.    Pain History:  Onset: Early March  Location: Low pelvic and rectal  Duration: Intermittent and varying in severity  Characteristics: Low pelvic pain sharp and stabbing, rectal pain burning  Aggravating factors: Bowel movements  Alleviating factors: Ice, heat, sitz bath's, bowel protocol  Radiation: To sacrum, pelvis, perineum  Treatments: As above  Severity: At its worst 9/10, tolerable pain 4-5/10.    Additional symptoms: Denies additional symptoms aside from recent diarrhea and/or constipation.      Interval History:  "Colostomy producing soft to liquid stool.  Vital signs remained stable patient is afebrile.    Interval History:  4/10 Overnight pain scores mostly 4-5/10, did have pain crisis this a.m. and had extra dosage of hydromorphone in addition to hydromorphone PCA dosing.  Appetite is good, p.o. intake 75 to 100% of meals.  Colostomy functioning with liquid stool noted. Active listening, reflection, reminiscing, validation & normalization, and empathic support utilized throughout this encounter.  All questions answered and contact information provided, encouraged to call with any questions or concerns.     patient required restarting of PCA yesterday due to severe pain.  He has significant anxiety about health and if he will continue to decline physically and eventually \"die from this.\"  He reports when the pain flares it is essentially unbearable.  Given his healthcare background provided extensive education regarding pain and symptom management strategies.    : MS ER increased to 100 mg per primary team; patient remained on Dilaudid PCA until this morning when it was discontinued.  [facility]    ROS:    Review of Systems   Unable to perform ROS: Other       PE:   Recent vital signs  BMI: Body mass index is 28.08 kg/m².    Temp (24hrs), Av.4 °C (97.6 °F), Min:36.1 °C (97 °F), Max:36.7 °C (98 °F)  Temperature: 36.7 °C (98 °F)  Pulse  Av.4  Min: 44  Max: 99   Blood Pressure: 128/68       Physical Exam  Vitals and nursing note reviewed.   Constitutional:       Appearance: He is obese. He is ill-appearing.   Pulmonary:      Effort: Pulmonary effort is normal.   Musculoskeletal:      Comments: Generalized muscle wasting evident.   Skin:     Coloration: Skin is pale.   Neurological:      General: No focal deficit present.      Mental Status: He is alert and oriented to person, place, and time.   Psychiatric:         Attention and Perception: Attention normal.    "      Mood and Affect: Mood is anxious.         Speech: Speech normal.         Behavior: Behavior is cooperative.         Cognition and Memory: Cognition normal.         Judgment: Judgment normal.       Recent Labs     04/12/25 0035 04/13/25 0035 04/14/25 0033   SODIUM 139 139 138   POTASSIUM 3.8 4.0 4.1   CHLORIDE 104 104 104   CO2 28 26 27   GLUCOSE 156* 163* 138*   BUN 16 13 14   CREATININE 0.74 0.67 0.76   CALCIUM 8.8 9.3 9.0     Recent Labs     04/12/25 0035 04/13/25 0035 04/14/25 0033   WBC 6.4 5.6 4.3*   RBC 3.51* 3.67* 3.51*   HEMOGLOBIN 11.0* 11.5* 11.0*   HEMATOCRIT 32.3* 34.1* 33.0*   MCV 92.0 92.9 94.0   MCH 31.3 31.3 31.3   MCHC 34.1 33.7 33.3   RDW 49.7 50.9* 52.1*   PLATELETCT 102* 111* 106*   MPV 9.6 9.4 10.2       ASSESSMENT/PLAN WITH SHARED DECISION MAKING:   Review  Pertinent imaging reviewed.    PHYSICAL ASPECTS OF CARE  Palliative Performance Scale: 60%    # Cancer related pain  4/9/2025:  -Patient placed on hydromorphone PCA prior to palliative care consult settings are as follows:  Basal rate 0.4 mg every hour; PCA dose 0.5 mg every 8 minutes; 5 mg 4-hour lockout dosing  - Previously on morphine sulfate extended release 30 mg every 12 hours p.o. per Dr. Styles  -Review of PDMP indicates patient also on oxycodone IR 10 mg for breakthrough pain, PDMP 470  -Patient started on gabapentin 100 mg this hospital admission  -Discontinue gabapentin start pregabalin 25 mg every 12 hours increase as tolerated  -Stop morphine sulfate extended release  -Continue hydromorphone PCA at current settings, plan to discontinue tomorrow and transition to oral oxycodone 10 mg p.o. every 3 hours as needed breakthrough pain-will continue to assess appropriateness for long-acting medication and shared decision making approach with patient.  -Collaborated with hospitalist/Dr. Brandin Renteria, he is actively weaning dexamethasone suggest scheduling ketorolac 15 mg every 8 hours for 2 to 3  "days.  4/10/2025:  -Discontinue hydromorphone PCA, schedule oxycodone every 4 hours 10 mg with as needed dosing every 4 hours for moderate breakthrough pain  -Hydromorphone 0.5 mg IV every 4 hours as needed severe breakthrough pain (orders indicate please utilize oral medication prior to offering IV medication)  -Increase Lyrica to 3 times daily dosing (25 mg)  4/11/2025:  MEDD ~ 231.6-270.2 mg  Discontinue basal rate of hydromorphone PCA; continue patient controlled analgesia for breakthrough pain 0.5 mg Q 8 minutes, 5 mg Q 4 hour lock out  Start morphine ER (MS Contin) 60 mg PO Q 12 hours (approximately 50% of MEDD)  - can adjust accordingly over the weekend for pain management  - if pain well controlled can consider changing PCA dosing to short acting immediate release opioid for breakthrough pain  - Patient previous on oxycodone 10 mg but may need higher dose if MEDD remains high (should be 10% of MEDD so may need closer to 15-20 mg)  Patient apprehensive about starting long-acting opioid therapies due to their treatment of chronic pain and he feels his pain is acute  Provided extensive education in regards to use of medications with proper weaning over time if pain improves  We also discussed the role of interventional pain management for possible superior hyperplastic plexus nerve block in the future for rectal pain  Patient wishes to be off of medications so he can continue to work and be active in his life up in [place]  Follows with Dr. Goel in [facility]    4/14/2025:  -MEDD approximate 620 with hydromorphone PCA and 100 mg MS ER every 12 hours.  -Patient states \"pain is finally managed\".  -Has significant anticipatory pain with anxiety related to same.  -Tolerating Lyrica 25 mg 3 times daily, duloxetine 30 mg daily.  -Recommend utilizing oxycodone for breakthrough pain 10 mg every 3 hours.  -Patient states \"maybe I should just stay here and see how my pain does and have them " "turn the PCA back on if I needed\"; advised against restarting PCA and utilizing oral breakthrough medications instead  -Discussed benefit of methadone specifically given high dose of MS ER at this point.  -Discussed follow-up appointments including with Dr. Styles.  -Updated team  # Depression and anxiety about health  Discontinue escitalopram 20 mg PO nightly  Start duloxetine 30 mg PO daily tomorrow 4/12  # Insomnia  Increase melatonin to 5 mg PO nightly  Cluster care at night (order placed in EPIC and sign placed on door)  # Rectal cancer  # Recent colostomy  # Coronary artery disease  # Hypertension    SOCIAL ASPECTS OF CARE  Patient is recently . He has one daughter, Sujatha who is 17. He does have 2 brothers and sisters and would be ok with them making decisions for him if needed.  He was previously independent with ADLs and IADLs and works full-time remotely from home.  He is a former critical care RN.    SPIRITUAL ASPECTS OF CARE   Not addressed.    GOALS OF CARE/SERIOUS ILLNESS CONVERSATION  Introduced myself to patient. Discussed role of palliative care and reason for consult.  He is agreeable to discuss goals of care.  Discussed recent cancer diagnosis and struggles with pain management including goal to not be on any opioid medication if possible.  His additional goals include to live as long as possible he is hopeful that this most recent surgery in addition to ongoing medical oncology care as well as radiation oncology will provide him with the best quality of life and prognosis.  We discussed plan moving forward and he is agreeable to multimodal pain medication strategy.  Provided palliative care contact information and encouraged patient to reach out with any questions/needs.      Code Status: Full code    ACP Documents: None on file.  Patient reports he is working on an advance directive and is hoping to get it done this admission.  Confirmed he can reach out to our team and we can assist " at any time.    Interval diagnostic studies and medical documentation entries pertinent to this case were reviewed independently by me. This patient has at least one acute or chronic illness or injury that poses a threat to life or bodily function. This patient suffers from a high risk of morbidly from additional invasive diagnostic testing or intensive treatment. Discussion of recommendations and coordination of care undertaken with primary provider/treatment team.      Mitali Barth, MSN, APRN, ACNPC-AG.  Palliative Care Nurse Practitioner  [phone]

## 2025-04-14 NOTE — WOUND TEAM
Attempted to see patient for ostomy education.  Patient in work call and asked to be seen later in the day.  Ostomy RN to round back with patient.

## 2025-04-14 NOTE — WOUND TEAM
" [facility]  Inpatient Services  New Ostomy Follow-up Management & Teaching      Ostomy Nurse Plan of Care - Frequency of Follow-up:   Ostomy nurses to continue to follow for ostomy needs and teaching until patient independent with care or discharge.  Ostomy Nurse follow-up frequency:  Daily         Past Surgical History:   Procedure Laterality Date    IL LAP, SURG COLOSTOMY  4/8/2025    Procedure: CREATION, COLOSTOMY, LAPAROSCOPIC;  Surgeon: Hector Tse M.D.;  Location: SURGERY [facility];  Service: Gastroenterology    IL I&D PERIRECTAL ABSCESS  4/8/2025    Procedure: INCISION AND DRAINAGE, ABSCESS, PERIRECTAL;  Surgeon: Hector Tse M.D.;  Location: SURGERY [facility];  Service: Gastroenterology    CATH PLACEMENT Right 10/15/2024    Procedure: CENTRAL VENOUS CATHETER PLACEMENT WITH SUBCUTANEOUS PORT;  Surgeon: Hector Tse MD, PhD;  Location: SURGERY [facility];  Service: Gastroenterology    STENT PLACEMENT  2015    BARE METAL STENT- CARDIAC    APPENDECTOMY      KNEE ARTHROSCOPY      MENISCECTOMY    TONSILLECTOMY         Surgery Date: 4/8/25    Surgeon(s):  Hector Tse M.D.    Procedure(s):  CREATION, COLOSTOMY, LAPAROSCOPIC  INCISION AND DRAINAGE, ABSCESS, PERIRECTAL     Permanence: Yes    Pertinent History:    Hospital Course  Booker Saucedo is a 59 y.o. male who presented 4/4/2025 with rectal pain.  Mr. Saucedo has a past medical history of CAD with stent to LAD 2015 as well as rectal cancer.   His last chemo was Feb 14th by Dr. Kim, completed 8 cycles of Folfirinox chemotherapy.  He started chemoradiation 3/2025.  He started developing rectal pain which has been progressive over the past 3 weeks. He has been taking decadron by Dr. Huerta and getting radiation 5 days a week.   He has been taking MS Contin 30 mg BID and using break-though pain. Today the pain was so intense that he couldn't stand it. \"I can't live like this\".He has had ongoing drainage mixed " "with his stool with some blood in it.  In the emergency room, his white blood cell count is 14,000 and CT reveals a 4.2 cm x 3.6 cm x 3.6 cm necrotic mass or abscess involving the rectum.  Dr. Trujillo, surgery has consulted and recommends IV antibiotics and conservative medical management.                         Colostomy 04/08/25 LLQ (Active)   Wound Image   04/11/25 1700   Stomal Appliance Assessment Changed 04/14/25 1200   Stoma Assessment Beefy red 04/14/25 1200   Stoma Shape Budded Greater Than One Inch;Round 04/14/25 1200   Stoma Size (in) 1.75 04/14/25 1200   Peristomal Assessment Clean;Dry;Intact 04/14/25 1200   Mucocutaneous Junction Intact 04/14/25 1200   Treatment Appliance Changed;Bag Change;Cleansed with water/washcloth;Site care 04/14/25 1200   Peristomal Protectant Paste Ring 04/14/25 1200   Stomal Appliance Paste Ring, 2\";2 3/4\" (70mm) CTF 04/14/25 1200   Output (mL) 300 mL 04/10/25 1630   Output Color Brown 04/14/25 1200   WOUND RN ONLY - Stomal Appliance  2 Piece;Paste Ring, 2\";2 3/4\" (70mm) CTF;Transparent Pouch Lock & Roll 04/14/25 1200   Appliance (Pouch) # barrier #05670, pouch #58450 OR #[id number] (charcoal filter), paste ring #[id number] 04/13/25 1100   Appliance Brand Delmy 04/14/25 1200   Secure Start completed Yes 04/09/25 1600   WOUND NURSE ONLY - Time Spent with Patient (mins) 60 04/14/25 1200                                                       Colostomy Interventions:  Removed appliance (using push pull method) - By patient   Cleansed robin-stomal skin with moist warm washcloth - By patient   Created/Checked template fit - By patient   Traced Shape to back of barrier - By patient   Cut barrier to stoma size - By patient   Confirmed fit - By patient   Removed plastic backing and \"Dog Eared\" paper edges - By patient   Stretched paste ring to fit barrier opening - By patient   Applied paste ring to back of barrier - By patient   Applied barrier to skin and adhered with friction - By patient "   Attached pouch - By patient   Closed Pouch end - By patient     Patient Education:   All questions answered, procedure explained, previous education reinforced    Ostomy RN to follow-up daily for education     Date:  04/14/25    Patient independent with change and ostomy RN to sign off  Date:  04/13/25    Reinforced education provided during last visit  Educated regarding the following things: stoma size/shape. Stoma will likely change sizes in the first 4 to 6 weeks. Currently using the most basic supplies while in the hospital, there are many brands and options in regards to supplies.  Educated on when to empty and how to empty, burping appliance, Wear time, Diet (chewing food well) and hydration, Medications, activity including showering and swimming. Pt aware that they should keep an extra set of appliances with them at all times (do not leave in warm cars).  Patient practiced emptying pouch with bedside RN/CNA  Pt completed all paper education and just needs continued hands on  Date:  04/11/25    Reinforced education provided during last visit  Folder/Paperwork discussed in detail (Follow up, supply ordering and support groups discussed as well as accessories that may be beneficial.)  Educated regarding the following things: stoma size/shape. Stoma will likely change sizes in the first 4 to 6 weeks. Currently using the most basic supplies while in the hospital, there are many brands and options in regards to supplies.  Educated on when to empty and how to empty, burping appliance, Wear time, Diet (chewing food well) and hydration, Medications, activity including showering and swimming. Pt aware that they should keep an extra set of appliances with them at all times (do not leave in warm cars).  Patient practiced emptying pouch with bedside RN/CNA  Date:  04/09/25  Reinforced education provided during last visit  Folder/paperwork delivered  Folder/Paperwork discussed in detail (Follow up, supply ordering and  support groups discussed as well as accessories that may be beneficial.)        Needs for next visit: none    Evaluation:      Date:  04/14/25    No changes on stoma from yesterday's assessment.  Stool is soft and brown.  Patient was able to perform hands-on through the whole appliance change, no additional questions from patient at this time.  Patient will be discharging to [facility] in the afternoon and will continue with chemo and radiation for a duration prior to discharging home to [place], [place].      Date:  04/13/25    Stoma is moist red and viable, MJ intact along with some adherent slough at the edges.  Patient having good output and consistency of stool is soft.  Additional education provided and questions answered regarding nutrition and and activities.  Went over accessories and ostomy support group.  Discussed permanence of the colostomy depending on anticipation and expectation.   Patient will be discharging to [facility] tomorrow afternoon and will continue with chemo and radiation for a duration prior to discharging home to [place], [place].      Date:  04/11/25    Stoma same in size since last visit and is beefy red. MJ intact. Patient producing large amount of stool; liquid with consistent solid small fluffy stool throughout. Education provided for when to empty bag and discussed options for continuing with sports post hospital stay with ostomy.     Date:  04/09/25    Stoma is beefy red with intact mucosal junction. Coagulated blood surrounding stoma site. Lateral 3oclock position bled a little with gentle cleansing yet stops is light pressure. Bedside CHRIS Yusuf notified and Patient aware. Stool liquid brown. Patient is still having sensation of posterior bowel movement with stool being processed through stoma.           Flatus: Present  Stool Output: large, brown, and soft  Urine Output: NA, Fecal Ostomy  Mobility: Up to chair, Ambulate , and Ambulating at Baseline    DIET ORDERS (From admission to next  "24h)       Start     Ordered    04/08/25 1750  Diet Order Diet: Regular  ALL MEALS        Question:  Diet:  Answer:  Regular    04/08/25 1749                     Secure Start Signed:  Completed by Wound team Tech  Outpatient Referral Placed:  Yes     5 Sets of appliances in Ostomy bag for discharge:  Yes    INSURANCE OPTIONS:  If patient has [facility] plus patient will need an [employer] book. If patientt has Medicaid be sure patient has care chest paperwork.    Currently Active Insurance       Payor Plan    SALENA MARTINO [facility]            Anticipated Discharge Plans:  [facility] for chemo and radiation, patient to discharge home afterwards    Ostomy Supplies for DC:  BARRIER (15 per month):  New Image Flextend Extended-Wear FLAT Skin Barrier 2 3/4\" ([place] [id number])  FECAL 2 PIECE POUCH (20 per month):  12in New Image Lock n' Roll with Filter 2 3/4\" (Delmy [id number])  ACCESSORIES:  Skin Prep Wipes (3M [place] 3344) - 2 box per month, Adapt No-Sting Universal Remover Wipes ([place] 2480) - 2 box per month, Adapt Stoma Powder (Delmy 7988) - 1 per month, and Adapt Flat paste ring 2\" (Delmy 4962) - 15 per month  To be determined in 4 to 6 weeks once stomal edema has fully resolved  "

## 2025-04-14 NOTE — DISCHARGE SUMMARY
"Discharge Summary    CHIEF COMPLAINT ON ADMISSION  Chief Complaint   Patient presents with    Low Back Pain    Other       Reason for Admission  rectal pain     Admission Date  4/4/2025    CODE STATUS  Full Code    HPI & HOSPITAL COURSE    Booker Saucedo is a 59 y.o. male who presented 4/4/2025 with rectal pain.  Mr. Saucedo has a past medical history of CAD with stent to LAD 2015 as well as rectal cancer.   His last chemo was Feb 14th by Dr. Kim, completed 8 cycles of Folfirinox chemotherapy.  He started chemoradiation 3/2025.  He started developing rectal pain which has been progressive over the past 3 weeks. He has been taking decadron by Dr. Huerta and getting radiation 5 days a week.   He has been taking MS Contin 30 mg BID and using break-though pain. Today the pain was so intense that he couldn't stand it. \"I can't live like this\".He has had ongoing drainage mixed with his stool with some blood in it.  In the emergency room, his white blood cell count is 14,000 and CT reveals a 4.2 cm x 3.6 cm x 3.6 cm necrotic mass or abscess involving the rectum.  Dr. Trujillo, surgery has consulted and recommends IV antibiotics and conservative medical management.  The patient was started on IV Zosyn his pain was not controlled so he was started on Dilaudid PCA.  He was continued on steroids and continued his radiation therapy.  He was subsequently evaluated by colorectal surgery Dr. Tse and underwent incision and drainage of his perirectal abscess and creation of colostomy.  He was evaluated by palliative care to assist with pain management.  Dr. Tse recommended 10 to 14 days of antibiotics and the patient will be transition to Augmentin.  His pain has been well-managed on MS Contin 100 twice daily and he was transitioned off Dilaudid PCA to oxycodone for breakthrough pain.  I discussed continued monitoring for 1 more day to make sure his pain is well-managed however the patient feels comfortable and would " like to discharge today.    Therefore, he is discharged in good and stable condition to home with organized home healthcare and close outpatient follow-up.    The patient met 2-midnight criteria for an inpatient stay at the time of discharge.    Discharge Date  4/14/2025    FOLLOW UP ITEMS POST DISCHARGE  Patient will complete antibiotic course with 4 more days of Augmentin  He will follow-up with oncology radiation oncology and colorectal surgery  He will need repeat CBC and chemistry panel at follow-up  He will follow-up with palliative care    DISCHARGE DIAGNOSES  Principal Problem:    Rectal abscess (POA: Yes)  Active Problems:    Rectal cancer (HCC) (POA: Yes)    Cancer related pain (POA: Yes)    Port-A-Cath in place (POA: Yes)    Primary hypertension (POA: Yes)    Coronary artery disease (POA: Yes)    Hypokalemia (POA: Unknown)  Resolved Problems:    * No resolved hospital problems. *      FOLLOW UP  Future Appointments   Date Time Provider Department Center   4/15/2025  7:30 AM RADIATION THERAPY RADT None   4/15/2025  8:00 AM Cassy Rubio P.A.-C. ONCRMO None   4/16/2025  7:30 AM RADIATION THERAPY RADT None   4/16/2025  7:45 AM Jason Huerta M.D. RADT None   4/17/2025  7:30 AM RADIATION THERAPY RADT None   4/18/2025  7:30 AM RADIATION THERAPY RADT None   4/19/2025  7:30 AM RADIATION THERAPY RADT None   4/21/2025  7:30 AM RADIATION THERAPY RADT None   4/21/2025  1:30 PM Quoc Goel M.D. ONCRMO None   4/22/2025  7:30 AM Jason Huerta M.D. RADT None   4/23/2025  7:30 AM RADIATION THERAPY RADT None   4/24/2025  7:30 AM RADIATION THERAPY RADT None   4/25/2025  9:00 AM Iron Kim D.O. ONCRMO None   4/28/2025  7:30 AM RADIATION THERAPY RADT None   4/28/2025  1:00 PM Quoc Goel M.D. ONCRMO None     [address], [address] [place] [id number]  [phone]        Hector Tse M.D.  [address] [place] [address]  [id number]  [phone]    Follow up in 1 week(s)        MEDICATIONS ON DISCHARGE     Medication List        START taking these medications        Instructions   acetaminophen 500 MG Tabs  Commonly known as: Tylenol   Take 1 Tablet by mouth every 6 hours as needed for Mild Pain or Moderate Pain.  Dose: 500 mg     amoxicillin-clavulanate 875-125 MG Tabs  Commonly known as: Augmentin   Take 1 Tablet by mouth every 12 hours for 4 days.  Dose: 1 Tablet     DULoxetine 30 MG Cpep  Start taking on: April 15, 2025  Commonly known as: Cymbalta   Take 1 Capsule by mouth every day.  Dose: 30 mg     lidocaine 5 % Oint  Commonly known as: Xylocaine   Apply to anal area     omeprazole 20 MG delayed-release capsule  Start taking on: April 15, 2025  Commonly known as: PriLOSEC   Take 1 Capsule by mouth every day.  Dose: 20 mg     SSD 1 % Crea  Start taking on: April 15, 2025  Generic drug: silver sulfADIAZINE   Apply to anal area            CHANGE how you take these medications        Instructions   morphine  MG Tbcr tablet  What changed:   medication strength  how much to take  Commonly known as: Ms Contin   Take 1 Tablet by mouth every 12 hours for 7 days.  Dose: 100 mg     oxyCODONE immediate release 10 MG immediate release tablet  What changed: reasons to take this  Commonly known as: Roxicodone   Take 1 Tablet by mouth every 6 hours as needed for Severe Pain for up to 7 days.  Dose: 10 mg            CONTINUE taking these medications        Instructions   aspirin 81 MG EC tablet   Take 81 mg by mouth every morning.  Dose: 81 mg     atorvastatin 40 MG Tabs  Commonly known as: Lipitor   Take 40 mg by mouth every day.  Dose: 40 mg     baclofen 5 MG Tabs  Commonly known as: Lioresal   Take 1 tablet by mouth every 8 hours as needed for hiccups related to chemotherapy treatment.     capecitabine 500 MG tablet  Commonly known as: Xeloda   Doctor's comments: 825mg/m2x BSA 1.95= 1,608.75mg Physician decision to round down to  1500mg  Take 3 tabs of 500mg strength (total dose of 1500 mg) by mouth 2 times a day (at least 12 hrs apart and 30 min after food) Monday-Friday on days of radiation x 30 treatments     carvedilol 12.5 MG Tabs  Commonly known as: Coreg   Take 12.5 mg by mouth 2 times a day.  Dose: 12.5 mg     dexamethasone 2 MG tablet  Commonly known as: Decadron   Take 1 Tablet by mouth 3 times a day with meals for 10 days.  Dose: 2 mg     gabapentin 100 MG Caps  Commonly known as: Neurontin   Take 1 Capsule by mouth 3 times a day for 15 days.  Dose: 100 mg     hydrOXYzine HCl 25 MG Tabs  Commonly known as: Atarax   Take 25 mg by mouth every 8 hours as needed for Anxiety.  Dose: 25 mg     melatonin 3 MG Tabs   Take 3 mg by mouth at bedtime.  Dose: 3 mg     ondansetron 4 MG Tabs tablet  Commonly known as: Zofran   Take 1 Tablet by mouth every four hours as needed for Nausea/Vomiting (for nausea, vomiting).  Dose: 4 mg     prochlorperazine 10 MG Tabs  Commonly known as: Compazine   Take 1 Tablet by mouth every 6 hours as needed (for nausea, vomiting).  Dose: 10 mg     tamsulosin 0.4 MG capsule  Commonly known as: Flomax   Take 1 Capsule by mouth every day.  Dose: 0.4 mg            STOP taking these medications      citalopram 20 MG Tabs  Commonly known as: CeleXA     hydrocortisone 25 MG Supp  Commonly known as: Anusol-HC     loperamide 2 MG tablet  Commonly known as: Imodium A-D     polyethylene glycol/lytes Pack  Commonly known as: Miralax              Allergies  No Known Allergies    DIET  Orders Placed This Encounter   Procedures    Diet Order Diet: Regular     Standing Status:   Standing     Number of Occurrences:   1     Diet::   Regular [1]       ACTIVITY  As tolerated.  Weight bearing as tolerated    CONSULTATIONS  Colorectal surgery, radiation oncology, palliative care    PROCEDURES  Booker Saucedo is a 59 y.o. male who presented 4/4/2025 with rectal pain.  Mr. Saucedo has a past medical history of CAD with stent to  "LAD 2015 as well as rectal cancer.   His last chemo was Feb 14th by Dr. Kim, completed 8 cycles of Folfirinox chemotherapy.  He started chemoradiation 3/2025.  He started developing rectal pain which has been progressive over the past 3 weeks. He has been taking decadron by Dr. Huerta and getting radiation 5 days a week.   He has been taking MS Contin 30 mg BID and using break-though pain. Today the pain was so intense that he couldn't stand it. \"I can't live like this\".He has had ongoing drainage mixed with his stool with some blood in it.  In the emergency room, his white blood cell count is 14,000 and CT reveals a 4.2 cm x 3.6 cm x 3.6 cm necrotic mass or abscess involving the rectum.  Dr. Trujillo, surgery has consulted and recommends IV antibiotics and conservative medical management.      LABORATORY  Lab Results   Component Value Date    SODIUM 138 04/14/2025    POTASSIUM 4.1 04/14/2025    CHLORIDE 104 04/14/2025    CO2 27 04/14/2025    GLUCOSE 138 (H) 04/14/2025    BUN 14 04/14/2025    CREATININE 0.76 04/14/2025        Lab Results   Component Value Date    WBC 4.3 (L) 04/14/2025    HEMOGLOBIN 11.0 (L) 04/14/2025    HEMATOCRIT 33.0 (L) 04/14/2025    PLATELETCT 106 (L) 04/14/2025        Total time of the discharge process exceeds 35 minutes.  "

## 2025-04-14 NOTE — DISCHARGE PLANNING
Case Management Discharge Planning    Admission Date: 4/4/2025  GMLOS: 2.9  ALOS: 10    6-Clicks ADL Score: 24  6-Clicks Mobility Score: 23      Anticipated Discharge Dispo: Discharge Disposition: D/T to home under [facility] care in anticipation of covered skilled care (06)  Discharge Address: [facility] ([address])    DME Needed: No    Action(s) Taken: Discussed in IDT rounds, plan for pt to DC home today. Pt completed 20/25 fractions of radiation. Hospitalist ordered pain meds to [id number]. Pt will need follow up appointment with Dr. Goel.    Escalations Completed: None    Medically Clear: Yes

## 2025-04-15 ENCOUNTER — HOSPITAL ENCOUNTER (OUTPATIENT)
Dept: RADIATION ONCOLOGY | Facility: MEDICAL CENTER | Age: 60
End: 2025-04-15

## 2025-04-15 ENCOUNTER — HOSPITAL ENCOUNTER (OUTPATIENT)
Dept: HEMATOLOGY ONCOLOGY | Facility: MEDICAL CENTER | Age: 60
End: 2025-04-15
Attending: STUDENT IN AN ORGANIZED HEALTH CARE EDUCATION/TRAINING PROGRAM
Payer: COMMERCIAL

## 2025-04-15 ENCOUNTER — PHARMACY VISIT (OUTPATIENT)
Dept: PHARMACY | Facility: MEDICAL CENTER | Age: 60
End: 2025-04-15
Payer: MEDICARE

## 2025-04-15 VITALS
OXYGEN SATURATION: 98 % | HEIGHT: 67 IN | HEART RATE: 82 BPM | TEMPERATURE: 98.5 F | BODY MASS INDEX: 28.08 KG/M2 | DIASTOLIC BLOOD PRESSURE: 72 MMHG | SYSTOLIC BLOOD PRESSURE: 110 MMHG

## 2025-04-15 DIAGNOSIS — C20 RECTAL CANCER (HCC): ICD-10-CM

## 2025-04-15 DIAGNOSIS — Z09 ONCOLOGY FOLLOW-UP ENCOUNTER: ICD-10-CM

## 2025-04-15 DIAGNOSIS — Z51.11 ENCOUNTER FOR CHEMOTHERAPY MANAGEMENT: ICD-10-CM

## 2025-04-15 DIAGNOSIS — R52 UNCONTROLLED PAIN: ICD-10-CM

## 2025-04-15 DIAGNOSIS — G89.3 CANCER RELATED PAIN: ICD-10-CM

## 2025-04-15 DIAGNOSIS — Z79.899 ENCOUNTER FOR LONG-TERM CURRENT USE OF HIGH RISK MEDICATION: ICD-10-CM

## 2025-04-15 LAB

## 2025-04-15 PROCEDURE — 77014 PR CT GUIDANCE PLACEMENT RAD THERAPY FIELDS: CPT | Mod: 26 | Performed by: RADIOLOGY

## 2025-04-15 PROCEDURE — 77386 HCHG IMRT DELIVERY COMPLEX: CPT | Performed by: RADIOLOGY

## 2025-04-15 PROCEDURE — 99214 OFFICE O/P EST MOD 30 MIN: CPT | Performed by: PHYSICIAN ASSISTANT

## 2025-04-15 PROCEDURE — RXMED WILLOW AMBULATORY MEDICATION CHARGE: Performed by: PHYSICIAN ASSISTANT

## 2025-04-15 PROCEDURE — 99212 OFFICE O/P EST SF 10 MIN: CPT | Performed by: PHYSICIAN ASSISTANT

## 2025-04-15 RX ORDER — FENTANYL 50 UG/1
1 PATCH TRANSDERMAL
Qty: 5 PATCH | Refills: 0 | Status: ON HOLD | OUTPATIENT
Start: 2025-04-15 | End: 2025-04-19

## 2025-04-15 ASSESSMENT — ENCOUNTER SYMPTOMS
FEVER: 0
DOUBLE VISION: 0
FLANK PAIN: 0
DIARRHEA: 0
HEADACHES: 0
BACK PAIN: 0
DIAPHORESIS: 1
BLOOD IN STOOL: 0
ABDOMINAL PAIN: 0
NERVOUS/ANXIOUS: 1
BRUISES/BLEEDS EASILY: 0
SENSORY CHANGE: 0
CHILLS: 0
SPUTUM PRODUCTION: 0
ORTHOPNEA: 0
SHORTNESS OF BREATH: 0
MYALGIAS: 0
CONSTIPATION: 0
COUGH: 0
TINGLING: 0
NAUSEA: 0
DIZZINESS: 0
PALPITATIONS: 0
HEARTBURN: 0
BLURRED VISION: 0
HEMOPTYSIS: 0
WEIGHT LOSS: 0
VOMITING: 0

## 2025-04-15 NOTE — Clinical Note
REFERRAL APPROVAL NOTICE         Sent on April 15, 2025                   Booker Saucedo  [address]  [facility] [address]                   Dear Mr. Saucedo,    After a careful review of the medical information and benefit coverage, [facility] has processed your referral. See below for additional details.    If applicable, you must be actively enrolled with your insurance for coverage of the authorized service. If you have any questions regarding your coverage, please contact your insurance directly.    REFERRAL INFORMATION   Referral #:  [id number]  Referred-To Department    Referred-By Provider:  Hematology Oncology    Elvin Renteria M.D.   [place] [facility]      [address] [facility] [id number] [phone] [address] [facility] [address]  [facility] [id number] [phone]    Referral Start Date:  04/14/2025  Referral End Date:   04/14/2026           SCHEDULING  If you do not already have an appointment, please call [phone] to make an appointment.   MORE INFORMATION  As a reminder, [facility] ownership has changed, meaning this location is now owned and operated by [facility]. As such, we want to clarify that our patients should expect to receive two separate bills for the services received at [facility] - one representing the [facility] facility fees as the owner of the establishment, and the other to represent the physician's services and subsequent fees. You can speak with your insurance carrier for a pricing estimate by calling the customer service number on the back of your card and ask about charges for a hospital outpatient visit.  If you do not already have a [employer] account, sign up at: [email or web address]  You can access your medical information, make appointments, see lab results, billing information, and more.  If you have questions regarding this referral, please contact  the [facility] Referrals department at:             [phone]. Monday - Friday  7:30AM - 5:00PM.      Sincerely,  [facility]

## 2025-04-16 ENCOUNTER — HOSPITAL ENCOUNTER (INPATIENT)
Facility: MEDICAL CENTER | Age: 60
LOS: 3 days | DRG: 948 | End: 2025-04-19
Attending: EMERGENCY MEDICINE | Admitting: STUDENT IN AN ORGANIZED HEALTH CARE EDUCATION/TRAINING PROGRAM
Payer: COMMERCIAL

## 2025-04-16 ENCOUNTER — HOSPITAL ENCOUNTER (OUTPATIENT)
Dept: RADIATION ONCOLOGY | Facility: MEDICAL CENTER | Age: 60
End: 2025-04-16

## 2025-04-16 ENCOUNTER — APPOINTMENT (OUTPATIENT)
Dept: RADIOLOGY | Facility: MEDICAL CENTER | Age: 60
DRG: 948 | End: 2025-04-16
Attending: EMERGENCY MEDICINE
Payer: COMMERCIAL

## 2025-04-16 ENCOUNTER — HOSPITAL ENCOUNTER (OUTPATIENT)
Dept: RADIATION ONCOLOGY | Facility: MEDICAL CENTER | Age: 60
End: 2025-04-16
Attending: RADIOLOGY
Payer: COMMERCIAL

## 2025-04-16 VITALS
BODY MASS INDEX: 25.48 KG/M2 | SYSTOLIC BLOOD PRESSURE: 120 MMHG | DIASTOLIC BLOOD PRESSURE: 82 MMHG | RESPIRATION RATE: 17 BRPM | OXYGEN SATURATION: 92 % | HEART RATE: 111 BPM | WEIGHT: 164.24 LBS | TEMPERATURE: 97 F

## 2025-04-16 DIAGNOSIS — K59.03 DRUG-INDUCED CONSTIPATION: ICD-10-CM

## 2025-04-16 DIAGNOSIS — C20 RECTAL CANCER (HCC): ICD-10-CM

## 2025-04-16 DIAGNOSIS — K62.89 RECTAL PAIN: ICD-10-CM

## 2025-04-16 DIAGNOSIS — G89.3 CANCER ASSOCIATED PAIN: ICD-10-CM

## 2025-04-16 DIAGNOSIS — K61.1 RECTAL ABSCESS: ICD-10-CM

## 2025-04-16 PROBLEM — R52 INTRACTABLE PAIN: Status: ACTIVE | Noted: 2025-04-16

## 2025-04-16 LAB
ALBUMIN SERPL BCP-MCNC: 3.4 G/DL (ref 3.2–4.9)
ALBUMIN/GLOB SERPL: 1.3 G/DL
ALP SERPL-CCNC: 89 U/L (ref 30–99)
ALT SERPL-CCNC: 23 U/L (ref 2–50)
ANION GAP SERPL CALC-SCNC: 9 MMOL/L (ref 7–16)
ANISOCYTOSIS BLD QL SMEAR: ABNORMAL
AST SERPL-CCNC: 17 U/L (ref 12–45)
BASOPHILS # BLD AUTO: 0 % (ref 0–1.8)
BASOPHILS # BLD: 0 K/UL (ref 0–0.12)
BILIRUB SERPL-MCNC: 0.7 MG/DL (ref 0.1–1.5)
BUN SERPL-MCNC: 12 MG/DL (ref 8–22)
CALCIUM ALBUM COR SERPL-MCNC: 10 MG/DL (ref 8.5–10.5)
CALCIUM SERPL-MCNC: 9.5 MG/DL (ref 8.5–10.5)
CHEMOTHERAPY INFUSION START DATE: NORMAL
CHEMOTHERAPY RECORDS: 1.8 GY
CHEMOTHERAPY RECORDS: 4500 CGY
CHEMOTHERAPY RECORDS: NORMAL
CHEMOTHERAPY RX CANCER: NORMAL
CHLORIDE SERPL-SCNC: 102 MMOL/L (ref 96–112)
CO2 SERPL-SCNC: 26 MMOL/L (ref 20–33)
CREAT SERPL-MCNC: 0.66 MG/DL (ref 0.5–1.4)
DATE 1ST CHEMO CANCER: NORMAL
EOSINOPHIL # BLD AUTO: 0.15 K/UL (ref 0–0.51)
EOSINOPHIL NFR BLD: 3.6 % (ref 0–6.9)
ERYTHROCYTE [DISTWIDTH] IN BLOOD BY AUTOMATED COUNT: 52.5 FL (ref 35.9–50)
GFR SERPLBLD CREATININE-BSD FMLA CKD-EPI: 108 ML/MIN/1.73 M 2
GLOBULIN SER CALC-MCNC: 2.7 G/DL (ref 1.9–3.5)
GLUCOSE SERPL-MCNC: 100 MG/DL (ref 65–99)
HCT VFR BLD AUTO: 32.6 % (ref 42–52)
HGB BLD-MCNC: 10.8 G/DL (ref 14–18)
LACTATE SERPL-SCNC: 1.7 MMOL/L (ref 0.5–2)
LIPASE SERPL-CCNC: 8 U/L (ref 11–82)
LYMPHOCYTES # BLD AUTO: 0.22 K/UL (ref 1–4.8)
LYMPHOCYTES NFR BLD: 5.3 % (ref 22–41)
MANUAL DIFF BLD: NORMAL
MCH RBC QN AUTO: 30.6 PG (ref 27–33)
MCHC RBC AUTO-ENTMCNC: 33.1 G/DL (ref 32.3–36.5)
MCV RBC AUTO: 92.4 FL (ref 81.4–97.8)
MICROCYTES BLD QL SMEAR: ABNORMAL
MONOCYTES # BLD AUTO: 0.41 K/UL (ref 0–0.85)
MONOCYTES NFR BLD AUTO: 9.7 % (ref 0–13.4)
MORPHOLOGY BLD-IMP: NORMAL
NEUTROPHILS # BLD AUTO: 3.42 K/UL (ref 1.82–7.42)
NEUTROPHILS NFR BLD: 81.4 % (ref 44–72)
NRBC # BLD AUTO: 0 K/UL
NRBC BLD-RTO: 0 /100 WBC (ref 0–0.2)
OVALOCYTES BLD QL SMEAR: NORMAL
PLATELET # BLD AUTO: 103 K/UL (ref 164–446)
PLATELET BLD QL SMEAR: NORMAL
PLATELETS.RETICULATED NFR BLD AUTO: 1.4 % (ref 0.6–13.1)
PMV BLD AUTO: 10.1 FL (ref 9–12.9)
POIKILOCYTOSIS BLD QL SMEAR: NORMAL
POTASSIUM SERPL-SCNC: 3.6 MMOL/L (ref 3.6–5.5)
PROT SERPL-MCNC: 6.1 G/DL (ref 6–8.2)
RAD ONC ARIA COURSE LAST TREATMENT DATE: NORMAL
RAD ONC ARIA COURSE TREATMENT ELAPSED DAYS: NORMAL
RAD ONC ARIA REFERENCE POINT DOSAGE GIVEN TO DATE: 39.6 GY
RAD ONC ARIA REFERENCE POINT ID: NORMAL
RAD ONC ARIA REFERENCE POINT SESSION DOSAGE GIVEN: 1.8 GY
RBC # BLD AUTO: 3.53 M/UL (ref 4.7–6.1)
RBC BLD AUTO: PRESENT
SODIUM SERPL-SCNC: 137 MMOL/L (ref 135–145)
WBC # BLD AUTO: 4.2 K/UL (ref 4.8–10.8)

## 2025-04-16 PROCEDURE — 83690 ASSAY OF LIPASE: CPT

## 2025-04-16 PROCEDURE — 770004 HCHG ROOM/CARE - ONCOLOGY PRIVATE *

## 2025-04-16 PROCEDURE — 99223 1ST HOSP IP/OBS HIGH 75: CPT | Performed by: STUDENT IN AN ORGANIZED HEALTH CARE EDUCATION/TRAINING PROGRAM

## 2025-04-16 PROCEDURE — 700111 HCHG RX REV CODE 636 W/ 250 OVERRIDE (IP): Mod: JZ | Performed by: EMERGENCY MEDICINE

## 2025-04-16 PROCEDURE — 36415 COLL VENOUS BLD VENIPUNCTURE: CPT

## 2025-04-16 PROCEDURE — 700102 HCHG RX REV CODE 250 W/ 637 OVERRIDE(OP): Performed by: STUDENT IN AN ORGANIZED HEALTH CARE EDUCATION/TRAINING PROGRAM

## 2025-04-16 PROCEDURE — 96375 TX/PRO/DX INJ NEW DRUG ADDON: CPT

## 2025-04-16 PROCEDURE — A9270 NON-COVERED ITEM OR SERVICE: HCPCS | Performed by: STUDENT IN AN ORGANIZED HEALTH CARE EDUCATION/TRAINING PROGRAM

## 2025-04-16 PROCEDURE — 96374 THER/PROPH/DIAG INJ IV PUSH: CPT

## 2025-04-16 PROCEDURE — 99497 ADVNCD CARE PLAN 30 MIN: CPT | Mod: 25 | Performed by: STUDENT IN AN ORGANIZED HEALTH CARE EDUCATION/TRAINING PROGRAM

## 2025-04-16 PROCEDURE — 77014 PR CT GUIDANCE PLACEMENT RAD THERAPY FIELDS: CPT | Mod: 26 | Performed by: RADIOLOGY

## 2025-04-16 PROCEDURE — 77386 HCHG IMRT DELIVERY COMPLEX: CPT | Performed by: RADIOLOGY

## 2025-04-16 PROCEDURE — 700111 HCHG RX REV CODE 636 W/ 250 OVERRIDE (IP): Mod: JZ | Performed by: STUDENT IN AN ORGANIZED HEALTH CARE EDUCATION/TRAINING PROGRAM

## 2025-04-16 PROCEDURE — 96376 TX/PRO/DX INJ SAME DRUG ADON: CPT

## 2025-04-16 PROCEDURE — 80053 COMPREHEN METABOLIC PANEL: CPT

## 2025-04-16 PROCEDURE — 85027 COMPLETE CBC AUTOMATED: CPT

## 2025-04-16 PROCEDURE — 700105 HCHG RX REV CODE 258: Performed by: STUDENT IN AN ORGANIZED HEALTH CARE EDUCATION/TRAINING PROGRAM

## 2025-04-16 PROCEDURE — 700105 HCHG RX REV CODE 258: Performed by: EMERGENCY MEDICINE

## 2025-04-16 PROCEDURE — 85007 BL SMEAR W/DIFF WBC COUNT: CPT

## 2025-04-16 PROCEDURE — 74177 CT ABD & PELVIS W/CONTRAST: CPT

## 2025-04-16 PROCEDURE — 85055 RETICULATED PLATELET ASSAY: CPT

## 2025-04-16 PROCEDURE — 700117 HCHG RX CONTRAST REV CODE 255: Performed by: EMERGENCY MEDICINE

## 2025-04-16 PROCEDURE — 99285 EMERGENCY DEPT VISIT HI MDM: CPT

## 2025-04-16 PROCEDURE — 83605 ASSAY OF LACTIC ACID: CPT

## 2025-04-16 RX ORDER — SODIUM CHLORIDE, SODIUM LACTATE, POTASSIUM CHLORIDE, CALCIUM CHLORIDE 600; 310; 30; 20 MG/100ML; MG/100ML; MG/100ML; MG/100ML
INJECTION, SOLUTION INTRAVENOUS CONTINUOUS
Status: DISCONTINUED | OUTPATIENT
Start: 2025-04-16 | End: 2025-04-18

## 2025-04-16 RX ORDER — IPRATROPIUM BROMIDE AND ALBUTEROL SULFATE 2.5; .5 MG/3ML; MG/3ML
3 SOLUTION RESPIRATORY (INHALATION)
Status: DISCONTINUED | OUTPATIENT
Start: 2025-04-16 | End: 2025-04-19 | Stop reason: HOSPADM

## 2025-04-16 RX ORDER — AMOXICILLIN 250 MG
2 CAPSULE ORAL EVERY EVENING
Status: DISCONTINUED | OUTPATIENT
Start: 2025-04-16 | End: 2025-04-19 | Stop reason: HOSPADM

## 2025-04-16 RX ORDER — SODIUM CHLORIDE, SODIUM LACTATE, POTASSIUM CHLORIDE, AND CALCIUM CHLORIDE .6; .31; .03; .02 G/100ML; G/100ML; G/100ML; G/100ML
500 INJECTION, SOLUTION INTRAVENOUS
Status: DISCONTINUED | OUTPATIENT
Start: 2025-04-16 | End: 2025-04-19 | Stop reason: HOSPADM

## 2025-04-16 RX ORDER — HYDROMORPHONE HYDROCHLORIDE 1 MG/ML
1 INJECTION, SOLUTION INTRAMUSCULAR; INTRAVENOUS; SUBCUTANEOUS ONCE
Status: COMPLETED | OUTPATIENT
Start: 2025-04-16 | End: 2025-04-16

## 2025-04-16 RX ORDER — DEXAMETHASONE 1 MG
2 TABLET ORAL
Status: DISCONTINUED | OUTPATIENT
Start: 2025-04-17 | End: 2025-04-19 | Stop reason: HOSPADM

## 2025-04-16 RX ORDER — IBUPROFEN 800 MG/1
800 TABLET, FILM COATED ORAL 3 TIMES DAILY PRN
Status: DISCONTINUED | OUTPATIENT
Start: 2025-04-20 | End: 2025-04-17

## 2025-04-16 RX ORDER — ATORVASTATIN CALCIUM 40 MG/1
40 TABLET, FILM COATED ORAL DAILY
Status: DISCONTINUED | OUTPATIENT
Start: 2025-04-17 | End: 2025-04-19 | Stop reason: HOSPADM

## 2025-04-16 RX ORDER — FENTANYL 50 UG/1
1 PATCH TRANSDERMAL
Refills: 0 | Status: DISCONTINUED | OUTPATIENT
Start: 2025-04-17 | End: 2025-04-17

## 2025-04-16 RX ORDER — ONDANSETRON 2 MG/ML
4 INJECTION INTRAMUSCULAR; INTRAVENOUS EVERY 4 HOURS PRN
Status: DISCONTINUED | OUTPATIENT
Start: 2025-04-16 | End: 2025-04-19 | Stop reason: HOSPADM

## 2025-04-16 RX ORDER — HYDROMORPHONE HYDROCHLORIDE 1 MG/ML
1 INJECTION, SOLUTION INTRAMUSCULAR; INTRAVENOUS; SUBCUTANEOUS
Status: COMPLETED | OUTPATIENT
Start: 2025-04-16 | End: 2025-04-17

## 2025-04-16 RX ORDER — SODIUM CHLORIDE 9 MG/ML
1000 INJECTION, SOLUTION INTRAVENOUS ONCE
Status: COMPLETED | OUTPATIENT
Start: 2025-04-16 | End: 2025-04-16

## 2025-04-16 RX ORDER — OMEPRAZOLE 20 MG/1
20 CAPSULE, DELAYED RELEASE ORAL DAILY
Status: DISCONTINUED | OUTPATIENT
Start: 2025-04-17 | End: 2025-04-19 | Stop reason: HOSPADM

## 2025-04-16 RX ORDER — ASPIRIN 81 MG/1
81 TABLET ORAL EVERY MORNING
Status: DISCONTINUED | OUTPATIENT
Start: 2025-04-17 | End: 2025-04-19 | Stop reason: HOSPADM

## 2025-04-16 RX ORDER — HYDROXYZINE HYDROCHLORIDE 25 MG/1
25 TABLET, FILM COATED ORAL EVERY 8 HOURS PRN
Status: DISCONTINUED | OUTPATIENT
Start: 2025-04-16 | End: 2025-04-19 | Stop reason: HOSPADM

## 2025-04-16 RX ORDER — ACETAMINOPHEN 325 MG/1
650 TABLET ORAL EVERY 6 HOURS PRN
Status: DISCONTINUED | OUTPATIENT
Start: 2025-04-22 | End: 2025-04-17

## 2025-04-16 RX ORDER — OXYCODONE HYDROCHLORIDE 10 MG/1
10 TABLET ORAL EVERY 4 HOURS PRN
Refills: 0 | Status: DISCONTINUED | OUTPATIENT
Start: 2025-04-16 | End: 2025-04-16

## 2025-04-16 RX ORDER — ONDANSETRON 4 MG/1
4 TABLET, ORALLY DISINTEGRATING ORAL EVERY 4 HOURS PRN
Status: DISCONTINUED | OUTPATIENT
Start: 2025-04-16 | End: 2025-04-19 | Stop reason: HOSPADM

## 2025-04-16 RX ORDER — GABAPENTIN 100 MG/1
100 CAPSULE ORAL EVERY 8 HOURS
Status: DISCONTINUED | OUTPATIENT
Start: 2025-04-16 | End: 2025-04-17

## 2025-04-16 RX ORDER — ACETAMINOPHEN 325 MG/1
650 TABLET ORAL EVERY 6 HOURS PRN
Status: DISCONTINUED | OUTPATIENT
Start: 2025-04-16 | End: 2025-04-16

## 2025-04-16 RX ORDER — POLYETHYLENE GLYCOL 3350 17 G/17G
1 POWDER, FOR SOLUTION ORAL
Status: DISCONTINUED | OUTPATIENT
Start: 2025-04-16 | End: 2025-04-19 | Stop reason: HOSPADM

## 2025-04-16 RX ORDER — PROCHLORPERAZINE EDISYLATE 5 MG/ML
5-10 INJECTION INTRAMUSCULAR; INTRAVENOUS EVERY 4 HOURS PRN
Status: DISCONTINUED | OUTPATIENT
Start: 2025-04-16 | End: 2025-04-19 | Stop reason: HOSPADM

## 2025-04-16 RX ORDER — ACETAMINOPHEN 325 MG/1
650 TABLET ORAL EVERY 6 HOURS
Status: DISCONTINUED | OUTPATIENT
Start: 2025-04-17 | End: 2025-04-17

## 2025-04-16 RX ORDER — ONDANSETRON 2 MG/ML
4 INJECTION INTRAMUSCULAR; INTRAVENOUS ONCE
Status: COMPLETED | OUTPATIENT
Start: 2025-04-16 | End: 2025-04-16

## 2025-04-16 RX ORDER — MORPHINE SULFATE 100 MG/1
100 TABLET ORAL EVERY 12 HOURS
Refills: 0 | Status: DISCONTINUED | OUTPATIENT
Start: 2025-04-16 | End: 2025-04-17

## 2025-04-16 RX ORDER — LABETALOL HYDROCHLORIDE 5 MG/ML
10 INJECTION, SOLUTION INTRAVENOUS EVERY 4 HOURS PRN
Status: DISCONTINUED | OUTPATIENT
Start: 2025-04-16 | End: 2025-04-19 | Stop reason: HOSPADM

## 2025-04-16 RX ORDER — PROMETHAZINE HYDROCHLORIDE 25 MG/1
12.5-25 TABLET ORAL EVERY 4 HOURS PRN
Status: DISCONTINUED | OUTPATIENT
Start: 2025-04-16 | End: 2025-04-19 | Stop reason: HOSPADM

## 2025-04-16 RX ORDER — GUAIFENESIN/DEXTROMETHORPHAN 100-10MG/5
10 SYRUP ORAL EVERY 6 HOURS PRN
Status: DISCONTINUED | OUTPATIENT
Start: 2025-04-16 | End: 2025-04-19 | Stop reason: HOSPADM

## 2025-04-16 RX ORDER — PROMETHAZINE HYDROCHLORIDE 25 MG/1
12.5-25 SUPPOSITORY RECTAL EVERY 4 HOURS PRN
Status: DISCONTINUED | OUTPATIENT
Start: 2025-04-16 | End: 2025-04-19 | Stop reason: HOSPADM

## 2025-04-16 RX ORDER — KETOROLAC TROMETHAMINE 15 MG/ML
15 INJECTION, SOLUTION INTRAMUSCULAR; INTRAVENOUS EVERY 6 HOURS
Status: DISCONTINUED | OUTPATIENT
Start: 2025-04-17 | End: 2025-04-17

## 2025-04-16 RX ORDER — DULOXETIN HYDROCHLORIDE 30 MG/1
30 CAPSULE, DELAYED RELEASE ORAL DAILY
Status: DISCONTINUED | OUTPATIENT
Start: 2025-04-17 | End: 2025-04-19 | Stop reason: HOSPADM

## 2025-04-16 RX ADMIN — HYDROMORPHONE HYDROCHLORIDE 1 MG: 1 INJECTION, SOLUTION INTRAMUSCULAR; INTRAVENOUS; SUBCUTANEOUS at 17:26

## 2025-04-16 RX ADMIN — GABAPENTIN 100 MG: 100 CAPSULE ORAL at 21:30

## 2025-04-16 RX ADMIN — AMOXICILLIN AND CLAVULANATE POTASSIUM 1 TABLET: 875; 125 TABLET, FILM COATED ORAL at 21:30

## 2025-04-16 RX ADMIN — SENNOSIDES AND DOCUSATE SODIUM 2 TABLET: 50; 8.6 TABLET ORAL at 22:16

## 2025-04-16 RX ADMIN — ONDANSETRON 4 MG: 2 INJECTION INTRAMUSCULAR; INTRAVENOUS at 17:25

## 2025-04-16 RX ADMIN — HYDROMORPHONE HYDROCHLORIDE: 10 INJECTION, SOLUTION INTRAMUSCULAR; INTRAVENOUS; SUBCUTANEOUS at 23:48

## 2025-04-16 RX ADMIN — HYDROMORPHONE HYDROCHLORIDE 1 MG: 1 INJECTION, SOLUTION INTRAMUSCULAR; INTRAVENOUS; SUBCUTANEOUS at 21:27

## 2025-04-16 RX ADMIN — HYDROMORPHONE HYDROCHLORIDE 1 MG: 1 INJECTION, SOLUTION INTRAMUSCULAR; INTRAVENOUS; SUBCUTANEOUS at 20:02

## 2025-04-16 RX ADMIN — MORPHINE SULFATE 100 MG: 100 TABLET, FILM COATED, EXTENDED RELEASE ORAL at 22:51

## 2025-04-16 RX ADMIN — SODIUM CHLORIDE, POTASSIUM CHLORIDE, SODIUM LACTATE AND CALCIUM CHLORIDE: 600; 310; 30; 20 INJECTION, SOLUTION INTRAVENOUS at 22:13

## 2025-04-16 RX ADMIN — HYDROMORPHONE HYDROCHLORIDE 1 MG: 1 INJECTION, SOLUTION INTRAMUSCULAR; INTRAVENOUS; SUBCUTANEOUS at 18:17

## 2025-04-16 RX ADMIN — SODIUM CHLORIDE 1000 ML: 9 INJECTION, SOLUTION INTRAVENOUS at 17:28

## 2025-04-16 RX ADMIN — IOHEXOL 100 ML: 350 INJECTION, SOLUTION INTRAVENOUS at 19:20

## 2025-04-16 ASSESSMENT — PAIN SCALES - GENERAL: PAINLEVEL_OUTOF10: 3=SLIGHT PAIN

## 2025-04-16 ASSESSMENT — PAIN DESCRIPTION - PAIN TYPE: TYPE: ACUTE PAIN

## 2025-04-16 ASSESSMENT — FIBROSIS 4 INDEX: FIB4 SCORE: 1.78

## 2025-04-16 NOTE — ON TREATMENT VISIT
"ON TREATMENT  NOTE  RADIATION ONCOLOGY DEPARTMENT    Patient name:  Booker Saucedo    Primary Physician:  Rickie Naranjo M.D. MRN: [id number]  [facility]: [id number]   Referring physician:  Hector Tse M.D.   : 1965, 59 y.o.     ENCOUNTER DATE:  2025      DIAGNOSIS:  Rectal cancer (HCC)  Staging form: Colon and Rectum, AJCC 8th Edition  - Clinical stage from 10/2/2024: Stage PATO (cT4a, cN2a, cM1a) - Signed by Jason Huerta M.D. on 10/16/2024  Histologic grade (G): G2  Histologic grading system: 4 grade system      TREATMENT SUMMARY:  Course First Treatment Date 2025  Course Last Treatment Date 2025  Radiation Treatments       Active   Plans   Rectum   Most recent treatment: Dose planned: 180 cGy (fraction 22 of 25 on 2025)   Total: Dose planned: 4,500 cGy   Elapsed Days:  @ 2025           Historical   No historical radiation treatments to show.                 SUBJECTIVE:  Well appearing, still having significant proctitis issues related to abscess..      VITAL SIGNS:      2025     7:50 AM 4/15/2025     7:52 AM 2025    12:25 PM 2025     8:00 AM 2025     4:09 AM 2025    12:17 AM 2025     7:11 PM   Vitals   SYSTOLIC 120 110 139 128 118 129 112   DIASTOLIC 82 72 90 68 67 72 65   Pulse 111 82 75 52 91 66 58   Temperature 36.1 °C (97 °F) 36.9 °C (98.5 °F) 36.6 °C (97.9 °F) 36.7 °C (98 °F) 36.4 °C (97.5 °F) 36.6 °C (97.8 °F) 36.4 °C (97.6 °F)   Respiration 17  20 18 18 18 18   Weight 164.24         Height  1.71 m (5' 7.32\")        BMI 25.48 kg/m2 28.08 kg/m2        Pulse Oximetry 92 % 98 % 94 % 97 % 98 % 93 % 95 %     KPS: 100, Fully active, able to carry on all pre-disease performed without restriction (ECOG equivalent 0)  Encounter Vitals  Temperature: 36.1 °C (97 °F)  Blood Pressure: 120/82  Pulse: (!) 111  Respiration: 17  Pulse Oximetry: 92 %  Weight: 74.5 kg (164 lb 3.9 oz)  Weight Source: Stand Up Scale  Pain Score: 3=Slight Pain     "  4/16/2025     7:50 AM 4/9/2025     7:53 AM 4/2/2025     7:50 AM 3/31/2025     7:59 AM 3/26/2025    12:15 PM 3/19/2025     7:52 AM   Pain Assessment   Pain Score 3=SLIGHT JESS 7=MODERATE-S 5=MODERATE P 3=SLIGHT JESS 2=MINIMAL PA 3=SLIGHT JESS   Pain Loc RECTUM PELVIC ABDOMEN  -- ABDOMEN          PHYSICAL EXAM:  Physical Exam  Constitutional:       Appearance: Normal appearance.   Abdominal:      General: There is no distension.      Palpations: Abdomen is soft.   Skin:     Findings: No erythema.   Neurological:      Mental Status: He is alert.              4/16/2025     7:53 AM 4/9/2025     7:54 AM 4/2/2025     7:53 AM 3/26/2025    12:16 PM 3/19/2025     7:53 AM   Toxicity Assessment   Toxicity Assessment Male Pelvis Male Pelvis Male Pelvis Male Pelvis Male Pelvis   Fatigue (lethargy, malaise, asthenia) Moderate (e.g., decrease in performance status by 1 ECOG level or 20% Karnofsky) or causing difficulty performing some activities Increased fatigue over baseline, but not altering normal activities Increased fatigue over baseline, but not altering normal activities Increased fatigue over baseline, but not altering normal activities None   Radiation Dermatitis Faint erythema or dry desquamation None None None None   Anorexia Loss of appetite Loss of appetite None None Loss of appetite   Colitis Abdominal pain with mucus and/or blood in stool Abdominal pain with mucus and/or blood in stool None None Abdominal pain with mucus and/or blood in stool   Constipation None None Requiring stool softener or dietary modification None Requiring stool softener or dietary modification   Dehydration None None None None None   Diarrhea w/o Colostomy None None None None None   Flatulence None None None None None   Nausea None None None None None   Proctitis Increased stool frequency, occasional blood-streaked stools or rectal discomfort (including hemorrhoids) not requiring medication Increased stool frequency, occasional blood-streaked  stools or rectal discomfort (including hemorrhoids) not requiring medication Increased stool frequency, occasional blood-streaked stools or rectal discomfort (including hemorrhoids) not requiring medication None None   Vomiting None None None None None   RT - Pain due to RT None None None None None   Tumor Pain (onset or exacerbation of tumor pain due to treatment) Moderate pain, pain or analgesics interfering with function, but not interfering with activities of daily living Moderate pain, pain or analgesics interfering with function, but not interfering with activities of daily living Moderate pain, pain or analgesics interfering with function, but not interfering with activities of daily living Mild pain not interfering with function Moderate pain, pain or analgesics interfering with function, but not interfering with activities of daily living   Dysuria (painful urination) Mild symptoms requiring no intervention None None None None   Urinary Frequency Normal Normal Normal Normal Normal   Urinary Urgency None None None None None   Bladder Spasms Absent Absent Absent Absent Absent   Incontinence None None None None None   Urinary Retention Hesitency or dribbling, but no significant residual urine and/or retention occuring during the immediate postoperative period Hesitency or dribbling, but no significant residual urine and/or retention occuring during the immediate postoperative period Hesitency or dribbling, but no significant residual urine and/or retention occuring during the immediate postoperative period Hesitency or dribbling, but no significant residual urine and/or retention occuring during the immediate postoperative period Hesitency or dribbling, but no significant residual urine and/or retention occuring during the immediate postoperative period       CURRENT MEDICATIONS:    Current Outpatient Medications:     fentaNYL (DURAGESIC) 50 MCG/HR PATCH 72 HR, Place 1 Patch on the skin every 72 hours for 15  days., Disp: 5 Patch, Rfl: 0    dexamethasone (DECADRON) 2 MG tablet, Take 1 Tablet by mouth 3 times a day with meals for 10 days., Disp: 30 Tablet, Rfl: 0    morphine SR (MS CONTIN) 100 MG Tab CR tablet, Take 1 Tablet by mouth every 12 hours for 7 days., Disp: 14 Tablet, Rfl: 0    oxyCODONE immediate release (ROXICODONE) 10 MG immediate release tablet, Take 1 Tablet by mouth every 6 hours as needed for Severe Pain for up to 7 days., Disp: 28 Tablet, Rfl: 0    acetaminophen (TYLENOL) 500 MG Tab, Take 1 Tablet by mouth every 6 hours as needed for Mild Pain or Moderate Pain., Disp: , Rfl:     amoxicillin-clavulanate (AUGMENTIN) 875-125 MG Tab, Take 1 Tablet by mouth every 12 hours for 4 days., Disp: 8 Tablet, Rfl: 0    DULoxetine (CYMBALTA) 30 MG Cap DR Particles, Take 1 Capsule by mouth every day., Disp: 30 Capsule, Rfl: 0    lidocaine (XYLOCAINE) 5 % Ointment, Apply to anal area, Disp: 35.44 g, Rfl: 1    omeprazole (PRILOSEC) 20 MG delayed-release capsule, Take 1 Capsule by mouth every day., Disp: 30 Capsule, Rfl: 0    silver sulfADIAZINE (SILVADENE) 1 % Cream, Apply to anal area, Disp: 25 g, Rfl: 0    gabapentin (NEURONTIN) 100 MG Cap, Take 1 Capsule by mouth 3 times a day for 15 days., Disp: 45 Capsule, Rfl: 0    tamsulosin (FLOMAX) 0.4 MG capsule, Take 1 Capsule by mouth every day., Disp: 30 Capsule, Rfl: 0    capecitabine (XELODA) 500 MG tablet, Take 3 tabs of 500mg strength (total dose of 1500 mg) by mouth 2 times a day (at least 12 hrs apart and 30 min after food) Monday-Friday on days of radiation x 30 treatments, Disp: 180 Tablet, Rfl: 0    aspirin 81 MG EC tablet, Take 81 mg by mouth every morning., Disp: , Rfl:     baclofen (LIORESAL) 5 MG Tab, Take 1 tablet by mouth every 8 hours as needed for hiccups related to chemotherapy treatment., Disp: 30 Tablet, Rfl: 1    melatonin 3 MG Tab, Take 3 mg by mouth at bedtime., Disp: , Rfl:     atorvastatin (LIPITOR) 40 MG Tab, Take 40 mg by mouth every day., Disp: ,  Rfl:     carvedilol (COREG) 12.5 MG Tab, Take 12.5 mg by mouth 2 times a day., Disp: , Rfl:     hydrOXYzine HCl (ATARAX) 25 MG Tab, Take 25 mg by mouth every 8 hours as needed for Anxiety., Disp: , Rfl:     ondansetron (ZOFRAN) 4 MG Tab tablet, Take 1 Tablet by mouth every four hours as needed for Nausea/Vomiting (for nausea, vomiting)., Disp: 30 Tablet, Rfl: 6    prochlorperazine (COMPAZINE) 10 MG Tab, Take 1 Tablet by mouth every 6 hours as needed (for nausea, vomiting)., Disp: 30 Tablet, Rfl: 6    LABORATORY DATA:   Lab Results   Component Value Date/Time    SODIUM 138 04/14/2025 12:33 AM    POTASSIUM 4.1 04/14/2025 12:33 AM    CHLORIDE 104 04/14/2025 12:33 AM    CO2 27 04/14/2025 12:33 AM    GLUCOSE 138 (H) 04/14/2025 12:33 AM    BUN 14 04/14/2025 12:33 AM    CREATININE 0.76 04/14/2025 12:33 AM       Lab Results   Component Value Date/Time    WBC 4.3 (L) 04/14/2025 12:33 AM    RBC 3.51 (L) 04/14/2025 12:33 AM    HEMOGLOBIN 11.0 (L) 04/14/2025 12:33 AM    HEMATOCRIT 33.0 (L) 04/14/2025 12:33 AM    MCV 94.0 04/14/2025 12:33 AM    MCH 31.3 04/14/2025 12:33 AM    MCHC 33.3 04/14/2025 12:33 AM    PLATELETCT 106 (L) 04/14/2025 12:33 AM         RADIOLOGY DATA:  CT-ABDOMEN-PELVIS WITH  Result Date: 4/4/2025  1.  Multiple varying sized small hepatic cysts. 2.  Mild atherosclerotic changes of the infrarenal aorta and iliac arteries. 3.  4.2 x 3.6 x 3.6 cm necrotic mass or abscess involving the rectum.      IMPRESSION:  Cancer Staging   Rectal cancer (HCC)  Staging form: Colon and Rectum, AJCC 8th Edition  - Clinical stage from 10/2/2024: Stage PATO (cT4a, cN2a, cM1a) - Signed by Jason Huerta M.D. on 10/16/2024      PLAN:  No change in treatment plan    Disposition:  Treatment plan and imaging reviewed. Questions answered. Continue therapy outlined.     Jason Huerta M.D.    No orders of the defined types were placed in this encounter.

## 2025-04-16 NOTE — ED TRIAGE NOTES
Pt comes in reporting he getting radiation. Pt stating hx of colon cancer. Pt reporting recent colostomy and he feels there is no output. Pt stating severe rectal pain.

## 2025-04-17 ENCOUNTER — HOSPITAL ENCOUNTER (OUTPATIENT)
Dept: RADIATION ONCOLOGY | Facility: MEDICAL CENTER | Age: 60
End: 2025-04-17
Payer: COMMERCIAL

## 2025-04-17 PROBLEM — Z93.9 HISTORY OF CREATION OF OSTOMY (HCC): Status: ACTIVE | Noted: 2025-04-17

## 2025-04-17 PROBLEM — Z71.89 ACP (ADVANCE CARE PLANNING): Status: ACTIVE | Noted: 2025-04-17

## 2025-04-17 LAB
ALBUMIN SERPL BCP-MCNC: 3.5 G/DL (ref 3.2–4.9)
ALBUMIN/GLOB SERPL: 1.5 G/DL
ALP SERPL-CCNC: 86 U/L (ref 30–99)
ALT SERPL-CCNC: 22 U/L (ref 2–50)
ANION GAP SERPL CALC-SCNC: 8 MMOL/L (ref 7–16)
ANISOCYTOSIS BLD QL SMEAR: ABNORMAL
AST SERPL-CCNC: 16 U/L (ref 12–45)
BASOPHILS # BLD AUTO: 0 % (ref 0–1.8)
BASOPHILS # BLD: 0 K/UL (ref 0–0.12)
BILIRUB SERPL-MCNC: 0.6 MG/DL (ref 0.1–1.5)
BUN SERPL-MCNC: 14 MG/DL (ref 8–22)
CALCIUM ALBUM COR SERPL-MCNC: 9.8 MG/DL (ref 8.5–10.5)
CALCIUM SERPL-MCNC: 9.4 MG/DL (ref 8.5–10.5)
CHEMOTHERAPY INFUSION START DATE: NORMAL
CHEMOTHERAPY RECORDS: 1.8 GY
CHEMOTHERAPY RECORDS: 4500 CGY
CHEMOTHERAPY RECORDS: NORMAL
CHEMOTHERAPY RX CANCER: NORMAL
CHLORIDE SERPL-SCNC: 103 MMOL/L (ref 96–112)
CO2 SERPL-SCNC: 25 MMOL/L (ref 20–33)
CREAT SERPL-MCNC: 0.81 MG/DL (ref 0.5–1.4)
DATE 1ST CHEMO CANCER: NORMAL
EOSINOPHIL # BLD AUTO: 0.35 K/UL (ref 0–0.51)
EOSINOPHIL NFR BLD: 9.3 % (ref 0–6.9)
ERYTHROCYTE [DISTWIDTH] IN BLOOD BY AUTOMATED COUNT: 55.8 FL (ref 35.9–50)
GFR SERPLBLD CREATININE-BSD FMLA CKD-EPI: 101 ML/MIN/1.73 M 2
GLOBULIN SER CALC-MCNC: 2.4 G/DL (ref 1.9–3.5)
GLUCOSE SERPL-MCNC: 97 MG/DL (ref 65–99)
HCT VFR BLD AUTO: 32.7 % (ref 42–52)
HGB BLD-MCNC: 11.1 G/DL (ref 14–18)
LYMPHOCYTES # BLD AUTO: 0.22 K/UL (ref 1–4.8)
LYMPHOCYTES NFR BLD: 5.9 % (ref 22–41)
MANUAL DIFF BLD: NORMAL
MCH RBC QN AUTO: 31.7 PG (ref 27–33)
MCHC RBC AUTO-ENTMCNC: 33.9 G/DL (ref 32.3–36.5)
MCV RBC AUTO: 93.4 FL (ref 81.4–97.8)
MICROCYTES BLD QL SMEAR: ABNORMAL
MONOCYTES # BLD AUTO: 0.35 K/UL (ref 0–0.85)
MONOCYTES NFR BLD AUTO: 9.3 % (ref 0–13.4)
MORPHOLOGY BLD-IMP: NORMAL
NEUTROPHILS # BLD AUTO: 2.87 K/UL (ref 1.82–7.42)
NEUTROPHILS NFR BLD: 74.6 % (ref 44–72)
NEUTS BAND NFR BLD MANUAL: 0.9 % (ref 0–10)
NRBC # BLD AUTO: 0 K/UL
NRBC BLD-RTO: 0 /100 WBC (ref 0–0.2)
OVALOCYTES BLD QL SMEAR: NORMAL
PLATELET # BLD AUTO: 102 K/UL (ref 164–446)
PLATELET BLD QL SMEAR: NORMAL
PLATELETS.RETICULATED NFR BLD AUTO: 1.8 % (ref 0.6–13.1)
PMV BLD AUTO: 9.8 FL (ref 9–12.9)
POIKILOCYTOSIS BLD QL SMEAR: NORMAL
POTASSIUM SERPL-SCNC: 3.6 MMOL/L (ref 3.6–5.5)
PROT SERPL-MCNC: 5.9 G/DL (ref 6–8.2)
RAD ONC ARIA COURSE LAST TREATMENT DATE: NORMAL
RAD ONC ARIA COURSE TREATMENT ELAPSED DAYS: NORMAL
RAD ONC ARIA REFERENCE POINT DOSAGE GIVEN TO DATE: 41.4 GY
RAD ONC ARIA REFERENCE POINT ID: NORMAL
RAD ONC ARIA REFERENCE POINT SESSION DOSAGE GIVEN: 1.8 GY
RBC # BLD AUTO: 3.5 M/UL (ref 4.7–6.1)
RBC BLD AUTO: PRESENT
SODIUM SERPL-SCNC: 136 MMOL/L (ref 135–145)
WBC # BLD AUTO: 3.8 K/UL (ref 4.8–10.8)

## 2025-04-17 PROCEDURE — 306591 TRAY SUTURE REMOVAL DISP: Performed by: STUDENT IN AN ORGANIZED HEALTH CARE EDUCATION/TRAINING PROGRAM

## 2025-04-17 PROCEDURE — A9270 NON-COVERED ITEM OR SERVICE: HCPCS | Performed by: STUDENT IN AN ORGANIZED HEALTH CARE EDUCATION/TRAINING PROGRAM

## 2025-04-17 PROCEDURE — 700111 HCHG RX REV CODE 636 W/ 250 OVERRIDE (IP): Mod: JZ | Performed by: STUDENT IN AN ORGANIZED HEALTH CARE EDUCATION/TRAINING PROGRAM

## 2025-04-17 PROCEDURE — 99233 SBSQ HOSP IP/OBS HIGH 50: CPT | Performed by: STUDENT IN AN ORGANIZED HEALTH CARE EDUCATION/TRAINING PROGRAM

## 2025-04-17 PROCEDURE — 99497 ADVNCD CARE PLAN 30 MIN: CPT | Performed by: FAMILY MEDICINE

## 2025-04-17 PROCEDURE — A9270 NON-COVERED ITEM OR SERVICE: HCPCS | Performed by: GENERAL PRACTICE

## 2025-04-17 PROCEDURE — 85007 BL SMEAR W/DIFF WBC COUNT: CPT

## 2025-04-17 PROCEDURE — 85055 RETICULATED PLATELET ASSAY: CPT

## 2025-04-17 PROCEDURE — 99222 1ST HOSP IP/OBS MODERATE 55: CPT | Mod: 25 | Performed by: FAMILY MEDICINE

## 2025-04-17 PROCEDURE — 97165 OT EVAL LOW COMPLEX 30 MIN: CPT

## 2025-04-17 PROCEDURE — 700102 HCHG RX REV CODE 250 W/ 637 OVERRIDE(OP): Performed by: STUDENT IN AN ORGANIZED HEALTH CARE EDUCATION/TRAINING PROGRAM

## 2025-04-17 PROCEDURE — 85027 COMPLETE CBC AUTOMATED: CPT

## 2025-04-17 PROCEDURE — 97162 PT EVAL MOD COMPLEX 30 MIN: CPT

## 2025-04-17 PROCEDURE — 700111 HCHG RX REV CODE 636 W/ 250 OVERRIDE (IP): Mod: JZ

## 2025-04-17 PROCEDURE — 77300 RADIATION THERAPY DOSE PLAN: CPT | Performed by: RADIOLOGY

## 2025-04-17 PROCEDURE — 77386 HCHG IMRT DELIVERY COMPLEX: CPT | Performed by: RADIOLOGY

## 2025-04-17 PROCEDURE — 700102 HCHG RX REV CODE 250 W/ 637 OVERRIDE(OP): Performed by: GENERAL PRACTICE

## 2025-04-17 PROCEDURE — 77014 PR CT GUIDANCE PLACEMENT RAD THERAPY FIELDS: CPT | Mod: 26 | Performed by: RADIOLOGY

## 2025-04-17 PROCEDURE — 77338 DESIGN MLC DEVICE FOR IMRT: CPT | Mod: 26 | Performed by: RADIOLOGY

## 2025-04-17 PROCEDURE — 77338 DESIGN MLC DEVICE FOR IMRT: CPT | Mod: XU | Performed by: RADIOLOGY

## 2025-04-17 PROCEDURE — 77300 RADIATION THERAPY DOSE PLAN: CPT | Mod: 26 | Performed by: RADIOLOGY

## 2025-04-17 PROCEDURE — 700105 HCHG RX REV CODE 258: Performed by: STUDENT IN AN ORGANIZED HEALTH CARE EDUCATION/TRAINING PROGRAM

## 2025-04-17 PROCEDURE — 80053 COMPREHEN METABOLIC PANEL: CPT

## 2025-04-17 PROCEDURE — 770004 HCHG ROOM/CARE - ONCOLOGY PRIVATE *

## 2025-04-17 RX ORDER — PREGABALIN 25 MG/1
25 CAPSULE ORAL 3 TIMES DAILY
Status: DISCONTINUED | OUTPATIENT
Start: 2025-04-17 | End: 2025-04-19 | Stop reason: HOSPADM

## 2025-04-17 RX ORDER — ENOXAPARIN SODIUM 100 MG/ML
40 INJECTION SUBCUTANEOUS DAILY
Status: DISCONTINUED | OUTPATIENT
Start: 2025-04-17 | End: 2025-04-19 | Stop reason: HOSPADM

## 2025-04-17 RX ORDER — ACETAMINOPHEN 500 MG
1000 TABLET ORAL 3 TIMES DAILY
Status: DISCONTINUED | OUTPATIENT
Start: 2025-04-17 | End: 2025-04-19 | Stop reason: HOSPADM

## 2025-04-17 RX ORDER — HYDROMORPHONE HYDROCHLORIDE 1 MG/ML
1 INJECTION, SOLUTION INTRAMUSCULAR; INTRAVENOUS; SUBCUTANEOUS ONCE
Status: COMPLETED | OUTPATIENT
Start: 2025-04-17 | End: 2025-04-17

## 2025-04-17 RX ORDER — HYDROMORPHONE HYDROCHLORIDE 1 MG/ML
0.5 INJECTION, SOLUTION INTRAMUSCULAR; INTRAVENOUS; SUBCUTANEOUS ONCE
Status: COMPLETED | OUTPATIENT
Start: 2025-04-17 | End: 2025-04-17

## 2025-04-17 RX ORDER — METHADONE HYDROCHLORIDE 10 MG/1
10 TABLET ORAL 3 TIMES DAILY
Refills: 0 | Status: DISCONTINUED | OUTPATIENT
Start: 2025-04-17 | End: 2025-04-19 | Stop reason: HOSPADM

## 2025-04-17 RX ADMIN — ACETAMINOPHEN 650 MG: 325 TABLET, FILM COATED ORAL at 00:03

## 2025-04-17 RX ADMIN — DULOXETINE 30 MG: 30 CAPSULE, DELAYED RELEASE ORAL at 05:09

## 2025-04-17 RX ADMIN — KETOROLAC TROMETHAMINE 15 MG: 15 INJECTION, SOLUTION INTRAMUSCULAR; INTRAVENOUS at 05:09

## 2025-04-17 RX ADMIN — SENNOSIDES AND DOCUSATE SODIUM 2 TABLET: 50; 8.6 TABLET ORAL at 17:35

## 2025-04-17 RX ADMIN — HYDROMORPHONE HYDROCHLORIDE 0.5 MG: 1 INJECTION, SOLUTION INTRAMUSCULAR; INTRAVENOUS; SUBCUTANEOUS at 19:54

## 2025-04-17 RX ADMIN — DEXAMETHASONE 2 MG: 1 TABLET ORAL at 12:06

## 2025-04-17 RX ADMIN — Medication: at 20:00

## 2025-04-17 RX ADMIN — ACETAMINOPHEN 650 MG: 325 TABLET, FILM COATED ORAL at 05:08

## 2025-04-17 RX ADMIN — ACETAMINOPHEN 1000 MG: 500 TABLET, FILM COATED ORAL at 20:18

## 2025-04-17 RX ADMIN — AMOXICILLIN AND CLAVULANATE POTASSIUM 1 TABLET: 875; 125 TABLET, FILM COATED ORAL at 05:08

## 2025-04-17 RX ADMIN — ACETAMINOPHEN 1000 MG: 500 TABLET, FILM COATED ORAL at 12:06

## 2025-04-17 RX ADMIN — METHADONE HYDROCHLORIDE 10 MG: 10 TABLET ORAL at 17:36

## 2025-04-17 RX ADMIN — SODIUM CHLORIDE, POTASSIUM CHLORIDE, SODIUM LACTATE AND CALCIUM CHLORIDE: 600; 310; 30; 20 INJECTION, SOLUTION INTRAVENOUS at 22:56

## 2025-04-17 RX ADMIN — GABAPENTIN 100 MG: 100 CAPSULE ORAL at 05:09

## 2025-04-17 RX ADMIN — PREGABALIN 25 MG: 25 CAPSULE ORAL at 12:06

## 2025-04-17 RX ADMIN — HYDROMORPHONE HYDROCHLORIDE 1 MG: 1 INJECTION, SOLUTION INTRAMUSCULAR; INTRAVENOUS; SUBCUTANEOUS at 09:10

## 2025-04-17 RX ADMIN — ENOXAPARIN SODIUM 40 MG: 100 INJECTION SUBCUTANEOUS at 17:36

## 2025-04-17 RX ADMIN — ATORVASTATIN CALCIUM 40 MG: 40 TABLET, FILM COATED ORAL at 05:08

## 2025-04-17 RX ADMIN — ASPIRIN 81 MG: 81 TABLET, COATED ORAL at 05:08

## 2025-04-17 RX ADMIN — DEXAMETHASONE 2 MG: 1 TABLET ORAL at 17:37

## 2025-04-17 RX ADMIN — HYDROMORPHONE HYDROCHLORIDE 1 MG: 1 INJECTION, SOLUTION INTRAMUSCULAR; INTRAVENOUS; SUBCUTANEOUS at 00:38

## 2025-04-17 RX ADMIN — HYDROMORPHONE HYDROCHLORIDE 1 MG: 1 INJECTION, SOLUTION INTRAMUSCULAR; INTRAVENOUS; SUBCUTANEOUS at 07:19

## 2025-04-17 RX ADMIN — METHADONE HYDROCHLORIDE 10 MG: 10 TABLET ORAL at 12:06

## 2025-04-17 RX ADMIN — MORPHINE SULFATE 100 MG: 100 TABLET, FILM COATED, EXTENDED RELEASE ORAL at 05:10

## 2025-04-17 RX ADMIN — KETOROLAC TROMETHAMINE 15 MG: 15 INJECTION, SOLUTION INTRAMUSCULAR; INTRAVENOUS at 00:03

## 2025-04-17 RX ADMIN — AMOXICILLIN AND CLAVULANATE POTASSIUM 1 TABLET: 875; 125 TABLET, FILM COATED ORAL at 17:36

## 2025-04-17 RX ADMIN — PREGABALIN 25 MG: 25 CAPSULE ORAL at 17:36

## 2025-04-17 RX ADMIN — METHADONE HYDROCHLORIDE 10 MG: 10 TABLET ORAL at 20:18

## 2025-04-17 RX ADMIN — DEXAMETHASONE 2 MG: 1 TABLET ORAL at 07:23

## 2025-04-17 RX ADMIN — OMEPRAZOLE 20 MG: 20 CAPSULE, DELAYED RELEASE ORAL at 05:08

## 2025-04-17 ASSESSMENT — LIFESTYLE VARIABLES
ON A TYPICAL DAY WHEN YOU DRINK ALCOHOL HOW MANY DRINKS DO YOU HAVE: 0
AVERAGE NUMBER OF DAYS PER WEEK YOU HAVE A DRINK CONTAINING ALCOHOL: 0
SUBSTANCE_ABUSE: 0
EVER FELT BAD OR GUILTY ABOUT YOUR DRINKING: NO
EVER HAD A DRINK FIRST THING IN THE MORNING TO STEADY YOUR NERVES TO GET RID OF A HANGOVER: NO
DOES PATIENT WANT TO STOP DRINKING: NO
CONSUMPTION TOTAL: NEGATIVE
TOTAL SCORE: 0
ALCOHOL_USE: NO
HAVE PEOPLE ANNOYED YOU BY CRITICIZING YOUR DRINKING: NO
HOW MANY TIMES IN THE PAST YEAR HAVE YOU HAD 5 OR MORE DRINKS IN A DAY: 0
HAVE YOU EVER FELT YOU SHOULD CUT DOWN ON YOUR DRINKING: NO
TOTAL SCORE: 0
TOTAL SCORE: 0

## 2025-04-17 ASSESSMENT — COGNITIVE AND FUNCTIONAL STATUS - GENERAL
SUGGESTED CMS G CODE MODIFIER DAILY ACTIVITY: CH
DAILY ACTIVITIY SCORE: 24
MOBILITY SCORE: 23
DAILY ACTIVITIY SCORE: 24
CLIMB 3 TO 5 STEPS WITH RAILING: A LITTLE
SUGGESTED CMS G CODE MODIFIER MOBILITY: CH
SUGGESTED CMS G CODE MODIFIER DAILY ACTIVITY: CH
SUGGESTED CMS G CODE MODIFIER MOBILITY: CI
MOBILITY SCORE: 24

## 2025-04-17 ASSESSMENT — ENCOUNTER SYMPTOMS
PALPITATIONS: 0
FEVER: 0
COUGH: 0
SHORTNESS OF BREATH: 0
DIZZINESS: 0
MYALGIAS: 1
BLURRED VISION: 0
ABDOMINAL PAIN: 1
VOMITING: 0
DEPRESSION: 0
DOUBLE VISION: 0
BRUISES/BLEEDS EASILY: 0
HEARTBURN: 0
BACK PAIN: 1
NAUSEA: 0
HEADACHES: 0
CHILLS: 0
FOCAL WEAKNESS: 0
WEAKNESS: 1

## 2025-04-17 ASSESSMENT — GAIT ASSESSMENTS
GAIT LEVEL OF ASSIST: SUPERVISED
DISTANCE (FEET): 200

## 2025-04-17 ASSESSMENT — PAIN DESCRIPTION - PAIN TYPE
TYPE: ACUTE PAIN

## 2025-04-17 ASSESSMENT — SOCIAL DETERMINANTS OF HEALTH (SDOH)
WITHIN THE LAST YEAR, HAVE YOU BEEN KICKED, HIT, SLAPPED, OR OTHERWISE PHYSICALLY HURT BY YOUR PARTNER OR EX-PARTNER?: NO
WITHIN THE PAST 12 MONTHS, THE FOOD YOU BOUGHT JUST DIDN'T LAST AND YOU DIDN'T HAVE MONEY TO GET MORE: NEVER TRUE
WITHIN THE LAST YEAR, HAVE YOU BEEN HUMILIATED OR EMOTIONALLY ABUSED IN OTHER WAYS BY YOUR PARTNER OR EX-PARTNER?: NO
WITHIN THE LAST YEAR, HAVE TO BEEN RAPED OR FORCED TO HAVE ANY KIND OF SEXUAL ACTIVITY BY YOUR PARTNER OR EX-PARTNER?: NO
IN THE PAST 12 MONTHS, HAS THE ELECTRIC, GAS, OIL, OR WATER COMPANY THREATENED TO SHUT OFF SERVICE IN YOUR HOME?: NO
WITHIN THE PAST 12 MONTHS, YOU WORRIED THAT YOUR FOOD WOULD RUN OUT BEFORE YOU GOT THE MONEY TO BUY MORE: NEVER TRUE
WITHIN THE LAST YEAR, HAVE YOU BEEN AFRAID OF YOUR PARTNER OR EX-PARTNER?: NO

## 2025-04-17 ASSESSMENT — PATIENT HEALTH QUESTIONNAIRE - PHQ9
2. FEELING DOWN, DEPRESSED, IRRITABLE, OR HOPELESS: NOT AT ALL
SUM OF ALL RESPONSES TO PHQ9 QUESTIONS 1 AND 2: 0
1. LITTLE INTEREST OR PLEASURE IN DOING THINGS: NOT AT ALL

## 2025-04-17 ASSESSMENT — ACTIVITIES OF DAILY LIVING (ADL): TOILETING: INDEPENDENT

## 2025-04-17 NOTE — ED NOTES
Pt cannot wait fro the dilaudid PCA in a lot of pain; , informed hospitalist and said theres an order for dilaudid PRN

## 2025-04-17 NOTE — DISCHARGE PLANNING
Care Transition Team Assessment    Patient is a 59 year-old male admitted for Rectal Abscess. Please see patient's H&P for prior medical history. Patient was previously admitted on 04/04/25 to 04/14/25 for rectal abscess. SW Student met with patient at bedside to complete assessment. Patient is A&Ox4 and able to verify the information on the face sheet. Patient lives with daughter in a 2 story house with 6 steps to enter, Patient requested friend Sunni Love (p) [phone] to be added as emergency contact. Provided patient with Advance Directive booklet and he is currently working on completing AD. Patient has a daughter that is a minor, Ramirez Saucedo , he requested not to contact her. Prior to admission patient is independent with ADL's and IADL’s. Patient does not use any DME at baseline. Patient reported that friend is good support for him. Patient reports, they work at [employer]. The patient's PCP is Dr. Rickie Naranjo. Patient's preferred pharmacy is [facility]. Patient denies a history of SNF/IPR nor HHC use in the past. Patient denies any SA or MH concerns. Patient's confirmed medical coverage via [employer]. Patient has means of transportation when medically cleared and friend Sunni will provide transport once stable for discharge. Patient's primary oncologist is Dr. Kim.       Discussed during IDT rounds, Palliative to monitor pain control. Patient has a PCA pump, Plan to start methadone today.      Information Source  Orientation Level: Oriented X4  Information Given By: Patient    Readmission Evaluation  Is this a readmission?: Yes - unplanned readmission    Elopement Risk  Legal Hold: No  Ambulatory or Self Mobile in Wheelchair: Yes  Disoriented: No  Psychiatric Symptoms: None  History of Wandering: No  Elopement this Admit: No  Vocalizing Wanting to Leave: No  Displays Behaviors, Body Language Wanting to Leave: No-Not at Risk for Elopement    Interdisciplinary Discharge  Planning  Lives with - Patient's Self Care Capacity: Alone and Able to Care For Self, Child Less than 18 Years of Age, Spouse  Patient or legal guardian wants to designate a caregiver: No  Support Systems: Friends / Neighbors, Family Member(s)  Housing / Facility: 2 Story House    Discharge Preparedness  What is your plan after discharge?: Home with help  What are your discharge supports?: Child, Other (comment) (Friend)  Prior Functional Level: Ambulatory, Independent with Activities of Daily Living, Independent with Medication Management  Difficulity with ADLs: None  Difficulity with IADLs: None    Functional Assesment  Prior Functional Level: Ambulatory, Independent with Activities of Daily Living, Independent with Medication Management    Finances  Financial Barriers to Discharge: No  Prescription Coverage: Yes    Vision / Hearing Impairment  Vision Impairment : Yes  Right Eye Vision: Wears Glasses  Left Eye Vision: Wears Glasses  Hearing Impairment : No    Advance Directive  Advance Directive?: None  Advance Directive offered?: AD Booklet given    Domestic Abuse  Have you ever been the victim of abuse or violence?: No  Possible Abuse/Neglect Reported to:: Not Applicable    Psychological Assessment  History of Substance Abuse: None  History of Psychiatric Problems: No  Non-compliant with Treatment: No  Newly Diagnosed Illness: No    Discharge Risks or Barriers  Discharge risks or barriers?: Complex medical needs  Patient risk factors: Complex medical needs    Anticipated Discharge Information  Discharge Disposition: D/T to home under A care in anticipation of covered skilled care (06)  Discharge Address: [facility] [address]  [facility] [id number]

## 2025-04-17 NOTE — ED PROVIDER NOTES
ED Provider Note    CHIEF COMPLAINT  Chief Complaint   Patient presents with    Sent by MD            EXTERNAL RECORDS REVIEWED  The patient has a history of rectal adenocarcinoma. The patient was seen at radiation today and oncology yesterday. He is on chemotherapy and radiation. They are holding off on the radiation due to his rectal pain. He was started on antibiotics. He was last discharged on 4/14/25 and did have a necrotic mass or abcess on CT. Dr. Tse saw him and did an I&D and was discharged on oral antibiotics.      HPI/ROS  LIMITATION TO HISTORY   Select: : None  OUTSIDE HISTORIAN(S):  None    Booker Saucedo is a 59 y.o. male with history of rectal adenocarcinoma who presents to the Emergency Department sent by his doctor for the evaluation of worsening rectal pain, onset two days ago. He notes the pain has gotten worse since starting radiation a few weeks ago.  He was recently seen for colostomy placement and incision and drainage of the rectum however states the pain has not improved since that time.  The patient states he now has abdominal pain as well and feels like he needs to pass gas. He describes pressure in his rectum and pelvic girdle, and notes that his colostomy has had decreased drainage since this afternoon. He denies vomiting or fever but states he is nauseous. He is still oral taking antibiotics.  He was recently started on a fentanyl patch by his palliative care/pain management providers.    PAST MEDICAL HISTORY  Past Medical History:   Diagnosis Date    Bowel habit changes     Cancer (HCC) 09/2024    colorectal cancer    High cholesterol     Hypertension     Kidney stone     4-5x    MI (myocardial infarction) (HCC) 2015        SURGICAL HISTORY  Past Surgical History:   Procedure Laterality Date    NM LAP, SURG COLOSTOMY  4/8/2025    Procedure: CREATION, COLOSTOMY, LAPAROSCOPIC;  Surgeon: Hector Tse M.D.;  Location: SURGERY [facility];  Service: Gastroenterology    NM I&D  PERIRECTAL ABSCESS  4/8/2025    Procedure: INCISION AND DRAINAGE, ABSCESS, PERIRECTAL;  Surgeon: Hector Tse M.D.;  Location: SURGERY [facility];  Service: Gastroenterology    CATH PLACEMENT Right 10/15/2024    Procedure: CENTRAL VENOUS CATHETER PLACEMENT WITH SUBCUTANEOUS PORT;  Surgeon: Hector Tse MD, PhD;  Location: SURGERY [facility];  Service: Gastroenterology    STENT PLACEMENT  2015    BARE METAL STENT- CARDIAC    APPENDECTOMY      KNEE ARTHROSCOPY      MENISCECTOMY    TONSILLECTOMY          FAMILY HISTORY  Family History   Problem Relation Age of Onset    Bladder cancer Father     Breast Cancer Sister        SOCIAL HISTORY   reports that he quit smoking about 15 months ago. His smoking use included cigars. He has never used smokeless tobacco. He reports that he does not currently use alcohol. He reports that he does not use drugs.    CURRENT MEDICATIONS  Previous Medications    AMOXICILLIN-CLAVULANATE (AUGMENTIN) 875-125 MG TAB    Take 1 Tablet by mouth every 12 hours for 4 days.    ASPIRIN 81 MG EC TABLET    Take 81 mg by mouth every morning.    ATORVASTATIN (LIPITOR) 40 MG TAB    Take 40 mg by mouth every day.    CAPECITABINE (XELODA) 500 MG TABLET    Take 3 tabs of 500mg strength (total dose of 1500 mg) by mouth 2 times a day (at least 12 hrs apart and 30 min after food) Monday-Friday on days of radiation x 30 treatments    CARVEDILOL (COREG) 12.5 MG TAB    Take 12.5 mg by mouth 2 times a day.    DEXAMETHASONE (DECADRON) 2 MG TABLET    Take 1 Tablet by mouth 3 times a day with meals for 10 days.    DULOXETINE (CYMBALTA) 30 MG CAP DR PARTICLES    Take 1 Capsule by mouth every day.    FENTANYL (DURAGESIC) 50 MCG/HR PATCH 72 HR    Place 1 Patch on the skin every 72 hours for 15 days.    GABAPENTIN (NEURONTIN) 100 MG CAP    Take 1 Capsule by mouth 3 times a day for 15 days.    HYDROXYZINE HCL (ATARAX) 25 MG TAB    Take 25 mg by mouth every 8 hours as needed for Anxiety.    LIDOCAINE  (XYLOCAINE) 5 % OINTMENT    Apply to anal area    MORPHINE SR (MS CONTIN) 100 MG TAB CR TABLET    Take 1 Tablet by mouth every 12 hours for 7 days.    OMEPRAZOLE (PRILOSEC) 20 MG DELAYED-RELEASE CAPSULE    Take 1 Capsule by mouth every day.    ONDANSETRON (ZOFRAN) 4 MG TAB TABLET    Take 1 Tablet by mouth every four hours as needed for Nausea/Vomiting (for nausea, vomiting).    OXYCODONE IMMEDIATE RELEASE (ROXICODONE) 10 MG IMMEDIATE RELEASE TABLET    Take 1 Tablet by mouth every 6 hours as needed for Severe Pain for up to 7 days.    SILVER SULFADIAZINE (SILVADENE) 1 % CREAM    Apply to anal area    TAMSULOSIN (FLOMAX) 0.4 MG CAPSULE    Take 1 Capsule by mouth every day.       ALLERGIES  Patient has no known allergies.    PHYSICAL EXAM  BP (!) 133/106   Pulse 97   Temp 36.2 °C (97.2 °F) (Temporal)   Resp 18   SpO2 94%      Constitutional: Nontoxic appearing. Alert in no apparent distress.  HENT: Normocephalic, Atraumatic. Bilateral external ears normal. Nose normal.  Moist mucous membranes.  Oropharynx clear.  Eyes: Pupils are equal and reactive. Conjunctiva normal.   Neck: Supple, full range of motion  Heart: Regular rate and rhythm.  No murmurs.    Lungs: No respiratory distress, normal work of breathing. Lungs clear to auscultation bilaterally.  Abdomen Soft, no distention.  Colostomy with bag to the left lower quadrant and diffuse abdominal tenderness.   Rectal: Small non thrombosed external hemorrhoid. Some redundant skin with minimal irritation. No drainage or induration noted.   Musculoskeletal: Atraumatic. No obvious deformities noted.  No lower extremity edema.  Skin: Warm, Dry.  No erythema, No rash.   Neurologic: Alert and oriented x3. Moving all extremities spontaneously without focal deficits.  Psychiatric: Affect normal, Mood normal, Appears appropriate and not intoxicated.     DIAGNOSTIC STUDIES / PROCEDURES    LABS  Labs Reviewed   CBC WITH DIFFERENTIAL - Abnormal; Notable for the following  components:       Result Value    WBC 4.2 (*)     RBC 3.53 (*)     Hemoglobin 10.8 (*)     Hematocrit 32.6 (*)     RDW 52.5 (*)     Platelet Count 103 (*)     Neutrophils-Polys 81.40 (*)     Lymphocytes 5.30 (*)     Lymphs (Absolute) 0.22 (*)     All other components within normal limits   COMP METABOLIC PANEL - Abnormal; Notable for the following components:    Glucose 100 (*)     All other components within normal limits   LIPASE - Abnormal; Notable for the following components:    Lipase 8 (*)     All other components within normal limits   LACTIC ACID   ESTIMATED GFR   DIFFERENTIAL MANUAL   PERIPHERAL SMEAR REVIEW   PLATELET ESTIMATE   MORPHOLOGY   IMMATURE PLT FRACTION         RADIOLOGY  I have independently interpreted the diagnostic imaging associated with this visit and am waiting the final reading from the radiologist.   My preliminary interpretation is as follows: no bowel obstruction    Radiologist interpretation:  CT-ABDOMEN-PELVIS WITH   Final Result      1.  Perirectal necrotic mass or abscess, slightly decreased in size from prior exam.   2.  Postoperative change with LEFT lower quadrant colostomy.  Potential stenosis within the colostomy at the lower abdominal wall.  Increased stool in the proximal colon suggesting constipation.   3.  No bowel obstruction or perforation.   4.  Probable liver metastases, slightly increased in size from prior exam which may be in part due to treatment effect.   5.  Small RIGHT pleural effusion.   6.  Bilateral pulmonary nodules again seen.            COURSE & MEDICAL DECISION MAKING    5:20 PM - Patient seen and examined at bedside. Discussed plan of care, including ordering labs and radiology. Patient agrees to the plan of care. The patient will be resuscitated with 1L NS IV and medicated with Zofran 4 mg and Dilaudid 1 mg. Ordered for lactic acid, lipase, CMP, and CBC w/ diff, Immature PLT fraction, Morphology, Platelet estimate, Peripheral smear review, Differential  manual, and CT-Abdomen pelvis with to evaluate his symptoms.     6:13 - Patient will be further medicated with Dilaudid 1 mg.       ASSESSMENT, COURSE AND PLAN  Care Narrative: Patient with history of rectal cancer currently on chemotherapy and radiation who was recently admitted for incision and drainage of the rectal area and colostomy placement now presenting with ongoing intractable rectal pain.  The patient is afebrile, mildly tachycardic on arrival with otherwise reassuring vital signs.  Rectal exam does not demonstrate any obvious area of infection or abscess.  Labs show chronic neutropenia and anemia without any findings concerning for sepsis.  He has no renal dysfunction or electrolyte abnormality.  Repeat CT shows this area of possibly a necrotic mass or abscess in the rectum is slightly decreased from prior however still present.  He has no evidence of bowel obstruction however does have some findings of constipation and possible stenosis of his colostomy.    I discussed the CT findings with the patient's surgeon Dr. Tse.  He does not feel that there is any further surgical indication at this time.  He is recommending continuation of oral antibiotics and pain control.  He is not concerned about the stenosis of his ostomy and thinks this is more related to constipation due to his increasing pain requirement.  He is happy to consult on the patient while he is here in the hospital if necessary.    Upon reassessment, patient is resting comfortably with normal vital signs.  Patient continues to report ongoing severe pain and is on his third dose of hydromorphone and therefore we will plan for admission for intractable pain discussed results with patient and/or family as well as plan of care for admission. Patient verbalizes understanding and agreement to this plan of care.    ADDITIONAL PROBLEM LIST  Problem #1: Intractable rectal pain -related to cancer and radiation, plan to admit for pain control, may  benefit from palliative care oncology seeing as an inpatient    Problem #2: Drug-induced constipation -continue aggressive bowel regimen      DISPOSITION AND DISCUSSIONS  I have discussed management of the patient with the following physicians and MARTHA's:    Dr. Tse, colorectal surgeon  Dr. Wen, hospitalist, who accepts admission of the patient      Decision tools and prescription drugs considered including, but not limited to: Antibiotics no signs of worsening infectious process .    DISPOSITION:  Patient will be hospitalized by Dr. Wen in stable condition.      FINAL DIAGNOSIS  1. Rectal pain    2. Drug-induced constipation        The note accurately reflects work and decisions made by me.  Vivian Jhaveri M.D.  4/16/2025  8:51 PM     Lory MCDONALD (Tyra), am scribing for, and in the presence of, Vivian Jhaveri M.D..    Electronically signed by: Lory Lemus (Tyra), 4/16/2025    Vivian MCDONALD M.D. personally performed the services described in this documentation, as scribed by Lory Lemus in my presence, and it is both accurate and complete.

## 2025-04-17 NOTE — ED NOTES
Medication history reviewed with patient at bedside with home medication bottles provided.   Med rec is complete  Allergies reviewed.     Patient was prescribed a 4 day course of Augmentin 875/125mg BID on 4/14/25- Last dose 4/16/25 PM.   Anticoagulants: No  Dispense history available in EPIC: Yes      Albert Ocasio

## 2025-04-17 NOTE — PROGRESS NOTES
4 Eyes Skin Assessment Completed by Jigna TOBIN RN and Jigna PARIS RN.    Head WDL  Ears WDL  Nose WDL  Mouth WDL  Neck WDL  Breast/Chest Rash R chest port   Shoulder Blades WDL  Spine Redness and Blanching  (R) Arm/Elbow/Hand Redness, Blanching, Bruising, and Scab  (L) Arm/Elbow/Hand Redness, Blanching, Bruising, and Scab  Abdomen Redness and Bruising LLQ colostomy   Groin WDL  Scrotum/Coccyx/Buttocks Redness, Blanching, and Excoriation  (R) Leg Redness  (L) Leg Redness  (R) Heel/Foot/Toe Redness, Blanching, and Rash, dryness, flaky, peeling   (L) Heel/Foot/Toe Redness, Blanching, and Rash,           Devices In Places Pulse Ox and Central Line      Interventions In Place Pillows    Possible Skin Injury No    Pictures Uploaded Into Epic Yes  Wound Consult Placed Yes  RN Wound Prevention Protocol Ordered No

## 2025-04-17 NOTE — WOUND TEAM
Ostomy RN in to check on patient for established colostomy. Patient appliance is intact, he is independent with care, extra supplies at bedside. Patient well rehearsed and with  no further questions or concerns. Updated RN Abhinav. Ostomy RN's are available if anything comes up or if patient requests. At this time no further follow up is indicated, wound team signing off.

## 2025-04-17 NOTE — PROGRESS NOTES
Hospital Medicine Daily Progress Note    Date of Service  4/17/2025    Chief Complaint  Booker Saucedo is a 59 y.o. male admitted 4/16/2025 with intractable pain    Hospital Course    59 y.o. male with history of rectal cancer followed by Dr. Kim s/p 8 cycles of FolFirnox chemotherapy, chemoradiation therapy 03/2025, who was just admitted for rectal abscess had I&D by surgery and discharged 4/14/2025 who presented 4/16/2025 with evaluation for intractable pain.  Patient was discharged with pain meds and oral Augmentin .In ER today, repeat CTAP noted perirectal necrotic mass or abscess, slightly decreased in size from prior exam. Case was discussed with colorectal surgery, Dr. Tse, nothing more to offer from surgery service, recommended admitting for pain control.  Patient receiving radiation therapy.  Palliative medicine following for pain management    Interval Problem Update    4/17/2025  Seen and examined at bedside  Vitals been stable  Pain being well-managed  Reviewed labs noted to have leukopenia white count 3.8, hemoglobin 11.1, platelet count 102K, ANC above  1500, sodium 132 potassium 3.6, renal function is stable  I have reviewed his CT abdomen pelvis noted to have a rectal necrotic mass, slightly decreased, liver metastasis, colostomy bag  Chart reviewed, meds reviewed  Plan  Placed on cardiac monitor  Patient currently requiring PCA pump for pain management  Fentanyl patch and OxyContin discontinued  Patient started on methadone  Continue on Augmentin  Continue on Decadron  Continue on Lyrica  Continue on IV fluid  Repeat BMP in a.m. to monitor electrolytes and renal function  Check magnesium, phosphorus   repeat CBC in a.m. to monitor white count and hemoglobin  Requiring close monitoring for toxicity while on continuous hydromorphone on pump  Need close oxygen monitoring  High risk of deterioration into severe respiratory failure.  Need close monitoring.  Case discussed with palliative   Augustine  Patient has a high medical complexity, complex decision making and is at high risk of complication, morbidity and mortality      I have discussed this patient's plan of care and discharge plan at IDT rounds today with Case Management, Nursing, Nursing leadership, and other members of the IDT team.    Consultants/Specialty  general surgery, oncology, and palliative care    Code Status  DNAR/DNI    Disposition  The patient is not medically cleared for discharge to home or a post-acute facility.  Anticipate discharge to: home with close outpatient follow-up    I have placed the appropriate orders for post-discharge needs.    Review of Systems  Review of Systems   Respiratory:  Negative for shortness of breath.    Cardiovascular:  Negative for chest pain.   Gastrointestinal:  Positive for abdominal pain. Negative for nausea and vomiting.        Physical Exam  Temp:  [35.9 °C (96.7 °F)-37.1 °C (98.7 °F)] 36.3 °C (97.3 °F)  Pulse:  [55-98] 55  Resp:  [16-18] 16  BP: (101-150)/() 125/59  SpO2:  [91 %-99 %] 94 %    Physical Exam  Constitutional:       Appearance: He is ill-appearing.   HENT:      Mouth/Throat:      Mouth: Mucous membranes are dry.   Cardiovascular:      Rate and Rhythm: Normal rate and regular rhythm.      Pulses: Normal pulses.   Pulmonary:      Effort: Pulmonary effort is normal. No respiratory distress.   Abdominal:      General: There is no distension.      Tenderness: There is no abdominal tenderness.      Comments: Colostomy bag with good output   Musculoskeletal:      Right lower leg: No edema.      Left lower leg: No edema.   Skin:     General: Skin is warm.   Neurological:      General: No focal deficit present.      Mental Status: He is alert and oriented to person, place, and time.   Psychiatric:         Mood and Affect: Mood normal.         Fluids  No intake or output data in the 24 hours ending 04/17/25 1634     Laboratory  Recent Labs     04/16/25  1702 04/17/25  0750   WBC 4.2*  3.8*   RBC 3.53* 3.50*   HEMOGLOBIN 10.8* 11.1*   HEMATOCRIT 32.6* 32.7*   MCV 92.4 93.4   MCH 30.6 31.7   MCHC 33.1 33.9   RDW 52.5* 55.8*   PLATELETCT 103* 102*   MPV 10.1 9.8     Recent Labs     04/16/25  1702 04/17/25  0750   SODIUM 137 136   POTASSIUM 3.6 3.6   CHLORIDE 102 103   CO2 26 25   GLUCOSE 100* 97   BUN 12 14   CREATININE 0.66 0.81   CALCIUM 9.5 9.4                   Imaging  CT-ABDOMEN-PELVIS WITH   Final Result      1.  Perirectal necrotic mass or abscess, slightly decreased in size from prior exam.   2.  Postoperative change with LEFT lower quadrant colostomy.  Potential stenosis within the colostomy at the lower abdominal wall.  Increased stool in the proximal colon suggesting constipation.   3.  No bowel obstruction or perforation.   4.  Probable liver metastases, slightly increased in size from prior exam which may be in part due to treatment effect.   5.  Small RIGHT pleural effusion.   6.  Bilateral pulmonary nodules again seen.           Assessment/Plan  * Cancer associated pain- (present on admission)  Assessment & Plan  4/17/2025    Patient currently requiring PCA pump for pain management  Fentanyl patch and OxyContin discontinued  Patient started on methadone  Continue on Augmentin  Continue on Decadron  Continue on Lyrica  Continue on IV fluid  Repeat BMP in a.m. to monitor electrolytes and renal function  Check magnesium, phosphorus   repeat CBC in a.m. to monitor white count and hemoglobin  Requiring close monitoring for toxicity while on continuous hydromorphone on pump  Need close oxygen monitoring  High risk of deterioration into severe respiratory failure.  Need close monitoring.  Severe malnutrition.  Need to monitor for refeeding syndrome  Case discussed with palliative Dr. Augustine  Patient has a high medical complexity, complex decision making and is at high risk of complication, morbidity and mortality    History of creation of ostomy (HCC)  Assessment & Plan  Ostomy care    ACP  (advance care planning)  Assessment & Plan  DNR/DNI    Rectal abscess- (present on admission)  Assessment & Plan  He had I&D last admission  Completed IV Zosyn while inpatient  Continue PO Augmentin    Drug induced constipation- (present on admission)  Assessment & Plan  Bowel regimen    Rectal cancer (HCC)- (present on admission)  Assessment & Plan  Followed by Dr. Kim  Prior chemotherapy, chemoradiation therapy, surgery           VTE prophylaxis: lovenox    I have performed a physical exam and reviewed and updated ROS and Plan today (4/17/2025). In review of yesterday's note (4/16/2025), there are no changes except as documented above.

## 2025-04-17 NOTE — ASSESSMENT & PLAN NOTE
4/18/2025  Continue on cardiac monitor  Discontinue PCA pump  Discontinue IV fluid  Fentanyl patch and OxyContin discontinued  Patient started on methadone  Added oxycodone 30 mg every every 4 hour for breakthrough pain  On IV hydromorphone  Continue on Augmentin  Continue on Decadron  Continue on Lyrica  repeat CBC in a.m. to monitor white count and hemoglobin  Requiring close monitoring for toxicity while on IV narcotics  Need close oxygen monitoring  High risk of deterioration into severe respiratory failure.  Need close monitoring.  Case discussed with palliative Dr. Siddiqui.  Patient has a high medical complexity, complex decision making and is at high risk of complication, morbidity and mortality

## 2025-04-17 NOTE — THERAPY
Physical Therapy   Initial Evaluation     Patient Name: Booker Saucedo  Age:  59 y.o., Sex:  male  Medical Record #: [id number]  Today's Date: 4/17/2025          Assessment  Patient is 59 y.o. male w/ hx of rectal cancer.  Recently d/c'd on 4/14/2025 s/p I&D of rectal abscess.  Admitted w/ intractable pain.  He reports living alone in a two story house, but he remains on the ground level.  He was indep w/ mobility PTA.  Today, he is able to mobilize as noted below, including ambulating 200 ft and performing stairs.  No loss of balance or need of assist.  PT for eval only.  Plan    Physical Therapy Initial Treatment Plan   Duration: Evaluation only    DC Equipment Recommendations: None  Discharge Recommendations: Anticipate that the patient will have no further physical therapy needs after discharge from the hospital          Objective       04/17/25 1403   Prior Living Situation   Housing / Facility 2 Story House   Steps Into Home 2   Steps In Home 12   Rail None;Left Rail (Steps in Home)   Equipment Owned None   Lives with - Patient's Self Care Capacity Alone and Able to Care For Self   Comments Pt reports that he can remain on the ground floor   Prior Level of Functional Mobility   Bed Mobility Independent   Transfer Status Independent   Ambulation Independent   Assistive Devices Used None   Stairs Independent   Cognition    Level of Consciousness Alert   Strength Lower Body   Comments   (grossly wnly)   Balance Assessment   Sitting Balance (Static) Fair +   Sitting Balance (Dynamic) Fair +   Standing Balance (Static) Fair +   Standing Balance (Dynamic) Fair +   Weight Shift Sitting Good   Weight Shift Standing Good   Bed Mobility    Supine to Sit Supervised   Sit to Supine Supervised   Gait Analysis   Gait Level Of Assist Supervised   Assistive Device None   Distance (Feet) 200   # of Stairs Climbed 10   Functional Mobility   Sit to Stand Supervised   Bed, Chair, Wheelchair Transfer Supervised   Physical  Therapy Initial Treatment Plan    Duration Evaluation only   Anticipated Discharge Equipment and Recommendations   DC Equipment Recommendations None   Discharge Recommendations Anticipate that the patient will have no further physical therapy needs after discharge from the hospital

## 2025-04-17 NOTE — PROGRESS NOTES
Patient received treatment in Radiation Therapy.   Patient received treatment number 23 of 30 planned treatments.

## 2025-04-17 NOTE — THERAPY
Occupational Therapy   Initial Evaluation     Patient Name: Booker Saucedo  Age:  59 y.o., Sex:  male  Medical Record #: [id number]  Today's Date: 4/17/2025          Assessment  Patient is 59 y.o. male with a diagnosis of intractable pn.   Pt is at or near his/her functional baseline. Pt with no further skilled OT needs in the acute care setting at this time.       Plan    Occupational Therapy Initial Treatment Plan   Duration: (P) Discharge Needs Only       Discharge Recommendations: (P) Anticipate that the patient will have no further occupational therapy needs after discharge from the hospital        04/17/25 1135   Prior Living Situation   Housing / Facility 2 Story House   Steps Into Home 2   Steps In Home 12   Equipment Owned None   Lives with - Patient's Self Care Capacity Alone and Able to Care For Self   Prior Level of ADL Function   Self Feeding Independent   Grooming / Hygiene Independent   Bathing Independent   Dressing Independent   Toileting Independent   Active ROM Upper Body   Active ROM Upper Body  WDL   Strength Upper Body   Upper Body Strength  WDL   Balance Assessment   Sitting Balance (Static) Good   Sitting Balance (Dynamic) Good   Standing Balance (Static) Fair +   Standing Balance (Dynamic) Fair +   Weight Shift Sitting Good   Weight Shift Standing Good   ADL Assessment   Grooming Supervision   Upper Body Dressing Supervision   Lower Body Dressing Supervision   Toileting Supervision   Functional Mobility   Sit to Stand Supervised   Bed, Chair, Wheelchair Transfer Supervised   Occupational Therapy Initial Treatment Plan    Duration Discharge Needs Only   Anticipated Discharge Equipment and Recommendations   Discharge Recommendations Anticipate that the patient will have no further occupational therapy needs after discharge from the hospital

## 2025-04-17 NOTE — CARE PLAN
The patient is Watcher - Medium risk of patient condition declining or worsening    Shift Goals  Clinical Goals: pain mgmt, radiation  Patient Goals: pain control  Family Goals: NADIR    Progress made toward(s) clinical / shift goals:  pt meds passed per MAR, no injuries this shift      Problem: Pain - Standard  Goal: Alleviation of pain or a reduction in pain to the patient’s comfort goal  Outcome: Progressing     Problem: Knowledge Deficit - Standard  Goal: Patient and family/care givers will demonstrate understanding of plan of care, disease process/condition, diagnostic tests and medications  Outcome: Progressing     Problem: Fluid Volume  Goal: Fluid volume balance will be maintained  Outcome: Progressing     Problem: Respiratory  Goal: Patient will achieve/maintain optimum respiratory ventilation and gas exchange  Outcome: Progressing       Patient is not progressing towards the following goals:

## 2025-04-17 NOTE — ED NOTES
Pt complaining of rectal pain; told pt awaiting for verification from pharmacy, notify hospitalist as well

## 2025-04-17 NOTE — H&P
Hospital Medicine History & Physical Note    Date of Service  4/16/2025    Primary Care Physician  Rickie Naranjo M.D.    Consultants  Surgery (Dr. Tse) -- nothing more to offer  Palliative care    Code Status  DNAR/DNI    Chief Complaint  Chief Complaint   Patient presents with    Sent by MD              History of Presenting Illness  Booker Saucedo is a 59 y.o. male with history of rectal cancer followed by Dr. Kim s/p 8 cycles of FolFirnox chemotherapy, chemoradiation therapy 03/2025, who was just admitted for rectal abscess had I&D by surgery and discharged 4/14/2025 who presented 4/16/2025 with evaluation for intractable pain.  Patient was discharged with pain meds and oral Augmentin.    In ER today, repeat CTAP noted perirectal necrotic mass or abscess, slightly decreased in size from prior exam.  Case was discussed with colorectal surgery, Dr. Tse, nothing more to offer from surgery service, recommended admitting for pain control.  Admitted to medicine service for further evaluation and treatment.    I discussed the plan of care with patient, bedside RN, charge RN, and pharmacy.    Review of Systems  Review of Systems   Constitutional:  Positive for malaise/fatigue. Negative for chills and fever.   HENT:  Negative for hearing loss and tinnitus.    Eyes:  Negative for blurred vision and double vision.   Respiratory:  Negative for cough and shortness of breath.    Cardiovascular:  Negative for chest pain and palpitations.   Gastrointestinal:  Positive for abdominal pain. Negative for heartburn, nausea and vomiting.   Genitourinary:  Negative for dysuria and urgency.   Musculoskeletal:  Positive for back pain and myalgias.   Skin:  Negative for itching and rash.   Neurological:  Positive for weakness. Negative for dizziness, focal weakness and headaches.   Endo/Heme/Allergies:  Negative for environmental allergies. Does not bruise/bleed easily.   Psychiatric/Behavioral:  Negative for  depression and substance abuse.    All other systems reviewed and are negative.      Past Medical History   has a past medical history of Bowel habit changes, Cancer ([facility]) (09/2024), High cholesterol, Hypertension, Kidney stone, and MI (myocardial infarction) ([facility]) (2015).    Surgical History   has a past surgical history that includes stent placement (2015); knee arthroscopy; appendectomy; tonsillectomy; cath placement (Right, 10/15/2024); pr lap, surg colostomy (4/8/2025); and pr i&d perirectal abscess (4/8/2025).     Family History  family history includes Bladder cancer in his father; Breast Cancer in his sister.   Family history reviewed with patient. There is family history that is pertinent to the chief complaint.     Social History   reports that he quit smoking about 15 months ago. His smoking use included cigars. He has never used smokeless tobacco. He reports that he does not currently use alcohol. He reports that he does not use drugs.    Allergies  No Known Allergies    Medications  Prior to Admission Medications   Prescriptions Last Dose Informant Patient Reported? Taking?   DULoxetine (CYMBALTA) 30 MG Cap DR Particles 4/16/2025 Morning Patient, Rx Bottle (For Med Information) No Yes   Sig: Take 1 Capsule by mouth every day.   amoxicillin-clavulanate (AUGMENTIN) 875-125 MG Tab 4/16/2025 Evening Patient, Rx Bottle (For Med Information) No Yes   Sig: Take 1 Tablet by mouth every 12 hours for 4 days.   aspirin 81 MG EC tablet 4/16/2025 Morning Patient, Rx Bottle (For Med Information) Yes Yes   Sig: Take 81 mg by mouth every morning.   atorvastatin (LIPITOR) 40 MG Tab 4/16/2025 Morning Patient, Rx Bottle (For Med Information) Yes Yes   Sig: Take 40 mg by mouth every day.   capecitabine (XELODA) 500 MG tablet Not Taking Patient, Rx Bottle (For Med Information) No No   Sig: Take 3 tabs of 500mg strength (total dose of 1500 mg) by mouth 2 times a day (at least 12 hrs apart and 30 min after food) Monday-Friday  on days of radiation x 30 treatments   Patient not taking: Reported on 4/16/2025   carvedilol (COREG) 12.5 MG Tab Not Taking Patient, Rx Bottle (For Med Information) Yes No   Sig: Take 12.5 mg by mouth 2 times a day.   Patient not taking: Reported on 4/16/2025   dexamethasone (DECADRON) 2 MG tablet 4/16/2025 Morning Patient, Rx Bottle (For Med Information) No Yes   Sig: Take 1 Tablet by mouth 3 times a day with meals for 10 days.   fentaNYL (DURAGESIC) 50 MCG/HR PATCH 72 HR 4/15/2025 Noon Patient, Rx Bottle (For Med Information) No Yes   Sig: Place 1 Patch on the skin every 72 hours for 15 days.   gabapentin (NEURONTIN) 100 MG Cap 4/16/2025 Evening Patient, Rx Bottle (For Med Information) No Yes   Sig: Take 1 Capsule by mouth 3 times a day for 15 days.   hydrOXYzine HCl (ATARAX) 25 MG Tab 4/15/2025 Bedtime Patient, Rx Bottle (For Med Information) Yes Yes   Sig: Take 25 mg by mouth every 8 hours as needed for Anxiety.   lidocaine (XYLOCAINE) 5 % Ointment Unknown Patient, Rx Bottle (For Med Information) No No   Sig: Apply to anal area   Patient taking differently: Apply 1 Each topically as needed (pain). Apply to anal area   morphine SR (MS CONTIN) 100 MG Tab CR tablet 4/15/2025 Evening Patient, Rx Bottle (For Med Information) No Yes   Sig: Take 1 Tablet by mouth every 12 hours for 7 days.   omeprazole (PRILOSEC) 20 MG delayed-release capsule 4/16/2025 Morning Patient, Rx Bottle (For Med Information) No Yes   Sig: Take 1 Capsule by mouth every day.   ondansetron (ZOFRAN) 4 MG Tab tablet Unknown Patient No No   Sig: Take 1 Tablet by mouth every four hours as needed for Nausea/Vomiting (for nausea, vomiting).   oxyCODONE immediate release (ROXICODONE) 10 MG immediate release tablet Unknown Patient, Rx Bottle (For Med Information) No No   Sig: Take 1 Tablet by mouth every 6 hours as needed for Severe Pain for up to 7 days.   silver sulfADIAZINE (SILVADENE) 1 % Cream Unknown Patient No No   Sig: Apply to anal area    Patient taking differently: Apply 1 Each topically every day. Apply to anal area   tamsulosin (FLOMAX) 0.4 MG capsule 4/15/2025 Bedtime Patient, Rx Bottle (For Med Information) No Yes   Sig: Take 1 Capsule by mouth every day.      Facility-Administered Medications: None       Physical Exam  Temp:  [36.1 °C (97 °F)-37.1 °C (98.7 °F)] 37.1 °C (98.7 °F)  Pulse:  [] 93  Resp:  [17-18] 18  BP: (111-150)/() 120/51  SpO2:  [92 %-99 %] 99 %  Blood Pressure: (!) 150/74   Temperature: 36.2 °C (97.2 °F)   Pulse: 89   Respiration: 18   Pulse Oximetry: 97 %       Physical Exam  Vitals and nursing note reviewed.   Constitutional:       General: He is not in acute distress.     Appearance: He is ill-appearing.   HENT:      Head: Normocephalic and atraumatic.      Nose: Nose normal.      Mouth/Throat:      Mouth: Mucous membranes are dry.      Pharynx: Oropharynx is clear.   Eyes:      General: No scleral icterus.     Extraocular Movements: Extraocular movements intact.   Cardiovascular:      Rate and Rhythm: Normal rate and regular rhythm.      Pulses: Normal pulses.      Heart sounds:      No friction rub.   Pulmonary:      Effort: No respiratory distress.      Breath sounds: No wheezing or rales.   Chest:      Chest wall: No tenderness.   Abdominal:      General: There is distension.      Tenderness: There is abdominal tenderness. There is no guarding or rebound.      Comments: Ostomy in place   Musculoskeletal:         General: No swelling. Normal range of motion.      Cervical back: Neck supple. No tenderness.   Skin:     General: Skin is warm and dry.      Capillary Refill: Capillary refill takes less than 2 seconds.   Neurological:      General: No focal deficit present.      Mental Status: He is alert and oriented to person, place, and time.   Psychiatric:         Mood and Affect: Mood normal.         Laboratory:  Recent Labs     04/14/25  0033 04/16/25  1702   WBC 4.3* 4.2*   RBC 3.51* 3.53*   HEMOGLOBIN  "11.0* 10.8*   HEMATOCRIT 33.0* 32.6*   MCV 94.0 92.4   MCH 31.3 30.6   MCHC 33.3 33.1   RDW 52.1* 52.5*   PLATELETCT 106* 103*   MPV 10.2 10.1     Recent Labs     04/14/25  0033 04/16/25  1702   SODIUM 138 137   POTASSIUM 4.1 3.6   CHLORIDE 104 102   CO2 27 26   GLUCOSE 138* 100*   BUN 14 12   CREATININE 0.76 0.66   CALCIUM 9.0 9.5     Recent Labs     04/14/25  0033 04/16/25  1702   ALTSGPT  --  23   ASTSGOT  --  17   ALKPHOSPHAT  --  89   TBILIRUBIN  --  0.7   LIPASE  --  8*   GLUCOSE 138* 100*         No results for input(s): \"NTPROBNP\" in the last 72 hours.      No results for input(s): \"TROPONINT\" in the last 72 hours.    Imaging:  CT-ABDOMEN-PELVIS WITH   Final Result      1.  Perirectal necrotic mass or abscess, slightly decreased in size from prior exam.   2.  Postoperative change with LEFT lower quadrant colostomy.  Potential stenosis within the colostomy at the lower abdominal wall.  Increased stool in the proximal colon suggesting constipation.   3.  No bowel obstruction or perforation.   4.  Probable liver metastases, slightly increased in size from prior exam which may be in part due to treatment effect.   5.  Small RIGHT pleural effusion.   6.  Bilateral pulmonary nodules again seen.          no X-Ray or EKG requiring interpretation    Assessment/Plan:  Justification for Admission Status  I anticipate this patient will require at least 2 midnights hospitalization, therefore appropriate for inpatient status.      * Cancer associated pain- (present on admission)  Assessment & Plan  Intractable pain  Continue fentanyl patch 50 mcg every 72 hours  Continue MS Contin 100 mg every 12 hours  Continue gabapentin  Continue Cymbalta    He was previously on Dilaudid PCA pump, reported helps  Ordered Dilaudid PCA pump    Multimodal pain management--Tylenol, Toradol, as needed IV Dilaudid    Palliative care consult for assistance with pain management    History of creation of ostomy (HCC)  Assessment & Plan  Ostomy " care    Rectal abscess- (present on admission)  Assessment & Plan  He had I&D last admission  Completed IV Zosyn while inpatient  Continue PO Augmentin    Drug induced constipation- (present on admission)  Assessment & Plan  Bowel regimen    Rectal cancer (HCC)- (present on admission)  Assessment & Plan  Followed by Dr. Kim  Prior chemotherapy, chemoradiation therapy, surgery      ACP (advance care planning)  Assessment & Plan  Goal of care discussed with patient in emergency room.  He stated he understands the aggressive nature of his cancer situation.  He stated he does not wish to have any aggressive/heroic life-sustaining measures-no CPR/defibrillation/intubation or mechanical ventilation.  He is however agreeable for minimally invasive medical management as warranted.  Diagnosis, prognosis, question and concern addressed.  DNR/DNI status comfort.  ACP: 16 minutes        VTE prophylaxis: SCDs/TEDs

## 2025-04-17 NOTE — ED NOTES
Pt to room, changed into gown, placed on monitoring. Port to R chest accessed. Blood return difficult to obtain, techniqies used and blood return eventually noted and sent to lab. Pt updated on plan of care, call light within reach

## 2025-04-17 NOTE — CONSULTS
"MRN: [id number]  Date of palliative consult: 2025  Reason for consult: Symptom managemeng  Referring provider: Dr. Wen  Location of consult: [facility]  Additional consulting services: Radiation oncology    HPI:   Booker Saucedo is a \"59 y.o. male with history of rectal cancer followed by Dr. Kim s/p 8 cycles of FolFirnox chemotherapy, chemoradiation therapy 2025, who was just admitted for rectal abscess had I&D by surgery and discharged 2025 who presented 2025 with evaluation for intractable pain.  Patient was discharged with pain meds and oral Augmentin.     In ER today, repeat CTAP noted perirectal necrotic mass or abscess, slightly decreased in size from prior exam.  Case was discussed with colorectal surgery, Dr. Tse, nothing more to offer from surgery service, recommended admitting for pain control.  Admitted to medicine service for further evaluation and treatment.\"    Palliative care was consulted for symptom management of his uncontrolled pain.    Additional Pertinent Medical History: S/p recent colostomy placement    Additional symptoms: denies  dyspnea, nausea, vomiting, constipation, fatigue, sleep, mood, appetite    Medication Allergy/Sensitivities:  No Known Allergies    ROS:    ROS Negative except as in HPI    PE:   Recent vital signs  BMI: There is no height or weight on file to calculate BMI.    Temp (24hrs), Av.5 °C (97.7 °F), Min:36.2 °C (97.2 °F), Max:37.1 °C (98.7 °F)  Temperature: 36.2 °C (97.2 °F)  Pulse  Av.3  Min: 44  Max: 110   Blood Pressure: 103/56       Physical Exam somnolent but arousable, in no distress, chronically ill-appearing, normal respiratory effort on room air  Recent Labs     25  1702 25  0750   SODIUM 137 136   POTASSIUM 3.6 3.6   CHLORIDE 102 103   CO2 26 25   GLUCOSE 100* 97   BUN 12 14   CREATININE 0.66 0.81   CALCIUM 9.5 9.4     Recent Labs     25  1702 25  0750   WBC 4.2* 3.8*   RBC 3.53* 3.50*   HEMOGLOBIN 10.8* " 11.1*   HEMATOCRIT 32.6* 32.7*   MCV 92.4 93.4   MCH 30.6 31.7   MCHC 33.1 33.9   RDW 52.5* 55.8*   PLATELETCT 103* 102*   MPV 10.1 9.8       ASSESSMENT/PLAN WITH SHARED DECISION MAKING:   Review  Pertinent imaging reviewed.    PHYSICAL ASPECTS OF CARE  Palliative Performance Scale: 60%    # Cancer related pain  4/17/2025  - Continue radiation treatments, patient tolerated today's treatment well and reports pain is currently well controlled  - Discontinued long-acting morphine and fentanyl patch  - Started methadone 10mg TID  - Discontinued gabapentin 100mg TID  - Started pregabalin 25mg TID  - Continue duloxetine 30mg daily  - Adjusted acetaminophen dose to 1000mg TID  - Adjusted hydromorphone PCA to the following settings:   Basal rate 0mg/hr, PCA dose 0.2mg every 10 minutes, 5mg 4 hour lockout dosing  - Plan to transition to oral breakthrough medications tomorrow and likely eventually outpatient referral for hypogastric nerve block    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 Discussed role of palliative care and reason for consult. He is agreeable to discuss goals of care. He is hopeful to continue radiation treatment and eventually be able to get off opiates and back home to resume telework. Discussed risks and benefits of starting methadone and patient is agreeable to this. Provided palliative care contact information and encouraged him to reach out with any questions/needs.     Code Status: DNR/DNI    ACP Documents: Not on file    30 minutes spent discussing advance care planning, this time excludes any other billed services.    Interval diagnostic studies and medical documentation entries pertinent to this case were reviewed independently by me. This patient has at least one acute or chronic illness or injury that poses a threat to life or bodily function. This patient suffers from  a high risk of morbidity from additional invasive diagnostic testing or intensive treatment. Discussion of recommendations and coordination of care undertaken with primary provider/treatment team.

## 2025-04-18 ENCOUNTER — HOSPITAL ENCOUNTER (OUTPATIENT)
Dept: RADIATION ONCOLOGY | Facility: MEDICAL CENTER | Age: 60
End: 2025-04-18
Payer: COMMERCIAL

## 2025-04-18 LAB
ANION GAP SERPL CALC-SCNC: 10 MMOL/L (ref 7–16)
BUN SERPL-MCNC: 11 MG/DL (ref 8–22)
CALCIUM SERPL-MCNC: 9.5 MG/DL (ref 8.5–10.5)
CHEMOTHERAPY INFUSION START DATE: NORMAL
CHEMOTHERAPY RECORDS: 1.8 GY
CHEMOTHERAPY RECORDS: 4500 CGY
CHEMOTHERAPY RECORDS: NORMAL
CHEMOTHERAPY RX CANCER: NORMAL
CHLORIDE SERPL-SCNC: 103 MMOL/L (ref 96–112)
CO2 SERPL-SCNC: 23 MMOL/L (ref 20–33)
CREAT SERPL-MCNC: 0.64 MG/DL (ref 0.5–1.4)
DATE 1ST CHEMO CANCER: NORMAL
ERYTHROCYTE [DISTWIDTH] IN BLOOD BY AUTOMATED COUNT: 53.3 FL (ref 35.9–50)
GFR SERPLBLD CREATININE-BSD FMLA CKD-EPI: 109 ML/MIN/1.73 M 2
GLUCOSE SERPL-MCNC: 145 MG/DL (ref 65–99)
HCT VFR BLD AUTO: 30.9 % (ref 42–52)
HGB BLD-MCNC: 10.8 G/DL (ref 14–18)
MAGNESIUM SERPL-MCNC: 2.1 MG/DL (ref 1.5–2.5)
MCH RBC QN AUTO: 32 PG (ref 27–33)
MCHC RBC AUTO-ENTMCNC: 35 G/DL (ref 32.3–36.5)
MCV RBC AUTO: 91.4 FL (ref 81.4–97.8)
PHOSPHATE SERPL-MCNC: 2.8 MG/DL (ref 2.5–4.5)
PLATELET # BLD AUTO: 111 K/UL (ref 164–446)
PMV BLD AUTO: 10.3 FL (ref 9–12.9)
POTASSIUM SERPL-SCNC: 4.6 MMOL/L (ref 3.6–5.5)
RAD ONC ARIA COURSE LAST TREATMENT DATE: NORMAL
RAD ONC ARIA COURSE TREATMENT ELAPSED DAYS: NORMAL
RAD ONC ARIA REFERENCE POINT DOSAGE GIVEN TO DATE: 43.2 GY
RAD ONC ARIA REFERENCE POINT ID: NORMAL
RAD ONC ARIA REFERENCE POINT SESSION DOSAGE GIVEN: 1.8 GY
RBC # BLD AUTO: 3.38 M/UL (ref 4.7–6.1)
SODIUM SERPL-SCNC: 136 MMOL/L (ref 135–145)
WBC # BLD AUTO: 3.7 K/UL (ref 4.8–10.8)

## 2025-04-18 PROCEDURE — 700105 HCHG RX REV CODE 258: Performed by: STUDENT IN AN ORGANIZED HEALTH CARE EDUCATION/TRAINING PROGRAM

## 2025-04-18 PROCEDURE — 700111 HCHG RX REV CODE 636 W/ 250 OVERRIDE (IP): Mod: JZ | Performed by: GENERAL PRACTICE

## 2025-04-18 PROCEDURE — 99232 SBSQ HOSP IP/OBS MODERATE 35: CPT | Performed by: FAMILY MEDICINE

## 2025-04-18 PROCEDURE — A9270 NON-COVERED ITEM OR SERVICE: HCPCS | Performed by: GENERAL PRACTICE

## 2025-04-18 PROCEDURE — 99233 SBSQ HOSP IP/OBS HIGH 50: CPT | Performed by: STUDENT IN AN ORGANIZED HEALTH CARE EDUCATION/TRAINING PROGRAM

## 2025-04-18 PROCEDURE — 77386 HCHG IMRT DELIVERY COMPLEX: CPT | Performed by: RADIOLOGY

## 2025-04-18 PROCEDURE — 80048 BASIC METABOLIC PNL TOTAL CA: CPT

## 2025-04-18 PROCEDURE — 700102 HCHG RX REV CODE 250 W/ 637 OVERRIDE(OP): Performed by: STUDENT IN AN ORGANIZED HEALTH CARE EDUCATION/TRAINING PROGRAM

## 2025-04-18 PROCEDURE — 700102 HCHG RX REV CODE 250 W/ 637 OVERRIDE(OP): Performed by: GENERAL PRACTICE

## 2025-04-18 PROCEDURE — 84100 ASSAY OF PHOSPHORUS: CPT

## 2025-04-18 PROCEDURE — 83735 ASSAY OF MAGNESIUM: CPT

## 2025-04-18 PROCEDURE — A9270 NON-COVERED ITEM OR SERVICE: HCPCS | Performed by: STUDENT IN AN ORGANIZED HEALTH CARE EDUCATION/TRAINING PROGRAM

## 2025-04-18 PROCEDURE — 700111 HCHG RX REV CODE 636 W/ 250 OVERRIDE (IP): Mod: JZ | Performed by: STUDENT IN AN ORGANIZED HEALTH CARE EDUCATION/TRAINING PROGRAM

## 2025-04-18 PROCEDURE — 77014 PR CT GUIDANCE PLACEMENT RAD THERAPY FIELDS: CPT | Mod: 26 | Performed by: RADIOLOGY

## 2025-04-18 PROCEDURE — 36415 COLL VENOUS BLD VENIPUNCTURE: CPT

## 2025-04-18 PROCEDURE — 770004 HCHG ROOM/CARE - ONCOLOGY PRIVATE *

## 2025-04-18 PROCEDURE — 77427 RADIATION TX MANAGEMENT X5: CPT | Performed by: RADIOLOGY

## 2025-04-18 PROCEDURE — 77336 RADIATION PHYSICS CONSULT: CPT | Performed by: RADIOLOGY

## 2025-04-18 PROCEDURE — 85027 COMPLETE CBC AUTOMATED: CPT

## 2025-04-18 RX ORDER — DULOXETIN HYDROCHLORIDE 20 MG/1
60 CAPSULE, DELAYED RELEASE ORAL DAILY
Status: CANCELLED | OUTPATIENT
Start: 2025-04-19

## 2025-04-18 RX ORDER — OXYCODONE AND ACETAMINOPHEN 5; 325 MG/1; MG/1
1 TABLET ORAL EVERY 4 HOURS PRN
Refills: 0 | Status: DISCONTINUED | OUTPATIENT
Start: 2025-04-18 | End: 2025-04-18

## 2025-04-18 RX ORDER — OXYCODONE HYDROCHLORIDE 30 MG/1
30 TABLET ORAL EVERY 4 HOURS PRN
Refills: 0 | Status: DISCONTINUED | OUTPATIENT
Start: 2025-04-18 | End: 2025-04-19 | Stop reason: HOSPADM

## 2025-04-18 RX ORDER — HYDROMORPHONE HYDROCHLORIDE 1 MG/ML
1 INJECTION, SOLUTION INTRAMUSCULAR; INTRAVENOUS; SUBCUTANEOUS EVERY 4 HOURS PRN
Status: DISCONTINUED | OUTPATIENT
Start: 2025-04-18 | End: 2025-04-19 | Stop reason: HOSPADM

## 2025-04-18 RX ADMIN — DULOXETINE 30 MG: 30 CAPSULE, DELAYED RELEASE ORAL at 04:48

## 2025-04-18 RX ADMIN — OXYCODONE HYDROCHLORIDE 30 MG: 30 TABLET ORAL at 11:47

## 2025-04-18 RX ADMIN — OXYCODONE HYDROCHLORIDE 30 MG: 30 TABLET ORAL at 22:16

## 2025-04-18 RX ADMIN — ASPIRIN 81 MG: 81 TABLET, COATED ORAL at 04:48

## 2025-04-18 RX ADMIN — METHADONE HYDROCHLORIDE 10 MG: 10 TABLET ORAL at 09:14

## 2025-04-18 RX ADMIN — DEXAMETHASONE 2 MG: 1 TABLET ORAL at 13:08

## 2025-04-18 RX ADMIN — SODIUM CHLORIDE, POTASSIUM CHLORIDE, SODIUM LACTATE AND CALCIUM CHLORIDE: 600; 310; 30; 20 INJECTION, SOLUTION INTRAVENOUS at 10:22

## 2025-04-18 RX ADMIN — OXYCODONE HYDROCHLORIDE AND ACETAMINOPHEN 1 TABLET: 5; 325 TABLET ORAL at 08:03

## 2025-04-18 RX ADMIN — DEXAMETHASONE 2 MG: 1 TABLET ORAL at 09:14

## 2025-04-18 RX ADMIN — ALTEPLASE 2 MG: 2.2 INJECTION, POWDER, LYOPHILIZED, FOR SOLUTION INTRAVENOUS at 13:09

## 2025-04-18 RX ADMIN — ACETAMINOPHEN 1000 MG: 500 TABLET, FILM COATED ORAL at 20:57

## 2025-04-18 RX ADMIN — AMOXICILLIN AND CLAVULANATE POTASSIUM 1 TABLET: 875; 125 TABLET, FILM COATED ORAL at 04:49

## 2025-04-18 RX ADMIN — ACETAMINOPHEN 1000 MG: 500 TABLET, FILM COATED ORAL at 15:39

## 2025-04-18 RX ADMIN — ENOXAPARIN SODIUM 40 MG: 100 INJECTION SUBCUTANEOUS at 17:21

## 2025-04-18 RX ADMIN — DEXAMETHASONE 2 MG: 1 TABLET ORAL at 17:21

## 2025-04-18 RX ADMIN — METHADONE HYDROCHLORIDE 10 MG: 10 TABLET ORAL at 20:57

## 2025-04-18 RX ADMIN — PREGABALIN 25 MG: 25 CAPSULE ORAL at 13:08

## 2025-04-18 RX ADMIN — PREGABALIN 25 MG: 25 CAPSULE ORAL at 04:49

## 2025-04-18 RX ADMIN — PREGABALIN 25 MG: 25 CAPSULE ORAL at 17:21

## 2025-04-18 RX ADMIN — SENNOSIDES AND DOCUSATE SODIUM 2 TABLET: 50; 8.6 TABLET ORAL at 17:21

## 2025-04-18 RX ADMIN — OMEPRAZOLE 20 MG: 20 CAPSULE, DELAYED RELEASE ORAL at 04:49

## 2025-04-18 RX ADMIN — ATORVASTATIN CALCIUM 40 MG: 40 TABLET, FILM COATED ORAL at 04:49

## 2025-04-18 RX ADMIN — HYDROMORPHONE HYDROCHLORIDE 1 MG: 1 INJECTION, SOLUTION INTRAMUSCULAR; INTRAVENOUS; SUBCUTANEOUS at 13:09

## 2025-04-18 RX ADMIN — ACETAMINOPHEN 1000 MG: 500 TABLET, FILM COATED ORAL at 09:14

## 2025-04-18 RX ADMIN — METHADONE HYDROCHLORIDE 10 MG: 10 TABLET ORAL at 15:39

## 2025-04-18 RX ADMIN — AMOXICILLIN AND CLAVULANATE POTASSIUM 1 TABLET: 875; 125 TABLET, FILM COATED ORAL at 17:21

## 2025-04-18 ASSESSMENT — PAIN DESCRIPTION - PAIN TYPE
TYPE: ACUTE PAIN
TYPE: ACUTE PAIN;CHRONIC PAIN
TYPE: ACUTE PAIN
TYPE: ACUTE PAIN

## 2025-04-18 ASSESSMENT — ENCOUNTER SYMPTOMS
ABDOMINAL PAIN: 1
NAUSEA: 0
VOMITING: 0
SHORTNESS OF BREATH: 0

## 2025-04-18 ASSESSMENT — FIBROSIS 4 INDEX
FIB4 SCORE: 1.81
FIB4 SCORE: 1.81

## 2025-04-18 NOTE — DISCHARGE PLANNING
Case Management Discharge Planning    Admission Date: 4/16/2025  GMLOS: 3.7  ALOS: 2    6-Clicks ADL Score: 24  6-Clicks Mobility Score: 24      Anticipated Discharge Dispo: Discharge Disposition: D/T to home under A care in anticipation of covered skilled care (06)  Discharge Address: [address]  [facility] [address]    DME Needed: No    Action(s) Taken: Discussed in IDT rounds, pt is still on PCA pump. Palliative monitoring pain control. Pt continues on methadone today.     Escalations Completed: None    Medically Clear: No    Next Steps: Pending medical clearance    Barriers to Discharge: Medical clearance    Is the patient up for discharge tomorrow: No

## 2025-04-18 NOTE — PROGRESS NOTES
Hospital Medicine Daily Progress Note    Date of Service  4/18/2025    Chief Complaint  Booker Saucedo is a 59 y.o. male admitted 4/16/2025 with intractable pain    Hospital Course    59 y.o. male with history of rectal cancer followed by Dr. Kim s/p 8 cycles of FolFirnox chemotherapy, chemoradiation therapy 03/2025, who was just admitted for rectal abscess had I&D by surgery and discharged 4/14/2025 who presented 4/16/2025 with evaluation for intractable pain.  Patient was discharged with pain meds and oral Augmentin .In ER today, repeat CTAP noted perirectal necrotic mass or abscess, slightly decreased in size from prior exam. Case was discussed with colorectal surgery, Dr. Tse, nothing more to offer from surgery service, recommended admitting for pain control.  Patient receiving radiation therapy.  Palliative medicine following for pain management    Interval Problem Update    4/18/2025  Seen and examined at bedside  Vital remained stable  Labs reviewed noted to pancytopenia, hemoglobin 10.8, platelet count 111, chemistry sodium 130s potassium 4.6, renal function stable phosphorus 2.8, magnesium 2.1  Chart reviewed, meds reviewed  Plan  Continue on cardiac monitor  Discontinue PCA pump  Discontinue IV fluid  Fentanyl patch and OxyContin discontinued  Patient started on methadone  Added oxycodone 30 mg every every 4 hour for breakthrough pain  On IV hydromorphone  Continue on Augmentin  Continue on Decadron  Continue on Lyrica  repeat CBC in a.m. to monitor white count and hemoglobin  Requiring close monitoring for toxicity while on IV narcotics  Need close oxygen monitoring  High risk of deterioration into severe respiratory failure.  Need close monitoring.  Case discussed with palliative Dr. Siddiqui.  Patient has a high medical complexity, complex decision making and is at high risk of complication, morbidity and mortality      I have discussed this patient's plan of care and discharge plan at IDT rounds  today with Case Management, Nursing, Nursing leadership, and other members of the IDT team.    Consultants/Specialty  general surgery, oncology, and palliative care    Code Status  DNAR/DNI    Disposition  The patient is not medically cleared for discharge to home or a post-acute facility.  Anticipate discharge to: home with close outpatient follow-up    I have placed the appropriate orders for post-discharge needs.    Review of Systems  Review of Systems   Respiratory:  Negative for shortness of breath.    Cardiovascular:  Negative for chest pain.   Gastrointestinal:  Positive for abdominal pain. Negative for nausea and vomiting.        Physical Exam  Temp:  [36.3 °C (97.3 °F)-36.6 °C (97.9 °F)] 36.3 °C (97.3 °F)  Pulse:  [55-74] 60  Resp:  [16-18] 17  BP: (111-146)/(54-80) 119/66  SpO2:  [91 %-99 %] 91 %    Physical Exam  Constitutional:       Appearance: He is ill-appearing.   HENT:      Mouth/Throat:      Mouth: Mucous membranes are dry.   Cardiovascular:      Rate and Rhythm: Normal rate and regular rhythm.      Pulses: Normal pulses.   Pulmonary:      Effort: Pulmonary effort is normal. No respiratory distress.   Abdominal:      General: There is no distension.      Tenderness: There is no abdominal tenderness.      Comments: Colostomy bag with good output   Musculoskeletal:      Right lower leg: No edema.      Left lower leg: No edema.   Skin:     General: Skin is warm.   Neurological:      General: No focal deficit present.      Mental Status: He is alert and oriented to person, place, and time.   Psychiatric:         Mood and Affect: Mood normal.         Fluids    Intake/Output Summary (Last 24 hours) at 4/18/2025 1606  Last data filed at 4/18/2025 1100  Gross per 24 hour   Intake 36.35 ml   Output --   Net 36.35 ml        Laboratory  Recent Labs     04/16/25  1702 04/17/25  0750 04/18/25  0220   WBC 4.2* 3.8* 3.7*   RBC 3.53* 3.50* 3.38*   HEMOGLOBIN 10.8* 11.1* 10.8*   HEMATOCRIT 32.6* 32.7* 30.9*   MCV  92.4 93.4 91.4   MCH 30.6 31.7 32.0   MCHC 33.1 33.9 35.0   RDW 52.5* 55.8* 53.3*   PLATELETCT 103* 102* 111*   MPV 10.1 9.8 10.3     Recent Labs     04/16/25  1702 04/17/25  0750 04/18/25  0220   SODIUM 137 136 136   POTASSIUM 3.6 3.6 4.6   CHLORIDE 102 103 103   CO2 26 25 23   GLUCOSE 100* 97 145*   BUN 12 14 11   CREATININE 0.66 0.81 0.64   CALCIUM 9.5 9.4 9.5                   Imaging  CT-ABDOMEN-PELVIS WITH   Final Result      1.  Perirectal necrotic mass or abscess, slightly decreased in size from prior exam.   2.  Postoperative change with LEFT lower quadrant colostomy.  Potential stenosis within the colostomy at the lower abdominal wall.  Increased stool in the proximal colon suggesting constipation.   3.  No bowel obstruction or perforation.   4.  Probable liver metastases, slightly increased in size from prior exam which may be in part due to treatment effect.   5.  Small RIGHT pleural effusion.   6.  Bilateral pulmonary nodules again seen.           Assessment/Plan  * Cancer associated pain- (present on admission)  Assessment & Plan  4/18/2025  Continue on cardiac monitor  Discontinue PCA pump  Discontinue IV fluid  Fentanyl patch and OxyContin discontinued  Patient started on methadone  Added oxycodone 30 mg every every 4 hour for breakthrough pain  On IV hydromorphone  Continue on Augmentin  Continue on Decadron  Continue on Lyrica  repeat CBC in a.m. to monitor white count and hemoglobin  Requiring close monitoring for toxicity while on IV narcotics  Need close oxygen monitoring  High risk of deterioration into severe respiratory failure.  Need close monitoring.  Case discussed with palliative Dr. Siddiqui.  Patient has a high medical complexity, complex decision making and is at high risk of complication, morbidity and mortality    History of creation of ostomy (HCC)  Assessment & Plan  Ostomy care    ACP (advance care planning)  Assessment & Plan  DNR/DNI    Rectal abscess- (present on  admission)  Assessment & Plan  He had I&D last admission  Completed IV Zosyn while inpatient  Continue PO Augmentin    Drug induced constipation- (present on admission)  Assessment & Plan  Bowel regimen    Rectal cancer (HCC)- (present on admission)  Assessment & Plan  Followed by Dr. Kim  Prior chemotherapy, chemoradiation therapy, surgery           VTE prophylaxis: lovenox    I have performed a physical exam and reviewed and updated ROS and Plan today (4/18/2025). In review of yesterday's note (4/17/2025), there are no changes except as documented above.

## 2025-04-18 NOTE — CARE PLAN
The patient is Watcher - Medium risk of patient condition declining or worsening    Shift Goals  Clinical Goals: pain control, rest  Patient Goals: pain control, shower in the AM  Family Goals: NADIR    Progress made toward(s) clinical / shift goals:    Problem: Knowledge Deficit - Standard  Goal: Patient and family/care givers will demonstrate understanding of plan of care, disease process/condition, diagnostic tests and medications  Outcome: Progressing  Note: Pt is AO4. Call light within reach. Educated and understand POC. All questions answered at this time.       Patient is not progressing towards the following goals:      Problem: Pain - Standard  Goal: Alleviation of pain or a reduction in pain to the patient’s comfort goal  Outcome: Not Progressing   Pt is in severe pain at assessment and reassessment. New pain regimen ordered

## 2025-04-18 NOTE — PROGRESS NOTES
"MRN: [id number]  Date of palliative consult: 2025  Reason for consult: Symptom managemeng  Referring provider: Dr. Wen  Location of consult: [facility]  Additional consulting services: Radiation oncology    HPI:   Booker Saucedo is a \"59 y.o. male with history of rectal cancer followed by Dr. Kim s/p 8 cycles of FolFirnox chemotherapy, chemoradiation therapy 2025, who was just admitted for rectal abscess had I&D by surgery and discharged 2025 who presented 2025 with evaluation for intractable pain.  Patient was discharged with pain meds and oral Augmentin.     In ER today, repeat CTAP noted perirectal necrotic mass or abscess, slightly decreased in size from prior exam.  Case was discussed with colorectal surgery, Dr. Tse, nothing more to offer from surgery service, recommended admitting for pain control.  Admitted to medicine service for further evaluation and treatment.\"     Palliative care was consulted for symptom management of his uncontrolled pain.    Additional Pertinent Medical History: S/p recent colostomy placement     Additional symptoms: denies  dyspnea, nausea, vomiting, constipation, fatigue, sleep, mood, appetite    Medication Allergy/Sensitivities:  No Known Allergies    ROS:    ROS negative except as per HPI    PE:   Recent vital signs  BMI: Body mass index is 25.07 kg/m².    Temp (24hrs), Av.4 °C (97.5 °F), Min:35.9 °C (96.7 °F), Max:36.6 °C (97.9 °F)  Temperature: 36.6 °C (97.8 °F)  Pulse  Av.1  Min: 44  Max: 110   Blood Pressure: (!) 142/80       Physical Exam much more awake and alert than yesterday, in no distress, chronically ill-appearing, normal respiratory effort on room air  Recent Labs     25  1702 25  0750 25  0220   SODIUM 137 136 136   POTASSIUM 3.6 3.6 4.6   CHLORIDE 102 103 103   CO2 26 25 23   GLUCOSE 100* 97 145*   BUN 12 14 11   CREATININE 0.66 0.81 0.64   CALCIUM 9.5 9.4 9.5     Recent Labs     25  1702 25  0750 " 04/18/25  0220   WBC 4.2* 3.8* 3.7*   RBC 3.53* 3.50* 3.38*   HEMOGLOBIN 10.8* 11.1* 10.8*   HEMATOCRIT 32.6* 32.7* 30.9*   MCV 92.4 93.4 91.4   MCH 30.6 31.7 32.0   MCHC 33.1 33.9 35.0   RDW 52.5* 55.8* 53.3*   PLATELETCT 103* 102* 111*   MPV 10.1 9.8 10.3       ASSESSMENT/PLAN WITH SHARED DECISION MAKING:   Review  Pertinent imaging reviewed.    PHYSICAL ASPECTS OF CARE  Palliative Performance Scale: 60%     # Cancer related pain  4/17/2025  - Continue radiation treatments, patient tolerated today's treatment well and reports pain is currently well controlled  - Discontinued long-acting morphine and fentanyl patch  - Started methadone 10mg TID  - Discontinued gabapentin 100mg TID  - Started pregabalin 25mg TID  - Continue duloxetine 30mg daily  - Adjusted acetaminophen dose to 1000mg TID  - Adjusted hydromorphone PCA to the following settings:   Basal rate 0mg/hr, PCA dose 0.2mg every 10 minutes, 5mg 4 hour lockout dosing  - Plan to transition to oral breakthrough medications tomorrow and likely eventually outpatient referral for hypogastric nerve block    4/18/2025  - Continue methadone 10 mg 3 times daily  - Continue pregabalin 25mg TID  - Continue duloxetine 30mg daily  - Added oxycodone 30 mg every 4 hours for breakthrough  - Discontinuing Dilaudid PCA  - Added as needed IV hydromorphone pushes 1 mg every 4 hours if pain not controlled by oxycodone    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 Discussed role of palliative care and reason for consult. He is agreeable to discuss goals of care. He is hopeful to continue radiation treatment and eventually be able to get off opiates and back home to resume telework. Discussed risks and benefits of starting methadone and patient is agreeable to this. Provided palliative care contact information and encouraged him to reach out with any  questions/needs.      Code Status: DNR/DNI     ACP Documents: Not on file     Interval diagnostic studies and medical documentation entries pertinent to this case were reviewed independently by me. This patient has at least one acute or chronic illness or injury that poses a threat to life or bodily function. This patient suffers from a high risk of morbidity from additional invasive diagnostic testing or intensive treatment. Discussion of recommendations and coordination of care undertaken with primary provider/treatment team.

## 2025-04-19 ENCOUNTER — PHARMACY VISIT (OUTPATIENT)
Dept: PHARMACY | Facility: MEDICAL CENTER | Age: 60
End: 2025-04-19
Payer: MEDICARE

## 2025-04-19 VITALS
HEIGHT: 69 IN | TEMPERATURE: 96.9 F | DIASTOLIC BLOOD PRESSURE: 73 MMHG | HEART RATE: 59 BPM | RESPIRATION RATE: 17 BRPM | OXYGEN SATURATION: 97 % | BODY MASS INDEX: 25.14 KG/M2 | WEIGHT: 169.75 LBS | SYSTOLIC BLOOD PRESSURE: 123 MMHG

## 2025-04-19 LAB
ERYTHROCYTE [DISTWIDTH] IN BLOOD BY AUTOMATED COUNT: 55.3 FL (ref 35.9–50)
HCT VFR BLD AUTO: 32.2 % (ref 42–52)
HGB BLD-MCNC: 10.8 G/DL (ref 14–18)
MCH RBC QN AUTO: 31.1 PG (ref 27–33)
MCHC RBC AUTO-ENTMCNC: 33.5 G/DL (ref 32.3–36.5)
MCV RBC AUTO: 92.8 FL (ref 81.4–97.8)
PLATELET # BLD AUTO: 106 K/UL (ref 164–446)
PMV BLD AUTO: 9.8 FL (ref 9–12.9)
RBC # BLD AUTO: 3.47 M/UL (ref 4.7–6.1)
WBC # BLD AUTO: 3.9 K/UL (ref 4.8–10.8)

## 2025-04-19 PROCEDURE — A9270 NON-COVERED ITEM OR SERVICE: HCPCS | Performed by: GENERAL PRACTICE

## 2025-04-19 PROCEDURE — 700102 HCHG RX REV CODE 250 W/ 637 OVERRIDE(OP): Performed by: STUDENT IN AN ORGANIZED HEALTH CARE EDUCATION/TRAINING PROGRAM

## 2025-04-19 PROCEDURE — 99239 HOSP IP/OBS DSCHRG MGMT >30: CPT | Performed by: STUDENT IN AN ORGANIZED HEALTH CARE EDUCATION/TRAINING PROGRAM

## 2025-04-19 PROCEDURE — 700102 HCHG RX REV CODE 250 W/ 637 OVERRIDE(OP): Performed by: GENERAL PRACTICE

## 2025-04-19 PROCEDURE — A9270 NON-COVERED ITEM OR SERVICE: HCPCS | Performed by: STUDENT IN AN ORGANIZED HEALTH CARE EDUCATION/TRAINING PROGRAM

## 2025-04-19 PROCEDURE — 85027 COMPLETE CBC AUTOMATED: CPT

## 2025-04-19 PROCEDURE — RXMED WILLOW AMBULATORY MEDICATION CHARGE: Performed by: STUDENT IN AN ORGANIZED HEALTH CARE EDUCATION/TRAINING PROGRAM

## 2025-04-19 RX ORDER — METHADONE HYDROCHLORIDE 10 MG/1
10 TABLET ORAL 3 TIMES DAILY
Qty: 9 TABLET | Refills: 0 | Status: SHIPPED | OUTPATIENT
Start: 2025-04-19 | End: 2025-04-21 | Stop reason: SDUPTHER

## 2025-04-19 RX ORDER — OXYCODONE HYDROCHLORIDE 15 MG/1
30 TABLET ORAL EVERY 4 HOURS PRN
Qty: 14 TABLET | Refills: 0 | Status: SHIPPED | OUTPATIENT
Start: 2025-04-19 | End: 2025-04-21 | Stop reason: SDUPTHER

## 2025-04-19 RX ORDER — OXYCODONE HYDROCHLORIDE 30 MG/1
30 TABLET ORAL EVERY 4 HOURS PRN
Qty: 18 TABLET | Refills: 0 | Status: SHIPPED | OUTPATIENT
Start: 2025-04-19 | End: 2025-04-19

## 2025-04-19 RX ADMIN — METHADONE HYDROCHLORIDE 10 MG: 10 TABLET ORAL at 08:52

## 2025-04-19 RX ADMIN — ATORVASTATIN CALCIUM 40 MG: 40 TABLET, FILM COATED ORAL at 05:26

## 2025-04-19 RX ADMIN — DULOXETINE 30 MG: 30 CAPSULE, DELAYED RELEASE ORAL at 05:26

## 2025-04-19 RX ADMIN — DEXAMETHASONE 2 MG: 1 TABLET ORAL at 08:52

## 2025-04-19 RX ADMIN — ACETAMINOPHEN 1000 MG: 500 TABLET, FILM COATED ORAL at 08:52

## 2025-04-19 RX ADMIN — OMEPRAZOLE 20 MG: 20 CAPSULE, DELAYED RELEASE ORAL at 05:26

## 2025-04-19 RX ADMIN — AMOXICILLIN AND CLAVULANATE POTASSIUM 1 TABLET: 875; 125 TABLET, FILM COATED ORAL at 05:26

## 2025-04-19 RX ADMIN — PREGABALIN 25 MG: 25 CAPSULE ORAL at 11:24

## 2025-04-19 RX ADMIN — OXYCODONE HYDROCHLORIDE 30 MG: 30 TABLET ORAL at 05:26

## 2025-04-19 RX ADMIN — PREGABALIN 25 MG: 25 CAPSULE ORAL at 05:26

## 2025-04-19 RX ADMIN — OXYCODONE HYDROCHLORIDE 30 MG: 30 TABLET ORAL at 13:56

## 2025-04-19 RX ADMIN — DEXAMETHASONE 2 MG: 1 TABLET ORAL at 11:24

## 2025-04-19 RX ADMIN — ASPIRIN 81 MG: 81 TABLET, COATED ORAL at 05:26

## 2025-04-19 RX ADMIN — OXYCODONE HYDROCHLORIDE 30 MG: 30 TABLET ORAL at 09:51

## 2025-04-19 ASSESSMENT — PAIN DESCRIPTION - PAIN TYPE
TYPE: ACUTE PAIN

## 2025-04-19 NOTE — CARE PLAN
The patient is Stable - Low risk of patient condition declining or worsening    Shift Goals  Clinical Goals: Pain control  Patient Goals: Pain control, DC tomorrow      Progress made toward(s) clinical / shift goals:     Problem: Pain - Standard  Goal: Alleviation of pain or a reduction in pain to the patient’s comfort goal  Outcome: Progressing     Problem: Hemodynamics  Goal: Patient's hemodynamics, fluid balance and neurologic status will be stable or improve  Outcome: Progressing

## 2025-04-19 NOTE — DISCHARGE SUMMARY
Discharge Summary    CHIEF COMPLAINT ON ADMISSION  Chief Complaint   Patient presents with    Sent by MD              Reason for Admission  Sent by MD     Admission Date  4/16/2025    CODE STATUS  DNAR/DNI    HPI & HOSPITAL COURSE    59 y.o. male with history of rectal cancer followed by Dr. Kim s/p 8 cycles of FolFirnox chemotherapy, chemoradiation therapy 03/2025, who was just admitted for rectal abscess had I&D by surgery and discharged 4/14/2025 who presented 4/16/2025 with evaluation for intractable pain.  Patient was discharged with pain meds and oral Augmentin .In ER today, repeat CTAP noted perirectal necrotic mass or abscess, slightly decreased in size from prior exam. Case was discussed with colorectal surgery, Dr. Tse, nothing more to offer from surgery service, recommended admitting for pain control.  Patient receiving radiation therapy.   Palliative medicine evaluated and pain medical adjusted to methadone and oxycodone IR.  Patient to be discharged home with oxycodone and methadone with outpatient follow-up with pain management clinic with  .   discussed with palliative medicine Dr. Augustine.    At the time of discharge patient remain hemodynamically stable ,asymptomatic ,labs remain unremarkable .  Patient will be discharged with close follow up with PCP, GI, oncology, palliative.Discharge plan was discussed with patient in details .  Patient agreed with discharge plan  and  all questions answered.          Therefore, he is discharged in good and stable condition to home with close outpatient follow-up.    The patient met 2-midnight criteria for an inpatient stay at the time of discharge.    Discharge Date  4/19/2025          DISCHARGE DIAGNOSES  Principal Problem:    Cancer associated pain (POA: Yes)  Active Problems:    Rectal cancer (HCC) (POA: Yes)    Drug induced constipation (POA: Yes)    Rectal abscess (POA: Yes)    Intractable pain (POA: Yes)    ACP (advance care planning) (POA:  Unknown)    History of creation of ostomy (HCC) (POA: Unknown)  Resolved Problems:    * No resolved hospital problems. *      FOLLOW UP  Future Appointments   Date Time Provider Department Center   4/21/2025  7:30 AM RADIATION THERAPY [facility] None   4/21/2025  9:00 AM RADIATION THERAPY RAD[facility] None   4/21/2025  1:30 PM Quoc Goel M.D. [facility] None   4/22/2025  7:45 AM Jason Huerta M.D. [facility] None   4/23/2025  7:45 AM RADIATION THERAPY RAD[facility] None   4/24/2025  7:45 AM RADIATION THERAPY [facility] None   4/25/2025  9:00 AM Iron Kim D.O. [facility] None   4/28/2025  7:45 AM RADIATION THERAPY [facility] None   4/28/2025  1:00 PM Quoc Goel M.D. [facility] None     No follow-up provider specified.    MEDICATIONS ON DISCHARGE     Medication List        START taking these medications        Instructions   methadone 10 MG Tabs  Commonly known as: Dolophine   Take 1 Tablet by mouth in the morning, at noon, and at bedtime for 3 days.  Dose: 10 mg            CHANGE how you take these medications        Instructions   oxyCODONE 30 MG immediate release tablet  What changed:   medication strength  how much to take  when to take this  Commonly known as: Oxy-IR   Take 1 Tablet by mouth every four hours as needed for Severe Pain for up to 3 days.  Dose: 30 mg            CONTINUE taking these medications        Instructions   amoxicillin-clavulanate 875-125 MG Tabs  Commonly known as: Augmentin   Take 1 Tablet by mouth every 12 hours for 4 days.  Dose: 1 Tablet     aspirin 81 MG EC tablet   Take 81 mg by mouth every morning.  Dose: 81 mg     atorvastatin 40 MG Tabs  Commonly known as: Lipitor   Take 40 mg by mouth every day.  Dose: 40 mg     dexamethasone 2 MG tablet  Commonly known as: Decadron   Take 1 Tablet by mouth 3 times a day with meals for 10 days.  Dose: 2 mg     DULoxetine 30 MG Cpep  Commonly known as: Cymbalta   Take 1 Capsule by mouth every day.  Dose: 30 mg     hydrOXYzine HCl 25 MG Tabs  Commonly known as: Atarax   Take  25 mg by mouth every 8 hours as needed for Anxiety.  Dose: 25 mg     lidocaine 5 % Oint  Commonly known as: Xylocaine   Apply to anal area     omeprazole 20 MG delayed-release capsule  Commonly known as: PriLOSEC   Take 1 Capsule by mouth every day.  Dose: 20 mg     ondansetron 4 MG Tabs tablet  Commonly known as: Zofran   Take 1 Tablet by mouth every four hours as needed for Nausea/Vomiting (for nausea, vomiting).  Dose: 4 mg     SSD 1 % Crea  Generic drug: silver sulfADIAZINE   Apply to anal area     tamsulosin 0.4 MG capsule  Commonly known as: Flomax   Take 1 Capsule by mouth every day.  Dose: 0.4 mg            STOP taking these medications      fentaNYL 50 MCG/HR Pt72  Commonly known as: Duragesic     gabapentin 100 MG Caps  Commonly known as: Neurontin     morphine  MG Tbcr tablet  Commonly known as: Ms Contin            ASK your doctor about these medications        Instructions   capecitabine 500 MG tablet  Commonly known as: Xeloda   Doctor's comments: 825mg/m2x BSA 1.95= 1,608.75mg Physician decision to round down to 1500mg  Take 3 tabs of 500mg strength (total dose of 1500 mg) by mouth 2 times a day (at least 12 hrs apart and 30 min after food) Monday-Friday on days of radiation x 30 treatments     carvedilol 12.5 MG Tabs  Commonly known as: Coreg   Take 12.5 mg by mouth 2 times a day.  Dose: 12.5 mg              Allergies  No Known Allergies    DIET  Orders Placed This Encounter   Procedures    Diet Order Diet: Regular     Standing Status:   Standing     Number of Occurrences:   1     Diet::   Regular [1]       ACTIVITY  As tolerated.  Weight bearing as tolerated    CONSULTATIONS  palliative    PROCEDURES  CT-ABDOMEN-PELVIS WITH   Final Result      1.  Perirectal necrotic mass or abscess, slightly decreased in size from prior exam.   2.  Postoperative change with LEFT lower quadrant colostomy.  Potential stenosis within the colostomy at the lower abdominal wall.  Increased stool in the proximal  colon suggesting constipation.   3.  No bowel obstruction or perforation.   4.  Probable liver metastases, slightly increased in size from prior exam which may be in part due to treatment effect.   5.  Small RIGHT pleural effusion.   6.  Bilateral pulmonary nodules again seen.            LABORATORY  Lab Results   Component Value Date    SODIUM 136 04/18/2025    POTASSIUM 4.6 04/18/2025    CHLORIDE 103 04/18/2025    CO2 23 04/18/2025    GLUCOSE 145 (H) 04/18/2025    BUN 11 04/18/2025    CREATININE 0.64 04/18/2025        Lab Results   Component Value Date    WBC 3.9 (L) 04/19/2025    HEMOGLOBIN 10.8 (L) 04/19/2025    HEMATOCRIT 32.2 (L) 04/19/2025    PLATELETCT 106 (L) 04/19/2025        Total time of the discharge process exceeds 32 minutes.

## 2025-04-21 ENCOUNTER — HOSPITAL ENCOUNTER (OUTPATIENT)
Dept: RADIATION ONCOLOGY | Facility: MEDICAL CENTER | Age: 60
End: 2025-04-21

## 2025-04-21 ENCOUNTER — HOSPITAL ENCOUNTER (OUTPATIENT)
Dept: HEMATOLOGY ONCOLOGY | Facility: MEDICAL CENTER | Age: 60
End: 2025-04-21
Attending: FAMILY MEDICINE
Payer: COMMERCIAL

## 2025-04-21 VITALS
TEMPERATURE: 96.6 F | SYSTOLIC BLOOD PRESSURE: 120 MMHG | HEART RATE: 70 BPM | OXYGEN SATURATION: 97 % | HEIGHT: 69 IN | BODY MASS INDEX: 25.79 KG/M2 | DIASTOLIC BLOOD PRESSURE: 64 MMHG | WEIGHT: 174.1 LBS

## 2025-04-21 DIAGNOSIS — G89.3 CANCER ASSOCIATED PAIN: ICD-10-CM

## 2025-04-21 DIAGNOSIS — K62.89 RECTAL PAIN: ICD-10-CM

## 2025-04-21 DIAGNOSIS — L85.3 DRY SKIN DERMATITIS: ICD-10-CM

## 2025-04-21 DIAGNOSIS — C20 RECTAL CANCER (HCC): ICD-10-CM

## 2025-04-21 LAB

## 2025-04-21 PROCEDURE — RXMED WILLOW AMBULATORY MEDICATION CHARGE: Performed by: FAMILY MEDICINE

## 2025-04-21 PROCEDURE — 99212 OFFICE O/P EST SF 10 MIN: CPT | Performed by: FAMILY MEDICINE

## 2025-04-21 PROCEDURE — 77386 HCHG IMRT DELIVERY COMPLEX: CPT | Performed by: RADIOLOGY

## 2025-04-21 PROCEDURE — 77014 PR CT GUIDANCE PLACEMENT RAD THERAPY FIELDS: CPT | Mod: 26 | Performed by: RADIOLOGY

## 2025-04-21 RX ORDER — OXYCODONE HYDROCHLORIDE 15 MG/1
30 TABLET ORAL EVERY 4 HOURS PRN
Qty: 60 TABLET | Refills: 0 | Status: SHIPPED | OUTPATIENT
Start: 2025-04-21 | End: 2025-04-28 | Stop reason: SDUPTHER

## 2025-04-21 RX ORDER — METHADONE HYDROCHLORIDE 5 MG/1
2.5 TABLET ORAL EVERY 8 HOURS
Qty: 11 TABLET | Refills: 0 | Status: SHIPPED | OUTPATIENT
Start: 2025-04-21 | End: 2025-04-28 | Stop reason: SDUPTHER

## 2025-04-21 RX ORDER — METHADONE HYDROCHLORIDE 10 MG/1
10 TABLET ORAL 3 TIMES DAILY
Qty: 21 TABLET | Refills: 0 | Status: SHIPPED | OUTPATIENT
Start: 2025-04-21 | End: 2025-04-28 | Stop reason: SDUPTHER

## 2025-04-21 ASSESSMENT — ENCOUNTER SYMPTOMS
EYES NEGATIVE: 1
CONSTITUTIONAL NEGATIVE: 1
ENDOCRINE NEGATIVE: 1
NEUROLOGICAL NEGATIVE: 1
MUSCULOSKELETAL NEGATIVE: 1
CARDIOVASCULAR NEGATIVE: 1
RESPIRATORY NEGATIVE: 1
PSYCHIATRIC NEGATIVE: 1
HEMATOLOGIC/LYMPHATIC NEGATIVE: 1
RECTAL PAIN: 1

## 2025-04-21 ASSESSMENT — FIBROSIS 4 INDEX: FIB4 SCORE: 1.9

## 2025-04-21 NOTE — ASSESSMENT & PLAN NOTE
Increase methadone to 12.5 mg 3 times a day  Continue oxycodone 15 to 30 mg every 4 hours as needed  Continue dexamethasone 2 mg 3 times daily  Continue duloxetine 30 mg daily  Continue lidocaine ointment to rectum daily  Follow-up in outpatient palliative clinic in 1 week

## 2025-04-21 NOTE — PROGRESS NOTES
Subjective:     Chief Complaint   Patient presents with    Cancer     dieringer/ follow up from er     Booker Oral Saucedo is a 59 y.o. male with PMH of rectal carcinoma [id number] stage IIIC disease who was recently admitted for intractable cancer-related pain in his rectum, seen today in outpatient palliative clinic for follow-up.    Booker reports that since he was discharged on Saturday, his pain has been tolerable.  He is taking his methadone 10 mg 3 times daily as directed and is taking OxyContin 15-30 mg every 2-4 hours for breakthrough pain.  He still has normal output from his ostomy bag.    Other than his severe rectal pain, Booker complains of very dry and cracking skin on his hands. This has been a problem for him in the past, but never this bad. He has tried using Aquaphor under rubber gloves for this with no relief.    No problems updated.       Current medicines (including changes today)  Current Outpatient Medications   Medication Sig Dispense Refill    methadone (DOLOPHINE) 10 MG Tab Take 1 Tablet by mouth in the morning, at noon, and at bedtime for 7 days. To be used in conjunction with 2.5mg prescription for a total of 12.5mg per dose. 21 Tablet 0    methadone (DOLOPHINE) 5 MG Tab Take 0.5 Tablets by mouth every 8 hours for 7 days. To be used in conjunction with 10mg prescription for a total of 12.5mg per dose. 11 Tablet 0    oxycodone (OXY-IR) 15 MG immediate release tablet Take 2 Tablets by mouth every four hours as needed for Severe Pain for up to 7 days. 60 Tablet 0    lidocaine (XYLOCAINE) 5 % Ointment Apply 0.5 g topically 3 times a day as needed (anal pain) for up to 30 days. 35.44 g 0    DULoxetine (CYMBALTA) 30 MG Cap DR Particles Take 1 Capsule by mouth every day. 30 Capsule 0    lidocaine (XYLOCAINE) 5 % Ointment Apply to anal area (Patient taking differently: Apply 1 Each topically as needed (pain). Apply to anal area) 35.44 g 1    omeprazole (PRILOSEC) 20 MG delayed-release capsule Take 1  Capsule by mouth every day. 30 Capsule 0    silver sulfADIAZINE (SILVADENE) 1 % Cream Apply to anal area (Patient taking differently: Apply 1 Each topically every day. Apply to anal area) 25 g 0    tamsulosin (FLOMAX) 0.4 MG capsule Take 1 Capsule by mouth every day. 30 Capsule 0    capecitabine (XELODA) 500 MG tablet Take 3 tabs of 500mg strength (total dose of 1500 mg) by mouth 2 times a day (at least 12 hrs apart and 30 min after food) Monday-Friday on days of radiation x 30 treatments (Patient not taking: Reported on 2025) 180 Tablet 0    aspirin 81 MG EC tablet Take 81 mg by mouth every morning.      atorvastatin (LIPITOR) 40 MG Tab Take 40 mg by mouth every day.      carvedilol (COREG) 12.5 MG Tab Take 12.5 mg by mouth 2 times a day. (Patient not taking: Reported on 2025)      hydrOXYzine HCl (ATARAX) 25 MG Tab Take 25 mg by mouth every 8 hours as needed for Anxiety.      ondansetron (ZOFRAN) 4 MG Tab tablet Take 1 Tablet by mouth every four hours as needed for Nausea/Vomiting (for nausea, vomiting). 30 Tablet 6     No current facility-administered medications for this encounter.       Social History     Tobacco Use    Smoking status: Former     Types: Cigars     Quit date: 2024     Years since quittin.3    Smokeless tobacco: Never    Tobacco comments:     Former cigars    Vaping Use    Vaping status: Never Used   Substance Use Topics    Alcohol use: Not Currently     Comment: Remote history of alcohol abuse.  None for 6 1/2 years.    Drug use: Never     Past Medical History:   Diagnosis Date    Bowel habit changes     Cancer (HCC) 2024    colorectal cancer    High cholesterol     Hypertension     Kidney stone     4-5x    MI (myocardial infarction) (HCC)       Family History   Problem Relation Age of Onset    Bladder cancer Father     Breast Cancer Sister          Objective:     Vitals:    25 1341   BP: 120/64   Pulse: 70   Temp: 35.9 °C (96.6 °F)   TempSrc: Temporal   SpO2: 97%  "  Weight: 79 kg (174 lb 1.6 oz)   Height: 1.753 m (5' 9.02\")     Body mass index is 25.7 kg/m².     Review of Systems   Constitutional: Negative.    HENT:  Negative.     Eyes: Negative.    Respiratory: Negative.     Cardiovascular: Negative.    Gastrointestinal:  Positive for rectal pain.   Endocrine: Negative.    Genitourinary: Negative.     Musculoskeletal: Negative.    Skin: Negative.    Neurological: Negative.    Hematological: Negative.    Psychiatric/Behavioral: Negative.          Physical Exam:   Gen: No acute distress.   Skin: Pale, warm, and dry  HEENT: conjunctiva non-injected, sclera non-icteric. EOMs intact.   Nasal mucosa without edema nor erythema.   Pinna normal.    Oral mucous membranes moist  Neck: Supple, trachea midline. No adenopathy or masses in the neck or supraclavicular regions.  Lungs: Effort is normal.   CV: regular rate and rhythm  Abdomen: Flat  Ext: no edema, color normal, vascularity normal, temperature normal  Alert and oriented Eye contact is good, speech goal directed, affect calm    Assessment and Plan:   Cancer associated pain  Increase methadone to 12.5 mg 3 times a day  Continue oxycodone 15 to 30 mg every 4 hours as needed  Continue dexamethasone 2 mg 3 times daily  Continue duloxetine 30 mg daily  Continue lidocaine ointment to rectum daily  Follow-up in outpatient palliative clinic in 1 week    Dry skin dermatitis  Recommended that he start applying Vaseline to his hands bilaterally then placing white cotton gloves on to wear overnight      36 minutes in total including chart review and consultation with patients oncological providers.     Followup: Return in about 2 weeks (around 5/5/2025).    Oral Augustine M.D.    "

## 2025-04-21 NOTE — ASSESSMENT & PLAN NOTE
Recommended that he start applying Vaseline to his hands bilaterally then placing white cotton gloves on to wear overnight

## 2025-04-22 LAB
CHEMOTHERAPY INFUSION START DATE: NORMAL
CHEMOTHERAPY RECORDS: 1.8 GY
CHEMOTHERAPY RECORDS: 900 CGY
CHEMOTHERAPY RECORDS: NORMAL
CHEMOTHERAPY RX CANCER: NORMAL
DATE 1ST CHEMO CANCER: NORMAL
RAD ONC ARIA COURSE LAST TREATMENT DATE: NORMAL
RAD ONC ARIA COURSE TREATMENT ELAPSED DAYS: NORMAL
RAD ONC ARIA REFERENCE POINT DOSAGE GIVEN TO DATE: 1.8 GY
RAD ONC ARIA REFERENCE POINT ID: NORMAL
RAD ONC ARIA REFERENCE POINT SESSION DOSAGE GIVEN: 1.8 GY

## 2025-04-22 PROCEDURE — 77386 HCHG IMRT DELIVERY COMPLEX: CPT | Performed by: RADIOLOGY

## 2025-04-22 PROCEDURE — 77014 PR CT GUIDANCE PLACEMENT RAD THERAPY FIELDS: CPT | Mod: 26 | Performed by: RADIOLOGY

## 2025-04-23 ENCOUNTER — HOSPITAL ENCOUNTER (OUTPATIENT)
Dept: RADIATION ONCOLOGY | Facility: MEDICAL CENTER | Age: 60
End: 2025-04-23
Attending: RADIOLOGY
Payer: COMMERCIAL

## 2025-04-23 ENCOUNTER — PHARMACY VISIT (OUTPATIENT)
Dept: PHARMACY | Facility: MEDICAL CENTER | Age: 60
End: 2025-04-23
Payer: MEDICARE

## 2025-04-23 ENCOUNTER — HOSPITAL ENCOUNTER (OUTPATIENT)
Dept: RADIATION ONCOLOGY | Facility: MEDICAL CENTER | Age: 60
End: 2025-04-23
Payer: COMMERCIAL

## 2025-04-23 VITALS
RESPIRATION RATE: 17 BRPM | SYSTOLIC BLOOD PRESSURE: 135 MMHG | TEMPERATURE: 97.2 F | WEIGHT: 164.9 LBS | HEART RATE: 74 BPM | OXYGEN SATURATION: 96 % | BODY MASS INDEX: 24.34 KG/M2 | DIASTOLIC BLOOD PRESSURE: 86 MMHG

## 2025-04-23 DIAGNOSIS — C20 RECTAL CANCER (HCC): ICD-10-CM

## 2025-04-23 LAB
CHEMOTHERAPY INFUSION START DATE: NORMAL
CHEMOTHERAPY RECORDS: 1.8 GY
CHEMOTHERAPY RECORDS: 900 CGY
CHEMOTHERAPY RECORDS: NORMAL
CHEMOTHERAPY RX CANCER: NORMAL
DATE 1ST CHEMO CANCER: NORMAL
RAD ONC ARIA COURSE LAST TREATMENT DATE: NORMAL
RAD ONC ARIA COURSE TREATMENT ELAPSED DAYS: NORMAL
RAD ONC ARIA REFERENCE POINT DOSAGE GIVEN TO DATE: 3.6 GY
RAD ONC ARIA REFERENCE POINT ID: NORMAL
RAD ONC ARIA REFERENCE POINT SESSION DOSAGE GIVEN: 1.8 GY

## 2025-04-23 PROCEDURE — 77386 HCHG IMRT DELIVERY COMPLEX: CPT | Performed by: RADIOLOGY

## 2025-04-23 PROCEDURE — 77014 PR CT GUIDANCE PLACEMENT RAD THERAPY FIELDS: CPT | Mod: 26 | Performed by: RADIOLOGY

## 2025-04-23 RX ORDER — LIDOCAINE 50 MG/G
0.5 OINTMENT TOPICAL 3 TIMES DAILY PRN
Qty: 35.44 G | Refills: 0 | Status: SHIPPED | OUTPATIENT
Start: 2025-04-23 | End: 2025-05-23

## 2025-04-23 ASSESSMENT — FIBROSIS 4 INDEX: FIB4 SCORE: 1.9

## 2025-04-23 ASSESSMENT — PAIN SCALES - GENERAL: PAINLEVEL_OUTOF10: 7=MODERATE-SEVERE PAIN

## 2025-04-23 NOTE — ADDENDUM NOTE
Encounter addended by: Jason Huerta M.D. on: 4/23/2025 8:16 AM   Actions taken: Order list changed, Diagnosis association updated

## 2025-04-23 NOTE — ON TREATMENT VISIT
"ON TREATMENT  NOTE  RADIATION ONCOLOGY DEPARTMENT    Patient name:  Booker Saucedo    Primary Physician:  Rickie Naranjo M.D. MRN: [id number]  [facility]: [id number]   Referring physician:  Hector Tse M.D.   : 1965, 59 y.o.     ENCOUNTER DATE:  2025      DIAGNOSIS:  Rectal cancer (HCC)  Staging form: Colon and Rectum, AJCC 8th Edition  - Clinical stage from 10/2/2024: Stage PATO (cT4a, cN2a, cM1a) - Signed by Jason Huerta M.D. on 10/16/2024  Histologic grade (G): G2  Histologic grading system: 4 grade system      TREATMENT SUMMARY:  Course First Treatment Date 2025  Course Last Treatment Date 2025  Radiation Treatments       Active   Plans   Rectum   Most recent treatment: Dose planned: 180 cGy (fraction 25 of 25 on 2025)   Total: Dose planned: 4,500 cGy   Elapsed Days: 34 @ 2025      Rectum Bst   Most recent treatment: Dose planned: 180 cGy (fraction 2 of 5 on 2025)   Total: Dose planned: 900 cGy   Elapsed Days: 36 @ 2025           Historical   No historical radiation treatments to show.                 SUBJECTIVE:  proctitis    VITAL SIGNS:      2025     7:57 AM 2025     1:41 PM 2025    11:48 AM 2025     9:51 AM 2025     8:05 AM 2025     5:19 AM 2025    11:32 PM   Vitals   SYSTOLIC 135 120 123  142 143 112   DIASTOLIC 86 64 73  88 75 65   Pulse 74 70 59  67 75 55   Temperature 36.2 °C (97.2 °F) 35.9 °C (96.6 °F) 36.1 °C (96.9 °F)  36.3 °C (97.4 °F) 36.7 °C (98 °F) 36.5 °C (97.7 °F)   Respiration 17  17 16 17 18 16   Weight 164.9 174.1        Height  1.753 m (5' 9.02\")        BMI 24.34 kg/m2 25.7 kg/m2        Pulse Oximetry 96 % 97 %   97 % 98 % 96 %     KPS: 100, Fully active, able to carry on all pre-disease performed without restriction (ECOG equivalent 0)  Encounter Vitals  Temperature: 36.2 °C (97.2 °F)  Blood Pressure: 135/86  Pulse: 74  Respiration: 17  Pulse Oximetry: 96 %  Weight: 74.8 kg (164 lb 14.5 " oz)  Weight Source: Stand Up Scale  Pain Score: 7=Moderate-Severe Pain      4/23/2025     7:57 AM 4/16/2025     7:50 AM 4/9/2025     7:53 AM 4/2/2025     7:50 AM 3/31/2025     7:59 AM 3/26/2025    12:15 PM   Pain Assessment   Pain Score 7=MODERATE-S 3=SLIGHT JESS 7=MODERATE-S 5=MODERATE P 3=SLIGHT JESS 2=MINIMAL PA   Pain Loc RECTUM RECTUM PELVIC ABDOMEN  --          PHYSICAL EXAM:  Physical Exam         4/23/2025     7:59 AM 4/16/2025     7:53 AM 4/9/2025     7:54 AM 4/2/2025     7:53 AM 3/26/2025    12:16 PM 3/19/2025     7:53 AM   Toxicity Assessment   Toxicity Assessment Male Pelvis Male Pelvis Male Pelvis Male Pelvis Male Pelvis Male Pelvis   Fatigue (lethargy, malaise, asthenia) Moderate (e.g., decrease in performance status by 1 ECOG level or 20% [place]) or causing difficulty performing some activities Moderate (e.g., decrease in performance status by 1 ECOG level or 20% [place]sky) or causing difficulty performing some activities Increased fatigue over baseline, but not altering normal activities Increased fatigue over baseline, but not altering normal activities Increased fatigue over baseline, but not altering normal activities None   Radiation Dermatitis Faint erythema or dry desquamation Faint erythema or dry desquamation None None None None   Anorexia Loss of appetite Loss of appetite Loss of appetite None None Loss of appetite   Colitis Abdominal pain with mucus and/or blood in stool Abdominal pain with mucus and/or blood in stool Abdominal pain with mucus and/or blood in stool None None Abdominal pain with mucus and/or blood in stool   Constipation None None None Requiring stool softener or dietary modification None Requiring stool softener or dietary modification   Dehydration None None None None None None   Diarrhea w/o Colostomy None None None None None None   Flatulence None None None None None None   Nausea None None None None None None   Proctitis Increased stool frequency, occasional  blood-streaked stools or rectal discomfort (including hemorrhoids) not requiring medication Increased stool frequency, occasional blood-streaked stools or rectal discomfort (including hemorrhoids) not requiring medication Increased stool frequency, occasional blood-streaked stools or rectal discomfort (including hemorrhoids) not requiring medication Increased stool frequency, occasional blood-streaked stools or rectal discomfort (including hemorrhoids) not requiring medication None None   Vomiting None None None None None None   RT - Pain due to RT None None None None None None   Tumor Pain (onset or exacerbation of tumor pain due to treatment) Moderate pain, pain or analgesics interfering with function, but not interfering with activities of daily living Moderate pain, pain or analgesics interfering with function, but not interfering with activities of daily living Moderate pain, pain or analgesics interfering with function, but not interfering with activities of daily living Moderate pain, pain or analgesics interfering with function, but not interfering with activities of daily living Mild pain not interfering with function Moderate pain, pain or analgesics interfering with function, but not interfering with activities of daily living   Dysuria (painful urination) Mild symptoms requiring no intervention Mild symptoms requiring no intervention None None None None   Urinary Frequency Normal Normal Normal Normal Normal Normal   Urinary Urgency None None None None None None   Bladder Spasms Absent Absent Absent Absent Absent Absent   Incontinence None None None None None None   Urinary Retention Hesitency or dribbling, but no significant residual urine and/or retention occuring during the immediate postoperative period Hesitency or dribbling, but no significant residual urine and/or retention occuring during the immediate postoperative period Hesitency or dribbling, but no significant residual urine and/or retention  occuring during the immediate postoperative period Hesitency or dribbling, but no significant residual urine and/or retention occuring during the immediate postoperative period Hesitency or dribbling, but no significant residual urine and/or retention occuring during the immediate postoperative period Hesitency or dribbling, but no significant residual urine and/or retention occuring during the immediate postoperative period       CURRENT MEDICATIONS:    Current Outpatient Medications:     lidocaine (XYLOCAINE) 5 % Ointment, Apply 0.5 g topically 3 times a day as needed (anal pain) for up to 30 days., Disp: 50 g, Rfl: 0    methadone (DOLOPHINE) 10 MG Tab, Take 1 Tablet by mouth in the morning, at noon, and at bedtime for 7 days. To be used in conjunction with 2.5mg prescription for a total of 12.5mg per dose., Disp: 21 Tablet, Rfl: 0    methadone (DOLOPHINE) 5 MG Tab, Take 0.5 Tablets by mouth every 8 hours for 7 days. To be used in conjunction with 10mg prescription for a total of 12.5mg per dose., Disp: 11 Tablet, Rfl: 0    oxycodone (OXY-IR) 15 MG immediate release tablet, Take 2 Tablets by mouth every four hours as needed for Severe Pain for up to 7 days., Disp: 60 Tablet, Rfl: 0    amoxicillin-clavulanate (AUGMENTIN) 875-125 MG Tab, Take 1 Tablet by mouth every 12 hours for 4 days., Disp: 8 Tablet, Rfl: 0    dexamethasone (DECADRON) 2 MG tablet, Take 1 Tablet by mouth 3 times a day with meals for 10 days., Disp: 30 Tablet, Rfl: 0    DULoxetine (CYMBALTA) 30 MG Cap DR Particles, Take 1 Capsule by mouth every day., Disp: 30 Capsule, Rfl: 0    lidocaine (XYLOCAINE) 5 % Ointment, Apply to anal area (Patient taking differently: Apply 1 Each topically as needed (pain). Apply to anal area), Disp: 35.44 g, Rfl: 1    omeprazole (PRILOSEC) 20 MG delayed-release capsule, Take 1 Capsule by mouth every day., Disp: 30 Capsule, Rfl: 0    silver sulfADIAZINE (SILVADENE) 1 % Cream, Apply to anal area (Patient taking  differently: Apply 1 Each topically every day. Apply to anal area), Disp: 25 g, Rfl: 0    tamsulosin (FLOMAX) 0.4 MG capsule, Take 1 Capsule by mouth every day., Disp: 30 Capsule, Rfl: 0    capecitabine (XELODA) 500 MG tablet, Take 3 tabs of 500mg strength (total dose of 1500 mg) by mouth 2 times a day (at least 12 hrs apart and 30 min after food) Monday-Friday on days of radiation x 30 treatments (Patient not taking: Reported on 4/16/2025), Disp: 180 Tablet, Rfl: 0    aspirin 81 MG EC tablet, Take 81 mg by mouth every morning., Disp: , Rfl:     atorvastatin (LIPITOR) 40 MG Tab, Take 40 mg by mouth every day., Disp: , Rfl:     carvedilol (COREG) 12.5 MG Tab, Take 12.5 mg by mouth 2 times a day. (Patient not taking: Reported on 4/16/2025), Disp: , Rfl:     hydrOXYzine HCl (ATARAX) 25 MG Tab, Take 25 mg by mouth every 8 hours as needed for Anxiety., Disp: , Rfl:     ondansetron (ZOFRAN) 4 MG Tab tablet, Take 1 Tablet by mouth every four hours as needed for Nausea/Vomiting (for nausea, vomiting)., Disp: 30 Tablet, Rfl: 6    LABORATORY DATA:   Lab Results   Component Value Date/Time    SODIUM 136 04/18/2025 02:20 AM    POTASSIUM 4.6 04/18/2025 02:20 AM    CHLORIDE 103 04/18/2025 02:20 AM    CO2 23 04/18/2025 02:20 AM    GLUCOSE 145 (H) 04/18/2025 02:20 AM    BUN 11 04/18/2025 02:20 AM    CREATININE 0.64 04/18/2025 02:20 AM       Lab Results   Component Value Date/Time    WBC 3.9 (L) 04/19/2025 07:59 AM    RBC 3.47 (L) 04/19/2025 07:59 AM    HEMOGLOBIN 10.8 (L) 04/19/2025 07:59 AM    HEMATOCRIT 32.2 (L) 04/19/2025 07:59 AM    MCV 92.8 04/19/2025 07:59 AM    MCH 31.1 04/19/2025 07:59 AM    MCHC 33.5 04/19/2025 07:59 AM    PLATELETCT 106 (L) 04/19/2025 07:59 AM         RADIOLOGY DATA:  CT-ABDOMEN-PELVIS WITH  Result Date: 4/16/2025  1.  Perirectal necrotic mass or abscess, slightly decreased in size from prior exam. 2.  Postoperative change with LEFT lower quadrant colostomy.  Potential stenosis within the colostomy at the  lower abdominal wall.  Increased stool in the proximal colon suggesting constipation. 3.  No bowel obstruction or perforation. 4.  Probable liver metastases, slightly increased in size from prior exam which may be in part due to treatment effect. 5.  Small RIGHT pleural effusion. 6.  Bilateral pulmonary nodules again seen.    CT-ABDOMEN-PELVIS WITH  Result Date: 4/4/2025  1.  Multiple varying sized small hepatic cysts. 2.  Mild atherosclerotic changes of the infrarenal aorta and iliac arteries. 3.  4.2 x 3.6 x 3.6 cm necrotic mass or abscess involving the rectum.      IMPRESSION:  Cancer Staging   Rectal cancer (HCC)  Staging form: Colon and Rectum, AJCC 8th Edition  - Clinical stage from 10/2/2024: Stage PATO (cT4a, cN2a, cM1a) - Signed by Jason Huerta M.D. on 10/16/2024      PLAN:  No change in treatment plan    Disposition:  Treatment plan and imaging reviewed. Questions answered. Continue therapy outlined.     Jason Huerta M.D.    Orders Placed This Encounter    MR-RECTAL    CT-CHEST,ABDOMEN,PELVIS WITH    lidocaine (XYLOCAINE) 5 % Ointment

## 2025-04-24 ENCOUNTER — HOSPITAL ENCOUNTER (OUTPATIENT)
Dept: RADIATION ONCOLOGY | Facility: MEDICAL CENTER | Age: 60
End: 2025-04-24
Payer: COMMERCIAL

## 2025-04-24 LAB
CHEMOTHERAPY INFUSION START DATE: NORMAL
CHEMOTHERAPY RECORDS: 1.8 GY
CHEMOTHERAPY RECORDS: 900 CGY
CHEMOTHERAPY RECORDS: NORMAL
CHEMOTHERAPY RX CANCER: NORMAL
DATE 1ST CHEMO CANCER: NORMAL
RAD ONC ARIA COURSE LAST TREATMENT DATE: NORMAL
RAD ONC ARIA COURSE TREATMENT ELAPSED DAYS: NORMAL
RAD ONC ARIA REFERENCE POINT DOSAGE GIVEN TO DATE: 5.4 GY
RAD ONC ARIA REFERENCE POINT ID: NORMAL
RAD ONC ARIA REFERENCE POINT SESSION DOSAGE GIVEN: 1.8 GY

## 2025-04-24 PROCEDURE — 77014 PR CT GUIDANCE PLACEMENT RAD THERAPY FIELDS: CPT | Mod: 26 | Performed by: RADIOLOGY

## 2025-04-24 PROCEDURE — 77386 HCHG IMRT DELIVERY COMPLEX: CPT | Performed by: RADIOLOGY

## 2025-04-25 ENCOUNTER — HOSPITAL ENCOUNTER (OUTPATIENT)
Dept: HEMATOLOGY ONCOLOGY | Facility: MEDICAL CENTER | Age: 60
End: 2025-04-25
Attending: STUDENT IN AN ORGANIZED HEALTH CARE EDUCATION/TRAINING PROGRAM
Payer: COMMERCIAL

## 2025-04-25 VITALS
OXYGEN SATURATION: 95 % | HEART RATE: 69 BPM | HEIGHT: 69 IN | WEIGHT: 171.2 LBS | SYSTOLIC BLOOD PRESSURE: 108 MMHG | DIASTOLIC BLOOD PRESSURE: 60 MMHG | BODY MASS INDEX: 25.36 KG/M2

## 2025-04-25 DIAGNOSIS — C20 RECTAL CANCER (HCC): ICD-10-CM

## 2025-04-25 LAB
CHEMOTHERAPY INFUSION START DATE: NORMAL
CHEMOTHERAPY RECORDS: 1.8 GY
CHEMOTHERAPY RECORDS: 900 CGY
CHEMOTHERAPY RECORDS: NORMAL
CHEMOTHERAPY RX CANCER: NORMAL
DATE 1ST CHEMO CANCER: NORMAL
RAD ONC ARIA COURSE LAST TREATMENT DATE: NORMAL
RAD ONC ARIA COURSE TREATMENT ELAPSED DAYS: NORMAL
RAD ONC ARIA REFERENCE POINT DOSAGE GIVEN TO DATE: 7.2 GY
RAD ONC ARIA REFERENCE POINT ID: NORMAL
RAD ONC ARIA REFERENCE POINT SESSION DOSAGE GIVEN: 1.8 GY

## 2025-04-25 PROCEDURE — 77336 RADIATION PHYSICS CONSULT: CPT | Performed by: RADIOLOGY

## 2025-04-25 PROCEDURE — 77014 PR CT GUIDANCE PLACEMENT RAD THERAPY FIELDS: CPT | Mod: 26 | Performed by: RADIOLOGY

## 2025-04-25 PROCEDURE — 99212 OFFICE O/P EST SF 10 MIN: CPT | Performed by: STUDENT IN AN ORGANIZED HEALTH CARE EDUCATION/TRAINING PROGRAM

## 2025-04-25 PROCEDURE — 99215 OFFICE O/P EST HI 40 MIN: CPT | Performed by: STUDENT IN AN ORGANIZED HEALTH CARE EDUCATION/TRAINING PROGRAM

## 2025-04-25 PROCEDURE — 77386 HCHG IMRT DELIVERY COMPLEX: CPT | Performed by: RADIOLOGY

## 2025-04-25 ASSESSMENT — ENCOUNTER SYMPTOMS
WEIGHT LOSS: 0
NERVOUS/ANXIOUS: 0
WEAKNESS: 0
FEVER: 0
MYALGIAS: 0
ABDOMINAL PAIN: 1
HEADACHES: 0
TINGLING: 0
CHILLS: 0
WHEEZING: 0
BLOOD IN STOOL: 0
HEARTBURN: 0
DIZZINESS: 0
SINUS PAIN: 0
ORTHOPNEA: 0
BACK PAIN: 0
BLURRED VISION: 0
NAUSEA: 0
SORE THROAT: 0
CONSTIPATION: 0
VOMITING: 0
SPUTUM PRODUCTION: 0
INSOMNIA: 0
DIAPHORESIS: 0
SHORTNESS OF BREATH: 0
BRUISES/BLEEDS EASILY: 0
PALPITATIONS: 0
COUGH: 0
DIARRHEA: 0
DOUBLE VISION: 0

## 2025-04-25 ASSESSMENT — PAIN SCALES - GENERAL: PAINLEVEL_OUTOF10: 4=SLIGHT-MODERATE PAIN

## 2025-04-25 ASSESSMENT — FIBROSIS 4 INDEX: FIB4 SCORE: 1.9

## 2025-04-25 NOTE — PROGRESS NOTES
Follow Up Note:  Hematology/Oncology      Primary Care:  Rickie Naranjo M.D.    Diagnosis: Rectal adenocarcinoma    Chief Complaint: On-treatment visit    Current Treatment: mFOLFIRINOX, followed by capecitabine with concurrent XRT, and then surgical evaluation    Prior Treatment: NA    Oncology History of Presenting Illness:  Booker Saucedo is a 59 y.o.  man who presents to the clinic for evaluation for systemic therapy for a diagnosis of rectal carcinoma. He started having back pain and changes in his bowel habits in July and went to the hospital where CT scan revealed right hydronephrosis due to a 1 mm calculus. However, he was also noted to have rectal wall thickening with perirectal lymph nodes. He subsequently underwent a colonoscopy which revealed a fungating circumferential mass, 5 cm from the anal verge, with biopsy revealing invasive adenocarcinoma, moderately differentiated, MMR proficient. Staging scans did not reveal any definitive metastatic disease, though there was a 1.5 cm left hepatic lobe lesion which was not PET avid. He was seen by radiation oncology and colorectal surgery and ultimately was staged on rectal MRI as a [id number] stage IIIC disease. He has been referred for treatment accordingly.     Treatment History:   11/08/2024: C1 FOLFIRINOX   11/22/2024: C2 FOLFIRINOX   12/06/2024: C3 FOLFIRINOX  12/20/2024: C4 FOLFIRINOX  01/03/2025: C5 FOLFIRINOX (decrease oxaliplatin to 65 mg/m2 for neuropathy)  01/17/25: C6 FOLFIRINOX  01/31/25: C7 FOLFIRINOX  02/14/25: C8 FOLFIRINOX   03/18/25: Start capecitabine with concurrent XRT      Interval History:  Patient is here for follow up visit. He was admitted to the hospital with an abscess related to his disease, and had surgical management of the abscess. He is recovering and is feeling a bit better. He has an ostomy. He completed radiation therapy and is recovering well. He still feels a bit weak and has some pain which is well  managed.     Allergies as of 04/25/2025    (No Known Allergies)         Current Outpatient Medications:     lidocaine (XYLOCAINE) 5 % Ointment, Apply 0.5 g topically 3 times a day as needed (anal pain) for up to 30 days., Disp: 35.44 g, Rfl: 0    methadone (DOLOPHINE) 10 MG Tab, Take 1 Tablet by mouth in the morning, at noon, and at bedtime for 7 days. To be used in conjunction with 2.5mg prescription for a total of 12.5mg per dose., Disp: 21 Tablet, Rfl: 0    methadone (DOLOPHINE) 5 MG Tab, Take 0.5 Tablets by mouth every 8 hours for 7 days. To be used in conjunction with 10mg prescription for a total of 12.5mg per dose., Disp: 11 Tablet, Rfl: 0    oxycodone (OXY-IR) 15 MG immediate release tablet, Take 2 Tablets by mouth every four hours as needed for Severe Pain for up to 7 days., Disp: 60 Tablet, Rfl: 0    DULoxetine (CYMBALTA) 30 MG Cap DR Particles, Take 1 Capsule by mouth every day., Disp: 30 Capsule, Rfl: 0    lidocaine (XYLOCAINE) 5 % Ointment, Apply to anal area (Patient taking differently: Apply 1 Each topically as needed (pain). Apply to anal area), Disp: 35.44 g, Rfl: 1    omeprazole (PRILOSEC) 20 MG delayed-release capsule, Take 1 Capsule by mouth every day., Disp: 30 Capsule, Rfl: 0    silver sulfADIAZINE (SILVADENE) 1 % Cream, Apply to anal area (Patient taking differently: Apply 1 Each topically every day. Apply to anal area), Disp: 25 g, Rfl: 0    tamsulosin (FLOMAX) 0.4 MG capsule, Take 1 Capsule by mouth every day., Disp: 30 Capsule, Rfl: 0    aspirin 81 MG EC tablet, Take 81 mg by mouth every morning., Disp: , Rfl:     atorvastatin (LIPITOR) 40 MG Tab, Take 40 mg by mouth every day., Disp: , Rfl:     hydrOXYzine HCl (ATARAX) 25 MG Tab, Take 25 mg by mouth every 8 hours as needed for Anxiety., Disp: , Rfl:     ondansetron (ZOFRAN) 4 MG Tab tablet, Take 1 Tablet by mouth every four hours as needed for Nausea/Vomiting (for nausea, vomiting)., Disp: 30 Tablet, Rfl: 6    capecitabine (XELODA) 500 MG  "tablet, Take 3 tabs of 500mg strength (total dose of 1500 mg) by mouth 2 times a day (at least 12 hrs apart and 30 min after food) Monday-Friday on days of radiation x 30 treatments (Patient not taking: Reported on 4/16/2025), Disp: 180 Tablet, Rfl: 0    carvedilol (COREG) 12.5 MG Tab, Take 12.5 mg by mouth 2 times a day. (Patient not taking: Reported on 4/16/2025), Disp: , Rfl:       Review of Systems:  Review of Systems   Constitutional:  Negative for chills, diaphoresis, fever, malaise/fatigue and weight loss.   HENT:  Negative for congestion, hearing loss, nosebleeds, sinus pain and sore throat.    Eyes:  Negative for blurred vision and double vision.   Respiratory:  Negative for cough, sputum production, shortness of breath and wheezing.    Cardiovascular:  Negative for chest pain, palpitations, orthopnea and leg swelling.   Gastrointestinal:  Positive for abdominal pain. Negative for blood in stool, constipation, diarrhea, heartburn, melena, nausea and vomiting.   Genitourinary:  Negative for dysuria, frequency, hematuria and urgency.   Musculoskeletal:  Negative for back pain, joint pain and myalgias.   Skin:  Negative for rash.   Neurological:  Negative for dizziness, tingling, weakness and headaches.        Forgetfulness   Endo/Heme/Allergies:  Does not bruise/bleed easily.   Psychiatric/Behavioral:  The patient is not nervous/anxious and does not have insomnia.          Physical Exam:  Vitals:    04/25/25 0852   BP: 108/60   Pulse: 69   SpO2: 95%   Weight: 77.7 kg (171 lb 3.2 oz)   Height: 1.753 m (5' 9.02\")       DESC; KARNOFSKY SCALE WITH ECOG EQUIVALENT: 90, Able to carry on normal activity; minor signs or symptoms of disease (ECOG equivalent 0)    DISTRESS LEVEL: no apparent distress    Physical Exam  Vitals and nursing note reviewed.   Constitutional:       General: He is awake. He is not in acute distress.     Appearance: Normal appearance. He is normal weight. He is not ill-appearing, " toxic-appearing or diaphoretic.   HENT:      Head: Normocephalic and atraumatic.      Nose: Nose normal. No congestion.      Mouth/Throat:      Pharynx: Oropharynx is clear. No oropharyngeal exudate or posterior oropharyngeal erythema.   Eyes:      General: No scleral icterus.     Extraocular Movements: Extraocular movements intact.      Conjunctiva/sclera: Conjunctivae normal.      Pupils: Pupils are equal, round, and reactive to light.   Cardiovascular:      Rate and Rhythm: Normal rate and regular rhythm.      Pulses: Normal pulses.      Heart sounds: Normal heart sounds. No murmur heard.     No friction rub. No gallop.   Pulmonary:      Effort: Pulmonary effort is normal.      Breath sounds: Normal breath sounds. No decreased air movement. No wheezing, rhonchi or rales.   Abdominal:      General: The ostomy site is clean. Bowel sounds are normal. There is no distension.      Tenderness: There is abdominal tenderness in the suprapubic area.   Musculoskeletal:         General: No deformity. Normal range of motion.      Cervical back: Normal range of motion and neck supple. No tenderness.      Right lower leg: No edema.      Left lower leg: No edema.   Lymphadenopathy:      Cervical: No cervical adenopathy.      Upper Body:      Right upper body: No axillary adenopathy.      Left upper body: No axillary adenopathy.      Lower Body: No right inguinal adenopathy. No left inguinal adenopathy.   Skin:     General: Skin is warm and dry.      Coloration: Skin is pale. Skin is not jaundiced.      Findings: No erythema or rash.   Neurological:      General: No focal deficit present.      Mental Status: He is alert and oriented to person, place, and time.      Sensory: Sensation is intact.      Motor: Motor function is intact. No weakness.      Gait: Gait is intact.   Psychiatric:         Attention and Perception: Attention normal.         Mood and Affect: Mood normal.         Behavior: Behavior normal. Behavior is  cooperative.         Thought Content: Thought content normal.         Judgment: Judgment normal.           Labs:  Orders Only on 04/25/2025   Component Date Value Ref Range Status    Course ID 04/25/2025 [email or web address]   Final    Course Start Date 04/25/2025 02/18/2025   Final    Course First Treatment Date 04/25/2025 03/18/2025   Final    Course Last Treatment Date 04/25/2025 04/25/2025   Final    Course Elapsed Days 04/25/2025 38 @ 04/25/2025   Final    Course Intent 04/25/2025 Curative   Final    RP ID 04/25/2025 Rectum Bst   Final    RP Dosage Given to Date (Gy) 04/25/2025 7.2  Gy Final    RP Session Dosage Given (Gy) 04/25/2025 1.8  Gy Final    Plan ID 04/25/2025 Rectum Bst   Final    Plan Name 04/25/2025 Rectum Bst   Final    Plan Fractions Treated to Date 04/25/2025 4 of 5   Final    Plan Prescribed Dose Per Fraction * 04/25/2025 1.8  Gy Final    Plan Total Prescribed Dose (cGy) 04/25/2025 900  cGy Final   Hospital Outpatient Visit on 04/24/2025   Component Date Value Ref Range Status    Course ID 04/24/2025 [email or web address]   Final    Course Start Date 04/24/2025 02/18/2025   Final    Course First Treatment Date 04/24/2025 03/18/2025   Final    Course Last Treatment Date 04/24/2025 04/24/2025   Final    Course Elapsed Days 04/24/2025 37 @ 04/24/2025   Final    Course Intent 04/24/2025 Curative   Final    RP ID 04/24/2025 Rectum Bst   Final    RP Dosage Given to Date (Gy) 04/24/2025 5.4  Gy Final    RP Session Dosage Given (Gy) 04/24/2025 1.8  Gy Final    Plan ID 04/24/2025 Rectum Bst   Final    Plan Name 04/24/2025 Rectum Bst   Final    Plan Fractions Treated to Date 04/24/2025 3 of 5   Final    Plan Prescribed Dose Per Fraction * 04/24/2025 1.8  Gy Final    Plan Total Prescribed Dose (cGy) 04/24/2025 900  cGy Final   Orders Only on 04/23/2025   Component Date Value Ref Range Status    Course ID 04/23/2025 [email or web address]   Final    Course Start Date 04/23/2025 02/18/2025   Final    Course First Treatment Date 04/23/2025  03/18/2025   Final    Course Last Treatment Date 04/23/2025 04/23/2025   Final    Course Elapsed Days 04/23/2025 36 @ 04/23/2025   Final    Course Intent 04/23/2025 Curative   Final    RP ID 04/23/2025 Rectum Bst   Final    RP Dosage Given to Date (Gy) 04/23/2025 3.6  Gy Final    RP Session Dosage Given (Gy) 04/23/2025 1.8  Gy Final    Plan ID 04/23/2025 Rectum Bst   Final    Plan Name 04/23/2025 Rectum Bst   Final    Plan Fractions Treated to Date 04/23/2025 2 of 5   Final    Plan Prescribed Dose Per Fraction * 04/23/2025 1.8  Gy Final    Plan Total Prescribed Dose (cGy) 04/23/2025 900  cGy Final   Orders Only on 04/22/2025   Component Date Value Ref Range Status    Course ID 04/22/2025 [email or web address]   Final    Course Start Date 04/22/2025 02/18/2025   Final    Course First Treatment Date 04/22/2025 03/18/2025   Final    Course Last Treatment Date 04/22/2025 04/22/2025   Final    Course Elapsed Days 04/22/2025 35 @ 04/22/2025   Final    Course Intent 04/22/2025 Curative   Final    RP ID 04/22/2025 Rectum Bst   Final    RP Dosage Given to Date (Gy) 04/22/2025 1.8  Gy Final    RP Session Dosage Given (Gy) 04/22/2025 1.8  Gy Final    Plan ID 04/22/2025 Rectum Bst   Final    Plan Name 04/22/2025 Rectum Bst   Final    Plan Fractions Treated to Date 04/22/2025 1 of 5   Final    Plan Prescribed Dose Per Fraction * 04/22/2025 1.8  Gy Final    Plan Total Prescribed Dose (cGy) 04/22/2025 900  cGy Final   Hospital Outpatient Visit on 04/21/2025   Component Date Value Ref Range Status    Course ID 04/21/2025 [email or web address]   Final    Course Start Date 04/21/2025 02/18/2025   Final    Course First Treatment Date 04/21/2025 03/18/2025   Final    Course Last Treatment Date 04/21/2025 04/21/2025   Final    Course Elapsed Days 04/21/2025 34 @ 04/21/2025   Final    Course Intent 04/21/2025 Curative   Final    RP ID 04/21/2025 Rectum   Final    RP Dosage Given to Date (Gy) 04/21/2025 45  Gy Final    RP Session Dosage Given (Gy) 04/21/2025 1.8   Gy Final    Plan ID 04/21/2025 Rectum   Final    Plan Name 04/21/2025 Rectum   Final    Plan Fractions Treated to Date 04/21/2025 25 of 25   Final    Plan Prescribed Dose Per Fraction * 04/21/2025 1.8  Gy Final    Plan Total Prescribed Dose (cGy) 04/21/2025 4,500  cGy Final   Admission on 04/16/2025, Discharged on 04/19/2025   Component Date Value Ref Range Status    WBC 04/16/2025 4.2 (L)  4.8 - 10.8 K/uL Final    RBC 04/16/2025 3.53 (L)  4.70 - 6.10 M/uL Final    Hemoglobin 04/16/2025 10.8 (L)  14.0 - 18.0 g/dL Final    Hematocrit 04/16/2025 32.6 (L)  42.0 - 52.0 % Final    MCV 04/16/2025 92.4  81.4 - 97.8 fL Final    MCH 04/16/2025 30.6  27.0 - 33.0 pg Final    MCHC 04/16/2025 33.1  32.3 - 36.5 g/dL Final    RDW 04/16/2025 52.5 (H)  35.9 - 50.0 fL Final    Platelet Count 04/16/2025 103 (L)  164 - 446 K/uL Final    MPV 04/16/2025 10.1  9.0 - 12.9 fL Final    Neutrophils-Polys 04/16/2025 81.40 (H)  44.00 - 72.00 % Final    Lymphocytes 04/16/2025 5.30 (L)  22.00 - 41.00 % Final    Monocytes 04/16/2025 9.70  0.00 - 13.40 % Final    Eosinophils 04/16/2025 3.60  0.00 - 6.90 % Final    Basophils 04/16/2025 0.00  0.00 - 1.80 % Final    Nucleated RBC 04/16/2025 0.00  0.00 - 0.20 /100 WBC Final    Neutrophils (Absolute) 04/16/2025 3.42  1.82 - 7.42 K/uL Final    Includes immature neutrophils, if present.    Lymphs (Absolute) 04/16/2025 0.22 (L)  1.00 - 4.80 K/uL Final    Monos (Absolute) 04/16/2025 0.41  0.00 - 0.85 K/uL Final    Eos (Absolute) 04/16/2025 0.15  0.00 - 0.51 K/uL Final    Baso (Absolute) 04/16/2025 0.00  0.00 - 0.12 K/uL Final    NRBC (Absolute) 04/16/2025 0.00  K/uL Final    Anisocytosis 04/16/2025 1+   Final    Microcytosis 04/16/2025 1+   Final    Sodium 04/16/2025 137  135 - 145 mmol/L Final    Potassium 04/16/2025 3.6  3.6 - 5.5 mmol/L Final    Chloride 04/16/2025 102  96 - 112 mmol/L Final    Co2 04/16/2025 26  20 - 33 mmol/L Final    Anion Gap 04/16/2025 9.0  7.0 - 16.0 Final    Glucose 04/16/2025  100 (H)  65 - 99 mg/dL Final    Bun 04/16/2025 12  8 - 22 mg/dL Final    Creatinine 04/16/2025 0.66  0.50 - 1.40 mg/dL Final    Calcium 04/16/2025 9.5  8.5 - 10.5 mg/dL Final    Correct Calcium 04/16/2025 10.0  8.5 - 10.5 mg/dL Final    AST(SGOT) 04/16/2025 17  12 - 45 U/L Final    ALT(SGPT) 04/16/2025 23  2 - 50 U/L Final    Alkaline Phosphatase 04/16/2025 89  30 - 99 U/L Final    Total Bilirubin 04/16/2025 0.7  0.1 - 1.5 mg/dL Final    Albumin 04/16/2025 3.4  3.2 - 4.9 g/dL Final    Total Protein 04/16/2025 6.1  6.0 - 8.2 g/dL Final    Globulin 04/16/2025 2.7  1.9 - 3.5 g/dL Final    A-G Ratio 04/16/2025 1.3  g/dL Final    Lipase 04/16/2025 8 (L)  11 - 82 U/L Final    Lactic Acid 04/16/2025 1.7  0.5 - 2.0 mmol/L Final    GFR (CKD-EPI) 04/16/2025 108  >60 mL/min/1.73 m 2 Final    Comment: Estimated Glomerular Filtration Rate is calculated using  race neutral CKD-EPI 2021 equation per NKF-ASN recommendations.      Manual Diff Status 04/16/2025 PERFORMED   Final    Peripheral Smear Review 04/16/2025 see below   Final    Comment: Due to instrument suspect flags, further review of peripheral smear is  indicated on this patient sample. This review may or may not result in  abnormal findings.      Plt Estimation 04/16/2025 Decreased   Final    RBC Morphology 04/16/2025 Present   Final    Poikilocytosis 04/16/2025 1+   Final    Ovalocytes 04/16/2025 1+   Final    Imm. Plt Fraction 04/16/2025 1.4  0.6 - 13.1 % Final    WBC 04/17/2025 3.8 (L)  4.8 - 10.8 K/uL Final    RBC 04/17/2025 3.50 (L)  4.70 - 6.10 M/uL Final    Hemoglobin 04/17/2025 11.1 (L)  14.0 - 18.0 g/dL Final    Hematocrit 04/17/2025 32.7 (L)  42.0 - 52.0 % Final    MCV 04/17/2025 93.4  81.4 - 97.8 fL Final    MCH 04/17/2025 31.7  27.0 - 33.0 pg Final    MCHC 04/17/2025 33.9  32.3 - 36.5 g/dL Final    RDW 04/17/2025 55.8 (H)  35.9 - 50.0 fL Final    Platelet Count 04/17/2025 102 (L)  164 - 446 K/uL Final    MPV 04/17/2025 9.8  9.0 - 12.9 fL Final     Neutrophils-Polys 04/17/2025 74.60 (H)  44.00 - 72.00 % Final    Lymphocytes 04/17/2025 5.90 (L)  22.00 - 41.00 % Final    Monocytes 04/17/2025 9.30  0.00 - 13.40 % Final    Eosinophils 04/17/2025 9.30 (H)  0.00 - 6.90 % Final    Basophils 04/17/2025 0.00  0.00 - 1.80 % Final    Nucleated RBC 04/17/2025 0.00  0.00 - 0.20 /100 WBC Final    Neutrophils (Absolute) 04/17/2025 2.87  1.82 - 7.42 K/uL Final    Includes immature neutrophils, if present.    Lymphs (Absolute) 04/17/2025 0.22 (L)  1.00 - 4.80 K/uL Final    Monos (Absolute) 04/17/2025 0.35  0.00 - 0.85 K/uL Final    Eos (Absolute) 04/17/2025 0.35  0.00 - 0.51 K/uL Final    Baso (Absolute) 04/17/2025 0.00  0.00 - 0.12 K/uL Final    NRBC (Absolute) 04/17/2025 0.00  K/uL Final    Anisocytosis 04/17/2025 1+   Final    Microcytosis 04/17/2025 2+ (A)   Final    Sodium 04/17/2025 136  135 - 145 mmol/L Final    Potassium 04/17/2025 3.6  3.6 - 5.5 mmol/L Final    Chloride 04/17/2025 103  96 - 112 mmol/L Final    Co2 04/17/2025 25  20 - 33 mmol/L Final    Anion Gap 04/17/2025 8.0  7.0 - 16.0 Final    Glucose 04/17/2025 97  65 - 99 mg/dL Final    Bun 04/17/2025 14  8 - 22 mg/dL Final    Creatinine 04/17/2025 0.81  0.50 - 1.40 mg/dL Final    Calcium 04/17/2025 9.4  8.5 - 10.5 mg/dL Final    Correct Calcium 04/17/2025 9.8  8.5 - 10.5 mg/dL Final    AST(SGOT) 04/17/2025 16  12 - 45 U/L Final    ALT(SGPT) 04/17/2025 22  2 - 50 U/L Final    Alkaline Phosphatase 04/17/2025 86  30 - 99 U/L Final    Total Bilirubin 04/17/2025 0.6  0.1 - 1.5 mg/dL Final    Albumin 04/17/2025 3.5  3.2 - 4.9 g/dL Final    Total Protein 04/17/2025 5.9 (L)  6.0 - 8.2 g/dL Final    Globulin 04/17/2025 2.4  1.9 - 3.5 g/dL Final    A-G Ratio 04/17/2025 1.5  g/dL Final    GFR (CKD-EPI) 04/17/2025 101  >60 mL/min/1.73 m 2 Final    Comment: Estimated Glomerular Filtration Rate is calculated using  race neutral CKD-EPI 2021 equation per NKF-ASN recommendations.      Bands-Stabs 04/17/2025 0.90  0.00 - 10.00  % Final    Manual Diff Status 04/17/2025 PERFORMED   Final    Peripheral Smear Review 04/17/2025 see below   Final    Comment: Due to instrument suspect flags, further review of peripheral smear is  indicated on this patient sample. This review may or may not result in  abnormal findings.      Plt Estimation 04/17/2025 Decreased   Final    RBC Morphology 04/17/2025 Present   Final    Poikilocytosis 04/17/2025 1+   Final    Ovalocytes 04/17/2025 1+   Final    Imm. Plt Fraction 04/17/2025 1.8  0.6 - 13.1 % Final    Sodium 04/18/2025 136  135 - 145 mmol/L Final    Potassium 04/18/2025 4.6  3.6 - 5.5 mmol/L Final    Chloride 04/18/2025 103  96 - 112 mmol/L Final    Co2 04/18/2025 23  20 - 33 mmol/L Final    Glucose 04/18/2025 145 (H)  65 - 99 mg/dL Final    Bun 04/18/2025 11  8 - 22 mg/dL Final    Creatinine 04/18/2025 0.64  0.50 - 1.40 mg/dL Final    Calcium 04/18/2025 9.5  8.5 - 10.5 mg/dL Final    Anion Gap 04/18/2025 10.0  7.0 - 16.0 Final    WBC 04/18/2025 3.7 (L)  4.8 - 10.8 K/uL Final    RBC 04/18/2025 3.38 (L)  4.70 - 6.10 M/uL Final    Hemoglobin 04/18/2025 10.8 (L)  14.0 - 18.0 g/dL Final    Hematocrit 04/18/2025 30.9 (L)  42.0 - 52.0 % Final    MCV 04/18/2025 91.4  81.4 - 97.8 fL Final    MCH 04/18/2025 32.0  27.0 - 33.0 pg Final    MCHC 04/18/2025 35.0  32.3 - 36.5 g/dL Final    RDW 04/18/2025 53.3 (H)  35.9 - 50.0 fL Final    Platelet Count 04/18/2025 111 (L)  164 - 446 K/uL Final    MPV 04/18/2025 10.3  9.0 - 12.9 fL Final    Magnesium 04/18/2025 2.1  1.5 - 2.5 mg/dL Final    Phosphorus 04/18/2025 2.8  2.5 - 4.5 mg/dL Final    GFR (CKD-EPI) 04/18/2025 109  >60 mL/min/1.73 m 2 Final    Comment: Estimated Glomerular Filtration Rate is calculated using  race neutral CKD-EPI 2021 equation per NKF-ASN recommendations.      WBC 04/19/2025 3.9 (L)  4.8 - 10.8 K/uL Final    RBC 04/19/2025 3.47 (L)  4.70 - 6.10 M/uL Final    Hemoglobin 04/19/2025 10.8 (L)  14.0 - 18.0 g/dL Final    Hematocrit 04/19/2025 32.2 (L)   "42.0 - 52.0 % Final    MCV 04/19/2025 92.8  81.4 - 97.8 fL Final    MCH 04/19/2025 31.1  27.0 - 33.0 pg Final    MCHC 04/19/2025 33.5  32.3 - 36.5 g/dL Final    RDW 04/19/2025 55.3 (H)  35.9 - 50.0 fL Final    Platelet Count 04/19/2025 106 (L)  164 - 446 K/uL Final    MPV 04/19/2025 9.8  9.0 - 12.9 fL Final         Imaging:     All listed images below have been independently reviewed by me. I agree with the findings as summarized below:    No results found.    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 Dr. Prasad reviewed the slides of this case prior to requesting   consultative opinion by [facility].                                         Diagnosis performed by:                                       KHOA PRASAD MD            Assessment & Plan:  1. Rectal cancer (HCC)          This is a 59 year old  man with rectal adenocarcinoma, [id number] stage IIIC disease. He presents for evaluation.      Current Diagnosis and Staging: Rectal adenocarcinoma, [id number] stage IIIC disease    Update: The patient is doing better at this time. He will get scans done in the beginning of June and then surgical evaluation afterwards. Monitor accordingly.      Treatment Plan: mFOLFIRINOX followed by capecitabine with concurrent XRT and then surgical evaluation     Treatment Citation: NCCN     Plan of Care:     Primary Therapy: Surgical evaluation pending scans  Supportive Therapy: Antiemetics per protocol  Toxicity: Patient is getting antineoplastic therapy for a life-threatening malignancy and needs monitoring of blood counts, hepatic function, and renal function due to potential for organ dysfunction. This " treatment poses a risk of toxicity that could lead to long term disruption of bodily function or death.  Labs: CBC with diff, CMP, CEA, ctDNA monitoring  Imaging: Repeat MRI rectal protocol, CT scans after completion of JOSEMANUEL  Treatment Planning: Patient will start with chemotherapy due to concern of aggressive spread of disease, and then will do capecitabine with concurrent XRT and surgical evaluation.  Consultations: Colorectal surgery (Dr. Tse); Radiation oncology (Dr. Huerta), Palliative Care (Dr. Styles)  Code Status: Full  Miscellaneous: Referred to medical genetics, patient will follow up.   Return for Follow Up: 6 weeks    Any questions and concerns raised by the patient were answered to the best of my ability. Thank you for allowing me to participate in the care for this patient. Please feel free to contact me for any questions or concerns.

## 2025-04-28 ENCOUNTER — APPOINTMENT (OUTPATIENT)
Dept: HEMATOLOGY ONCOLOGY | Facility: MEDICAL CENTER | Age: 60
End: 2025-04-28
Attending: FAMILY MEDICINE
Payer: COMMERCIAL

## 2025-04-28 ENCOUNTER — TELEPHONE (OUTPATIENT)
Dept: HEMATOLOGY ONCOLOGY | Facility: MEDICAL CENTER | Age: 60
End: 2025-04-28
Payer: COMMERCIAL

## 2025-04-28 ENCOUNTER — PHARMACY VISIT (OUTPATIENT)
Dept: PHARMACY | Facility: MEDICAL CENTER | Age: 60
End: 2025-04-28
Payer: MEDICARE

## 2025-04-28 ENCOUNTER — HOSPITAL ENCOUNTER (OUTPATIENT)
Dept: RADIATION ONCOLOGY | Facility: MEDICAL CENTER | Age: 60
End: 2025-04-28
Payer: COMMERCIAL

## 2025-04-28 DIAGNOSIS — G89.3 CANCER ASSOCIATED PAIN: ICD-10-CM

## 2025-04-28 DIAGNOSIS — C20 RECTAL CANCER (HCC): ICD-10-CM

## 2025-04-28 DIAGNOSIS — K62.89 RECTAL PAIN: ICD-10-CM

## 2025-04-28 LAB
CHEMOTHERAPY INFUSION START DATE: NORMAL
CHEMOTHERAPY RECORDS: 1.8 GY
CHEMOTHERAPY RECORDS: 900 CGY
CHEMOTHERAPY RECORDS: NORMAL
CHEMOTHERAPY RX CANCER: NORMAL
DATE 1ST CHEMO CANCER: NORMAL
RAD ONC ARIA COURSE LAST TREATMENT DATE: NORMAL
RAD ONC ARIA COURSE TREATMENT ELAPSED DAYS: NORMAL
RAD ONC ARIA REFERENCE POINT DOSAGE GIVEN TO DATE: 7.3 GY
RAD ONC ARIA REFERENCE POINT ID: NORMAL
RAD ONC ARIA REFERENCE POINT SESSION DOSAGE GIVEN: 0.1 GY

## 2025-04-28 PROCEDURE — 77014 PR CT GUIDANCE PLACEMENT RAD THERAPY FIELDS: CPT | Mod: 26 | Performed by: RADIOLOGY

## 2025-04-28 PROCEDURE — 77427 RADIATION TX MANAGEMENT X5: CPT | Performed by: RADIOLOGY

## 2025-04-28 PROCEDURE — 77386 HCHG IMRT DELIVERY COMPLEX: CPT | Performed by: RADIOLOGY

## 2025-04-28 PROCEDURE — RXMED WILLOW AMBULATORY MEDICATION CHARGE: Performed by: FAMILY MEDICINE

## 2025-04-28 RX ORDER — OXYCODONE HYDROCHLORIDE 15 MG/1
15-30 TABLET ORAL EVERY 4 HOURS PRN
Qty: 60 TABLET | Refills: 0 | Status: SHIPPED | OUTPATIENT
Start: 2025-04-28 | End: 2025-05-05

## 2025-04-28 RX ORDER — METHADONE HYDROCHLORIDE 5 MG/1
2.5 TABLET ORAL EVERY 8 HOURS
Qty: 22 TABLET | Refills: 0 | Status: SHIPPED | OUTPATIENT
Start: 2025-04-28 | End: 2025-05-13

## 2025-04-28 RX ORDER — METHADONE HYDROCHLORIDE 10 MG/1
10 TABLET ORAL 3 TIMES DAILY
Qty: 42 TABLET | Refills: 0 | Status: SHIPPED | OUTPATIENT
Start: 2025-04-28 | End: 2025-05-12

## 2025-04-29 NOTE — RADIATION COMPLETION NOTES
END OF TREATMENT SUMMARY    Patient name:  Booker Saucedo    Primary Physician:  Rickie Naranjo M.D. MRN: [id number]  CSN: [id number]   Referring physician:  No ref. provider found  : 1965, 59 y.o.       TREATMENT SUMMARY:        Course First Treatment Date 2025    Course Last Treatment Date 2025   Course Elapsed Days 41 @ 2025   Course Intent Curative     Radiation Therapy Episodes       Active Episodes       Radiation Therapy: IMRT (10/21/2024)                   Radiation Treatments         Plan Last Treated On Elapsed Days Fractions Treated Prescribed Fraction Dose (cGy) Prescribed Total Dose (cGy)    Rectum 2025 34 @ 2025 25 of 25 180 4,500    Rectum Bst 2025 41 @ 2025 5 of 5 180 900                  Reference Point Last Treated On Elapsed Days Most Recent Session Dose (cGy) Total Dose (cGy)    Rectum 2025 34 @ 2025 180 4,500    Rectum Bst 2025 41 @ 2025 10 730                                     STAGE:   Rectal cancer (HCC)  Staging form: Colon and Rectum, AJCC 8th Edition  - Clinical stage from 10/2/2024: Stage PATO (cT4a, cN2a, cM1a) - Signed by Jason Huerta M.D. on 10/16/2024  Histologic grade (G): G2  Histologic grading system: 4 grade system       TREATMENT INDICATION:   ***     CONCURRENT SYSTEMIC TREATMENT:   ***     RT COURSE DISCONTINUED EARLY:   No     PATIENT EXPERIENCE:       2025     7:59 AM 2025     7:53 AM 2025     7:54 AM 2025     7:53 AM 3/26/2025    12:16 PM 3/19/2025     7:53 AM   Toxicity Assessment   Toxicity Assessment Male Pelvis Male Pelvis Male Pelvis Male Pelvis Male Pelvis Male Pelvis   Fatigue (lethargy, malaise, asthenia) Moderate (e.g., decrease in performance status by 1 ECOG level or 20% Karnofsky) or causing difficulty performing some activities Moderate (e.g., decrease in performance status by 1 ECOG level or 20% Karnofsky) or causing difficulty performing some activities  Increased fatigue over baseline, but not altering normal activities Increased fatigue over baseline, but not altering normal activities Increased fatigue over baseline, but not altering normal activities None   Radiation Dermatitis Faint erythema or dry desquamation Faint erythema or dry desquamation None None None None   Anorexia Loss of appetite Loss of appetite Loss of appetite None None Loss of appetite   Colitis Abdominal pain with mucus and/or blood in stool Abdominal pain with mucus and/or blood in stool Abdominal pain with mucus and/or blood in stool None None Abdominal pain with mucus and/or blood in stool   Constipation None None None Requiring stool softener or dietary modification None Requiring stool softener or dietary modification   Dehydration None None None None None None   Diarrhea w/o Colostomy None None None None None None   Flatulence None None None None None None   Nausea None None None None None None   Proctitis Increased stool frequency, occasional blood-streaked stools or rectal discomfort (including hemorrhoids) not requiring medication Increased stool frequency, occasional blood-streaked stools or rectal discomfort (including hemorrhoids) not requiring medication Increased stool frequency, occasional blood-streaked stools or rectal discomfort (including hemorrhoids) not requiring medication Increased stool frequency, occasional blood-streaked stools or rectal discomfort (including hemorrhoids) not requiring medication None None   Vomiting None None None None None None   RT - Pain due to RT None None None None None None   Tumor Pain (onset or exacerbation of tumor pain due to treatment) Moderate pain, pain or analgesics interfering with function, but not interfering with activities of daily living Moderate pain, pain or analgesics interfering with function, but not interfering with activities of daily living Moderate pain, pain or analgesics interfering with function, but not interfering  "with activities of daily living Moderate pain, pain or analgesics interfering with function, but not interfering with activities of daily living Mild pain not interfering with function Moderate pain, pain or analgesics interfering with function, but not interfering with activities of daily living   Dysuria (painful urination) Mild symptoms requiring no intervention Mild symptoms requiring no intervention None None None None   Urinary Frequency Normal Normal Normal Normal Normal Normal   Urinary Urgency None None None None None None   Bladder Spasms Absent Absent Absent Absent Absent Absent   Incontinence None None None None None None   Urinary Retention Hesitency or dribbling, but no significant residual urine and/or retention occuring during the immediate postoperative period Hesitency or dribbling, but no significant residual urine and/or retention occuring during the immediate postoperative period Hesitency or dribbling, but no significant residual urine and/or retention occuring during the immediate postoperative period Hesitency or dribbling, but no significant residual urine and/or retention occuring during the immediate postoperative period Hesitency or dribbling, but no significant residual urine and/or retention occuring during the immediate postoperative period Hesitency or dribbling, but no significant residual urine and/or retention occuring during the immediate postoperative period        FOLLOW-UP PLAN:   {avpfollowuplist:64200::\"8 Weeks\"}     COMMENT:          ANATOMIC TARGET SUMMARY    ANATOMIC TARGET MODALITY TECHNIQUE   ***   {RAD ONC MODALITY:25471} {RAD ONC Technique:72508::\"IMRT\"}            COMMENT:         DIAGRAMS:      DOSE VOLUME HISTOGRAMS:            "

## 2025-05-07 PROBLEM — J90 PLEURAL EFFUSION ON RIGHT: Status: ACTIVE | Noted: 2025-01-01

## 2025-05-07 PROBLEM — C78.00 RECTAL ADENOCARCINOMA METASTATIC TO LUNG (HCC): Status: ACTIVE | Noted: 2025-01-01

## 2025-05-07 PROBLEM — D72.823 LEUKEMOID REACTION: Status: ACTIVE | Noted: 2025-01-01

## 2025-05-07 PROBLEM — N17.9 ACUTE KIDNEY INJURY (HCC): Status: ACTIVE | Noted: 2025-01-01

## 2025-05-07 PROBLEM — C20 RECTAL ADENOCARCINOMA METASTATIC TO LUNG (HCC): Status: ACTIVE | Noted: 2025-01-01

## 2025-05-07 PROBLEM — J96.01 ACUTE HYPOXIC RESPIRATORY FAILURE (HCC): Status: ACTIVE | Noted: 2025-01-01

## 2025-05-07 NOTE — ADDENDUM NOTE
Encounter addended by: Zeny Paulino, Med Ass't on: 5/7/2025 9:53 AM   Actions taken: Charge Capture section accepted

## 2025-05-07 NOTE — ED PROVIDER NOTES
ER Provider Note    Scribed for Marcus Aquino M.D. by Oral Ross. 5/7/2025   12:11 PM    Primary Care Provider: Rickie Naranjo M.D.    CHIEF COMPLAINT  Chief Complaint   Patient presents with    Fatigue     Sent by oncologist   Hx colorectal cancer    Pt has not been eating, drinking, increased lethargy   Lost 4 lbs in 1 week     EXTERNAL RECORDS REVIEWED  Inpatient Notes Patient was recently admitted and discharged on 04/19/2025.  and Outpatient Notes Patient has history of colon cancer. Patient had a recent perirectal incision and drainage. He was placed on pain medication. His CT scan showed new perirectal mass. Patient saw hematology and oncology today. Patient was sent by Dr. Guzman due to patient having difficulty caring for self.        HPI/ROS  LIMITATION TO HISTORY   Select: : None  OUTSIDE HISTORIAN(S):  None    Booker Saucedo is a 59 y.o. male who presents to the ED for evaluation of fatigue onset prior to arrival. Patient reports he is undergoing chemotherapy and radiation and adds he was sent to ED his per his Oncologist. He has associated symptoms of constant rectal pain, ankle swelling, shortness of breath, difficulty ambulating, and abdominal pain. Patient denies any fever, chills, cough, rhinorrhea, or chest pain. Patient adds that he has a friend helping.     PAST MEDICAL HISTORY  Past Medical History:   Diagnosis Date    Bowel habit changes     Cancer (HCC) 09/2024    colorectal cancer    High cholesterol     Hypertension     Kidney stone     4-5x    MI (myocardial infarction) (HCC) 2015       SURGICAL HISTORY  Past Surgical History:   Procedure Laterality Date    MT LAP, SURG COLOSTOMY  4/8/2025    Procedure: CREATION, COLOSTOMY, LAPAROSCOPIC;  Surgeon: Hector Tse M.D.;  Location: SURGERY [facility];  Service: Gastroenterology    MT I&D PERIRECTAL ABSCESS  4/8/2025    Procedure: INCISION AND DRAINAGE, ABSCESS, PERIRECTAL;  Surgeon: Hector Tse M.D.;  Location:  SURGERY [facility];  Service: Gastroenterology    CATH PLACEMENT Right 10/15/2024    Procedure: CENTRAL VENOUS CATHETER PLACEMENT WITH SUBCUTANEOUS PORT;  Surgeon: Hector Tse MD, PhD;  Location: SURGERY [facility];  Service: Gastroenterology    STENT PLACEMENT  2015    BARE METAL STENT- CARDIAC    APPENDECTOMY      KNEE ARTHROSCOPY      MENISCECTOMY    TONSILLECTOMY         FAMILY HISTORY  Family History   Problem Relation Age of Onset    Bladder cancer Father     Breast Cancer Sister        SOCIAL HISTORY   reports that he quit smoking about 16 months ago. His smoking use included cigars. He has never used smokeless tobacco. He reports that he does not currently use alcohol. He reports that he does not use drugs.    CURRENT MEDICATIONS  Previous Medications    ATORVASTATIN (LIPITOR) 40 MG TAB    Take 40 mg by mouth every evening.    CAPECITABINE (XELODA) 500 MG TABLET    Take 3 tabs of 500mg strength (total dose of 1500 mg) by mouth 2 times a day (at least 12 hrs apart and 30 min after food) Monday-Friday on days of radiation x 30 treatments    DULOXETINE (CYMBALTA) 30 MG CAP DR PARTICLES    Take 1 Capsule by mouth every day.    FUROSEMIDE (LASIX) 20 MG TAB    Take 20 mg by mouth every day.    HYDROXYZINE HCL (ATARAX) 25 MG TAB    Take 25 mg by mouth every 8 hours as needed for Anxiety.    LIDOCAINE (XYLOCAINE) 5 % OINTMENT    Apply to anal area    LIDOCAINE (XYLOCAINE) 5 % OINTMENT    Apply 0.5 g topically 3 times a day as needed (anal pain) for up to 30 days.    METHADONE (DOLOPHINE) 10 MG TAB    Take 1 Tablet by mouth in the morning, at noon, and at bedtime for 14 days. To be used in conjunction with 2.5mg prescription for a total of 12.5mg per dose.    METHADONE (DOLOPHINE) 5 MG TAB    Take 0.5 Tablets by mouth every 8 hours for 14 days. To be used in conjunction with 10mg prescription for a total of 12.5mg per dose.    OMEPRAZOLE (PRILOSEC) 20 MG DELAYED-RELEASE CAPSULE    Take 1 Capsule by mouth  "every day.    ONDANSETRON (ZOFRAN) 4 MG TAB TABLET    Take 1 Tablet by mouth every four hours as needed for Nausea/Vomiting (for nausea, vomiting).    OXYCODONE (OXY-IR) 15 MG IMMEDIATE RELEASE TABLET    Take 15-30 mg by mouth every four hours as needed for Severe Pain.    SILVER SULFADIAZINE (SILVADENE) 1 % CREAM    Apply to anal area    TAMSULOSIN (FLOMAX) 0.4 MG CAPSULE    Take 1 Capsule by mouth every day.       ALLERGIES  No Known Allergies     PHYSICAL EXAM  /70   Pulse (!) 101   Temp 36.4 °C (97.6 °F) (Temporal)   Resp 15   Ht 1.753 m (5' 9\")   Wt 73 kg (161 lb)   SpO2 94%   BMI 23.78 kg/m²    Constitutional: Ill appearing, moderate distress,    HENT: Normocephalic, Atraumatic, Dry mucous membranes    Eyes: PERRLA, EOMI, Conjunctiva normal, No discharge.   Neck: No tenderness, Supple, No stridor.   Cardiovascular: Normal heart rate, Normal rhythm.   Thorax & Lungs: Crackles at bilateral bases, No respiratory distress.   Abdomen: Soft, Moderate tenderness across abdomen, No masses.   Skin: Port on right chest, Warm, Dry, No rash.    Musculoskeletal: Pidding edema on bilateral ankles, No major deformities noted.  Neurologic: Awake, alert. Moves all extremities spontaneously.  Psychiatric: Affect normal, Judgment normal, Mood normal.     DIAGNOSTIC STUDIES    Labs:   Results for orders placed or performed during the hospital encounter of 05/07/25   Lactic Acid    Collection Time: 05/07/25 12:11 PM   Result Value Ref Range    Lactic Acid 2.0 0.5 - 2.0 mmol/L   CBC with Differential    Collection Time: 05/07/25 12:11 PM   Result Value Ref Range    WBC 19.7 (H) 4.8 - 10.8 K/uL    RBC 4.17 (L) 4.70 - 6.10 M/uL    Hemoglobin 12.3 (L) 14.0 - 18.0 g/dL    Hematocrit 37.7 (L) 42.0 - 52.0 %    MCV 90.4 81.4 - 97.8 fL    MCH 29.5 27.0 - 33.0 pg    MCHC 32.6 32.3 - 36.5 g/dL    RDW 57.1 (H) 35.9 - 50.0 fL    Platelet Count 144 (L) 164 - 446 K/uL    MPV 10.8 9.0 - 12.9 fL    Neutrophils-Polys 90.20 (H) 44.00 - " 72.00 %    Lymphocytes 1.70 (L) 22.00 - 41.00 %    Monocytes 4.10 0.00 - 13.40 %    Eosinophils 0.80 0.00 - 6.90 %    Basophils 0.00 0.00 - 1.80 %    Nucleated RBC 0.00 0.00 - 0.20 /100 WBC    Neutrophils (Absolute) 17.93 (H) 1.82 - 7.42 K/uL    Lymphs (Absolute) 0.33 (L) 1.00 - 4.80 K/uL    Monos (Absolute) 0.80 0.00 - 0.85 K/uL    Eos (Absolute) 0.16 0.00 - 0.51 K/uL    Baso (Absolute) 0.00 0.00 - 0.12 K/uL    NRBC (Absolute) 0.00 K/uL    Anisocytosis 1+     Microcytosis 1+    Complete Metabolic Panel    Collection Time: 05/07/25 12:11 PM   Result Value Ref Range    Sodium 126 (L) 135 - 145 mmol/L    Potassium 4.6 3.6 - 5.5 mmol/L    Chloride 92 (L) 96 - 112 mmol/L    Co2 18 (L) 20 - 33 mmol/L    Anion Gap 16.0 7.0 - 16.0    Glucose 130 (H) 65 - 99 mg/dL    Bun 46 (H) 8 - 22 mg/dL    Creatinine 1.53 (H) 0.50 - 1.40 mg/dL    Calcium 8.8 8.5 - 10.5 mg/dL    Correct Calcium 10.1 8.5 - 10.5 mg/dL    AST(SGOT) 43 12 - 45 U/L    ALT(SGPT) 22 2 - 50 U/L    Alkaline Phosphatase 133 (H) 30 - 99 U/L    Total Bilirubin 0.7 0.1 - 1.5 mg/dL    Albumin 2.4 (L) 3.2 - 4.9 g/dL    Total Protein 5.6 (L) 6.0 - 8.2 g/dL    Globulin 3.2 1.9 - 3.5 g/dL    A-G Ratio 0.8 g/dL   Blood Culture - Draw one from central line and one from peripheral site    Collection Time: 05/07/25 12:11 PM    Specimen: Line; Blood   Result Value Ref Range    Significant Indicator NEG     Source BLD     Site LINE     Culture Result       No Growth  Note: Blood cultures are incubated for 5 days and  are monitored continuously.Positive blood cultures  are called to the RN and reported as soon as  they are identified.     LIPASE    Collection Time: 05/07/25 12:11 PM   Result Value Ref Range    Lipase 16 11 - 82 U/L   Prothrombin Time    Collection Time: 05/07/25 12:11 PM   Result Value Ref Range    PT 19.1 (H) 12.0 - 14.6 sec    INR 1.60 (H) 0.87 - 1.13   APTT    Collection Time: 05/07/25 12:11 PM   Result Value Ref Range    APTT 27.3 24.7 - 36.0 sec   TROPONIN     Collection Time: 05/07/25 12:11 PM   Result Value Ref Range    Troponin T 35 (H) 6 - 19 ng/L   proBrain Natriuretic Peptide, NT    Collection Time: 05/07/25 12:11 PM   Result Value Ref Range    NT-proBNP 1279 (H) 0 - 125 pg/mL   ABO Rh Confirm    Collection Time: 05/07/25 12:11 PM   Result Value Ref Range    ABO Rh Confirm AB POS    DIFFERENTIAL MANUAL    Collection Time: 05/07/25 12:11 PM   Result Value Ref Range    Bands-Stabs 0.80 0.00 - 10.00 %    Metamyelocytes 0.80 %    Myelocytes 1.60 %    Manual Diff Status PERFORMED    PERIPHERAL SMEAR REVIEW    Collection Time: 05/07/25 12:11 PM   Result Value Ref Range    Peripheral Smear Review see below    PLATELET ESTIMATE    Collection Time: 05/07/25 12:11 PM   Result Value Ref Range    Plt Estimation Decreased    MORPHOLOGY    Collection Time: 05/07/25 12:11 PM   Result Value Ref Range    RBC Morphology Present     Poikilocytosis 2+     Ovalocytes 1+     Echinocytes 2+    ESTIMATED GFR    Collection Time: 05/07/25 12:11 PM   Result Value Ref Range    GFR (CKD-EPI) 52 (A) >60 mL/min/1.73 m 2   COD - Adult (Type and Screen)    Collection Time: 05/07/25 12:28 PM   Result Value Ref Range    ABO Grouping Only AB     Rh Grouping Only POS     Antibody Screen-Cod NEG    Blood Culture - Draw one from central line and one from peripheral site    Collection Time: 05/07/25  1:11 PM    Specimen: Peripheral; Blood   Result Value Ref Range    Significant Indicator NEG     Source BLD     Site PERIPHERAL     Culture Result       No Growth  Note: Blood cultures are incubated for 5 days and  are monitored continuously.Positive blood cultures  are called to the RN and reported as soon as  they are identified.         Radiology:   This attending emergency physician has independently interpreted the diagnostic imaging associated with this visit and is awaiting the final reading from the radiologist.   Preliminary interpretation is a follows: Pleural effusion  Radiologist  interpretation:   CT-CTA CHEST PULMONARY ARTERY W/ RECONS   Final Result      1.  No CT evidence for pulmonary emboli.   2.  Multiple bilateral pulmonary nodules consistent with metastases.   3.  Bilateral pleural effusions, larger on the RIGHT, with associated atelectasis.   4.  Ill-defined patchy interstitial opacities, likely indicating edema.   5.  Multiple liver metastases.               CT-ABDOMEN-PELVIS WITH   Final Result      1.  Progression of metastatic disease with enlarging pulmonary metastases, worsening adenopathy and enlarging hepatic metastases.   2.  Enlarging right and new left pleural effusions with multiple pleural metastases.   3.  Mild ascites.   4.  Stable necrotic rectal mass.   5.  Splenomegaly.         DX-CHEST-PORTABLE (1 VIEW)   Final Result      Findings likely represent pulmonary edema with a new right pleural effusion.             COURSE & MEDICAL DECISION MAKING         INITIAL ASSESSMENT, COURSE AND PLAN  Differential diagnoses include but not limited to: Sepsis, worsening cancer, dehydration, metabolic    Care Narrative: Patient is coming in with worsening symptoms, unable to care for self, the patient is tachycardic, dehydrated.  Give the patient IV fluids, CT scan of the chest abdomen pelvis shows a large pleural effusion, worsening cancer, the patient has hyponatremia acute kidney injury and dehydration.  IV fluids were given for dehydration with mild improvement of patient's symptoms.  Patient will be admitted to the hospitalist.    12:11 PM - Patient was evaluated at bedside. Ordered for DX-Chest portable, blood culture, urine culture, UA, CMP, CBC w/Diff, lactic acid, Sepsis protocol, IV saline shock, to evaluate. Patient verbalizes understanding and support with my plan of care.       DISPOSITION AND DISCUSSIONS    I have discussed management of the patient with the following physicians and MARTHA's: Dr. Shrestha    FINAL DIAGNOSIS  1. Other fatigue    2. Rectal pain    3.  Metastatic malignant neoplasm, unspecified site (HCC)    4. Pleural effusion    5. Hyponatremia    6. SATURNINO (acute kidney injury) (HCC)    7. Dehydration         IOral (Scribe), am scribing for, and in the presence of, Marcus Aquino M.D..    Electronically signed by: Oral Ross (Scribe), 5/7/2025    IMarcus M.D. personally performed the services described in this documentation, as scribed by Oral Ross in my presence, and it is both accurate and complete.      The note accurately reflects work and decisions made by me.  Marcus Aquino M.D.  5/7/2025  6:24 PM

## 2025-05-07 NOTE — PROGRESS NOTES
Follow Up Note:  Hematology/Oncology      Primary Care:  Rickie Naranjo M.D.    Diagnosis: Rectal adenocarcinoma    Chief Complaint: On-treatment visit    Current Treatment: mFOLFIRINOX, followed by capecitabine with concurrent XRT, and then surgical evaluation    Prior Treatment: NA    Oncology History of Presenting Illness:  Booker Saucedo is a 59 y.o.  man who presents to the clinic for evaluation for systemic therapy for a diagnosis of rectal carcinoma. He started having back pain and changes in his bowel habits in July and went to the hospital where CT scan revealed right hydronephrosis due to a 1 mm calculus. However, he was also noted to have rectal wall thickening with perirectal lymph nodes. He subsequently underwent a colonoscopy which revealed a fungating circumferential mass, 5 cm from the anal verge, with biopsy revealing invasive adenocarcinoma, moderately differentiated, MMR proficient. Staging scans did not reveal any definitive metastatic disease, though there was a 1.5 cm left hepatic lobe lesion which was not PET avid. He was seen by radiation oncology and colorectal surgery and ultimately was staged on rectal MRI as a [id number] stage IIIC disease. He has been referred for treatment accordingly.     Treatment History:   11/08/2024: C1 FOLFIRINOX   11/22/2024: C2 FOLFIRINOX   12/06/2024: C3 FOLFIRINOX  12/20/2024: C4 FOLFIRINOX  01/03/2025: C5 FOLFIRINOX (decrease oxaliplatin to 65 mg/m2 for neuropathy)  01/17/25: C6 FOLFIRINOX  01/31/25: C7 FOLFIRINOX  02/14/25: C8 FOLFIRINOX   03/18/25: Start capecitabine with concurrent XRT      Interval History:  Patient is here for follow up visit. He is struggling with forgetfulness. He has been using his medication but it is not clear whether they are being used as prescribed, because of his forgetfulness. He has his caregiver with him today, who is a friend of his.     Allergies as of 05/07/2025    (No Known Allergies)         Current  Outpatient Medications:     furosemide (LASIX) 20 MG Tab, Take 20 mg by mouth every day., Disp: , Rfl:     methadone (DOLOPHINE) 10 MG Tab, Take 1 Tablet by mouth in the morning, at noon, and at bedtime for 14 days. To be used in conjunction with 2.5mg prescription for a total of 12.5mg per dose., Disp: 42 Tablet, Rfl: 0    methadone (DOLOPHINE) 5 MG Tab, Take 0.5 Tablets by mouth every 8 hours for 14 days. To be used in conjunction with 10mg prescription for a total of 12.5mg per dose., Disp: 22 Tablet, Rfl: 0    lidocaine (XYLOCAINE) 5 % Ointment, Apply 0.5 g topically 3 times a day as needed (anal pain) for up to 30 days., Disp: 35.44 g, Rfl: 0    DULoxetine (CYMBALTA) 30 MG Cap DR Particles, Take 1 Capsule by mouth every day., Disp: 30 Capsule, Rfl: 0    lidocaine (XYLOCAINE) 5 % Ointment, Apply to anal area (Patient taking differently: Apply 1 Each topically as needed (pain). Apply to anal area), Disp: 35.44 g, Rfl: 1    omeprazole (PRILOSEC) 20 MG delayed-release capsule, Take 1 Capsule by mouth every day., Disp: 30 Capsule, Rfl: 0    silver sulfADIAZINE (SILVADENE) 1 % Cream, Apply to anal area (Patient taking differently: Apply 1 Each topically every day. Apply to anal area), Disp: 25 g, Rfl: 0    tamsulosin (FLOMAX) 0.4 MG capsule, Take 1 Capsule by mouth every day., Disp: 30 Capsule, Rfl: 0    aspirin 81 MG EC tablet, Take 81 mg by mouth every morning., Disp: , Rfl:     atorvastatin (LIPITOR) 40 MG Tab, Take 40 mg by mouth every day., Disp: , Rfl:     hydrOXYzine HCl (ATARAX) 25 MG Tab, Take 25 mg by mouth every 8 hours as needed for Anxiety., Disp: , Rfl:     ondansetron (ZOFRAN) 4 MG Tab tablet, Take 1 Tablet by mouth every four hours as needed for Nausea/Vomiting (for nausea, vomiting)., Disp: 30 Tablet, Rfl: 6    capecitabine (XELODA) 500 MG tablet, Take 3 tabs of 500mg strength (total dose of 1500 mg) by mouth 2 times a day (at least 12 hrs apart and 30 min after food) Monday-Friday on days of  "radiation x 30 treatments (Patient not taking: Reported on 4/16/2025), Disp: 180 Tablet, Rfl: 0      Review of Systems:  Review of Systems   Constitutional:  Negative for chills, diaphoresis, fever, malaise/fatigue and weight loss.   HENT:  Negative for congestion, hearing loss, nosebleeds, sinus pain and sore throat.    Eyes:  Negative for blurred vision and double vision.   Respiratory:  Negative for cough, sputum production, shortness of breath and wheezing.    Cardiovascular:  Negative for chest pain, palpitations, orthopnea and leg swelling.   Gastrointestinal:  Positive for abdominal pain. Negative for blood in stool, constipation, diarrhea, heartburn, melena, nausea and vomiting.   Genitourinary:  Negative for dysuria, frequency, hematuria and urgency.   Musculoskeletal:  Negative for back pain, joint pain and myalgias.   Skin:  Negative for rash.   Neurological:  Negative for dizziness, tingling, weakness and headaches.        Forgetfulness is getting worse   Endo/Heme/Allergies:  Does not bruise/bleed easily.   Psychiatric/Behavioral:  The patient is not nervous/anxious and does not have insomnia.          Physical Exam:  Vitals:    05/07/25 0904   BP: 108/56   Pulse: 80   Temp: 36.3 °C (97.4 °F)   TempSrc: Temporal   SpO2: 98%   Weight: 73 kg (161 lb)   Height: 1.753 m (5' 9.02\")       DESC; KARNOFSKY SCALE WITH ECOG EQUIVALENT: 60, Requires occasional assistance, but is able to care for most of his personal needs (ECOG equivalent 2)    DISTRESS LEVEL: no apparent distress    Physical Exam  Vitals and nursing note reviewed.   Constitutional:       General: He is awake. He is not in acute distress.     Appearance: Normal appearance. He is normal weight. He is not ill-appearing, toxic-appearing or diaphoretic.   HENT:      Head: Normocephalic and atraumatic.      Nose: Nose normal. No congestion.      Mouth/Throat:      Pharynx: Oropharynx is clear. No oropharyngeal exudate or posterior oropharyngeal " erythema.   Eyes:      General: No scleral icterus.     Extraocular Movements: Extraocular movements intact.      Conjunctiva/sclera: Conjunctivae normal.      Pupils: Pupils are equal, round, and reactive to light.   Cardiovascular:      Rate and Rhythm: Normal rate and regular rhythm.      Pulses: Normal pulses.      Heart sounds: Normal heart sounds. No murmur heard.     No friction rub. No gallop.   Pulmonary:      Effort: Pulmonary effort is normal.      Breath sounds: Normal breath sounds. No decreased air movement. No wheezing, rhonchi or rales.   Abdominal:      General: The ostomy site is clean. Bowel sounds are normal. There is no distension.      Tenderness: There is abdominal tenderness in the suprapubic area.   Musculoskeletal:         General: No deformity. Normal range of motion.      Cervical back: Normal range of motion and neck supple. No tenderness.      Right lower leg: No edema.      Left lower leg: No edema.   Lymphadenopathy:      Cervical: No cervical adenopathy.      Upper Body:      Right upper body: No axillary adenopathy.      Left upper body: No axillary adenopathy.      Lower Body: No right inguinal adenopathy. No left inguinal adenopathy.   Skin:     General: Skin is warm and dry.      Coloration: Skin is pale. Skin is not jaundiced.      Findings: No erythema or rash.   Neurological:      General: No focal deficit present.      Mental Status: He is alert and oriented to person, place, and time.      Sensory: Sensation is intact.      Motor: Weakness present.      Gait: Gait abnormal.   Psychiatric:         Attention and Perception: Attention normal.         Mood and Affect: Mood normal.         Behavior: Behavior normal. Behavior is cooperative.         Thought Content: Thought content normal.         Judgment: Judgment normal.           Labs:  No visits with results within 7 Day(s) from this visit.   Latest known visit with results is:   Hospital Outpatient Visit on 04/28/2025  "  Component Date Value Ref Range Status    Course ID 04/28/2025 C1_Rectum   Final    Course Start Date 04/28/2025 02/18/2025   Final    Course First Treatment Date 04/28/2025 03/18/2025   Final    Course Last Treatment Date 04/28/2025 04/28/2025   Final    Course Elapsed Days 04/28/2025 41 @ 04/28/2025   Final    Course Intent 04/28/2025 Curative   Final    RP ID 04/28/2025 Rectum Bst   Final    RP Dosage Given to Date (Gy) 04/28/2025 [id number]  Gy Final    RP Session Dosage Given (Gy) 04/28/2025 0.84390172  Gy Final    Plan ID 04/28/2025 Rectum Bst   Final    Plan Name 04/28/2025 Rectum Bst   Final    Plan Fractions Treated to Date 04/28/2025 5 of 5   Final    Plan Prescribed Dose Per Fraction * 04/28/2025 1.8  Gy Final    Plan Total Prescribed Dose (cGy) 04/28/2025 900  cGy Final       Imaging:     All listed images below have been independently reviewed by me. I agree with the findings as summarized below:    No results found.    Pathology:    Liver biopsy 11/13/24:    FINAL DIAGNOSIS:   CONSULTANTS' DIAGNOSIS:     \"[id number]; 11/13/2024   Liver, mass, biopsy (A)     * Cytologically bland vascular proliferation, most consistent with       hepatic small vessel neoplasm (see comment)\"     Please see separate [facility] report for further   details. Dr. Prasad reviewed the slides of this case prior to requesting   consultative opinion by [facility].                                         Diagnosis performed by:                                       KHOA PRASAD MD            Assessment & Plan:  1. Rectal cancer (HCC)        2. Cancer associated pain          This is a 59 year old  man with rectal adenocarcinoma, [id number] stage IIIC disease. He presents for evaluation.      Current Diagnosis and Staging: Rectal adenocarcinoma, [id number] stage IIIC disease    Update: The patient is struggling with care. I am concerned about his ability to take care of himself, as " is his caregiver. I am worried that his medications need to be reconciled because he is suffering from medication effects. I am going to send him to the ED for further evaluation accordingly.      Treatment Plan: mFOLFIRINOX followed by capecitabine with concurrent XRT and then surgical evaluation     Treatment Citation: NCCN     Plan of Care:     Primary Therapy: Surgical evaluation pending scans  Supportive Therapy: Antiemetics per protocol. Patient to be sent to the ED for evaluation.   Toxicity: Patient is getting antineoplastic therapy for a life-threatening malignancy and needs monitoring of blood counts, hepatic function, and renal function due to potential for organ dysfunction. This treatment poses a risk of toxicity that could lead to long term disruption of bodily function or death.  Labs: CBC with diff, CMP, CEA, ctDNA monitoring  Imaging: Repeat MRI rectal protocol, CT scans after completion of JOSEMANUEL (scheduled for June)  Treatment Planning: Patient will start with chemotherapy due to concern of aggressive spread of disease, and then will do capecitabine with concurrent XRT and surgical evaluation.  Consultations: Colorectal surgery (Dr. Tse); Radiation oncology (Dr. Huerta), Palliative Care (Dr. Goel)  Code Status: Full  Miscellaneous: Referred to medical genetics, patient will follow up.   Return for Follow Up: Monitor in hospital     Any questions and concerns raised by the patient were answered to the best of my ability. Thank you for allowing me to participate in the care for this patient. Please feel free to contact me for any questions or concerns.

## 2025-05-07 NOTE — ED TRIAGE NOTES
".  Chief Complaint   Patient presents with    Fatigue     Sent by oncologist   Hx colorectal cancer    Pt has not been eating, drinking, increased lethargy   Lost 4 lbs in 1 week     .Patient wheeled to triage for above complaint. Patient aware to notify RN of any changes in symptoms. Patient educated on triage process. A&O x 4, speaking in full sentences    ./76   Pulse 91   Temp 36.4 °C (97.6 °F) (Temporal)   Resp 16   Ht 1.753 m (5' 9\")   Wt 73 kg (161 lb)   SpO2 91%   BMI 23.78 kg/m²     "

## 2025-05-08 PROBLEM — E87.1 HYPONATREMIA: Status: ACTIVE | Noted: 2025-01-01

## 2025-05-08 NOTE — ASSESSMENT & PLAN NOTE
Likely prerenal  Cre 1.5, baseline 0.8    Hold outpatient furosemide  Hold IVF given pleural effusion    5/9 cre 0.9  I ordered am lab to monitor  Avoid nephrotoxic meds

## 2025-05-08 NOTE — THERAPY
Occupational Therapy   Initial Evaluation     Patient Name: Booker Saucedo  Age:  59 y.o., Sex:  male  Medical Record #: [id number]  Today's Date: 5/8/2025          Assessment  Patient is 59 y.o. male with a diagnosis of intractable rectum and lower abdomin pn .  Pt is at or near his/her functional baseline. Pt with no further skilled OT needs in the acute care setting at this time.      Plan    Occupational Therapy Initial Treatment Plan   Duration: (P) Discharge Needs Only       Discharge Recommendations: (P) Anticipate that the patient will have no further occupational therapy needs after discharge from the hospital (current limiting factor is pain)        05/08/25 1149   Prior Living Situation   Housing / Facility 2 Story House   Steps Into Home 2   Steps In Home 12   Equipment Owned None   Lives with - Patient's Self Care Capacity Alone and Able to Care For Self   Comments reports that he mostly lays in bed all day   Prior Level of ADL Function   Self Feeding Independent   Grooming / Hygiene Independent   Bathing Independent   Dressing Independent   Toileting Independent   Passive ROM Upper Body   Passive ROM Upper Body WDL   Active ROM Upper Body   Active ROM Upper Body  WDL   Strength Upper Body   Upper Body Strength  X   Comments 4/5 B UOEs   Balance Assessment   Sitting Balance (Static) Fair +   Sitting Balance (Dynamic) Fair +   Standing Balance (Static) Fair   Standing Balance (Dynamic) Fair  (with FWW)   Weight Shift Sitting Good   Weight Shift Standing Fair   ADL Assessment   Grooming Supervision   Upper Body Dressing Supervision   Lower Body Dressing Supervision   Toileting Supervision   Functional Mobility   Sit to Stand Supervised   Bed, Chair, Wheelchair Transfer Supervised   Occupational Therapy Initial Treatment Plan    Duration Discharge Needs Only   Anticipated Discharge Equipment and Recommendations   Discharge Recommendations Anticipate that the patient will have no further occupational  therapy needs after discharge from the hospital  (current limiting factor is pain)

## 2025-05-08 NOTE — ED NOTES
Bedside report received from CHRIS López.   Fall risk precautions in place. Call bell in reach.  Pt on 1L O2 by nasal cannula, all lines traced.     POC discussed with pt.

## 2025-05-08 NOTE — PROGRESS NOTES
[facility] Medicine Daily Progress Note    Date of Service  5/8/2025    Chief Complaint  Booker Saucedo is a 59 y.o. male admitted 5/7/2025 with  large right pleural effusion    Hospital Course  Booker Saucedo is a 59 y.o. male with a history of rectal adenocarcinoma followed by Dr. Kim who presented 5/7/2025 with intractable pain mostly in the rectum, lower abdomen, memory issues, and inability to care for self.     He was recently admitted from 4/4-14 as well as 4/16-19 with intractable pain, rectal abscess, complications from rectal adenocarcinoma    Patient states that he lives at home alone.  Sometimes his friends will come to check on him.  He is having a lot of memory issues and forgets everything, he especially has issues remembering his medications.  States his abdominal and rectal pain is not well-controlled, but he also states that he would like to come off the pain meds because he feels like that is contributing to his memory as well as making him sleepy and tired all the time.  Patient has a lot of trouble getting around the house.  He has had decreased appetite and decreased oral intake.  He is dizzy when he gets up and is almost fell multiple times.  He has also been more short of breath and now requiring oxygen here in the ER     CT pulmonary angiogram was negative for pulmonary emboli.  Multiple bilateral pulm nodules consistent with metastasis.  Bilateral pleural effusions right greater than left with associated atelectasis.  Pulmonary edema.  Multiple liver metastasis.  CT abdomen pelvis showed progression of metastatic disease with enlarging pulmonary metastasis, worsening adenopathy and enlarging hepatic metastasis.  Enlarging right and new left pleural effusion with multiple pleural metastasis.  Mild ascites.  Stable necrotic rectal mass.  Splenomegaly.    Cxr personally reviewed, BL pleural effusion R>L      Interval Problem Update  I have seen and examined the patient at  bedside    On 2L   Plan for thoracentesis today, I added cytology  Pending echo    SATURNINO - Cre 1.5>1.3   Pending renal u/s    Hyponatremia - >128    Wbc 19>21, proc 9  tarted empiric iv rocephin    Prot blockage - plan for port dye study by IR    I discussed with oncology, will have colorectal surgery evalu    Hb 12  Plt 169  Bnp 1279    I have discussed this patient's plan of care and discharge plan at IDT rounds today with Case Management, Nursing, Nursing leadership, and other members of the IDT team.    Consultants/Specialty  Oncology, IR, Colonectal    Code Status  DNAR/DNI    Disposition  The patient is not medically cleared for discharge to home or a post-acute facility.      I have placed the appropriate orders for post-discharge needs.    Review of Systems   All 12 systems were reviewed and negative except as mentioned above      Physical Exam  Temp:  [35.9 °C (96.7 °F)-36.8 °C (98.3 °F)] 36.1 °C (97 °F)  Pulse:  [100-125] 109  Resp:  [16-18] 16  BP: ()/(53-83) 90/53  SpO2:  [86 %-95 %] 93 %    Physical Exam  Constitutional:       General: He is in acute distress.      Appearance: He is ill-appearing.   HENT:      Head: Normocephalic.      Mouth/Throat:      Mouth: Mucous membranes are moist.   Eyes:      Extraocular Movements: Extraocular movements intact.      Conjunctiva/sclera: Conjunctivae normal.   Cardiovascular:      Rate and Rhythm: Normal rate and regular rhythm.      Pulses: Normal pulses.      Heart sounds: Normal heart sounds.   Pulmonary:      Effort: Pulmonary effort is normal.      Breath sounds: No wheezing.      Comments: Decreased breathing sounds  Abdominal:      General: There is distension.      Tenderness: There is abdominal tenderness.   Musculoskeletal:         General: Swelling present. No tenderness.      Cervical back: Normal range of motion and neck supple.   Skin:     General: Skin is warm.   Neurological:      Mental Status: He is alert and oriented to person, place,  and time. Mental status is at baseline.         Fluids    Intake/Output Summary (Last 24 hours) at 5/8/2025 1713  Last data filed at 5/7/2025 1921  Gross per 24 hour   Intake 1000 ml   Output --   Net 1000 ml        Laboratory  Recent Labs     05/07/25  1211 05/08/25  0000   WBC 19.7* 21.7*   RBC 4.17* 4.01*   HEMOGLOBIN 12.3* 12.0*   HEMATOCRIT 37.7* 36.2*   MCV 90.4 90.3   MCH 29.5 29.9   MCHC 32.6 33.1   RDW 57.1* 57.0*   PLATELETCT 144* 169   MPV 10.8 11.1     Recent Labs     05/07/25  1211 05/08/25  0000   SODIUM 126* 128*   POTASSIUM 4.6 4.2   CHLORIDE 92* 98   CO2 18* 23   GLUCOSE 130* 99   BUN 46* 52*   CREATININE 1.53* 1.34   CALCIUM 8.8 8.7     Recent Labs     05/07/25  1211   APTT 27.3   INR 1.60*               Imaging  EC-ECHOCARDIOGRAM COMPLETE W/O CONT   Final Result      CT-CTA CHEST PULMONARY ARTERY W/ RECONS   Final Result      1.  No CT evidence for pulmonary emboli.   2.  Multiple bilateral pulmonary nodules consistent with metastases.   3.  Bilateral pleural effusions, larger on the RIGHT, with associated atelectasis.   4.  Ill-defined patchy interstitial opacities, likely indicating edema.   5.  Multiple liver metastases.               CT-ABDOMEN-PELVIS WITH   Final Result      1.  Progression of metastatic disease with enlarging pulmonary metastases, worsening adenopathy and enlarging hepatic metastases.   2.  Enlarging right and new left pleural effusions with multiple pleural metastases.   3.  Mild ascites.   4.  Stable necrotic rectal mass.   5.  Splenomegaly.         DX-CHEST-PORTABLE (1 VIEW)   Final Result      Findings likely represent pulmonary edema with a new right pleural effusion.      US-THORACENTESIS PUNCTURE RIGHT    (Results Pending)        Assessment/Plan  * Rectal adenocarcinoma metastatic to lung (HCC)- (present on admission)  Assessment & Plan  Follow-up with Dr. Kim  Has been through chemo, radiation.  Now with left lower quadrant colostomy  Repeat CT scans show worsening  metastasis.  Patient also unable to take care of himself well at home.  He may need postacute placement.  This unfortunately will preclude him from getting any further cancer treatment.  PT/OT consult  Palliative care consulted for goals of care planning, symptom management as patient states his pain is not controlled but also wants to come off pain meds.  I do not think he was taking his medications correctly    I discussed with oncology, will have colorectal surgery evalu    Hyponatremia  Assessment & Plan  >128  monitor    Acute hypoxic respiratory failure (HCC)  Assessment & Plan  Requires 2L, baseline RA  Likely secondary to worsening pulmonary metastasis, large right pleural effusion  Plan for thoracentesis today, I added cytology  Wean down oxygen    Pleural effusion on right  Assessment & Plan  5/8 Plan for thoracentesis today, fluid analysis and culture, I added cytology    Acute kidney injury (HCC)- (present on admission)  Assessment & Plan  Likely prerenal  Cre 1.5, baseline 0.8    Hold outpatient furosemide  Hold IVF given pleural effusion  I ordered am lab to monitor  Avoid nephrotoxic meds    Leukemoid reaction  Assessment & Plan  Wbc 19>21, proc 9  I ordered ua  I started empiric iv rocephin    History of creation of ostomy (HCC)- (present on admission)  Assessment & Plan  History of  Monitor ostomy output    Coronary artery disease- (present on admission)  Assessment & Plan  History of.  Continue outpatient atorvastatin         VTE prophylaxis: lovenox    I have performed a physical exam and reviewed and updated ROS and Plan today (5/8/2025). In review of yesterday's note (5/7/2025), there are no changes except as documented above.    I spent greater than 52 minutes for chart review, obtaining history independently, performing medically appropriate examination,  documenting , ordering medications, tests, or procedures, referring and communicating with other health care professionals,  Independently interpreting results and communicating results with patient/family/caregiver. More than 50% of time was spent in face-to-face clinical encounter.

## 2025-05-08 NOTE — H&P
Hospital Medicine History & Physical Note    Date of Service  5/7/2025    Primary Care Physician  Rickie Naranjo M.D.    Consultants  oncology    Specialist Names: Judy    Code Status  DNAR/DNI    Chief Complaint  Chief Complaint   Patient presents with    Fatigue     Sent by oncologist   Hx colorectal cancer    Pt has not been eating, drinking, increased lethargy   Lost 4 lbs in 1 week       History of Presenting Illness  Booker Saucedo is a 59 y.o. male with a history of rectal adenocarcinoma followed by Dr. Kim who presented 5/7/2025 with intractable pain mostly in the rectum, lower abdomen, memory issues, and inability to care for self.    He was recently admitted from 4/4-14 as well as 4/16-19 with intractable pain, rectal abscess, complications from rectal adenocarcinoma  Patient states that he lives at home alone.  Sometimes his friends will come to check on him.  He is having a lot of memory issues and forgets everything, he especially has issues remembering his medications.  States his abdominal and rectal pain is not well-controlled, but he also states that he would like to come off the pain meds because he feels like that is contributing to his memory as well as making him sleepy and tired all the time.  Patient has a lot of trouble getting around the house.  He has had decreased appetite and decreased oral intake.  He is dizzy when he gets up and is almost fell multiple times.  He has also been more short of breath and now requiring oxygen here in the ER    On presentation to the ER patient was tachycardic.  Labs significant for leukocytosis at 19.7, hemoglobin 12.3, platelets 144, sodium 126, bicarb 18, creatinine 1.53, glucose 130, chloride 92, alk phos 133, albumin 2.4, total protein 5.6, troponin 35, BNP 1279, INR 1.6, PTT 19.1  CT pulmonary angiogram was negative for pulmonary emboli.  Multiple bilateral pulm nodules consistent with metastasis.  Bilateral pleural effusions right  greater than left with associated atelectasis.  Pulmonary edema.  Multiple liver metastasis.  CT abdomen pelvis showed progression of metastatic disease with enlarging pulmonary metastasis, worsening adenopathy and enlarging hepatic metastasis.  Enlarging right and new left pleural effusion with multiple pleural metastasis.  Mild ascites.  Stable necrotic rectal mass.  Splenomegaly.  In the ER he was given 1 L bolus of normal saline, 0.5 mg of IV Dilaudid x 1    I discussed the plan of care with patient, bedside RN, and ERP .    Review of Systems  Review of Systems   Constitutional:  Positive for malaise/fatigue.   Respiratory:  Positive for shortness of breath.    Gastrointestinal:  Positive for abdominal pain.   Psychiatric/Behavioral:  Positive for memory loss.        Past Medical History   has a past medical history of Bowel habit changes, Cancer (HCC) (09/2024), High cholesterol, Hypertension, Kidney stone, and MI (myocardial infarction) (HCC) (2015).    Surgical History   has a past surgical history that includes stent placement (2015); knee arthroscopy; appendectomy; tonsillectomy; cath placement (Right, 10/15/2024); pr lap, surg colostomy (4/8/2025); and pr i&d perirectal abscess (4/8/2025).     Family History  family history includes Bladder cancer in his father; Breast Cancer in his sister.   Family history reviewed with patient. There is no family history that is pertinent to the chief complaint.     Social History   reports that he quit smoking about 16 months ago. His smoking use included cigars. He has never used smokeless tobacco. He reports that he does not currently use alcohol. He reports that he does not use drugs.    Allergies  No Known Allergies    Medications  Prior to Admission Medications   Prescriptions Last Dose Informant Patient Reported? Taking?   DULoxetine (CYMBALTA) 30 MG Cap DR Particles  Patient, Rx Bottle (For Med Information) No No   Sig: Take 1 Capsule by mouth every day.    aspirin 81 MG EC tablet  Patient, Rx Bottle (For Med Information) Yes No   Sig: Take 81 mg by mouth every morning.   atorvastatin (LIPITOR) 40 MG Tab  Patient, Rx Bottle (For Med Information) Yes No   Sig: Take 40 mg by mouth every day.   capecitabine (XELODA) 500 MG tablet  Patient, Rx Bottle (For Med Information) No No   Sig: Take 3 tabs of 500mg strength (total dose of 1500 mg) by mouth 2 times a day (at least 12 hrs apart and 30 min after food) Monday-Friday on days of radiation x 30 treatments   Patient not taking: Reported on 4/16/2025   furosemide (LASIX) 20 MG Tab   Yes No   Sig: Take 20 mg by mouth every day.   hydrOXYzine HCl (ATARAX) 25 MG Tab  Patient, Rx Bottle (For Med Information) Yes No   Sig: Take 25 mg by mouth every 8 hours as needed for Anxiety.   lidocaine (XYLOCAINE) 5 % Ointment  Patient, Rx Bottle (For Med Information) No No   Sig: Apply to anal area   Patient taking differently: Apply 1 Each topically as needed (pain). Apply to anal area   lidocaine (XYLOCAINE) 5 % Ointment   No No   Sig: Apply 0.5 g topically 3 times a day as needed (anal pain) for up to 30 days.   methadone (DOLOPHINE) 10 MG Tab   No No   Sig: Take 1 Tablet by mouth in the morning, at noon, and at bedtime for 14 days. To be used in conjunction with 2.5mg prescription for a total of 12.5mg per dose.   methadone (DOLOPHINE) 5 MG Tab   No No   Sig: Take 0.5 Tablets by mouth every 8 hours for 14 days. To be used in conjunction with 10mg prescription for a total of 12.5mg per dose.   omeprazole (PRILOSEC) 20 MG delayed-release capsule  Patient, Rx Bottle (For Med Information) No No   Sig: Take 1 Capsule by mouth every day.   ondansetron (ZOFRAN) 4 MG Tab tablet  Patient No No   Sig: Take 1 Tablet by mouth every four hours as needed for Nausea/Vomiting (for nausea, vomiting).   silver sulfADIAZINE (SILVADENE) 1 % Cream  Patient No No   Sig: Apply to anal area   Patient taking differently: Apply 1 Each topically every day.  Apply to anal area   tamsulosin (FLOMAX) 0.4 MG capsule  Patient, Rx Bottle (For Med Information) No No   Sig: Take 1 Capsule by mouth every day.      Facility-Administered Medications: None       Physical Exam  Temp:  [36.3 °C (97.4 °F)-36.4 °C (97.6 °F)] 36.4 °C (97.6 °F)  Pulse:  [] 96  Resp:  [12-16] 16  BP: (108-134)/(56-76) 134/76  SpO2:  [82 %-98 %] 94 %  Blood Pressure: 134/76   Temperature: 36.4 °C (97.6 °F)   Pulse: 96   Respiration: 16   Pulse Oximetry: 94 %       Physical Exam  Vitals and nursing note reviewed.   Constitutional:       Appearance: Normal appearance. He is ill-appearing.   HENT:      Head: Normocephalic and atraumatic.      Nose: Nose normal.      Mouth/Throat:      Mouth: Mucous membranes are moist.      Pharynx: Oropharynx is clear.   Eyes:      Extraocular Movements: Extraocular movements intact.      Conjunctiva/sclera: Conjunctivae normal.   Cardiovascular:      Rate and Rhythm: Normal rate and regular rhythm.      Pulses: Normal pulses.      Heart sounds: Normal heart sounds. No murmur heard.     No friction rub. No gallop.   Pulmonary:      Effort: Pulmonary effort is normal. No respiratory distress.      Breath sounds: Normal breath sounds. No wheezing or rales.   Chest:      Chest wall: No tenderness.   Abdominal:      General: Abdomen is flat. Bowel sounds are normal. There is no distension.      Palpations: Abdomen is soft. There is no mass.      Tenderness: There is abdominal tenderness. There is no guarding.      Comments: LLQ colostomy   Musculoskeletal:         General: Normal range of motion.      Cervical back: Normal range of motion and neck supple.   Skin:     General: Skin is warm.      Capillary Refill: Capillary refill takes less than 2 seconds.   Neurological:      General: No focal deficit present.      Mental Status: He is alert. Mental status is at baseline. He is disoriented.      Cranial Nerves: No cranial nerve deficit.      Motor: No weakness.       Comments: States it is 04/07/2026   Psychiatric:         Mood and Affect: Mood normal.         Behavior: Behavior normal.         Laboratory:  Recent Labs     05/07/25  1211   WBC 19.7*   RBC 4.17*   HEMOGLOBIN 12.3*   HEMATOCRIT 37.7*   MCV 90.4   MCH 29.5   MCHC 32.6   RDW 57.1*   PLATELETCT 144*   MPV 10.8     Recent Labs     05/07/25  1211   SODIUM 126*   POTASSIUM 4.6   CHLORIDE 92*   CO2 18*   GLUCOSE 130*   BUN 46*   CREATININE 1.53*   CALCIUM 8.8     Recent Labs     05/07/25  1211   ALTSGPT 22   ASTSGOT 43   ALKPHOSPHAT 133*   TBILIRUBIN 0.7   LIPASE 16   GLUCOSE 130*     Recent Labs     05/07/25  1211   APTT 27.3   INR 1.60*     Recent Labs     05/07/25  1211   NTPROBNP 1279*         Recent Labs     05/07/25  1211   TROPONINT 35*       Imaging:  CT-CTA CHEST PULMONARY ARTERY W/ RECONS   Final Result      1.  No CT evidence for pulmonary emboli.   2.  Multiple bilateral pulmonary nodules consistent with metastases.   3.  Bilateral pleural effusions, larger on the RIGHT, with associated atelectasis.   4.  Ill-defined patchy interstitial opacities, likely indicating edema.   5.  Multiple liver metastases.               CT-ABDOMEN-PELVIS WITH   Final Result      1.  Progression of metastatic disease with enlarging pulmonary metastases, worsening adenopathy and enlarging hepatic metastases.   2.  Enlarging right and new left pleural effusions with multiple pleural metastases.   3.  Mild ascites.   4.  Stable necrotic rectal mass.   5.  Splenomegaly.         DX-CHEST-PORTABLE (1 VIEW)   Final Result      Findings likely represent pulmonary edema with a new right pleural effusion.          X-Ray:  I have personally reviewed the images and compared with prior images. and My impression is: Right-sided pleural effusion    Assessment/Plan:  Justification for Admission Status  I anticipate this patient will require at least two midnights for appropriate medical management, necessitating inpatient admission because  rectal adenocarcinoma complicated by worsening metastasis , inability to care for self at home, poor oral intake, memory issues, right pleural effusion, acute kidney injury, coronary artery disease, acute proximal respiratory failure    Patient will need a Med/Surg bed on EMERGENCY service .  The need is secondary to rectal adenocarcinoma complicated by worsening metastasis, inability to care for self at home, poor oral intake, memory issues, right pleural effusion, acute kidney injury, coronary artery disease, acute proximal respiratory failure.    * Rectal adenocarcinoma metastatic to lung (HCC)- (present on admission)  Assessment & Plan  Follow-up with Dr. Kim  Has been through chemo, radiation.  Now with left lower quadrant colostomy  Repeat CT scans show worsening metastasis.  Patient also unable to take care of himself well at home.  He may need postacute placement.  This unfortunately will preclude him from getting any further cancer treatment.  PT/OT consult  Palliative care consulted for goals of care planning, symptom management as patient states his pain is not controlled but also wants to come off pain meds.  I do not think he was taking his medications correctly    Acute hypoxic respiratory failure (HCC)  Assessment & Plan  Likely secondary to worsening pulmonary metastasis, large right pleural effusion  Ordered thoracentesis  Wean supplemental oxygen as tolerated    Pleural effusion on right  Assessment & Plan  Ordered thoracentesis.    Acute kidney injury (HCC)- (present on admission)  Assessment & Plan  Likely prerenal  Patient appears dry and reports poor oral intake  Hold outpatient furosemide    Leukemoid reaction  Assessment & Plan  Elevated 19.7  Does not endorse any specific symptoms localizing for infection  Check procalcitonin, infectious workup    History of creation of ostomy (HCC)- (present on admission)  Assessment & Plan  History of  Monitor ostomy output    Coronary artery disease-  (present on admission)  Assessment & Plan  History of.  Continue outpatient atorvastatin        VTE prophylaxis: enoxaparin ppx

## 2025-05-08 NOTE — DIETARY
"Nutrition Services: Initial Assessment     Day 1 of admit. Booker Saucedo is 59 y.o., male with admitting DX of Rectal adenocarcinoma metastatic to lung (HCC) [C20, C78.00].    Consult Received for: MST - Malnutrition Screening Tool    Current Hospital Problems List:    Principal Problem:    Rectal adenocarcinoma metastatic to lung (HCC) (POA: Yes)  Active Problems:    Coronary artery disease (POA: Yes)    History of creation of ostomy (HCC) (POA: Yes)    Leukemoid reaction (POA: Unknown)    Acute kidney injury (HCC) (POA: Yes)    Pleural effusion on right (POA: Unknown)    Acute hypoxic respiratory failure (HCC) (POA: Unknown)  Resolved Problems:    * No resolved hospital problems. *       Nutrition Assessment:      Height: 175.3 cm (5' 9\")  Weight: 73 kg (161 lb)  Weight taken via: Stand Up Scale  BMI Calculated: 23.78  BMI Classification: WNL       Wt Readings from Last 35 Encounters:   05/07/25 73 kg (161 lb)   05/07/25 73 kg (161 lb)   04/23/25 74.8 kg (164 lb 14.5 oz)   04/16/25 74.5 kg (164 lb 3.9 oz)   04/25/25 77.7 kg (171 lb 3.2 oz)   04/21/25 79 kg (174 lb 1.6 oz)   04/18/25 77 kg (169 lb 12.1 oz)   04/04/25 82.1 kg (181 lb)   04/02/25 79.7 kg (175 lb 11.3 oz)   04/01/25 80.1 kg (176 lb 9.6 oz)   03/31/25 80.1 kg (176 lb 9.6 oz)   03/26/25 77.2 kg (170 lb 3.1 oz)   03/19/25 79.4 kg (175 lb 0.7 oz)   03/18/25 78.9 kg (174 lb)   02/18/25 77.4 kg (170 lb 10.2 oz)   02/16/25 78.4 kg (172 lb 13.5 oz)   02/14/25 80 kg (176 lb 5.9 oz)   02/10/25 79.8 kg (176 lb)   02/10/25 79.8 kg (176 lb)   02/10/25 79.4 kg (175 lb)   01/31/25 84.5 kg (186 lb 4.6 oz)   01/17/25 81.8 kg (180 lb 7.1 oz)   01/17/25 81.9 kg (180 lb 8.9 oz)   01/03/25 82.2 kg (181 lb 4.8 oz)   01/03/25 82.5 kg (181 lb 14.1 oz)   12/20/24 82 kg (180 lb 12.4 oz)   12/19/24 81.9 kg (180 lb 8.9 oz)   12/19/24 77 kg (169 lb 12.8 oz)   12/06/24 83.4 kg (183 lb 15.2 oz)   12/06/24 83.1 kg (183 lb 3.2 oz)   11/22/24 80.1 kg (176 lb 9.4 oz)   11/22/24 " "82.2 kg (181 lb 3.5 oz)   11/13/24 80.1 kg (176 lb 9.4 oz)   11/08/24 82.6 kg (182 lb 1.6 oz)   10/16/24 84.8 kg (186 lb 15.2 oz)       Objective:   Pertinent Medical Hx: Hypertension, rectal adenocarcinoma  Pertinent Labs: Sodium 128 L, BUN 52 H, AST 53 H, Alk Phos 210 H  Pertinent Meds: Pericolace  Skin/Wounds:  +colostomy  Food Allergies: NKFA  Last BM:     05/06/25 (via colostomy, no colostomy output today per patient)       Current Diet Order/Intake:   Regular diet      Subjective:   Telehealth visit - call placed to pt's cell phone number listed in EMR; not answered. Per admission nutrition screen, pt reports 2-13 lb weight loss in <1 week and eating poorly due to decreased appetite. Per chart review, pt noted with memory issues and \"forgets everything\", as well as decreased appetite and intakes. It is possible that pt may be forgetting to eat in addition to lack of appetite. No intakes recorded this admission. Will continue Ensure Plus High Protein TID ordered by provider.    Nutrition Focused Physical Exam (NFPE)  Weight Loss: Per chart review, pt with 8.4% weight loss in 1 month and 11.6% weight loss in 6 months; clinically significant.   Muscle Mass: Unable to identify at this time; RD working remotely  Subcutaneous Fat: Unable to identify at this time; RD working remotely  Fluid Accumulation: 2+ BLE edema  Reduced  Strength: N/A in acute care setting.    Nutrition Diagnosis:      Unintended Weight Loss related to increased nutrient needs as evidenced by 8.4% weight loss in 1 month and 11.6% weight loss in 6 months, rectal adenocarcinoma.        Based on RD assessment at this time, Unable to fully assess for malnutrition at this time    Nutrition Interventions:      Continue regular diet as ordered.  Continue Ensure Plus High Protein (provides 350 calories) 20 g protein per 8 fl oz) TID.  Patient aware of active plan of care as appropriate.       Nutrition Monitoring and Evaluation:      Monitor " nutrition POC, goal for >50% intake from meals and supplements.  Additional fluids per MD/DO  Monitor vital signs pertinent to nutrition.    RD following and will provide updated recommendations as indicated.      Ryanne Grant R.D.                                         [facility] FOR MALNUTRITION

## 2025-05-08 NOTE — ASSESSMENT & PLAN NOTE
Follow-up with Dr. Kim  Has been through chemo, radiation.  Now with left lower quadrant colostomy  Repeat CT scans show worsening metastasis.  Patient also unable to take care of himself well at home.  He may need postacute placement.  This unfortunately will preclude him from getting any further cancer treatment.  PT/OT consult  Patient has made decision to transition to comfort care and discharge to group home on hospice

## 2025-05-08 NOTE — THERAPY
Physical Therapy   Initial Evaluation     Patient Name: Booker Saucedo  Age:  59 y.o., Sex:  male  Medical Record #: [id number]  Today's Date: 5/8/2025          Assessment  Patient is 59 y.o. male w/ hx of colorectal CA.  Sent by his oncologist 2/2 rectal pain and liver abnormality, memory issues and inability to care for himself.  Several admits 2/2 pain, (4/4/2025-4/14/2025 and 4/16/2025-4/19/2025).  He reports living alone and after his last admit staying in bed, getting out to use the bathroom only.  When asked if he had friends/family who did his grocery shopping, he reports that he just doesn't eat.  Today, he is able to move oob w/o assist, stand and ambulate w/ a fww w/o assist and w/o loss of balance.  He was unable to attempt stairs today 2/2 c/o fatigue.  Anticipate from a mobility standpoint he will be able to mobilize well enough to return home, but he will greatly benefit from some assistance w/ regard to obtaining his meals.  PT will follow and address goals noted below.  Plan    Physical Therapy Initial Treatment Plan   Treatment Plan : Gait Training, Therapeutic Activities, Stair Training  Treatment Frequency: 3 Times per Week  Duration: Until Therapy Goals Met    DC Equipment Recommendations: Unable to determine at this time  Discharge Recommendations: Recommend home health for continued physical therapy services          Objective       05/08/25 1126   Prior Living Situation   Housing / Facility 2 Story House   Steps Into Home 2   Steps In Home 12   Rail None   Equipment Owned None   Lives with - Patient's Self Care Capacity Alone and Able to Care For Self   Comments Pt remains on the ground floor   Prior Level of Functional Mobility   Bed Mobility Independent   Transfer Status Independent   Ambulation Independent   Ambulation Distance   (limited household)   Assistive Devices Used None   Stairs Independent   Balance Assessment   Sitting Balance (Static) Fair +   Sitting Balance (Dynamic) Fair  +   Standing Balance (Static) Fair   Standing Balance (Dynamic) Fair   Weight Shift Sitting Good   Weight Shift Standing Good   Comments w/ fww   Bed Mobility    Supine to Sit Supervised   Gait Analysis   Gait Level Of Assist Standby Assist   Assistive Device Front Wheel Walker   Distance (Feet) 85   Deviation Bradykinetic   # of Stairs Climbed   (declined 2/2 fatigue)   Functional Mobility   Sit to Stand Supervised   Bed, Chair, Wheelchair Transfer Standby Assist   Short Term Goals    Short Term Goal # 1 Pt to ambulate 150 ft w/ fww and spv in 6 visits   Short Term Goal # 2 Pt to move up/down 2 steps w/ spv in 6 visits   Physical Therapy Initial Treatment Plan    Treatment Plan  Gait Training;Therapeutic Activities;Stair Training   Treatment Frequency 3 Times per Week   Duration Until Therapy Goals Met   Problem List    Problems Decreased Activity Tolerance   Anticipated Discharge Equipment and Recommendations   DC Equipment Recommendations Unable to determine at this time   Discharge Recommendations Recommend home health for continued physical therapy services

## 2025-05-08 NOTE — PROGRESS NOTES
Rounded on patient. Patient reports no needs at this time. Patient appliance checked.Asked patient if he knew what size he wears for his ostomy. Was not aware. Ordered some supplies as his back piece does need to be changed.

## 2025-05-08 NOTE — CARE PLAN
The patient is Watcher - Medium risk of patient condition declining or worsening    Shift Goals  Clinical Goals: Safety, pain control  Patient Goals: pain control  Family Goals: None Present    Progress made toward(s) clinical / shift goals:      Problem: Pain - Standard  Goal: Alleviation of pain or a reduction in pain to the patient’s comfort goal  Outcome: Progressing     Problem: Knowledge Deficit - Standard  Goal: Patient and family/care givers will demonstrate understanding of plan of care, disease process/condition, diagnostic tests and medications  Outcome: Progressing

## 2025-05-08 NOTE — ASSESSMENT & PLAN NOTE
Requires 2L, baseline RA  Likely secondary to worsening pulmonary metastasis, large right pleural effusion  Plan for thoracentesis today, I added cytology  Wean down oxygen

## 2025-05-08 NOTE — PROGRESS NOTES
4 Eyes Skin Assessment Completed by Jigna RAI RN and CHRIS Schwartz.    Head WDL  Ears Redness and Blanching  Nose WDL  Mouth WDL  Neck WDL  Breast/Chest chest port  Shoulder Blades WDL  Spine WDL  (R) Arm/Elbow/Hand scattered abrasions to finger tips  (L) Arm/Elbow/Hand scattered abrasions to finger tips  Abdomen rash to panus, LLQ colostomy  Groin WDL  Scrotum/Coccyx/Buttocks Redness and Blanching  (R) Leg WDL  (L) Leg scab to knee  (R) Heel/Foot/Toe Redness, Blanching, Scab, and Rash  (L) Heel/Foot/Toe Redness, Blanching, Scab, and Rash          Devices In Places Blood Pressure Cuff, Pulse Ox, and SCD's, Nasal Cannula, colostomy      Interventions In Place Gray Ear Foams, NC W/Ear Foams, Pillows, and Heels Loaded W/Pillows    Possible Skin Injury No    Pictures Uploaded Into Epic Yes  Wound Consult Placed No  RN Wound Prevention Protocol Ordered No

## 2025-05-08 NOTE — ASSESSMENT & PLAN NOTE
S/p thoracentesis 5/9, fluid analysis consistent with exudative, cytology pending, culture pending  Follow-up chest x-ray negative for pneumothorax  Continue empirical Rocephin  Echo normal with EF 55%

## 2025-05-08 NOTE — ED NOTES
Med Rec complete per patient   Allergies reviewed  Antibiotics in the past 30 days:yes  Anticoagulant in past 14 days:no  Pharmacy patient utilizes:[facility]    Patient states he's been receiving multiple different conversations from doctor's if he should continue taking Decadron or stop taking. Therefore patient is unsure of what the plan is on Decadron medication. Not on med rec.     Patient unsure of Oxycodone IR dosage he has been taking. Oxycodone IR 15 mg put on med rec. Dispensed on 4/28/25 for a 7 day supply through [facility]

## 2025-05-08 NOTE — CONSULTS
"MRN: [id number]  Date of palliative consult: 5/8/2025  Reason for consult: Goals of care, symptom management  Referring provider: Dr. Gonzalez  Location of consult: [facility]  Additional consulting services: Palliative care,    HPI:   Booker Saucedo is a 59 y.o. male with medical history significant for stage IIIc rectal carcinoma s/p FOLFIRINOX chemotherapy, currently in chemoradiation therapy, rectal abscess s/p I&D 4/2025, LLQ colostomy, nephrolithiasis, hydronephrosis admitted 5/7/2025 for confusion, decreased oral intake, dizziness, and pain.  Patient tachycardic, with leukocytosis, hyponatremia (Na 126), and SATURNINO.  CTA of chest negative for PE, but notable for multiple bilateral pulmonary nodules consistent with metastasis, bilateral pleural effusion and pulmonary edema.  CT abdomen and pelvis notable for progression of pulmonary and hepatic metastasis, stable necrotic rectal mass and splenomegaly.  Thoracentesis ordered per hospitalist, diuretics held, infectious workup underway.  Palliative care consulted for symptom management and goals of care.    Additional Pertinent Medical History: Patient has been hospitalized 6 times in the past 7 months due to complications of rectal adenocarcinoma.  Patient has been seen by IP palliative care and is followed by Dr. Styles, outpatient oncology palliative care.  Per Dr. Styles patient missed his last appointment last week due to fatigue and inability to travel from [place].  Prior to this patient has been alert and oriented x 4.  Patient lives alone in [place] with intermittent support from friends.  There is concern he is longer able to care for himself at home and may be having confusion around taking his medications as prescribed.    Pain History:  Onset: since cancer diagnosis in July 2024  Location: rectum, low back  Duration: constant with flares  Characteristics: squeezing, feels like \"sand paper\"  Aggravating factors: positioning  Alleviating factors: heat, pain " medication  Radiation: none  Treatments: methadone, oxycodone, duloxetine  Severity: 5-6/10; pain does wake Booker from sleep    Additional symptoms: dyspnea, nausea, no vomiting, fatigue, fitful sleep/frequent waking with pain, mood, poor appetite    ROS:    Review of Systems   Constitutional:  Positive for diaphoresis, malaise/fatigue and weight loss.   HENT: Negative.     Eyes: Negative.    Respiratory:  Positive for shortness of breath.    Cardiovascular:  Positive for leg swelling.   Gastrointestinal:         Colostomy LLQ   Genitourinary: Negative.    Musculoskeletal:  Positive for back pain.   Neurological:  Positive for weakness.   Psychiatric/Behavioral:  Positive for memory loss. The patient is nervous/anxious.        PE:   Recent vital signs  BMI: Body mass index is 23.78 kg/m².    Temp (24hrs), Av.6 °C (97.8 °F), Min:36.4 °C (97.5 °F), Max:36.8 °C (98.3 °F)  Temperature: 36.8 °C (98.3 °F)  Pulse  Av.4  Min: 44  Max: 125   Blood Pressure: 112/81       Physical Exam  Cardiovascular:      Rate and Rhythm: Regular rhythm. Tachycardia present.      Pulses: Normal pulses.      Heart sounds: Normal heart sounds.   Pulmonary:      Effort: Pulmonary effort is normal.      Breath sounds: No rales.   Abdominal:      General: Bowel sounds are normal.   Musculoskeletal:      Right lower leg: Edema present.      Left lower leg: Edema present.   Skin:     General: Skin is warm and dry.   Neurological:      Mental Status: He is alert and oriented to person, place, and time.      Motor: Weakness present.   Psychiatric:         Mood and Affect: Mood normal.         Behavior: Behavior normal.         Thought Content: Thought content normal.         Judgment: Judgment normal.         ASSESSMENT/PLAN WITH SHARED DECISION MAKING:   PHYSICAL ASPECTS OF CARE  Palliative Performance Scale: 50%    # Stage IIIc rectal adenocarcinoma with signs of progression on CT scan  - CT scan with evidence of disease progression in  liver and lungs  - S/P FOLFIRINOX with Dr. Kim, [facility] onc, s/p chemoradiation therapy end of April 2025.   # Altered mental status  - Pt mentation improved today  - Pt reports taking PRN oxycodone more frequently than Rx'd at home due to intactable rectal pain, then becoming foggy, confused and lethargic w/ hallucination of people not there.   - Pt also with dehydration, SATURNINO and hyponatremia on admission, now improving  # Acute kidney injury  - Prerenal related to dehydration, improving  # Poor oral intake  - Pt reports not eating much since discharge from hospital 3 weeks ago with weight loss  - Pt reports nausea, was not taking Zofran, no vomiting  - Start olanzapine 2.5 mg qhs to help improve appetite  # History of rectal abscess  - LLQ colostomy  - CT scan shows stable necrotic mass  # Symptom management  - Patient with cancer related pain in rectum and low back  - Patient followed by Dr. Styles for palliative care  - Recently increased methadone dose from 10 mg q 8 hour to 12.5 mg q 8 hour for better pain control  - Pt reports taking PRN oxycodone 30 mg at shorter intervals than Rx'd and becoming confused and  lethargic.  - Patient notes no benefit from scheduled tylenol, does not wish to take this.   - Pt reports pain 5-6/10 without much relief from pain medications, but also wants to eventually come off of all pain medications.   - Discussed difficulty in managing cancer related pain and that we would work with him to find a regimen that is tolerable to him.   Plan:  Continue methadone 10 mg q 8 hours  - Discontinue PRN PO oxycodone  - START PRN PO hydromorphone 2-4 mg q 4 hours for breakthrough pain  - Continue Duloxetine 30 mg (patient declined increasing to regular dose of 60 mg qd)  # Goals of care    SOCIAL ASPECTS OF CARE  Patient lives alone in [place], [place] and has only peripheral support from friends. 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 Patient is recently  . He has one daughter, Sujatha who is 17. He does have 2 brothers and sisters and would be ok with them making decisions for him if needed.     SPIRITUAL ASPECTS OF CARE   Not addressed.     GOALS OF CARE/SERIOUS ILLNESS CONVERSATION  Goals of care discussion deferred until tomorrow due to patient's somnolence and discomfort.  Provided palliative care contact information and encouraged Booker to reach out with any questions/needs.     Code Status: DNR/DNI    ACP Documents: No Documents, PC to help Booker with POLST and AD if he consents.       Interval diagnostic studies and medical documentation entries pertinent to this case were reviewed independently by me. This patient has at least one acute or chronic illness or injury that poses a threat to life or bodily function. This patient suffers from a high risk of morbidity from additional invasive diagnostic testing or intensive treatment. Discussion of recommendations and coordination of care undertaken with primary provider/treatment team.      Kayla Sandra, ABBE  Palliative Care  [phone]

## 2025-05-08 NOTE — DISCHARGE PLANNING
Care Transition Team Assessment    Patient is a 59 year old male admitted for rectal adenocarcinoma metastatic to lung. Please see patient's H&P for prior medical history. RNCM met with patient at bedside to complete assessment. Patient is A/Ox4 and able to verify information on the face sheet. Patient lives alone in a 2 story house with 6 steps to enter, Sunni Love (p)[phone]; emergency contact. No Advanced directive on file. Patient reports, prior to admission patient is independent with ADL's and IADL's. Patient does not use any DME at baseline. Patient reports his friend Sunni is good support for him. Patient works full time. Patient's PCP is Rickie Naranjo. Patient's preferred pharmacy is [facility]. Patient denies a history of SNF/IPR, utilized [facility]  while at [facility] after last admission. Patient denies any SA or MH concerns. Patient confirmed medical coverage via [employer]. Patient has means to transportation and his friend Sunni will provide transport once medically stable for discharge. Patient 's primary oncologist is Dr. Kim with [facility].     Information Source  Orientation Level: Oriented X4  Information Given By: Patient  Who is responsible for making decisions for patient? : Patient    Readmission Evaluation  Is this a readmission?: Yes - unplanned readmission    Elopement Risk  Legal Hold: No  Ambulatory or Self Mobile in Wheelchair: Yes  Disoriented: No  Psychiatric Symptoms: None  History of Wandering: No  Elopement this Admit: No  Vocalizing Wanting to Leave: No  Displays Behaviors, Body Language Wanting to Leave: No-Not at Risk for Elopement  Elopement Risk: Not at Risk for Elopement    Interdisciplinary Discharge Planning  Lives with - Patient's Self Care Capacity: Alone and Able to Care For Self  Patient or legal guardian wants to designate a caregiver: No  Support Systems: Family Member(s), Friends / Neighbors  Housing / Facility: 2 Story House    Discharge  Preparedness  What is your plan after discharge?: Home with help  What are your discharge supports?: Child, Other (comment)  Prior Functional Level: Ambulatory, Independent with Activities of Daily Living, Independent with Medication Management  Difficulity with ADLs: None  Difficulity with IADLs: None    Functional Assesment  Prior Functional Level: Ambulatory, Independent with Activities of Daily Living, Independent with Medication Management    Finances  Financial Barriers to Discharge: No  Prescription Coverage: Yes    Vision / Hearing Impairment  Right Eye Vision: Wears Glasses  Left Eye Vision: Wears Glasses         Advance Directive  Advance Directive?: None  Advance Directive offered?: AD Booklet refused    Domestic Abuse  Have you ever been the victim of abuse or violence?: No  Possible Abuse/Neglect Reported to:: Not Applicable    Psychological Assessment  History of Substance Abuse: None  History of Psychiatric Problems: No  Non-compliant with Treatment: No  Newly Diagnosed Illness: No    Discharge Risks or Barriers  Discharge risks or barriers?: No  Patient risk factors: Complex medical needs    Anticipated Discharge Information  Discharge Disposition: Discharged to home/self care (01)

## 2025-05-09 PROBLEM — D72.829 LEUKOCYTOSIS: Status: ACTIVE | Noted: 2025-01-01

## 2025-05-09 NOTE — PROGRESS NOTES
[facility] Medicine Daily Progress Note    Date of Service  5/9/2025    Chief Complaint  Booker Saucedo is a 59 y.o. male admitted 5/7/2025 with  large right pleural effusion    Hospital Course  Booker Saucedo is a 59 y.o. male with a history of rectal adenocarcinoma followed by Dr. Kim who presented 5/7/2025 with intractable pain mostly in the rectum, lower abdomen, memory issues, and inability to care for self.     He was recently admitted from 4/4-14 as well as 4/16-19 with intractable pain, rectal abscess, complications from rectal adenocarcinoma    Patient states that he lives at home alone.  Sometimes his friends will come to check on him.  He is having a lot of memory issues and forgets everything, he especially has issues remembering his medications.  States his abdominal and rectal pain is not well-controlled, but he also states that he would like to come off the pain meds because he feels like that is contributing to his memory as well as making him sleepy and tired all the time.  Patient has a lot of trouble getting around the house.  He has had decreased appetite and decreased oral intake.  He is dizzy when he gets up and is almost fell multiple times.  He has also been more short of breath and now requiring oxygen here in the ER     CT pulmonary angiogram was negative for pulmonary emboli.  Multiple bilateral pulm nodules consistent with metastasis.  Bilateral pleural effusions right greater than left with associated atelectasis.  Pulmonary edema.  Multiple liver metastasis.  CT abdomen pelvis showed progression of metastatic disease with enlarging pulmonary metastasis, worsening adenopathy and enlarging hepatic metastasis.  Enlarging right and new left pleural effusion with multiple pleural metastasis.  Mild ascites.  Stable necrotic rectal mass.  Splenomegaly.    Cxr personally reviewed, BL pleural effusion R>L      Interval Problem Update  I have seen and examined the patient at  bedside    Patient seems forgetful, episodes of confusion  I ordered a CT head  Patient has ongoing self care issues.  I ordered SLP for cognitive evaluation    I discussed with radiation oncology Dr. Huerta, not plans for radiation.    I discussed with rectal surgeon Dr. Tse, will follow-up as outpatient    On 4 L NC  S/p thoracentesis 5/9, fluid analysis consistent with exudative, cytology pending, culture pending  Follow-up chest x-ray negative for pneumothorax  Continue empirical Rocephin  Echo normal with EF 55%    Had stool output in colostomy bag 5/8    SATURNINO - Cre 1.5>1.3>0.9    Hyponatremia - >128    I have discussed this patient's plan of care and discharge plan at IDT rounds today with Case Management, Nursing, Nursing leadership, and other members of the IDT team.    Consultants/Specialty  Oncology, IR, Colonectal    Code Status  DNAR/DNI    Disposition  Medically Cleared  I have placed the appropriate orders for post-discharge needs.    Review of Systems   All 12 systems were reviewed and negative except as mentioned above      Physical Exam  Temp:  [36 °C (96.8 °F)-36.6 °C (97.9 °F)] 36.2 °C (97.1 °F)  Pulse:  [103-118] 104  Resp:  [16-18] 18  BP: ()/(58-74) 111/71  SpO2:  [92 %-95 %] 94 %    Physical Exam  Constitutional:       General: He is in acute distress.      Appearance: He is ill-appearing.   HENT:      Head: Normocephalic.      Mouth/Throat:      Mouth: Mucous membranes are moist.   Eyes:      Extraocular Movements: Extraocular movements intact.      Conjunctiva/sclera: Conjunctivae normal.   Cardiovascular:      Rate and Rhythm: Normal rate and regular rhythm.      Pulses: Normal pulses.      Heart sounds: Normal heart sounds.   Pulmonary:      Effort: Pulmonary effort is normal.      Breath sounds: No wheezing.      Comments: Decreased breathing sounds  Abdominal:      General: There is distension.      Tenderness: There is abdominal tenderness.   Musculoskeletal:          General: Swelling present. No tenderness.      Cervical back: Normal range of motion and neck supple.   Skin:     General: Skin is warm.   Neurological:      Mental Status: He is alert and oriented to person, place, and time. Mental status is at baseline.         Fluids    Intake/Output Summary (Last 24 hours) at 5/9/2025 1600  Last data filed at 5/8/2025 2300  Gross per 24 hour   Intake --   Output 300 ml   Net -300 ml        Laboratory  Recent Labs     05/07/25  1211 05/08/25  0000 05/09/25  0000   WBC 19.7* 21.7* 18.1*   RBC 4.17* 4.01* 3.95*   HEMOGLOBIN 12.3* 12.0* 11.8*   HEMATOCRIT 37.7* 36.2* 35.6*   MCV 90.4 90.3 90.1   MCH 29.5 29.9 29.9   MCHC 32.6 33.1 33.1   RDW 57.1* 57.0* 56.5*   PLATELETCT 144* 169 119*   MPV 10.8 11.1 11.8     Recent Labs     05/07/25  1211 05/08/25  0000 05/09/25  0000   SODIUM 126* 128* 128*   POTASSIUM 4.6 4.2 4.2   CHLORIDE 92* 98 93*   CO2 18* 23 24   GLUCOSE 130* 99 91   BUN 46* 52* 57*   CREATININE 1.53* 1.34 0.99   CALCIUM 8.8 8.7 8.7     Recent Labs     05/07/25  1211   APTT 27.3   INR 1.60*               Imaging  DX-CHEST-PORTABLE (1 VIEW)   Final Result      Decreased right pleural effusion and associated atelectasis. Trace residual pleural effusions. No pneumothorax.      EC-ECHOCARDIOGRAM COMPLETE W/O CONT   Final Result      CT-CTA CHEST PULMONARY ARTERY W/ RECONS   Final Result      1.  No CT evidence for pulmonary emboli.   2.  Multiple bilateral pulmonary nodules consistent with metastases.   3.  Bilateral pleural effusions, larger on the RIGHT, with associated atelectasis.   4.  Ill-defined patchy interstitial opacities, likely indicating edema.   5.  Multiple liver metastases.               CT-ABDOMEN-PELVIS WITH   Final Result      1.  Progression of metastatic disease with enlarging pulmonary metastases, worsening adenopathy and enlarging hepatic metastases.   2.  Enlarging right and new left pleural effusions with multiple pleural metastases.   3.  Mild  ascites.   4.  Stable necrotic rectal mass.   5.  Splenomegaly.         DX-CHEST-PORTABLE (1 VIEW)   Final Result      Findings likely represent pulmonary edema with a new right pleural effusion.      US-THORACENTESIS PUNCTURE RIGHT    (Results Pending)   CT-HEAD W/O    (Results Pending)        Assessment/Plan  * Rectal adenocarcinoma metastatic to lung (HCC)- (present on admission)  Assessment & Plan  Follow-up with Dr. Kim  Has been through chemo, radiation.  Now with left lower quadrant colostomy  Repeat CT scans show worsening metastasis.  Patient also unable to take care of himself well at home.  He may need postacute placement.  This unfortunately will preclude him from getting any further cancer treatment.  PT/OT consult  Palliative care consulted for goals of care planning, symptom management as patient states his pain is not controlled but also wants to come off pain meds.  I do not think he was taking his medications correctly    I discussed with the oncology, likely chemo outpatient  I discussed with radiation oncology Dr. Huerta, not plans for radiation.    I discussed with rectal surgeon Dr. Tse, will follow-up as outpatient    Hyponatremia  Assessment & Plan  >128  monitor    Acute hypoxic respiratory failure (HCC)  Assessment & Plan  Requires 2L, baseline RA  Likely secondary to worsening pulmonary metastasis, large right pleural effusion  Plan for thoracentesis today, I added cytology  Wean down oxygen    Pleural effusion on right  Assessment & Plan  S/p thoracentesis 5/9, fluid analysis consistent with exudative, cytology pending, culture pending  Follow-up chest x-ray negative for pneumothorax  Continue empirical Rocephin  Echo normal with EF 55%    Acute kidney injury (HCC)- (present on admission)  Assessment & Plan  Likely prerenal  Cre 1.5, baseline 0.8    Hold outpatient furosemide  Hold IVF given pleural effusion    5/9 cre 0.9  I ordered am lab to monitor  Avoid nephrotoxic  meds    Leukocytosis  Assessment & Plan  Wbc 19>21, proc 9  I ordered ua  I started empiric iv rocephin    History of creation of ostomy (HCC)- (present on admission)  Assessment & Plan  History of  Monitor ostomy output    Had stool output in colostomy bag 5/8  Monitor stool output    Coronary artery disease- (present on admission)  Assessment & Plan  History of.  Continue outpatient atorvastatin    ACP (advance care planning)- (present on admission)  Assessment & Plan  59-year-old male, admitted with acute respiratory failure, large right pleural effusion, history of rectal adenocarcinoma metastatic to now.  I discussed with the diagnosis treatment plan with him.  We discussed the CODE STATUS including CPR/chest compression/electrical shock/intubation.  He confirmed DNR DNI.  Very poor prognosis with tumor progression despite the treatment .  Palliative care is following . ACP 16 minutes         VTE prophylaxis: lovenox    I have performed a physical exam and reviewed and updated ROS and Plan today (5/9/2025). In review of yesterday's note (5/8/2025), there are no changes except as documented above.    I spent greater than 52 minutes for chart review, obtaining history independently, performing medically appropriate examination,  documenting , ordering medications, tests, or procedures, referring and communicating with other health care professionals, Independently interpreting results and communicating results with patient/family/caregiver. More than 50% of time was spent in face-to-face clinical encounter.     ROS  VTE Selection

## 2025-05-09 NOTE — CARE PLAN
The patient is Watcher - Medium risk of patient condition declining or worsening    Shift Goals  Clinical Goals: pain control, safety  Patient Goals: rest, pain control  Family Goals: none present    Progress made toward(s) clinical / shift goals:  Patient is AxO x4 and understands plan of care, all questions answered at this time. Call light and personal belongings are within reach. Pt calls appropriately for nursing needs. Frequent rounding in place. Bed is locked and in lowest position.      Patient is not progressing towards the following goals: NA

## 2025-05-09 NOTE — DISCHARGE PLANNING
Case Management Discharge Planning    Admission Date: 5/7/2025  GMLOS: 5.6  ALOS: 2    6-Clicks ADL Score: 24  6-Clicks Mobility Score: 23      Anticipated Discharge Dispo: Discharge Disposition: Discharged to home/self care (01)    DME Needed: No    Action(s) Taken: Patient was discussed in IDT rounds, patient received a thoracentesis today. Per MD, colorectal surgery will see patient today. PT is recommending HH, there is no  agency that will accept patient's insurance at his home address.   1515: Patient is pending a CT of head, speech to do a cog eval and a walking home O2 eval.     Escalations Completed: None    Medically Clear: No    Next Steps: pending medical clearance     Barriers to Discharge: Medical clearance    Is the patient up for discharge tomorrow: No

## 2025-05-09 NOTE — CARE PLAN
The patient is Watcher - Medium risk of patient condition declining or worsening    Shift Goals  Clinical Goals: Patient will remain free from falls and verbalize tolerable pain level  Patient Goals: pain control, updates on POC  Family Goals: None Present    Progress made toward(s) clinical / shift goals:    Problem: Pain - Standard  Goal: Alleviation of pain or a reduction in pain to the patient’s comfort goal  Outcome: Progressing     Problem: Fall Risk  Goal: Patient will remain free from falls  Outcome: Progressing   Fall prevention measures in place, bed alarm on and connected correctly, call light within reach, hourly rounding, Education provided on fall prevention. Pt instructed to use call light prior to exiting the bed, educated on use of bed alarms, and risks and complications related to falls.    Patient is not progressing towards the following goals:      Problem: Knowledge Deficit - Standard  Goal: Patient and family/care givers will demonstrate understanding of plan of care, disease process/condition, diagnostic tests and medications  Outcome: Not Progressing

## 2025-05-09 NOTE — THERAPY
"Speech Language Pathology   Cognitive Evaluation     Patient Name: Booker Saucedo  AGE:  59 y.o., SEX:  male  Medical Record #: [id number]  Date of Service: 5/9/2025      History of Present Illness  59M admitted on 5/7 with intractable pain mostly in the rectum, lower abdomen, memory issues, and inability to care for self. Patient found to have pleural effusions. He is currently undergoing treatment for rectal adenocarcinoma with mets to lung.     CT Head ordered.       General Information  Vitals  O2 (LPM): 4  O2 Delivery Device: Nasal Cannula  Level of Consciousness: Alert  Patient Behaviors: Flat Affect  Orientation: Self, General place, Current year, Situation  Follows Directives: Yes      Prior Living Situation & Level of Function  Housing / Facility: 2 Story House  Communication: Reported decline 3-4 weeks prior in cognition       Subjective  RN supervisor contacted SLP for cognitive evaluation, concerns for patient's memory and ability to manage medications/care for self. Patient did report feeling more forgetful, particularly over the psat 3-4 weeks. His teenage daughter previously lived with him but is no longer living with him. Patient reported difficulty \"staying afloat,\" difficult to ascertain if from financial or IADLs perspective.        Assessment  The patient was seen this date for a cognitive evaluation. Portions of the COGNISTAT (Neurobehavioral Cognitive Status Assessment) were administered in conjunction with non-standardized assessments. Results are outlined below:        Orientation: Average  Attention: Average  Comprehension: Average  Repetition: Average  Naming: Average  Memory: Severe  Calculations: Mild  Similarities: Average  Judgement: Average    Comments: Immediate recall of four word memory task not achieved x5 attempts; max 3/4 immediately recalled. Notable rehearsal with clinician and some use of compensatory strategies attempted (category cues for immediate recall). With delayed " "recall of ~4 minutes, patient recalled 1/4 words independently; provided category prompt, accuracy did not increase; provided three word list, accuracy increased to 3/4; 1/4 words not recalled. 2/4 calculations correct; patient stated need for calculator. Provided calculator, but accuracy did not increase as patient did not accurately remember numbers to type in.       Medication Management:  Provided patient with medication instructions. Patient read instructions/questions aloud. He provided single tablet dosage instead of \"prescribed\" two tablet dosage. Simple cue resulted in accurate provision of dosage. Significant difficulty creating medication schedule; ultimately required maxA/hand over hand to complete. Patient stated appropriate memory aid (phone alarm); however, when provided phone, patient was unable to navigate to clock feature and then was searching world clocks rather than navigating further to alarms. Patient then unable to create a new alarm without cues x3.       Discussed results with patient and concerns for his ability to care for self since he is home alone. Patient indicated concerns were likely r/t medication/pain. SLP in agreement; however, did note this does not change safety of discharge. Patient expressed understanding. All questions addressed.  RN/MD updated.       Clinical Impressions  Patient presents with severe deficits in memory and executive functioning. Deficits negatively impact IADLs, including medication management. Language and reasoning are areas of relative strength. Presentation appears consistent with fatigue, pain, and oncologic treatment. Recommend direct assistance for IADLs. Will not follow for acute cognitive therapy, as it is unlikely to be functional in this setting. Patient would best benefit from post-acute placement and/or home health follow up to ensure carryover of routine.       NOTE: It is not within the scope of practice of Speech-Language Pathologists to " determine patient capacity. Please defer to the physician or psych to complete this assessment.       Recommendations  Supervision Needs Upons Discharge: Direct assistance with IADLs (see below)  IADLs: Medication management, Financial management, Appointment management, Cooking, Household chores         SLP Treatment Plan  Treatment Plan: None Indicated  SLP Frequency: N/A - Evaluation Only  Estimated Duration: N/A - Evaluation Only      Anticipated Discharge Needs  Discharge Recommendations: Recommend post-acute placement for additional speech therapy services prior to discharge home  Therapy Recommendations Upon DC: Cognitive-Linguistic Training (will likely need home health at some point to establish functional routines)                Sunni Severino, SLP

## 2025-05-09 NOTE — ASSESSMENT & PLAN NOTE
Educated patient about his diagnosis and prognosis. Oncology had discussion with patient earlier where he was informed that his condition is terminal. Hospice referral place per oncology discussion with patient. I addressed patients wishes at bedside and patient is agreeable to transition to comfort care. If patient is to discharge on hospice he will most likely need a group home. Total of 20 minutes spent in discussion and coordination of advance care planning.

## 2025-05-09 NOTE — WOUND TEAM
"Received wound consult for established ostomy.  Patient with colostomy since 4/8/25. Patient independent with care, appliance was changed by bedside RN earlier today, currently intact.    Provided patient with additional supplies (2 3/4\" two piece appliance). Wound team not actively following, but available for any questions.  "

## 2025-05-09 NOTE — PROGRESS NOTES
4 Eyes Skin Assessment Completed by Jigna RAI RN and CHRIS Casanova.    Head WDL  Ears Redness and Blanching  Nose WDL  Mouth WDL  Neck WDL  Breast/Chest chest port  Shoulder Blades WDL  Spine WDL  (R) Arm/Elbow/Hand scattered abrasions to fingertips  (L) Arm/Elbow/Hand scattered abrasions to fingertips, redness  Abdomen LLQ colostomy, rash to panus  Groin WDL  Scrotum/Coccyx/Buttocks Redness, Blanching, and Excoriation  (R) Leg Swelling  (L) Leg scab to knee, swelling  (R) Heel/Foot/Toe Redness, Blanching, Scab, Rash, and Edema  (L) Heel/Foot/Toe Redness, Blanching, Scab, Rash, and Edema          Devices In Places Pulse Ox and Nasal Cannula, colostomy      Interventions In Place Gray Ear Foams, NC W/Ear Foams, Pillows, Barrier Cream, and Heels Loaded W/Pillows    Possible Skin Injury No    Pictures Uploaded Into Epic Yes  Wound Consult Placed Yes  RN Wound Prevention Protocol Ordered No

## 2025-05-09 NOTE — CARE PLAN
The patient is Watcher - Medium risk of patient condition declining or worsening    Shift Goals  Clinical Goals: safety, pain control, increase PO intake  Patient Goals: pain control  Family Goals: None Present    Progress made toward(s) clinical / shift goals:      Problem: Pain - Standard  Goal: Alleviation of pain or a reduction in pain to the patient’s comfort goal  Outcome: Progressing     Problem: Knowledge Deficit - Standard  Goal: Patient and family/care givers will demonstrate understanding of plan of care, disease process/condition, diagnostic tests and medications  Outcome: Progressing     Problem: Fall Risk  Goal: Patient will remain free from falls  Outcome: Progressing

## 2025-05-09 NOTE — PROGRESS NOTES
MRN: [id number]  Date of palliative consult: 5/9/25  Reason for consult: Goals of care, symptom management  Referring provider: Dr. Gonzalez  Location of consult: [facility]  Additional consulting services: Colorectal surgery    HPI:   Booker Saucedo is a 59 y.o. male with medical history significant for stage IIIc rectal carcinoma s/p FOLFIRINOX chemotherapy, currently in chemoradiation therapy, rectal abscess s/p I&D 4/2025, LLQ colostomy, nephrolithiasis, hydronephrosis admitted 5/7/2025 for confusion, decreased oral intake, dizziness, and pain.  Patient tachycardic, with leukocytosis, hyponatremia (Na 126), and SATURNINO.  CTA of chest negative for PE, but notable for multiple bilateral pulmonary nodules consistent with metastasis, bilateral pleural effusion and pulmonary edema.  CT abdomen and pelvis notable for progression of pulmonary and hepatic metastasis, stable necrotic rectal mass and splenomegaly.  Thoracentesis ordered per hospitalist, diuretics held, infectious workup underway.  Palliative care consulted for symptom management and goals of care.     Additional Pertinent Medical History: Patient has been hospitalized 6 times in the past 7 months due to complications of rectal adenocarcinoma.  Patient has been seen by IP palliative care and is followed by Dr. Styles, outpatient oncology palliative care.  Per Dr. Styles patient missed his last appointment last week due to fatigue and inability to travel from [place].  Prior to this patient has been alert and oriented x 4.  Patient lives alone in [place] with intermittent support from friends.  There is concern he is longer able to care for himself at home and may be having confusion around taking his medications as prescribed.     5/9/25: Pt underwent thoracentesis today, 1.2 L removed. Pt continues to have pain, but refused methadone yesterday (unable to provide a reason) back on today. Encouraged patient to utilize PRNs PO and IV and we will adjust his  "methadone or consider switching to fentanyl patch for long acting pain control. Pt ok to increase duloxetine from 30 mg to 60 mg for more pain control. Will consider Lyrica as well for other non-opioid pain control.     Pain History:  Onset: since cancer diagnosis in 2024  Location: rectum, low back  Duration: constant with flares  Characteristics: squeezing, feels like \"sand paper\"  Aggravating factors: positioning  Alleviating factors: heat, pain medication  Radiation: none  Treatments: methadone, oxycodone, duloxetine  Severity: 5-6/10; pain does wake Booker from sleep     Additional symptoms: dyspnea, nausea, no vomiting, fatigue, fitful sleep/frequent waking with pain, mood, poor appetite    Medication Allergy/Sensitivities:  No Known Allergies    ROS:    Review of Systems   Constitutional:  Positive for malaise/fatigue and weight loss.   HENT: Negative.     Eyes: Negative.    Respiratory:  Positive for shortness of breath.    Cardiovascular: Negative.    Gastrointestinal:  Positive for nausea.        Poor appetite     Genitourinary: Negative.    Musculoskeletal:  Positive for back pain.   Neurological:  Positive for weakness.   Psychiatric/Behavioral:  The patient is nervous/anxious.        PE:   Recent vital signs  BMI: Body mass index is 23.78 kg/m².    Temp (24hrs), Av.4 °C (97.5 °F), Min:36 °C (96.8 °F), Max:36.6 °C (97.9 °F)  Temperature: 36.2 °C (97.1 °F)  Pulse  Av.8  Min: 44  Max: 125   Blood Pressure: 111/71       Physical Exam  Constitutional:       Appearance: He is ill-appearing.   Cardiovascular:      Rate and Rhythm: Regular rhythm. Tachycardia present.      Pulses: Normal pulses.      Heart sounds: Normal heart sounds.   Pulmonary:      Effort: Pulmonary effort is normal.      Breath sounds: Rhonchi present.   Abdominal:      General: Bowel sounds are normal.      Palpations: Abdomen is soft.   Musculoskeletal:      Right lower leg: Edema present.      Left lower leg: Edema present. "   Skin:     General: Skin is warm and dry.   Neurological:      Mental Status: He is alert.   Psychiatric:         Mood and Affect: Mood normal.         Behavior: Behavior normal.         Thought Content: Thought content normal.         Judgment: Judgment normal.       Recent Labs     05/07/25  1211 05/08/25  0000 05/09/25  0000   SODIUM 126* 128* 128*   POTASSIUM 4.6 4.2 4.2   CHLORIDE 92* 98 93*   CO2 18* 23 24   GLUCOSE 130* 99 91   BUN 46* 52* 57*   CREATININE 1.53* 1.34 0.99   CALCIUM 8.8 8.7 8.7     Recent Labs     05/07/25  1211 05/08/25  0000 05/09/25  0000   WBC 19.7* 21.7* 18.1*   RBC 4.17* 4.01* 3.95*   HEMOGLOBIN 12.3* 12.0* 11.8*   HEMATOCRIT 37.7* 36.2* 35.6*   MCV 90.4 90.3 90.1   MCH 29.5 29.9 29.9   MCHC 32.6 33.1 33.1   RDW 57.1* 57.0* 56.5*   PLATELETCT 144* 169 119*   MPV 10.8 11.1 11.8       ASSESSMENT/PLAN WITH SHARED DECISION MAKING:   Review  Pertinent imaging reviewed.    PHYSICAL ASPECTS OF CARE  Palliative Performance Scale: 50%     # Stage IIIc rectal adenocarcinoma with signs of progression on CT scan  - CT scan with evidence of disease progression in liver and lungs  - S/P FOLFIRINOX with Dr. Kim, [facility] onc, s/p chemoradiation therapy end of April 2025.   - Communicated with Dr. Kim re: CT scans showing progression in the context of goals of care discussion. It is unclear if pt has been updated re: disease progression, discussed with Dr. Gonzalez and Dr. Kim, who will f/u with patient.   # Altered mental status  - Pt mentation improved today  - Pt reports taking PRN oxycodone more frequently than Rx'd at home due to intactable rectal pain, then becoming foggy, confused and lethargic w/ hallucination of people not there.   - Pt also with dehydration, SATURNINO and hyponatremia on admission, now improving  # Acute kidney injury  - Prerenal related to dehydration, improving  # Poor oral intake  - Pt reports not eating much since discharge from hospital 3 weeks ago with weight loss  - Pt reports  nausea, was not taking Zofran, no vomiting  - Start olanzapine 2.5 mg qhs to help improve appetite  # History of rectal abscess  - LLQ colostomy  - CT scan shows stable necrotic mass  # Pleural Effusion:  - s/p thoracentesis today 5/9/25, 1.2 L removed  #Symptom management  - Patient with cancer related pain in rectum and low back  - Patient followed by Dr. Styles for palliative care  - Recently increased methadone dose from 10 mg q 8 hour to 12.5 mg q 8 hour for better pain control  - Pt reports taking PRN oxycodone 30 mg at shorter intervals than Rx'd and becoming confused and  lethargic.  - Patient notes no benefit from scheduled tylenol, does not wish to take this.   - Pt reports pain 5-6/10 without much relief from pain medications, but also wants to eventually come off of all pain medications.   - Discussed difficulty in managing cancer related pain and that we would work with him to find a regimen that is tolerable to him.   Plan:  Continue methadone 10 mg q 8 hours  - Discontinue PRN PO oxycodone  - START PRN PO hydromorphone 2-4 mg q 4 hours for breakthrough pain  - Continue IV dilaudid 0.5 mg q 4 hours PRN  - Increase Duloxetine to 60 mg qd   # Goals of care     SOCIAL ASPECTS OF CARE  Patient lives alone in [place], [place] and has only peripheral support from friends. 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 Patient is recently . He has one daughter, Sujatha who is 17. He does have 2 brothers and sisters and would be ok with them making decisions for him if needed.      SPIRITUAL ASPECTS OF CARE   Not addressed.      GOALS OF CARE/SERIOUS ILLNESS CONVERSATION  Goals of care discussion deferred until Monday per patient request due to fatigue and somnolence.  Provided palliative care contact information and encouraged Booker to reach out with any questions/needs.      Code Status: DNR/DNI     ACP Documents: No Documents, PC to help Booker with POLST and AD if he consents.       Communicated updates with symptom management with Dr. Gonzalez and Dr. Styles.     Interval diagnostic studies and medical documentation entries pertinent to this case were reviewed independently by me. This patient has at least one acute or chronic illness or injury that poses a threat to life or bodily function. This patient suffers from a high risk of morbidity from additional invasive diagnostic testing or intensive treatment. Discussion of recommendations and coordination of care undertaken with primary provider/treatment team.    Kayla Sandra, ABBE  Palliative Care  [phone]

## 2025-05-10 PROBLEM — I63.9 ACUTE CVA (CEREBROVASCULAR ACCIDENT) (HCC): Status: ACTIVE | Noted: 2025-01-01

## 2025-05-10 NOTE — DISCHARGE PLANNING
Case Management Discharge Planning    Admission Date: 5/7/2025  GMLOS: 5.6  ALOS: 3    6-Clicks ADL Score: 24  6-Clicks Mobility Score: 23      Anticipated Discharge Dispo: Discharge Disposition: Discharged to home/self care (01)    DME Needed: No    Action(s) Taken: Discussed with pt he would like to elect for comfort care. Pt lives alone in [place] and does not have support to take care of him. Pt has a daughter who is a minor and she lives with her mother. Pt stated he is agreeable to a group home in [place] with hospice. Requested quant be ordered today. Left hospice choice at bedside and group home list.    Contacted [facility] and left voicemail to see if they service the [place] area.     Escalations Completed: None    Medically Clear: No    Next Steps: Pending quant results and group home placement    Barriers to Discharge: Medical clearance and Pending Placement    Is the patient up for discharge tomorrow: No         Xolair Pregnancy And Lactation Text: This medication is Pregnancy Category B and is considered safe during pregnancy. This medication is excreted in breast milk.

## 2025-05-10 NOTE — PROGRESS NOTES
4 Eyes Skin Assessment Completed by Jigna RAI RN and CHRIS Gottlieb.    Head WDL  Ears Redness and Blanching  Nose WDL  Mouth WDL  Neck WDL  Breast/Chest R chest port  Shoulder Blades Redness and Blanching  Spine Redness and Blanching  (R) Arm/Elbow/Hand scattered abrasions to fingertips and knuckles  (L) Arm/Elbow/Hand scattered abrasions to fingertips and knuckles, redness and blanching  Abdomen LLQ colostomy, slight rash to panus, previous scar  Groin WDL  Scrotum/Coccyx/Buttocks Redness, Blanching  (R) Leg Swelling, redness to side of knee cap  (L) Leg Swelling, scab to knee, redness to side of knee cap  (R) Heel/Foot/Toe Redness, Blanching, Scab, Rash, and Edema  (L) Heel/Foot/Toe Redness, Blanching, Scab, Rash, and Edema          Devices In Places Pulse Ox and Nasal Cannula, colostomy      Interventions In Place Gray Ear Foams, NC W/Ear Foams, NC Cheek Stickers, Pillows, Barrier Cream, and Heels Loaded W/Pillows, mepilex on knees    Possible Skin Injury No    Pictures Uploaded Into Epic Yes  Wound Consult Placed Yes  RN Wound Prevention Protocol Ordered No

## 2025-05-10 NOTE — CARE PLAN
The patient is Watcher - Medium risk of patient condition declining or worsening    Shift Goals  Clinical Goals: safety, pain control, MRI  Patient Goals: pain control  Family Goals: None Present    Progress made toward(s) clinical / shift goals:      Problem: Pain - Standard  Goal: Alleviation of pain or a reduction in pain to the patient’s comfort goal  Outcome: Progressing     Problem: Fall Risk  Goal: Patient will remain free from falls  Outcome: Progressing

## 2025-05-10 NOTE — PROGRESS NOTES
[facility] Medicine Daily Progress Note    Date of Service  5/10/2025    Chief Complaint  Booker Saucedo is a 59 y.o. male admitted 5/7/2025 with  large right pleural effusion    Hospital Course  Booker Saucedo is a 59 y.o. male with a history of rectal adenocarcinoma followed by Dr. Kim who presented 5/7/2025 with intractable pain mostly in the rectum, lower abdomen, memory issues, and inability to care for self.     He was recently admitted from 4/4-14 as well as 4/16-19 with intractable pain, rectal abscess, complications from rectal adenocarcinoma    Patient states that he lives at home alone.  Sometimes his friends will come to check on him.  He is having a lot of memory issues and forgets everything, he especially has issues remembering his medications.  States his abdominal and rectal pain is not well-controlled, but he also states that he would like to come off the pain meds because he feels like that is contributing to his memory as well as making him sleepy and tired all the time.  Patient has a lot of trouble getting around the house.  He has had decreased appetite and decreased oral intake.  He is dizzy when he gets up and is almost fell multiple times.  He has also been more short of breath and now requiring oxygen here in the ER     CT pulmonary angiogram was negative for pulmonary emboli.  Multiple bilateral pulm nodules consistent with metastasis.  Bilateral pleural effusions right greater than left with associated atelectasis.  Pulmonary edema.  Multiple liver metastasis.  CT abdomen pelvis showed progression of metastatic disease with enlarging pulmonary metastasis, worsening adenopathy and enlarging hepatic metastasis.  Enlarging right and new left pleural effusion with multiple pleural metastasis.  Mild ascites.  Stable necrotic rectal mass.  Splenomegaly.    Cxr personally reviewed, BL pleural effusion R>L      Interval Problem Update  Patient was seen and examined at bedside.  No  acute events overnight. Patient is resting comfortably in bed and in no acute distress.     Patient has made decision to discharge on hospice - hospice referral placed  Will likely need a group home  Educated patient regarding comfort care and he is agreeable to proceed with this direction in his care    I have discussed this patient's plan of care and discharge plan at IDT rounds today with Case Management, Nursing, Nursing leadership, and other members of the IDT team.    Consultants/Specialty  Oncology, IR, Colonectal    Code Status  Comfort Care/DNR    Disposition  The patient is not medically cleared for discharge to home or a post-acute facility.      I have placed the appropriate orders for post-discharge needs.    Review of Systems   All 12 systems were reviewed and negative except as mentioned above    Review of Systems   Constitutional:  Negative for fever.   HENT:  Negative for congestion.    Eyes:  Negative for blurred vision and double vision.   Respiratory:  Positive for shortness of breath. Negative for cough.    Gastrointestinal:  Negative for nausea and vomiting.   Genitourinary:  Negative for dysuria and frequency.   Musculoskeletal:  Positive for joint pain.   Skin:  Negative for rash.   Neurological:  Positive for headaches. Negative for seizures.   Psychiatric/Behavioral:  Negative for hallucinations.      Physical Exam  Temp:  [35.9 °C (96.7 °F)-37.1 °C (98.7 °F)] 35.9 °C (96.7 °F)  Pulse:  [100-123] 121  Resp:  [17-20] 18  BP: ()/(49-68) 96/65  SpO2:  [92 %-96 %] 92 %    Physical Exam  Constitutional:       General: He is in acute distress.      Appearance: He is ill-appearing.   HENT:      Head: Normocephalic.      Mouth/Throat:      Mouth: Mucous membranes are moist.   Eyes:      Extraocular Movements: Extraocular movements intact.      Conjunctiva/sclera: Conjunctivae normal.   Cardiovascular:      Rate and Rhythm: Normal rate and regular rhythm.      Pulses: Normal pulses.      Heart  sounds: Normal heart sounds.   Pulmonary:      Effort: Pulmonary effort is normal.      Breath sounds: No wheezing.      Comments: Decreased breathing sounds  Abdominal:      General: There is distension.      Tenderness: There is abdominal tenderness.   Musculoskeletal:         General: Swelling present. No tenderness.      Cervical back: Normal range of motion and neck supple.   Skin:     General: Skin is warm.   Neurological:      Mental Status: He is alert and oriented to person, place, and time. Mental status is at baseline.         Fluids    Intake/Output Summary (Last 24 hours) at 5/10/2025 1548  Last data filed at 5/10/2025 0915  Gross per 24 hour   Intake 240 ml   Output 250 ml   Net -10 ml        Laboratory  Recent Labs     05/08/25  0000 05/09/25  0000 05/10/25  0030   WBC 21.7* 18.1* 20.3*   RBC 4.01* 3.95* 3.84*   HEMOGLOBIN 12.0* 11.8* 11.8*   HEMATOCRIT 36.2* 35.6* 34.1*   MCV 90.3 90.1 88.8   MCH 29.9 29.9 30.7   MCHC 33.1 33.1 34.6   RDW 57.0* 56.5* 56.4*   PLATELETCT 169 119* 122*   MPV 11.1 11.8 11.1     Recent Labs     05/08/25  0000 05/09/25  0000 05/10/25  0030   SODIUM 128* 128* 130*   POTASSIUM 4.2 4.2 4.1   CHLORIDE 98 93* 96   CO2 23 24 24   GLUCOSE 99 91 114*   BUN 52* 57* 57*   CREATININE 1.34 0.99 0.92   CALCIUM 8.7 8.7 8.6                     Imaging  CT-HEAD W/O   Final Result      No acute intracranial abnormality.         Brain metastases cannot be excluded on a noncontrast head CT. If there is continued concern, MRI of the brain without and with contrast may be performed.                        DX-CHEST-PORTABLE (1 VIEW)   Final Result      Decreased right pleural effusion and associated atelectasis. Trace residual pleural effusions. No pneumothorax.      US-THORACENTESIS PUNCTURE RIGHT   Final Result      1. Ultrasound guided right sided diagnostic thoracentesis.      2. 1200 mL of fluid withdrawn.      EC-ECHOCARDIOGRAM COMPLETE W/O CONT   Final Result      CT-CTA CHEST PULMONARY  ARTERY W/ RECONS   Final Result      1.  No CT evidence for pulmonary emboli.   2.  Multiple bilateral pulmonary nodules consistent with metastases.   3.  Bilateral pleural effusions, larger on the RIGHT, with associated atelectasis.   4.  Ill-defined patchy interstitial opacities, likely indicating edema.   5.  Multiple liver metastases.               CT-ABDOMEN-PELVIS WITH   Final Result      1.  Progression of metastatic disease with enlarging pulmonary metastases, worsening adenopathy and enlarging hepatic metastases.   2.  Enlarging right and new left pleural effusions with multiple pleural metastases.   3.  Mild ascites.   4.  Stable necrotic rectal mass.   5.  Splenomegaly.         DX-CHEST-PORTABLE (1 VIEW)   Final Result      Findings likely represent pulmonary edema with a new right pleural effusion.      MR-BRAIN-WITH & W/O    (Results Pending)        Assessment/Plan  * Rectal adenocarcinoma metastatic to lung (HCC)- (present on admission)  Assessment & Plan  Follow-up with Dr. Kim  Has been through chemo, radiation.  Now with left lower quadrant colostomy  Repeat CT scans show worsening metastasis.  Patient also unable to take care of himself well at home.  He may need postacute placement.  This unfortunately will preclude him from getting any further cancer treatment.  PT/OT consult  Patient has made decision to transition to comfort care and discharge to group home on hospice    Hyponatremia  Assessment & Plan  >128  monitor    Acute hypoxic respiratory failure (HCC)  Assessment & Plan  Requires 2L, baseline RA  Likely secondary to worsening pulmonary metastasis, large right pleural effusion  Plan for thoracentesis today, I added cytology  Wean down oxygen    Pleural effusion on right  Assessment & Plan  S/p thoracentesis 5/9, fluid analysis consistent with exudative, cytology pending, culture pending  Follow-up chest x-ray negative for pneumothorax  Continue empirical Rocephin  Echo normal  with EF 55%    Acute kidney injury (HCC)- (present on admission)  Assessment & Plan  Likely prerenal  Cre 1.5, baseline 0.8    Hold outpatient furosemide  Hold IVF given pleural effusion    5/9 cre 0.9  I ordered am lab to monitor  Avoid nephrotoxic meds    Leukocytosis  Assessment & Plan  Wbc 19>21, proc 9  I ordered ua  I started empiric iv rocephin    History of creation of ostomy (HCC)- (present on admission)  Assessment & Plan  History of  Monitor ostomy output    Had stool output in colostomy bag 5/8  Monitor stool output    ACP (advance care planning)- (present on admission)  Assessment & Plan  Educated patient about his diagnosis and prognosis. Oncology had discussion with patient earlier where he was informed that his condition is terminal. Hospice referral place per oncology discussion with patient. I addressed patients wishes at bedside and patient is agreeable to transition to comfort care. If patient is to discharge on hospice he will most likely need a group home. Total of 20 minutes spent in discussion and coordination of advance care planning.     Coronary artery disease- (present on admission)  Assessment & Plan  History of.  Continue outpatient atorvastatin         VTE prophylaxis: lovenox    I have performed a physical exam and reviewed and updated ROS and Plan today (5/10/2025). In review of yesterday's note (5/9/2025), there are no changes except as documented above.    SCDs/TEDs    Greater than 51 minutes spent prepping to see patient (e.g. review of tests) obtaining and/or reviewing separately obtained history. Performing a medically appropriate examination and/ evaluation.  Counseling and educating the patient/family/caregiver.  Ordering medications, tests, or procedures.  Referring and communicating with other health care professionals.  Documenting clinical information in EPIC.  Independently interpreting results and communicating results to patient/family/caregiver.  Care coordination.

## 2025-05-11 NOTE — PROGRESS NOTES
Staff assist called. This RN to bedside. Per CHRIS Max, responded to frame alarm sounding. When staff arrived, patient already on floor. This RN assessed patient. Orientation status unable to assess, consistent with start of shift. Patient denies pain. Multiple staff members assisted patient back to bed. Patient continues to deny pain. No visible injury. Charge RN notified. STEPH May notified. No new orders at this time. Attempted to call Sister per chart with no answer. Patient friend, Sunni Love, notified. Friend agreed with no intervention due to patient's code status and no complaint of pain. Per friend, no need to attempt to contact sister at this time. Fall risk wristband placed on patient. Bed strip alarm placed instead of frame alarm. Condom cath placed for intermittent incontinence. Call light and patient belongings within reach. Bed locked and in lowest position. Door open. Patient room close to nurses station.

## 2025-05-11 NOTE — PROGRESS NOTES
Follow Up Note:  Hematology/Oncology      Primary Care:  Rickie Naranjo M.D.    Diagnosis: Rectal adenocarcinoma    Chief Complaint: On-treatment visit    Current Treatment: mFOLFIRINOX, followed by capecitabine with concurrent XRT, and then surgical evaluation    Prior Treatment: NA    Oncology History of Presenting Illness:  Booker Saucedo is a 59 y.o.  man who presents to the clinic for evaluation for systemic therapy for a diagnosis of rectal carcinoma. He started having back pain and changes in his bowel habits in July and went to the hospital where CT scan revealed right hydronephrosis due to a 1 mm calculus. However, he was also noted to have rectal wall thickening with perirectal lymph nodes. He subsequently underwent a colonoscopy which revealed a fungating circumferential mass, 5 cm from the anal verge, with biopsy revealing invasive adenocarcinoma, moderately differentiated, MMR proficient. Staging scans did not reveal any definitive metastatic disease, though there was a 1.5 cm left hepatic lobe lesion which was not PET avid. He was seen by radiation oncology and colorectal surgery and ultimately was staged on rectal MRI as a [id number] stage IIIC disease. He has been referred for treatment accordingly.     Treatment History:   11/08/2024: C1 FOLFIRINOX   11/22/2024: C2 FOLFIRINOX   12/06/2024: C3 FOLFIRINOX  12/20/2024: C4 FOLFIRINOX  01/03/2025: C5 FOLFIRINOX (decrease oxaliplatin to 65 mg/m2 for neuropathy)  01/17/25: C6 FOLFIRINOX  01/31/25: C7 FOLFIRINOX  02/14/25: C8 FOLFIRINOX   03/18/25: Start capecitabine with concurrent XRT      Interval History:  Patient is seen and examined at bedside. Scans done upon admission unfortunately show that his disease has progressed. He now has findings consistent with lung and liver metastases. Given that he only recently finished mFOLFIRINOX, this represents particularly aggressive disease and explains many of his symptoms. He does feel a bit  better with supportive care but he is still weak.     Allergies as of 05/07/2025    (No Known Allergies)         Current Facility-Administered Medications:     MD ALERT...adult comfort care, , Other, PRN, Russ Rodriguez D.O.    atropine 1 % ophthalmic solution 2 Drop, 2 Drop, Sublingual, Q4HRS PRN, Russ Rodriguez D.O.    morphine (Roxanol) 20 MG/ML oral conc 10 mg, 10 mg, Oral, Q HOUR PRN, Russ Rodriguez D.O.    morphine (Roxanol) 20 MG/ML oral conc 20 mg, 20 mg, Oral, Q HOUR PRN, Russ Rodriguez D.O., 20 mg at 05/10/25 1755    docusate sodium (Colace) capsule 100 mg, 100 mg, Oral, Q12HRS, Russ Rodriguez D.O., 100 mg at 05/10/25 1756    LORazepam (Ativan) 2 MG/ML oral conc 1 mg, 1 mg, Sublingual, Q HOUR PRN, 1 mg at 05/10/25 1807 **OR** LORazepam (Ativan) injection 1 mg, 1 mg, Intravenous, Q HOUR PRN, Russ Rodriguez D.O.    artificial tears (Eye Lubricant) ophth ointment 1 Application, 1 Application, Both Eyes, Q6HR, Russ Rodriguez D.O.    DULoxetine (Cymbalta) capsule 60 mg, 60 mg, Oral, DAILY, Tiera Sandra, A.P.R.N., 60 mg at 05/10/25 0557    OLANZapine (ZyPREXA) tablet 2.5 mg, 2.5 mg, Oral, Q EVENING, Tiera Sandra, A.P.R.N., 2.5 mg at 05/09/25 2024    acetaminophen (Tylenol) tablet 650 mg, 650 mg, Oral, Q6HRS PRN, Gigi Shrestha D.O.    senna-docusate (Pericolace Or Senokot S) 8.6-50 MG per tablet 2 Tablet, 2 Tablet, Oral, Q EVENING, 2 Tablet at 05/10/25 1755 **AND** polyethylene glycol/lytes (Miralax) Packet 1 Packet, 1 Packet, Oral, QDAY PRN, Gigi Shrestha D.O.    ondansetron (Zofran) syringe/vial injection 4 mg, 4 mg, Intravenous, Q4HRS PRN, Gigi Shrestha D.O.    ondansetron (Zofran ODT) dispertab 4 mg, 4 mg, Oral, Q4HRS PRN, Gigi Shrestha D.O.    promethazine (Phenergan) tablet 12.5-25 mg, 12.5-25 mg, Oral, Q4HRS PRN, Gigi Shrestha D.O.    promethazine (Phenergan) suppository 12.5-25 mg, 12.5-25 mg, Rectal, Q4HRS PRN, Gigi Shrestha D.O.    prochlorperazine (Compazine)  injection 5-10 mg, 5-10 mg, Intravenous, Q4HRS PRN, Gigi Shrestha D.O.    methadone (Dolophine) tablet 10 mg, 10 mg, Oral, TID, Gigi Shrestha D.O., 10 mg at 05/10/25 1755    tamsulosin (Flomax) capsule 0.4 mg, 0.4 mg, Oral, DAILY, Gigi Shrestha D.O., 0.4 mg at 05/10/25 0557      Review of Systems:  Review of Systems   Constitutional:  Positive for malaise/fatigue. Negative for chills, diaphoresis, fever and weight loss.   HENT:  Negative for congestion, hearing loss, nosebleeds, sinus pain and sore throat.    Eyes:  Negative for blurred vision and double vision.   Respiratory:  Negative for cough, sputum production, shortness of breath and wheezing.    Cardiovascular:  Negative for chest pain, palpitations, orthopnea and leg swelling.   Gastrointestinal:  Positive for abdominal pain. Negative for blood in stool, constipation, diarrhea, heartburn, melena, nausea and vomiting.   Genitourinary:  Negative for dysuria, frequency, hematuria and urgency.   Musculoskeletal:  Negative for back pain, joint pain and myalgias.   Skin:  Negative for rash.   Neurological:  Positive for weakness. Negative for dizziness, tingling and headaches.        Forgetfulness is getting worse   Endo/Heme/Allergies:  Does not bruise/bleed easily.   Psychiatric/Behavioral:  The patient is not nervous/anxious and does not have insomnia.          Physical Exam:  Vitals:    05/10/25 0811 05/10/25 1236 05/10/25 1418 05/10/25 1540   BP: 101/68 97/64  96/65   Pulse: (!) 115 (!) 123 (!) 104 (!) 121   Resp: 20 18  18   Temp: 36 °C (96.8 °F) 36.1 °C (97 °F)  35.9 °C (96.7 °F)   TempSrc: Temporal Temporal  Temporal   SpO2: 92% 94%  92%   Weight:       Height:           DESC; KARNOFSKY SCALE WITH ECOG EQUIVALENT: 60, Requires occasional assistance, but is able to care for most of his personal needs (ECOG equivalent 2)    DISTRESS LEVEL: no apparent distress    Physical Exam  Vitals and nursing note reviewed.   Constitutional:       General: He is  awake. He is not in acute distress.     Appearance: Normal appearance. He is normal weight. He is not ill-appearing, toxic-appearing or diaphoretic.   HENT:      Head: Normocephalic and atraumatic.      Nose: Nose normal. No congestion.      Mouth/Throat:      Pharynx: Oropharynx is clear. No oropharyngeal exudate or posterior oropharyngeal erythema.   Eyes:      General: No scleral icterus.     Extraocular Movements: Extraocular movements intact.      Conjunctiva/sclera: Conjunctivae normal.      Pupils: Pupils are equal, round, and reactive to light.   Cardiovascular:      Rate and Rhythm: Normal rate and regular rhythm.      Pulses: Normal pulses.      Heart sounds: Normal heart sounds. No murmur heard.     No friction rub. No gallop.   Pulmonary:      Effort: Pulmonary effort is normal.      Breath sounds: Normal breath sounds. No decreased air movement. No wheezing, rhonchi or rales.   Abdominal:      General: The ostomy site is clean. Bowel sounds are normal. There is no distension.      Tenderness: There is abdominal tenderness in the suprapubic area.   Musculoskeletal:         General: No deformity. Normal range of motion.      Cervical back: Normal range of motion and neck supple. No tenderness.      Right lower leg: No edema.      Left lower leg: No edema.   Lymphadenopathy:      Cervical: No cervical adenopathy.      Upper Body:      Right upper body: No axillary adenopathy.      Left upper body: No axillary adenopathy.      Lower Body: No right inguinal adenopathy. No left inguinal adenopathy.   Skin:     General: Skin is warm and dry.      Coloration: Skin is pale. Skin is not jaundiced.      Findings: No erythema or rash.   Neurological:      General: No focal deficit present.      Mental Status: He is alert and oriented to person, place, and time.      Sensory: Sensation is intact.      Motor: Weakness present.      Gait: Gait abnormal.   Psychiatric:         Attention and Perception: Attention  normal.         Mood and Affect: Mood normal.         Behavior: Behavior normal. Behavior is cooperative.         Thought Content: Thought content normal.         Judgment: Judgment normal.           Labs:  Recent Labs     05/08/25  0000 05/09/25  0000 05/09/25  1050 05/10/25  0030   WBC 21.7* 18.1*  --  20.3*   RBC 4.01* 3.95*  --  3.84*   HEMOGLOBIN 12.0* 11.8*  --  11.8*   HEMATOCRIT 36.2* 35.6*  --  34.1*   MCV 90.3 90.1  --  88.8   MCH 29.9 29.9  --  30.7   RDW 57.0* 56.5*  --  56.4*   PLATELETCT 169 119*  --  122*   MPV 11.1 11.8  --  11.1   EOSINOPHILS  --   --  2  --      Recent Labs     05/08/25  0000 05/09/25  0000 05/10/25  0030   SODIUM 128* 128* 130*   POTASSIUM 4.2 4.2 4.1   CHLORIDE 98 93* 96   CO2 23 24 24   GLUCOSE 99 91 114*   BUN 52* 57* 57*     Recent Labs     05/08/25  0000 05/09/25  0000 05/10/25  0030   ALBUMIN 2.3* 2.3* 2.1*   TBILIRUBIN 0.7 0.7 0.6   ALKPHOSPHAT 210* 213* 239*   TOTPROTEIN 5.4* 5.1* 5.2*   ALTSGPT 27 31 27   ASTSGOT 53* 47* 39   CREATININE 1.34 0.99 0.92           Imaging:     All listed images below have been independently reviewed by me. I agree with the findings as summarized below:    MRI brain 05/09/25:    IMPRESSION:     1.  Multiple small acute infarcts of the bilateral frontal and parietal lobes and the right cerebellum. Pattern is suggestive of watershed infarcts. Correlate for central embolic source.  2.  No evidence of intracranial metastatic disease, though suboptimally evaluated related to patient motion.  3.  1.3 cm soft tissue lesion at the left vertex. Additional smaller soft tissue lesion just left and posterior to the dominant lesion. Correlate with exam for evidence of malignancy.    CT c/a/p 05/07/25:    IMPRESSION:     1.  No CT evidence for pulmonary emboli.  2.  Multiple bilateral pulmonary nodules consistent with metastases.  3.  Bilateral pleural effusions, larger on the RIGHT, with associated atelectasis.  4.  Ill-defined patchy interstitial  "opacities, likely indicating edema.  5.  Multiple liver metastases.    Pathology:    Liver biopsy 11/13/24:    FINAL DIAGNOSIS:   CONSULTANTS' DIAGNOSIS:     \"[id number]; 11/13/2024   Liver, mass, biopsy (A)     * Cytologically bland vascular proliferation, most consistent with       hepatic small vessel neoplasm (see comment)\"     Please see separate [facility] report for further   details. Dr. Prasad reviewed the slides of this case prior to requesting   consultative opinion by [facility].                                         Diagnosis performed by:                                       KHOA PRASAD MD            Assessment & Plan:  1. Other fatigue        2. Rectal pain        3. Metastatic malignant neoplasm, unspecified site (HCC)  Referral to Hospice      4. Pleural effusion        5. Hyponatremia        6. SATURNINO (acute kidney injury) (HCC)        7. Dehydration        8. Rectal adenocarcinoma metastatic to lung (HCC)  Referral to Home Health    Referral to Hospice        This is a 59 year old  man with rectal adenocarcinoma, [id number] stage IV-b disease. He presents for evaluation.      Current Diagnosis and Staging: Rectal adenocarcinoma, [id number] stage IV-b disease    Update: The patient has had widespread progression of disease and his MRI shows effects both indirectly and directly from the worsening of malignancy. We had a long goals of care discussion today after discussing these results, and the patient is amenable to going on hospice care. He will need help with support services, particularly for his daughter.      Treatment Plan: Hospice care     Treatment Citation: NCCN     Plan of Care:     Primary Therapy: Hospice care  Supportive Therapy: Medical management per primary and hospice teams  Toxicity: NA  Labs: NA  Imaging: MRI brain, CT c/a/p reviewed  Treatment Planning: Patient has progressive disease and will go on hospice care. "   Consultations: Colorectal surgery (Dr. Tse); Radiation oncology (Dr. Huerta), Palliative Care (Dr. Goel)  Code Status: DNAR/DNI  Miscellaneous: Greater than 16 minutes was spent discussing goals of care and hospice care. Patient has transitioned to hospice care.   Return for Follow Up: PRN (hospice to take over)    Any questions and concerns raised by the patient were answered to the best of my ability. Thank you for allowing me to participate in the care for this patient. Please feel free to contact me for any questions or concerns.

## 2025-05-11 NOTE — CARE PLAN
The patient is Unstable - High likelihood or risk of patient condition declining or worsening    Shift Goals  Clinical Goals: Patient will rest comfortably throughout the shift  Patient Goals: NADIR  Family Goals: Patient will rest comfortably throughout the shift    Progress made toward(s) clinical / shift goals:      Problem: Pain - Standard  Goal: Alleviation of pain or a reduction in pain to the patient’s comfort goal  Outcome: Progressing   Patient medicated per MAR for generalized discomfort. Patient states improvement in discomfort after interventions.     Problem: Skin Integrity  Goal: Skin integrity is maintained or improved  Outcome: Progressing  Skin intact. Patient independently turns self from side to side throughout the shift.     Patient is not progressing towards the following goals:    Problem: Fall Risk  Goal: Patient will remain free from falls  Outcome: Not Progressing   Fall during the shift. Patient educated on fall prevention but needs reinforcement. Bed locked and in lowest position. Call light and patient belongings within reach. Bed strip alarm on. Patient does not call for assistance. Charge RN aware. Patient room close to nurses station.

## 2025-05-11 NOTE — PROGRESS NOTES
Patient refused 2 RN skin check. Pt was upset about recent discussion of terminal illness and prognosis.

## 2025-05-11 NOTE — CARE PLAN
The patient is Stable - Low risk of patient condition declining or worsening    Shift Goals  Clinical Goals: safety, pain control, MRI  Patient Goals: pain control  Family Goals: None Present    Progress made toward(s) clinical / shift goals:    Problem: Pain - Standard  Goal: Alleviation of pain or a reduction in pain to the patient’s comfort goal  Outcome: Progressing       Patient is not progressing towards the following goals:

## 2025-05-11 NOTE — ASSESSMENT & PLAN NOTE
Multiple small acute infarcts of the bilateral frontal and parietal lobes and the right cerebellum. Pattern is suggestive of watershed infarcts. Correlate for central embolic source.

## 2025-05-11 NOTE — PROGRESS NOTES
[facility] Medicine Daily Progress Note    Date of Service  5/11/2025    Chief Complaint  Booker Saucedo is a 59 y.o. male admitted 5/7/2025 with  large right pleural effusion    Hospital Course  Booker Saucedo is a 59 y.o. male with a history of rectal adenocarcinoma followed by Dr. Kim who presented 5/7/2025 with intractable pain mostly in the rectum, lower abdomen, memory issues, and inability to care for self.     He was recently admitted from 4/4-14 as well as 4/16-19 with intractable pain, rectal abscess, complications from rectal adenocarcinoma    Patient states that he lives at home alone.  Sometimes his friends will come to check on him.  He is having a lot of memory issues and forgets everything, he especially has issues remembering his medications.  States his abdominal and rectal pain is not well-controlled, but he also states that he would like to come off the pain meds because he feels like that is contributing to his memory as well as making him sleepy and tired all the time.  Patient has a lot of trouble getting around the house.  He has had decreased appetite and decreased oral intake.  He is dizzy when he gets up and is almost fell multiple times.  He has also been more short of breath and now requiring oxygen here in the ER     CT pulmonary angiogram was negative for pulmonary emboli.  Multiple bilateral pulm nodules consistent with metastasis.  Bilateral pleural effusions right greater than left with associated atelectasis.  Pulmonary edema.  Multiple liver metastasis.  CT abdomen pelvis showed progression of metastatic disease with enlarging pulmonary metastasis, worsening adenopathy and enlarging hepatic metastasis.  Enlarging right and new left pleural effusion with multiple pleural metastasis.  Mild ascites.  Stable necrotic rectal mass.  Splenomegaly.    Cxr personally reviewed, BL pleural effusion R>L      Interval Problem Update  Patient was seen and examined at bedside.   Updated to sister yesterday evening at bedside.     Currently on comfort care - pain is well controlled  Hospice referral, will look into group home    I have discussed this patient's plan of care and discharge plan at IDT rounds today with Case Management, Nursing, Nursing leadership, and other members of the IDT team.    Consultants/Specialty  Oncology, IR, Colonectal    Code Status  Comfort Care/DNR    Disposition  The patient is medically cleared for discharge to home or a post-acute facility.  Anticipate discharge to: hospice    I have placed the appropriate orders for post-discharge needs.    Review of Systems   All 12 systems were reviewed and negative except as mentioned above    Review of Systems   Constitutional:  Negative for fever.   HENT:  Negative for congestion.    Eyes:  Negative for blurred vision and double vision.   Respiratory:  Denies shortness of breath. Negative for cough.    Gastrointestinal:  Negative for nausea and vomiting.   Genitourinary:  Negative for dysuria and frequency.   Musculoskeletal:  Positive for joint pain.   Skin:  Negative for rash.   Neurological:  Positive for headaches. Negative for seizures.   Psychiatric/Behavioral:  Negative for hallucinations.      Physical Exam  Temp:  [35.9 °C (96.7 °F)-36.4 °C (97.6 °F)] 36.4 °C (97.6 °F)  Pulse:  [104-132] 132  Resp:  [18] 18  BP: (88-97)/(52-65) 88/52  SpO2:  [90 %-94 %] 90 %    Physical Exam  Vitals reviewed.   Constitutional:       Appearance: He is ill-appearing.   HENT:      Head: Normocephalic.      Nose: No congestion.      Mouth/Throat:      Mouth: Mucous membranes are moist.   Eyes:      General: No scleral icterus.  Cardiovascular:      Rate and Rhythm: Normal rate and regular rhythm.      Pulses: Normal pulses.      Heart sounds: Normal heart sounds.   Pulmonary:      Effort: Pulmonary effort is normal.      Breath sounds: No wheezing.      Comments: Decreased breathing sounds  Abdominal:      Tenderness: There is  abdominal tenderness.   Musculoskeletal:         General: Swelling present. No tenderness.      Cervical back: Normal range of motion and neck supple.   Skin:     General: Skin is warm.      Coloration: Skin is pale.   Neurological:      Mental Status: He is alert and oriented to person, place, and time. Mental status is at baseline.         Fluids    Intake/Output Summary (Last 24 hours) at 5/11/2025 0925  Last data filed at 5/10/2025 1715  Gross per 24 hour   Intake 250 ml   Output --   Net 250 ml        Laboratory  Recent Labs     05/09/25  0000 05/10/25  0030   WBC 18.1* 20.3*   RBC 3.95* 3.84*   HEMOGLOBIN 11.8* 11.8*   HEMATOCRIT 35.6* 34.1*   MCV 90.1 88.8   MCH 29.9 30.7   MCHC 33.1 34.6   RDW 56.5* 56.4*   PLATELETCT 119* 122*   MPV 11.8 11.1     Recent Labs     05/09/25  0000 05/10/25  0030   SODIUM 128* 130*   POTASSIUM 4.2 4.1   CHLORIDE 93* 96   CO2 24 24   GLUCOSE 91 114*   BUN 57* 57*   CREATININE 0.99 0.92   CALCIUM 8.7 8.6                     Imaging  MR-BRAIN-WITH & W/O   Final Result      1.  Multiple small acute infarcts of the bilateral frontal and parietal lobes and the right cerebellum. Pattern is suggestive of watershed infarcts. Correlate for central embolic source.   2.  No evidence of intracranial metastatic disease, though suboptimally evaluated related to patient motion.   3.  1.3 cm soft tissue lesion at the left vertex. Additional smaller soft tissue lesion just left and posterior to the dominant lesion. Correlate with exam for evidence of malignancy.      CT-HEAD W/O   Final Result      No acute intracranial abnormality.         Brain metastases cannot be excluded on a noncontrast head CT. If there is continued concern, MRI of the brain without and with contrast may be performed.                        DX-CHEST-PORTABLE (1 VIEW)   Final Result      Decreased right pleural effusion and associated atelectasis. Trace residual pleural effusions. No pneumothorax.      US-THORACENTESIS  PUNCTURE RIGHT   Final Result      1. Ultrasound guided right sided diagnostic thoracentesis.      2. 1200 mL of fluid withdrawn.      EC-ECHOCARDIOGRAM COMPLETE W/O CONT   Final Result      CT-CTA CHEST PULMONARY ARTERY W/ RECONS   Final Result      1.  No CT evidence for pulmonary emboli.   2.  Multiple bilateral pulmonary nodules consistent with metastases.   3.  Bilateral pleural effusions, larger on the RIGHT, with associated atelectasis.   4.  Ill-defined patchy interstitial opacities, likely indicating edema.   5.  Multiple liver metastases.               CT-ABDOMEN-PELVIS WITH   Final Result      1.  Progression of metastatic disease with enlarging pulmonary metastases, worsening adenopathy and enlarging hepatic metastases.   2.  Enlarging right and new left pleural effusions with multiple pleural metastases.   3.  Mild ascites.   4.  Stable necrotic rectal mass.   5.  Splenomegaly.         DX-CHEST-PORTABLE (1 VIEW)   Final Result      Findings likely represent pulmonary edema with a new right pleural effusion.           Assessment/Plan  * Rectal adenocarcinoma metastatic to lung (HCC)- (present on admission)  Assessment & Plan  Follow-up with Dr. Kim  Has been through chemo, radiation.  Now with left lower quadrant colostomy  Repeat CT scans show worsening metastasis.  Patient also unable to take care of himself well at home.  He may need postacute placement.  This unfortunately will preclude him from getting any further cancer treatment.  PT/OT consult  Patient has made decision to transition to comfort care and discharge to group home on hospice    Acute CVA (cerebrovascular accident) (HCC)  Assessment & Plan  Multiple small acute infarcts of the bilateral frontal and parietal lobes and the right cerebellum. Pattern is suggestive of watershed infarcts. Correlate for central embolic source.     Hyponatremia  Assessment & Plan  >128  monitor    Acute hypoxic respiratory failure (HCC)  Assessment &  Plan  Requires 2L, baseline RA  Likely secondary to worsening pulmonary metastasis, large right pleural effusion  Plan for thoracentesis today, I added cytology  Wean down oxygen    Pleural effusion on right  Assessment & Plan  S/p thoracentesis 5/9, fluid analysis consistent with exudative, cytology pending, culture pending  Follow-up chest x-ray negative for pneumothorax  Continue empirical Rocephin  Echo normal with EF 55%    Acute kidney injury (HCC)- (present on admission)  Assessment & Plan  Likely prerenal  Cre 1.5, baseline 0.8    Hold outpatient furosemide  Hold IVF given pleural effusion    5/9 cre 0.9  I ordered am lab to monitor  Avoid nephrotoxic meds    Leukocytosis  Assessment & Plan  Wbc 19>21, proc 9  I ordered ua  I started empiric iv rocephin    History of creation of ostomy (HCC)- (present on admission)  Assessment & Plan  History of  Monitor ostomy output    Had stool output in colostomy bag 5/8  Monitor stool output    ACP (advance care planning)- (present on admission)  Assessment & Plan  Educated patient about his diagnosis and prognosis. Oncology had discussion with patient earlier where he was informed that his condition is terminal. Hospice referral place per oncology discussion with patient. I addressed patients wishes at bedside and patient is agreeable to transition to comfort care. If patient is to discharge on hospice he will most likely need a group home. Total of 20 minutes spent in discussion and coordination of advance care planning.     Coronary artery disease- (present on admission)  Assessment & Plan  History of.  Continue outpatient atorvastatin         VTE prophylaxis: lovenox    I have performed a physical exam and reviewed and updated ROS and Plan today (5/11/2025). In review of yesterday's note (5/10/2025), there are no changes except as documented above.  VTE Selection    Greater than 48 minutes spent prepping to see patient (e.g. review of tests) obtaining and/or  reviewing separately obtained history. Performing a medically appropriate examination and/ evaluation.  Counseling and educating the patient/family/caregiver.  Ordering medications, tests, or procedures.  Referring and communicating with other health care professionals.  Documenting clinical information in EPIC.  Independently interpreting results and communicating results to patient/family/caregiver.  Care coordination.

## 2025-05-11 NOTE — PROGRESS NOTES
4 Eyes Skin Assessment Completed by Jazmine ROUSSEAU RN and CHRIS Gottlieb.    Head WDL  Ears WDL  Nose WDL  Mouth WDL  Neck WDL  Breast/Chest R chest port   Shoulder Blades WDL  Spine WDL  (R) Arm/Elbow/Hand Redness, Blanching, Abrasion, and Scab  (L) Arm/Elbow/Hand Redness, Blanching, Abrasion, and Scab  Abdomen Scar, LLQ ostomy   Groin Redness and Blanching  Scrotum/Coccyx/Buttocks Redness and Blanching  (R) Leg WDL  (L) Leg Scab  (R) Heel/Foot/Toe Redness, Blanching, Swelling, and Edema  (L) Heel/Foot/Toe Redness, Blanching, Swelling, and Edema          Devices In Places Central line, colostomy       Interventions In Place Pillows, Heels Loaded W/Pillows, and Pressure Redistribution Mattress    Possible Skin Injury No    Pictures Uploaded Into Epic N/A  Wound Consult Placed N/A  RN Wound Prevention Protocol Ordered No

## 2025-05-12 NOTE — PROGRESS NOTES
Patient with Wilmer score <17. Patient is on comfort care and unable to tolerate 2 RN skin check at this time.

## 2025-05-12 NOTE — CARE PLAN
The patient is Stable - Low risk of patient condition declining or worsening    Shift Goals  Clinical Goals: comfort measures  Patient Goals: NADIR  Family Goals: patient comfort    Progress made toward(s) clinical / shift goals:      Problem: Pain - Standard  Goal: Alleviation of pain or a reduction in pain to the patient’s comfort goal  Outcome: Progressing     Problem: Psychosocial - Comfort Care  Goal: Patient's level of anxiety will decrease  Outcome: Progressing  Goal: Privacy will be maintained for patient and family  Outcome: Progressing     Problem: Discharge Planning - Comfort Care  Goal: Patient's continuum of care needs are met  Outcome: Progressing       Patient is not progressing towards the following goals:

## 2025-05-12 NOTE — PROGRESS NOTES
[facility] Medicine Daily Progress Note    Date of Service  5/12/2025    Chief Complaint  Booker Saucedo is a 59 y.o. male admitted 5/7/2025 with  large right pleural effusion    Hospital Course  Booker Saucedo is a 59 y.o. male with a history of rectal adenocarcinoma followed by Dr. Kim who presented 5/7/2025 with intractable pain mostly in the rectum, lower abdomen, memory issues, and inability to care for self.     He was recently admitted from 4/4-14 as well as 4/16-19 with intractable pain, rectal abscess, complications from rectal adenocarcinoma    Patient states that he lives at home alone.  Sometimes his friends will come to check on him.  He is having a lot of memory issues and forgets everything, he especially has issues remembering his medications.  States his abdominal and rectal pain is not well-controlled, but he also states that he would like to come off the pain meds because he feels like that is contributing to his memory as well as making him sleepy and tired all the time.  Patient has a lot of trouble getting around the house.  He has had decreased appetite and decreased oral intake.  He is dizzy when he gets up and is almost fell multiple times.  He has also been more short of breath and now requiring oxygen here in the ER     CT pulmonary angiogram was negative for pulmonary emboli.  Multiple bilateral pulm nodules consistent with metastasis.  Bilateral pleural effusions right greater than left with associated atelectasis.  Pulmonary edema.  Multiple liver metastasis.  CT abdomen pelvis showed progression of metastatic disease with enlarging pulmonary metastasis, worsening adenopathy and enlarging hepatic metastasis.  Enlarging right and new left pleural effusion with multiple pleural metastasis.  Mild ascites.  Stable necrotic rectal mass.  Splenomegaly.    Cxr personally reviewed, BL pleural effusion R>L      Interval Problem Update  Patient was seen and examined at bedside. Family  updated at bedside.     Currently on comfort care   Dilaudid infusion  Palliative care assisting in symptom management    I have discussed this patient's plan of care and discharge plan at IDT rounds today with Case Management, Nursing, Nursing leadership, and other members of the IDT team.    Consultants/Specialty  Oncology, IR, Colonectal    Code Status  Comfort Care/DNR    Disposition  The patient is not medically cleared for discharge to home or a post-acute facility.      I have placed the appropriate orders for post-discharge needs.    Review of Systems   All 12 systems were reviewed and negative except as mentioned above    Review of Systems   Unable to assess due to acuity of condition    Physical Exam  Temp:  [36.4 °C (97.6 °F)] 36.4 °C (97.6 °F)  Pulse:  [128] 128  Resp:  [18] 18  BP: (92)/(56) 92/56  SpO2:  [92 %] 92 %    Physical Exam  Vitals reviewed.   Constitutional:       Appearance: He is ill-appearing.      Comments: Patient is obtunded  Unable to participate in exam   HENT:      Head: Normocephalic.      Nose: No congestion.      Mouth/Throat:      Mouth: Mucous membranes are moist.   Cardiovascular:      Rate and Rhythm: Regular rhythm. Tachycardia present.      Pulses: Normal pulses.      Heart sounds: Normal heart sounds.   Pulmonary:      Breath sounds: Rhonchi present.      Comments: Decreased breathing sounds  Abdominal:      Tenderness: There is no guarding.   Musculoskeletal:         General: Swelling present. No tenderness.      Cervical back: Normal range of motion and neck supple.   Skin:     General: Skin is warm.      Coloration: Skin is pale.   Neurological:      Mental Status: He is alert and oriented to person, place, and time. Mental status is at baseline.         Fluids    Intake/Output Summary (Last 24 hours) at 5/12/2025 1605  Last data filed at 5/12/2025 1146  Gross per 24 hour   Intake 0 ml   Output 200 ml   Net -200 ml        Laboratory  Recent Labs     05/10/25  0030   WBC  20.3*   RBC 3.84*   HEMOGLOBIN 11.8*   HEMATOCRIT 34.1*   MCV 88.8   MCH 30.7   MCHC 34.6   RDW 56.4*   PLATELETCT 122*   MPV 11.1     Recent Labs     05/10/25  0030   SODIUM 130*   POTASSIUM 4.1   CHLORIDE 96   CO2 24   GLUCOSE 114*   BUN 57*   CREATININE 0.92   CALCIUM 8.6                     Imaging  MR-BRAIN-WITH & W/O   Final Result      1.  Multiple small acute infarcts of the bilateral frontal and parietal lobes and the right cerebellum. Pattern is suggestive of watershed infarcts. Correlate for central embolic source.   2.  No evidence of intracranial metastatic disease, though suboptimally evaluated related to patient motion.   3.  1.3 cm soft tissue lesion at the left vertex. Additional smaller soft tissue lesion just left and posterior to the dominant lesion. Correlate with exam for evidence of malignancy.      CT-HEAD W/O   Final Result      No acute intracranial abnormality.         Brain metastases cannot be excluded on a noncontrast head CT. If there is continued concern, MRI of the brain without and with contrast may be performed.                        DX-CHEST-PORTABLE (1 VIEW)   Final Result      Decreased right pleural effusion and associated atelectasis. Trace residual pleural effusions. No pneumothorax.      US-THORACENTESIS PUNCTURE RIGHT   Final Result      1. Ultrasound guided right sided diagnostic thoracentesis.      2. 1200 mL of fluid withdrawn.      EC-ECHOCARDIOGRAM COMPLETE W/O CONT   Final Result      CT-CTA CHEST PULMONARY ARTERY W/ RECONS   Final Result      1.  No CT evidence for pulmonary emboli.   2.  Multiple bilateral pulmonary nodules consistent with metastases.   3.  Bilateral pleural effusions, larger on the RIGHT, with associated atelectasis.   4.  Ill-defined patchy interstitial opacities, likely indicating edema.   5.  Multiple liver metastases.               CT-ABDOMEN-PELVIS WITH   Final Result      1.  Progression of metastatic disease with enlarging pulmonary  metastases, worsening adenopathy and enlarging hepatic metastases.   2.  Enlarging right and new left pleural effusions with multiple pleural metastases.   3.  Mild ascites.   4.  Stable necrotic rectal mass.   5.  Splenomegaly.         DX-CHEST-PORTABLE (1 VIEW)   Final Result      Findings likely represent pulmonary edema with a new right pleural effusion.           Assessment/Plan  * Rectal adenocarcinoma metastatic to lung (HCC)- (present on admission)  Assessment & Plan  Follow-up with Dr. Kim  Has been through chemo, radiation.  Now with left lower quadrant colostomy  Repeat CT scans show worsening metastasis.  Patient also unable to take care of himself well at home.  He may need postacute placement.  This unfortunately will preclude him from getting any further cancer treatment.  PT/OT consult  Patient has made decision to transition to comfort care and discharge to group home on hospice    Acute CVA (cerebrovascular accident) (HCC)  Assessment & Plan  Multiple small acute infarcts of the bilateral frontal and parietal lobes and the right cerebellum. Pattern is suggestive of watershed infarcts. Correlate for central embolic source.     Hyponatremia  Assessment & Plan  >128  monitor    Acute hypoxic respiratory failure (HCC)  Assessment & Plan  Requires 2L, baseline RA  Likely secondary to worsening pulmonary metastasis, large right pleural effusion  Plan for thoracentesis today, I added cytology  Wean down oxygen    Pleural effusion on right  Assessment & Plan  S/p thoracentesis 5/9, fluid analysis consistent with exudative, cytology pending, culture pending  Follow-up chest x-ray negative for pneumothorax  Continue empirical Rocephin  Echo normal with EF 55%    Acute kidney injury (HCC)- (present on admission)  Assessment & Plan  Likely prerenal  Cre 1.5, baseline 0.8    Hold outpatient furosemide  Hold IVF given pleural effusion    5/9 cre 0.9  I ordered am lab to monitor  Avoid nephrotoxic  meds    Leukocytosis  Assessment & Plan  Wbc 19>21, proc 9  I ordered ua  I started empiric iv rocephin    History of creation of ostomy (HCC)- (present on admission)  Assessment & Plan  History of  Monitor ostomy output    Had stool output in colostomy bag 5/8  Monitor stool output    ACP (advance care planning)- (present on admission)  Assessment & Plan  Educated patient about his diagnosis and prognosis. Oncology had discussion with patient earlier where he was informed that his condition is terminal. Hospice referral place per oncology discussion with patient. I addressed patients wishes at bedside and patient is agreeable to transition to comfort care. If patient is to discharge on hospice he will most likely need a group home. Total of 20 minutes spent in discussion and coordination of advance care planning.     Coronary artery disease- (present on admission)  Assessment & Plan  History of.  Continue outpatient atorvastatin         VTE prophylaxis: lovenox    I have performed a physical exam and reviewed and updated ROS and Plan today (5/12/2025). In review of yesterday's note (5/11/2025), there are no changes except as documented above.  VTE Selection    Greater than 51 minutes spent prepping to see patient (e.g. review of tests) obtaining and/or reviewing separately obtained history. Performing a medically appropriate examination and/ evaluation.  Counseling and educating the patient/family/caregiver.  Ordering medications, tests, or procedures.  Referring and communicating with other health care professionals.  Documenting clinical information in EPIC.  Independently interpreting results and communicating results to patient/family/caregiver.  Care coordination.

## 2025-05-12 NOTE — CARE PLAN
The patient is Unstable - High likelihood or risk of patient condition declining or worsening    Shift Goals  Clinical Goals: Comfort measures  Patient Goals: NADIR  Family Goals: Pt comfort    Progress made toward(s) clinical / shift goals:  Comfort meds are adjusted by MD based on patient's presentation. PRN meds and scheduled meds are given to help maintain comfort. Pt's response to PRN and newly scheduled meds = decrease in moaning and grimacing, as well as a decrease in audible secretions.    Patient is not progressing towards the following goals:

## 2025-05-12 NOTE — PROGRESS NOTES
Palliative Care    Room: R313    HPI:   Booker Saucedo is a 59 y.o. male with medical history significant for rectal adenocarcinoma with multiple recent hospital admissions well-known to the palliative care team for complex cancer associated pain admitted 5/7/2025 with large right pleural effusion.  Patient was transition to comfort care 5/10/2025.      REVIEW OF SYSTEMS:  Unable to perform due to acuity of condition.     PHYSICAL EXAM:  Physical Exam  Constitutional:       Appearance: He is diaphoretic.   Cardiovascular:      Rate and Rhythm: Tachycardia present.   Pulmonary:      Comments: Audible grunting and moaning  Per patient's ex-wife at bedside secretions too deep to suction      Discussion/Plan   #Cancer Associated Pain  #Comfort Focused Treatment Only  Patient has signs and symptoms of distress despite receiving 200 mg oral morphine equivalent via sublingual morphine oral concentrate in the last 24 hours.  He was previously on 30 mg of methadone per day via tablets which were not transition to oral concentrate yesterday; last dose yesterday morning.  Patient with significant signs of distress including tachycardia, moaning and grunting, audible secretions.  - Discontinue all oral medications and on comfort focused treatment orders  - Start hydromorphone titratable drip at 1 mg/h IV; may increase by 1 mg/h up to 5 mg/h  - Start diazepam 5 mg IV every 4 hours; may require Versed drip if ineffective  - Can continue as needed lorazepam sublingual or IV (increased dose to 2 mg IV as needed from 1 mg IV as he has received 6 doses last 24 hours  - Start Robinul 0.2 mg IV every 4 hours  - Continue atropine sublingual every 4 hours as needed for secretion management  - Discussed with CHRIS Pollock and Dr. Rodriguez    Pending hospice choice. Prognosis is likely no longer than days; possibly less.     Interval diagnostic studies and medical documentation entries pertinent to this case were reviewed independently by  "me. This patient has at least one acute or chronic illness or injury that poses a threat to life or bodily function. This patient suffers from a high risk of morbidity from additional invasive diagnostic testing or intensive treatment. Discussion of recommendations and coordination of care undertaken with primary provider/treatment team.      Taryn \"Johanna\" ABBE Brennan, [facility]  Inpatient Palliative Care (service hours Mon-Fri 8AM - 5PM)  [phone]      "

## 2025-05-12 NOTE — DISCHARGE PLANNING
1210  Received Choice form at 1208  Agency/Facility Name: [facility] for GIP evaluation  Referral sent per Choice form @ 1219

## 2025-05-12 NOTE — DISCHARGE PLANNING
Case Management Discharge Planning    Admission Date: 5/7/2025  GMLOS: 5.6  ALOS: 5    6-Clicks ADL Score: 24  6-Clicks Mobility Score: 23      Anticipated Discharge Dispo: Discharge Disposition: D/T to hospice home (50)    DME Needed: No    Action(s) Taken: Patient was discussed in IDT rounds, patient is being started on a Dilaudid drip. RNCM met with patient's sister Paula at bedside to obtain hospice choice for [facility] for GIP eval and faxed to [facility]. RNCM voalted [facility] supervisor for patient to be evaluated for GIP    Escalations Completed: None    Medically Clear: No    Next Steps: pending GIP eval    Barriers to Discharge:  GIP eval    Is the patient up for discharge tomorrow: No

## 2025-05-12 NOTE — DIETARY
Nutrition Services:  Brief Update    Per chart review, pt has transitioned to comfort care on 5/10. RD will re-screen pt weekly.  Consult RD as needed.    RD available PRN.

## 2025-05-13 PROBLEM — R18.0 MALIGNANT ASCITES: Status: ACTIVE | Noted: 2025-01-01

## 2025-05-13 NOTE — DISCHARGE SUMMARY
Death Summary    Cause of Death  Rectal adenocarcinoma with metastatic spread to lung, liver and brain  due to neoplastic process due to unknown    Comorbid Conditions at the Time of Death  Principal Problem:    Rectal adenocarcinoma metastatic to lung, liver, brain (HCC) (POA: Yes)  Active Problems:    Acute CVA (cerebrovascular accident) (HCC) (POA: Unknown)    Coronary artery disease (POA: Yes)    ACP (advance care planning) (POA: Yes)    History of creation of ostomy (HCC) (POA: Yes)    Leukocytosis (POA: Unknown)    Acute kidney injury (HCC) (POA: Yes)    Pleural effusion on right (POA: Unknown)    Acute hypoxic respiratory failure (HCC) (POA: Unknown)    Hyponatremia (POA: Unknown)    Malignant ascites (POA: Unknown)  Resolved Problems:    * No resolved hospital problems. *      History of Presenting Illness and Hospital Course  Booker Saucedo is a 59 y.o. male with a history of rectal adenocarcinoma followed by Dr. Kim who presented 5/7/2025 with intractable pain mostly in the rectum, lower abdomen, memory issues, and inability to care for self. He was recently admitted from 4/4-14 as well as 4/16-19 with intractable pain, rectal abscess, complications from rectal adenocarcinoma. CT pulmonary angiogram was negative for pulmonary emboli.  Multiple bilateral pulm nodules consistent with metastasis.  Bilateral pleural effusions right greater than left with associated atelectasis.  Pulmonary edema.  Multiple liver metastasis. CT abdomen pelvis showed progression of metastatic disease with enlarging pulmonary metastasis, worsening adenopathy and enlarging hepatic metastasis.  Enlarging right and new left pleural effusion with multiple pleural metastasis.  Mild ascites.  Stable necrotic rectal mass.  MRI brain demonstrated multiple small acute infarcts of the bilateral frontal and parietal lobes and the right cerebellum. Pattern is suggestive of watershed infarcts. 1.3 cm soft tissue lesion at the left  vertex. Additional smaller soft tissue lesion just left and posterior to the dominant lesion. Oncology was consulted and discussed terminal status of patients condition. Through goals of care conversations patient made decision to transition to comfort care and hospice. Patient  under comfort care measures.     Death Date: 25   Death Time: 217         Pronounced By (RN1): Nakia Reyes  Pronounced By (RN2): Jigna De Los Santos

## 2025-05-13 NOTE — PROGRESS NOTES
Patient has a Wilmer score <17. Patient is on comfort care and unable to tolerate 2 RN skin check at this time.

## 2025-05-13 NOTE — CARE PLAN
The patient is Unstable - High likelihood or risk of patient condition declining or worsening    Shift Goals  Clinical Goals: comfort measures  Patient Goals: NADIR  Family Goals: comfort    Progress made toward(s) clinical / shift goals:     Problem: Knowledge Deficit - Comfort Care  Goal: Patient and family/care givers will demonstrate understanding of dying process and grieving  Outcome: Progressing       Patient is not progressing towards the following goals:

## 2025-05-14 LAB
FUNGUS SPEC CULT: NORMAL
FUNGUS SPEC FUNGUS STN: NORMAL
SIGNIFICANT IND 70042: NORMAL
SITE SITE: NORMAL
SOURCE SOURCE: NORMAL

## 2025-07-05 LAB
FINAL REPORT Q0603: NORMAL
PRELIMINARY RPT Q0601: NORMAL
RHODAMINE-AURAMINE STN SPEC: NORMAL

## (undated) DEVICE — ELECTRODE DUAL RETURN W/ CORD - (50/PK)

## (undated) DEVICE — DRAPE STRLE REG TOWEL 18X24 - (10/BX 4BX/CA)"

## (undated) DEVICE — SUTURE 4-0 MONOCRYL PLUS PS-2 - 27 INCH (36/BX)

## (undated) DEVICE — LACTATED RINGERS INJ 1000 ML - (14EA/CA 60CA/PF)

## (undated) DEVICE — SUCTION INSTRUMENT YANKAUER BULBOUS TIP W/O VENT (50EA/CA)

## (undated) DEVICE — DRESSING ABDOMINAL PAD STERILE 8 X 10" (360EA/CA)"

## (undated) DEVICE — TRAY SRGPRP PVP IOD WT PRP - (20/CA)

## (undated) DEVICE — KIT 2.25IN COL ILSTM 2 PC DRN - 57MM 2 1/4 INCH THIS IS FOR THE OR ONLY (5/BX)

## (undated) DEVICE — SUTURE GENERAL

## (undated) DEVICE — SODIUM CHL IRRIGATION 0.9% 1000ML (12EA/CA)

## (undated) DEVICE — GLOVE BIOGEL SZ 8 SURGICAL PF LTX - (50PR/BX 4BX/CA)

## (undated) DEVICE — PACK MINOR BASIN - (4EA/CA)

## (undated) DEVICE — CANISTER SUCTION 3000ML MECHANICAL FILTER AUTO SHUTOFF MEDI-VAC NONSTERILE LF DISP (40EA/CA)

## (undated) DEVICE — SYRINGE 30 ML LL (56/BX)

## (undated) DEVICE — DRAPE 36X28IN RAD CARM BND BG - (25/CA) O

## (undated) DEVICE — ARMREST CRADLE FOAM - (2PR/PK 12PR/CA)

## (undated) DEVICE — BLADE SURGICAL #15 - (50/BX 3BX/CA)

## (undated) DEVICE — VESSELOOP MAXI BLUE STERILE- SURG-I-LOOP (10EA/BX)

## (undated) DEVICE — SYRINGE SAFETY 3 ML 18 GA X 1 1/2 BLUNT LL (100/BX 8BX/CA)

## (undated) DEVICE — DERMABOND ADVANCED - (12EA/BX)

## (undated) DEVICE — VESSEL DIVIDER SEAL LAP LIGASURE 37CM (6EA/BX)

## (undated) DEVICE — SLEEVE, VASO, THIGH, MED

## (undated) DEVICE — BRIEF STRETCH MATERNITY M/L - FITS 20-60IN (5EA/BG 20BG/CA)

## (undated) DEVICE — PACK LAP CHOLE OR - (2EA/CA)

## (undated) DEVICE — COVER LIGHT HANDLE ALC PLUS DISP (18EA/BX)

## (undated) DEVICE — STAPLER SKIN DISP - (6/BX 10BX/CA) VISISTAT

## (undated) DEVICE — CHLORAPREP 26 ML APPLICATOR - ORANGE TINT(25/CA)

## (undated) DEVICE — SOD. CHL. INJ. 0.9% 250 ML - (36/CA 50CA/PF)

## (undated) DEVICE — SENSOR OXIMETER ADULT SPO2 RD SET (20EA/BX)

## (undated) DEVICE — STAPLER 75MM LINEAR OPEN (3EA/BX)

## (undated) DEVICE — BOVIE NEEDLE TIP 3CM COLORADO

## (undated) DEVICE — SET EXTENSION WITH 2 PORTS (48EA/CA) ***PART #2C8610 IS A SUBSTITUTE*****

## (undated) DEVICE — GAUZE FLUFF STERILE 2-PLY 36 X 36 (100EA/CA)

## (undated) DEVICE — SET LEADWIRE 5 LEAD BEDSIDE DISPOSABLE ECG (1SET OF 5/EA)

## (undated) DEVICE — DRAPE LAPAROTOMY T SHEET - (12EA/CA)

## (undated) DEVICE — SUTURE 3-0 VICRYL PLUS SH - 8X 18 INCH (12/BX)

## (undated) DEVICE — SUTURE 3-0 PROLENE SH 30 (36PK/BX)"

## (undated) DEVICE — SUTURE 4-0 MONOCRYL PLUS PS-1 - 27 INCH (36/BX)

## (undated) DEVICE — CANNULA W/SEAL 5X100 Z-THRE - ADED KII (12/BX)

## (undated) DEVICE — CATHETER IV 14 GA X 2 ---SURG.& SDS ONLY---(200EA/CA)

## (undated) DEVICE — GOWN WARMING STANDARD FLEX - (30/CA)

## (undated) DEVICE — COVER PROBE INTRAOPERATIVE KIT (10EA/CA)

## (undated) DEVICE — GLOVE BIOGEL PI INDICATOR SZ 7.0 SURGICAL PF LF - (50/BX 4BX/CA)

## (undated) DEVICE — DECANTER FLD BLS - (50/CA)

## (undated) DEVICE — GLOVE SZ 6.5 BIOGEL PI MICRO - PF LF (50PR/BX)

## (undated) DEVICE — SLEEVE VASO DVT COMPRESSION CALF MED - (10PR/CA)

## (undated) DEVICE — SCISSORS 5MM CVD (6EA/BX)

## (undated) DEVICE — Device

## (undated) DEVICE — SPONGE GAUZESTER 4 X 4 4PLY - (128PK/CA)

## (undated) DEVICE — TUBING CLEARLINK DUO-VENT - C-FLO (48EA/CA)

## (undated) DEVICE — SHEET TRANSVERSE LAP - (12EA/CA)

## (undated) DEVICE — SUTURE 2-0 SILK 12 X 18" (36PK/BX)"

## (undated) DEVICE — TROCAR 5X100 NON BLADED Z-TH - READ KII (6/BX)